# Patient Record
Sex: MALE | Race: WHITE | NOT HISPANIC OR LATINO | Employment: PART TIME | URBAN - METROPOLITAN AREA
[De-identification: names, ages, dates, MRNs, and addresses within clinical notes are randomized per-mention and may not be internally consistent; named-entity substitution may affect disease eponyms.]

---

## 2021-06-28 ENCOUNTER — APPOINTMENT (EMERGENCY)
Dept: CT IMAGING | Facility: HOSPITAL | Age: 31
End: 2021-06-28
Payer: COMMERCIAL

## 2021-06-28 ENCOUNTER — ANESTHESIA EVENT (OUTPATIENT)
Dept: PERIOP | Facility: HOSPITAL | Age: 31
End: 2021-06-28
Payer: COMMERCIAL

## 2021-06-28 ENCOUNTER — ANESTHESIA (OUTPATIENT)
Dept: PERIOP | Facility: HOSPITAL | Age: 31
End: 2021-06-28
Payer: COMMERCIAL

## 2021-06-28 ENCOUNTER — HOSPITAL ENCOUNTER (OUTPATIENT)
Facility: HOSPITAL | Age: 31
Setting detail: OBSERVATION
Discharge: HOME | End: 2021-06-29
Attending: EMERGENCY MEDICINE | Admitting: INTERNAL MEDICINE
Payer: COMMERCIAL

## 2021-06-28 ENCOUNTER — HOSPITAL ENCOUNTER (EMERGENCY)
Facility: HOSPITAL | Age: 31
Discharge: HOME | End: 2021-06-28
Attending: EMERGENCY MEDICINE | Admitting: EMERGENCY MEDICINE
Payer: COMMERCIAL

## 2021-06-28 VITALS
WEIGHT: 232 LBS | DIASTOLIC BLOOD PRESSURE: 65 MMHG | RESPIRATION RATE: 16 BRPM | SYSTOLIC BLOOD PRESSURE: 135 MMHG | TEMPERATURE: 97.8 F | HEART RATE: 73 BPM | OXYGEN SATURATION: 99 %

## 2021-06-28 DIAGNOSIS — N17.9 ACUTE KIDNEY INJURY (HCC): Primary | ICD-10-CM

## 2021-06-28 DIAGNOSIS — K12.2 LUDWIG'S ANGINA: Primary | ICD-10-CM

## 2021-06-28 DIAGNOSIS — K08.89 DENTALGIA: ICD-10-CM

## 2021-06-28 DIAGNOSIS — K04.7 DENTAL INFECTION: ICD-10-CM

## 2021-06-28 DIAGNOSIS — F17.210 TOBACCO DEPENDENCE DUE TO CIGARETTES: ICD-10-CM

## 2021-06-28 DIAGNOSIS — K04.7 DENTAL ABSCESS: ICD-10-CM

## 2021-06-28 PROBLEM — F11.20 OPIOID DEPENDENCE (HCC): Status: ACTIVE | Noted: 2021-06-28

## 2021-06-28 PROBLEM — R79.89 ELEVATED SERUM CREATININE: Status: ACTIVE | Noted: 2021-06-28

## 2021-06-28 LAB
ALBUMIN SERPL BCP-MCNC: 3.6 G/DL (ref 3.5–5)
ALP SERPL-CCNC: 70 U/L (ref 46–116)
ALT SERPL W P-5'-P-CCNC: 19 U/L (ref 12–78)
ANION GAP SERPL CALCULATED.3IONS-SCNC: 7 MMOL/L (ref 4–13)
ANION GAP SERPL CALCULATED.3IONS-SCNC: 8 MMOL/L (ref 4–13)
AST SERPL W P-5'-P-CCNC: 14 U/L (ref 5–45)
BASOPHILS # BLD AUTO: 0.04 THOUSANDS/ΜL (ref 0–0.1)
BASOPHILS # BLD AUTO: 0.1 THOUSANDS/ΜL (ref 0–0.1)
BASOPHILS NFR BLD AUTO: 0 % (ref 0–1)
BASOPHILS NFR BLD AUTO: 1 % (ref 0–2)
BILIRUB SERPL-MCNC: 0.43 MG/DL (ref 0.2–1)
BUN SERPL-MCNC: 22 MG/DL (ref 5–25)
BUN SERPL-MCNC: 27 MG/DL (ref 7–25)
CALCIUM SERPL-MCNC: 8.8 MG/DL (ref 8.3–10.1)
CALCIUM SERPL-MCNC: 9.4 MG/DL (ref 8.6–10.5)
CHLORIDE SERPL-SCNC: 105 MMOL/L (ref 98–107)
CHLORIDE SERPL-SCNC: 110 MMOL/L (ref 100–108)
CO2 SERPL-SCNC: 23 MMOL/L (ref 21–32)
CO2 SERPL-SCNC: 28 MMOL/L (ref 21–31)
CREAT SERPL-MCNC: 1.22 MG/DL (ref 0.6–1.3)
CREAT SERPL-MCNC: 1.59 MG/DL (ref 0.7–1.3)
EOSINOPHIL # BLD AUTO: 0.03 THOUSAND/ΜL (ref 0–0.61)
EOSINOPHIL # BLD AUTO: 0.2 THOUSAND/ΜL (ref 0–0.61)
EOSINOPHIL NFR BLD AUTO: 0 % (ref 0–6)
EOSINOPHIL NFR BLD AUTO: 1 % (ref 0–5)
ERYTHROCYTE [DISTWIDTH] IN BLOOD BY AUTOMATED COUNT: 14.5 % (ref 11.5–14.5)
ERYTHROCYTE [DISTWIDTH] IN BLOOD BY AUTOMATED COUNT: 14.6 % (ref 11.6–15.1)
GFR SERPL CREATININE-BSD FRML MDRD: 57 ML/MIN/1.73SQ M
GFR SERPL CREATININE-BSD FRML MDRD: 78 ML/MIN/1.73SQ M
GLUCOSE SERPL-MCNC: 104 MG/DL (ref 65–99)
GLUCOSE SERPL-MCNC: 99 MG/DL (ref 65–140)
HCT VFR BLD AUTO: 43.1 % (ref 36.5–49.3)
HCT VFR BLD AUTO: 44.3 % (ref 42–47)
HGB BLD-MCNC: 14.2 G/DL (ref 12–17)
HGB BLD-MCNC: 14.7 G/DL (ref 14–18)
IMM GRANULOCYTES # BLD AUTO: 0.07 THOUSAND/UL (ref 0–0.2)
IMM GRANULOCYTES NFR BLD AUTO: 0 % (ref 0–2)
LYMPHOCYTES # BLD AUTO: 0.53 THOUSANDS/ΜL (ref 0.6–4.47)
LYMPHOCYTES # BLD AUTO: 2.1 THOUSANDS/ΜL (ref 0.6–4.47)
LYMPHOCYTES NFR BLD AUTO: 16 % (ref 21–51)
LYMPHOCYTES NFR BLD AUTO: 3 % (ref 14–44)
MAGNESIUM SERPL-MCNC: 2.2 MG/DL (ref 1.9–2.7)
MCH RBC QN AUTO: 29.6 PG (ref 26.8–34.3)
MCH RBC QN AUTO: 29.6 PG (ref 26–34)
MCHC RBC AUTO-ENTMCNC: 32.9 G/DL (ref 31.4–37.4)
MCHC RBC AUTO-ENTMCNC: 33.2 G/DL (ref 31–37)
MCV RBC AUTO: 89 FL (ref 81–99)
MCV RBC AUTO: 90 FL (ref 82–98)
MONOCYTES # BLD AUTO: 0.91 THOUSAND/ΜL (ref 0.17–1.22)
MONOCYTES # BLD AUTO: 1.3 THOUSAND/ΜL (ref 0.17–1.22)
MONOCYTES NFR BLD AUTO: 6 % (ref 4–12)
MONOCYTES NFR BLD AUTO: 9 % (ref 2–12)
NEUTROPHILS # BLD AUTO: 10.1 THOUSANDS/ΜL (ref 1.4–6.5)
NEUTROPHILS # BLD AUTO: 14.1 THOUSANDS/ΜL (ref 1.85–7.62)
NEUTS SEG NFR BLD AUTO: 73 % (ref 42–75)
NEUTS SEG NFR BLD AUTO: 91 % (ref 43–75)
NRBC BLD AUTO-RTO: 0 /100 WBCS
PLATELET # BLD AUTO: 174 THOUSANDS/UL (ref 149–390)
PLATELET # BLD AUTO: 183 THOUSANDS/UL (ref 149–390)
PMV BLD AUTO: 11.3 FL (ref 8.9–12.7)
PMV BLD AUTO: 9.3 FL (ref 8.6–11.7)
POTASSIUM SERPL-SCNC: 3.6 MMOL/L (ref 3.5–5.3)
POTASSIUM SERPL-SCNC: 4.1 MMOL/L (ref 3.5–5.5)
PROT SERPL-MCNC: 7.4 G/DL (ref 6.4–8.2)
RBC # BLD AUTO: 4.8 MILLION/UL (ref 3.88–5.62)
RBC # BLD AUTO: 4.97 MILLION/UL (ref 4.3–5.9)
SODIUM SERPL-SCNC: 140 MMOL/L (ref 136–145)
SODIUM SERPL-SCNC: 141 MMOL/L (ref 134–143)
WBC # BLD AUTO: 13.8 THOUSAND/UL (ref 4.8–10.8)
WBC # BLD AUTO: 15.68 THOUSAND/UL (ref 4.31–10.16)

## 2021-06-28 PROCEDURE — 99284 EMERGENCY DEPT VISIT MOD MDM: CPT

## 2021-06-28 PROCEDURE — 99285 EMERGENCY DEPT VISIT HI MDM: CPT | Performed by: PHYSICIAN ASSISTANT

## 2021-06-28 PROCEDURE — 87147 CULTURE TYPE IMMUNOLOGIC: CPT | Performed by: DENTIST

## 2021-06-28 PROCEDURE — 87116 MYCOBACTERIA CULTURE: CPT | Performed by: DENTIST

## 2021-06-28 PROCEDURE — 87206 SMEAR FLUORESCENT/ACID STAI: CPT | Performed by: DENTIST

## 2021-06-28 PROCEDURE — 96365 THER/PROPH/DIAG IV INF INIT: CPT

## 2021-06-28 PROCEDURE — 87040 BLOOD CULTURE FOR BACTERIA: CPT | Performed by: PHYSICIAN ASSISTANT

## 2021-06-28 PROCEDURE — 70487 CT MAXILLOFACIAL W/DYE: CPT

## 2021-06-28 PROCEDURE — 85025 COMPLETE CBC W/AUTO DIFF WBC: CPT | Performed by: PHYSICIAN ASSISTANT

## 2021-06-28 PROCEDURE — 83735 ASSAY OF MAGNESIUM: CPT | Performed by: EMERGENCY MEDICINE

## 2021-06-28 PROCEDURE — 80053 COMPREHEN METABOLIC PANEL: CPT | Performed by: PHYSICIAN ASSISTANT

## 2021-06-28 PROCEDURE — 99220 PR INITIAL OBSERVATION CARE/DAY 70 MINUTES: CPT | Performed by: INTERNAL MEDICINE

## 2021-06-28 PROCEDURE — G1004 CDSM NDSC: HCPCS

## 2021-06-28 PROCEDURE — 87102 FUNGUS ISOLATION CULTURE: CPT | Performed by: DENTIST

## 2021-06-28 PROCEDURE — 96361 HYDRATE IV INFUSION ADD-ON: CPT

## 2021-06-28 PROCEDURE — 87070 CULTURE OTHR SPECIMN AEROBIC: CPT | Performed by: DENTIST

## 2021-06-28 PROCEDURE — 87075 CULTR BACTERIA EXCEPT BLOOD: CPT | Performed by: DENTIST

## 2021-06-28 PROCEDURE — 85025 COMPLETE CBC W/AUTO DIFF WBC: CPT | Performed by: EMERGENCY MEDICINE

## 2021-06-28 PROCEDURE — 87205 SMEAR GRAM STAIN: CPT | Performed by: DENTIST

## 2021-06-28 PROCEDURE — 96375 TX/PRO/DX INJ NEW DRUG ADDON: CPT

## 2021-06-28 PROCEDURE — 99285 EMERGENCY DEPT VISIT HI MDM: CPT | Performed by: EMERGENCY MEDICINE

## 2021-06-28 PROCEDURE — 80048 BASIC METABOLIC PNL TOTAL CA: CPT | Performed by: EMERGENCY MEDICINE

## 2021-06-28 PROCEDURE — 36415 COLL VENOUS BLD VENIPUNCTURE: CPT | Performed by: EMERGENCY MEDICINE

## 2021-06-28 RX ORDER — HYDROXYZINE HYDROCHLORIDE 25 MG/1
25 TABLET, FILM COATED ORAL EVERY 6 HOURS PRN
Status: DISCONTINUED | OUTPATIENT
Start: 2021-06-28 | End: 2021-06-29 | Stop reason: HOSPADM

## 2021-06-28 RX ORDER — METOCLOPRAMIDE HYDROCHLORIDE 5 MG/ML
10 INJECTION INTRAMUSCULAR; INTRAVENOUS ONCE AS NEEDED
Status: DISCONTINUED | OUTPATIENT
Start: 2021-06-28 | End: 2021-06-28 | Stop reason: HOSPADM

## 2021-06-28 RX ORDER — PROMETHAZINE HYDROCHLORIDE 25 MG/ML
12.5 INJECTION, SOLUTION INTRAMUSCULAR; INTRAVENOUS ONCE AS NEEDED
Status: DISCONTINUED | OUTPATIENT
Start: 2021-06-28 | End: 2021-06-28 | Stop reason: HOSPADM

## 2021-06-28 RX ORDER — NALOXONE HYDROCHLORIDE 0.4 MG/ML
0.1 INJECTION, SOLUTION INTRAMUSCULAR; INTRAVENOUS; SUBCUTANEOUS
Status: DISCONTINUED | OUTPATIENT
Start: 2021-06-28 | End: 2021-06-28

## 2021-06-28 RX ORDER — HYDROMORPHONE HCL 110MG/55ML
PATIENT CONTROLLED ANALGESIA SYRINGE INTRAVENOUS AS NEEDED
Status: DISCONTINUED | OUTPATIENT
Start: 2021-06-28 | End: 2021-06-28

## 2021-06-28 RX ORDER — CLINDAMYCIN PHOSPHATE 600 MG/50ML
600 INJECTION INTRAVENOUS ONCE
Status: DISCONTINUED | OUTPATIENT
Start: 2021-06-28 | End: 2021-06-28

## 2021-06-28 RX ORDER — KETOROLAC TROMETHAMINE 30 MG/ML
15 INJECTION, SOLUTION INTRAMUSCULAR; INTRAVENOUS ONCE
Status: COMPLETED | OUTPATIENT
Start: 2021-06-28 | End: 2021-06-28

## 2021-06-28 RX ORDER — LIDOCAINE HYDROCHLORIDE AND EPINEPHRINE 10; 10 MG/ML; UG/ML
INJECTION, SOLUTION INFILTRATION; PERINEURAL AS NEEDED
Status: DISCONTINUED | OUTPATIENT
Start: 2021-06-28 | End: 2021-06-28 | Stop reason: HOSPADM

## 2021-06-28 RX ORDER — SUCCINYLCHOLINE/SOD CL,ISO/PF 100 MG/5ML
SYRINGE (ML) INTRAVENOUS AS NEEDED
Status: DISCONTINUED | OUTPATIENT
Start: 2021-06-28 | End: 2021-06-28

## 2021-06-28 RX ORDER — HYDROMORPHONE HCL/PF 1 MG/ML
0.5 SYRINGE (ML) INJECTION
Status: DISCONTINUED | OUTPATIENT
Start: 2021-06-28 | End: 2021-06-28

## 2021-06-28 RX ORDER — SODIUM CHLORIDE, SODIUM LACTATE, POTASSIUM CHLORIDE, CALCIUM CHLORIDE 600; 310; 30; 20 MG/100ML; MG/100ML; MG/100ML; MG/100ML
125 INJECTION, SOLUTION INTRAVENOUS CONTINUOUS
Status: DISCONTINUED | OUTPATIENT
Start: 2021-06-28 | End: 2021-06-29

## 2021-06-28 RX ORDER — BUPIVACAINE HYDROCHLORIDE AND EPINEPHRINE 5; 5 MG/ML; UG/ML
INJECTION, SOLUTION EPIDURAL; INTRACAUDAL; PERINEURAL AS NEEDED
Status: DISCONTINUED | OUTPATIENT
Start: 2021-06-28 | End: 2021-06-28 | Stop reason: HOSPADM

## 2021-06-28 RX ORDER — ROCURONIUM BROMIDE 10 MG/ML
INJECTION, SOLUTION INTRAVENOUS AS NEEDED
Status: DISCONTINUED | OUTPATIENT
Start: 2021-06-28 | End: 2021-06-28

## 2021-06-28 RX ORDER — SODIUM CHLORIDE 9 MG/ML
125 INJECTION, SOLUTION INTRAVENOUS CONTINUOUS
Status: DISCONTINUED | OUTPATIENT
Start: 2021-06-28 | End: 2021-06-29

## 2021-06-28 RX ORDER — FENTANYL CITRATE 50 UG/ML
50 INJECTION, SOLUTION INTRAMUSCULAR; INTRAVENOUS ONCE
Status: COMPLETED | OUTPATIENT
Start: 2021-06-28 | End: 2021-06-28

## 2021-06-28 RX ORDER — HYDROMORPHONE HCL IN WATER/PF 6 MG/30 ML
0.2 PATIENT CONTROLLED ANALGESIA SYRINGE INTRAVENOUS
Status: DISCONTINUED | OUTPATIENT
Start: 2021-06-28 | End: 2021-06-28 | Stop reason: HOSPADM

## 2021-06-28 RX ORDER — LABETALOL 20 MG/4 ML (5 MG/ML) INTRAVENOUS SYRINGE
10
Status: DISCONTINUED | OUTPATIENT
Start: 2021-06-28 | End: 2021-06-28 | Stop reason: HOSPADM

## 2021-06-28 RX ORDER — HYDRALAZINE HYDROCHLORIDE 20 MG/ML
5 INJECTION INTRAMUSCULAR; INTRAVENOUS
Status: DISCONTINUED | OUTPATIENT
Start: 2021-06-28 | End: 2021-06-28 | Stop reason: HOSPADM

## 2021-06-28 RX ORDER — ACETAMINOPHEN 325 MG/1
975 TABLET ORAL EVERY 8 HOURS SCHEDULED
Status: DISCONTINUED | OUTPATIENT
Start: 2021-06-28 | End: 2021-06-29 | Stop reason: HOSPADM

## 2021-06-28 RX ORDER — HYDROMORPHONE HYDROCHLORIDE 2 MG/1
2 TABLET ORAL EVERY 6 HOURS PRN
Status: DISCONTINUED | OUTPATIENT
Start: 2021-06-28 | End: 2021-06-28

## 2021-06-28 RX ORDER — OXYCODONE HYDROCHLORIDE 10 MG/1
10 TABLET ORAL EVERY 4 HOURS PRN
Status: DISCONTINUED | OUTPATIENT
Start: 2021-06-28 | End: 2021-06-28

## 2021-06-28 RX ORDER — FENTANYL CITRATE 50 UG/ML
INJECTION, SOLUTION INTRAMUSCULAR; INTRAVENOUS AS NEEDED
Status: DISCONTINUED | OUTPATIENT
Start: 2021-06-28 | End: 2021-06-28

## 2021-06-28 RX ORDER — PROPOFOL 10 MG/ML
INJECTION, EMULSION INTRAVENOUS AS NEEDED
Status: DISCONTINUED | OUTPATIENT
Start: 2021-06-28 | End: 2021-06-28

## 2021-06-28 RX ORDER — AMOXICILLIN 250 MG
1 CAPSULE ORAL DAILY
Status: DISCONTINUED | OUTPATIENT
Start: 2021-06-29 | End: 2021-06-29 | Stop reason: HOSPADM

## 2021-06-28 RX ORDER — OXYCODONE HYDROCHLORIDE 5 MG/1
5 TABLET ORAL EVERY 4 HOURS PRN
Status: DISCONTINUED | OUTPATIENT
Start: 2021-06-28 | End: 2021-06-28

## 2021-06-28 RX ORDER — KETAMINE HCL IN NACL, ISO-OSM 100MG/10ML
SYRINGE (ML) INJECTION AS NEEDED
Status: DISCONTINUED | OUTPATIENT
Start: 2021-06-28 | End: 2021-06-28

## 2021-06-28 RX ORDER — SODIUM CHLORIDE, SODIUM LACTATE, POTASSIUM CHLORIDE, CALCIUM CHLORIDE 600; 310; 30; 20 MG/100ML; MG/100ML; MG/100ML; MG/100ML
INJECTION, SOLUTION INTRAVENOUS CONTINUOUS PRN
Status: DISCONTINUED | OUTPATIENT
Start: 2021-06-28 | End: 2021-06-28

## 2021-06-28 RX ORDER — LIDOCAINE HYDROCHLORIDE 10 MG/ML
INJECTION, SOLUTION EPIDURAL; INFILTRATION; INTRACAUDAL; PERINEURAL AS NEEDED
Status: DISCONTINUED | OUTPATIENT
Start: 2021-06-28 | End: 2021-06-28

## 2021-06-28 RX ORDER — OXYCODONE HYDROCHLORIDE 10 MG/1
10 TABLET ORAL EVERY 4 HOURS PRN
Status: DISCONTINUED | OUTPATIENT
Start: 2021-06-28 | End: 2021-06-29 | Stop reason: HOSPADM

## 2021-06-28 RX ORDER — ONDANSETRON 2 MG/ML
4 INJECTION INTRAMUSCULAR; INTRAVENOUS EVERY 4 HOURS PRN
Status: DISCONTINUED | OUTPATIENT
Start: 2021-06-28 | End: 2021-06-29 | Stop reason: HOSPADM

## 2021-06-28 RX ORDER — ONDANSETRON 2 MG/ML
4 INJECTION INTRAMUSCULAR; INTRAVENOUS ONCE AS NEEDED
Status: COMPLETED | OUTPATIENT
Start: 2021-06-28 | End: 2021-06-28

## 2021-06-28 RX ORDER — OXYCODONE HYDROCHLORIDE 5 MG/1
5 TABLET ORAL EVERY 4 HOURS PRN
Status: DISCONTINUED | OUTPATIENT
Start: 2021-06-28 | End: 2021-06-29 | Stop reason: HOSPADM

## 2021-06-28 RX ORDER — ALBUTEROL SULFATE 2.5 MG/3ML
2.5 SOLUTION RESPIRATORY (INHALATION) ONCE AS NEEDED
Status: DISCONTINUED | OUTPATIENT
Start: 2021-06-28 | End: 2021-06-28 | Stop reason: HOSPADM

## 2021-06-28 RX ORDER — ESMOLOL HYDROCHLORIDE 10 MG/ML
INJECTION INTRAVENOUS AS NEEDED
Status: DISCONTINUED | OUTPATIENT
Start: 2021-06-28 | End: 2021-06-28

## 2021-06-28 RX ORDER — HYDROMORPHONE HYDROCHLORIDE 2 MG/1
4 TABLET ORAL EVERY 4 HOURS PRN
Status: DISCONTINUED | OUTPATIENT
Start: 2021-06-28 | End: 2021-06-28

## 2021-06-28 RX ORDER — ONDANSETRON 2 MG/ML
INJECTION INTRAMUSCULAR; INTRAVENOUS AS NEEDED
Status: DISCONTINUED | OUTPATIENT
Start: 2021-06-28 | End: 2021-06-28

## 2021-06-28 RX ORDER — MIDAZOLAM HYDROCHLORIDE 2 MG/2ML
INJECTION, SOLUTION INTRAMUSCULAR; INTRAVENOUS AS NEEDED
Status: DISCONTINUED | OUTPATIENT
Start: 2021-06-28 | End: 2021-06-28

## 2021-06-28 RX ORDER — ALBUTEROL SULFATE 90 UG/1
AEROSOL, METERED RESPIRATORY (INHALATION) AS NEEDED
Status: DISCONTINUED | OUTPATIENT
Start: 2021-06-28 | End: 2021-06-28

## 2021-06-28 RX ORDER — NICOTINE 21 MG/24HR
1 PATCH, TRANSDERMAL 24 HOURS TRANSDERMAL DAILY
Status: DISCONTINUED | OUTPATIENT
Start: 2021-06-28 | End: 2021-06-29 | Stop reason: HOSPADM

## 2021-06-28 RX ORDER — HYDROMORPHONE HCL/PF 1 MG/ML
1 SYRINGE (ML) INJECTION
Status: DISCONTINUED | OUTPATIENT
Start: 2021-06-28 | End: 2021-06-29 | Stop reason: HOSPADM

## 2021-06-28 RX ORDER — METHADONE HYDROCHLORIDE 10 MG/1
75 TABLET ORAL DAILY
Status: DISCONTINUED | OUTPATIENT
Start: 2021-06-28 | End: 2021-06-29 | Stop reason: HOSPADM

## 2021-06-28 RX ORDER — FENTANYL CITRATE/PF 50 MCG/ML
25 SYRINGE (ML) INJECTION
Status: DISCONTINUED | OUTPATIENT
Start: 2021-06-28 | End: 2021-06-28 | Stop reason: HOSPADM

## 2021-06-28 RX ORDER — CEFAZOLIN SODIUM 2 G/50ML
2000 SOLUTION INTRAVENOUS ONCE
Status: DISCONTINUED | OUTPATIENT
Start: 2021-06-28 | End: 2021-06-28 | Stop reason: HOSPADM

## 2021-06-28 RX ORDER — CLINDAMYCIN HYDROCHLORIDE 300 MG/1
300 CAPSULE ORAL 4 TIMES DAILY
Qty: 28 CAPSULE | Refills: 0 | Status: ON HOLD | OUTPATIENT
Start: 2021-06-28 | End: 2021-06-29

## 2021-06-28 RX ORDER — ONDANSETRON 2 MG/ML
INJECTION INTRAMUSCULAR; INTRAVENOUS
Status: COMPLETED
Start: 2021-06-28 | End: 2021-06-28

## 2021-06-28 RX ORDER — METHADONE HYDROCHLORIDE 10 MG/ML
75 CONCENTRATE ORAL DAILY
COMMUNITY

## 2021-06-28 RX ORDER — LORAZEPAM 2 MG/ML
1 INJECTION INTRAMUSCULAR
Status: DISCONTINUED | OUTPATIENT
Start: 2021-06-28 | End: 2021-06-28 | Stop reason: HOSPADM

## 2021-06-28 RX ORDER — METHADONE HYDROCHLORIDE 10 MG/1
70 TABLET ORAL DAILY
Status: DISCONTINUED | OUTPATIENT
Start: 2021-06-28 | End: 2021-06-28

## 2021-06-28 RX ORDER — HYDROMORPHONE HCL/PF 1 MG/ML
0.5 SYRINGE (ML) INJECTION
Status: DISCONTINUED | OUTPATIENT
Start: 2021-06-28 | End: 2021-06-28 | Stop reason: HOSPADM

## 2021-06-28 RX ADMIN — ONDANSETRON 4 MG: 2 INJECTION INTRAMUSCULAR; INTRAVENOUS at 19:16

## 2021-06-28 RX ADMIN — SODIUM CHLORIDE 1000 ML: 0.9 INJECTION, SOLUTION INTRAVENOUS at 11:14

## 2021-06-28 RX ADMIN — IOHEXOL 85 ML: 350 INJECTION, SOLUTION INTRAVENOUS at 06:19

## 2021-06-28 RX ADMIN — KETOROLAC TROMETHAMINE 15 MG: 30 INJECTION, SOLUTION INTRAMUSCULAR; INTRAVENOUS at 05:43

## 2021-06-28 RX ADMIN — NICOTINE 1 PATCH: 14 PATCH, EXTENDED RELEASE TRANSDERMAL at 12:15

## 2021-06-28 RX ADMIN — ROCURONIUM BROMIDE 30 MG: 50 INJECTION, SOLUTION INTRAVENOUS at 19:00

## 2021-06-28 RX ADMIN — OXYCODONE HYDROCHLORIDE 10 MG: 10 TABLET ORAL at 15:43

## 2021-06-28 RX ADMIN — SODIUM CHLORIDE 1000 ML: 0.9 INJECTION, SOLUTION INTRAVENOUS at 05:43

## 2021-06-28 RX ADMIN — SODIUM CHLORIDE 3 G: 9 INJECTION, SOLUTION INTRAVENOUS at 20:53

## 2021-06-28 RX ADMIN — SODIUM CHLORIDE 125 ML/HR: 0.9 INJECTION, SOLUTION INTRAVENOUS at 12:11

## 2021-06-28 RX ADMIN — ESMOLOL HYDROCHLORIDE 40 MG: 100 INJECTION, SOLUTION INTRAVENOUS at 19:06

## 2021-06-28 RX ADMIN — ONDANSETRON 4 MG: 2 INJECTION INTRAMUSCULAR; INTRAVENOUS at 12:54

## 2021-06-28 RX ADMIN — LIDOCAINE HYDROCHLORIDE 50 MG: 10 INJECTION, SOLUTION EPIDURAL; INFILTRATION; INTRACAUDAL; PERINEURAL at 18:50

## 2021-06-28 RX ADMIN — SODIUM CHLORIDE 3 G: 9 INJECTION, SOLUTION INTRAVENOUS at 12:11

## 2021-06-28 RX ADMIN — PROPOFOL 200 MG: 10 INJECTION, EMULSION INTRAVENOUS at 18:50

## 2021-06-28 RX ADMIN — ALBUTEROL SULFATE 4 PUFF: 90 AEROSOL, METERED RESPIRATORY (INHALATION) at 18:51

## 2021-06-28 RX ADMIN — ONDANSETRON 4 MG: 2 INJECTION INTRAMUSCULAR; INTRAVENOUS at 19:44

## 2021-06-28 RX ADMIN — SODIUM CHLORIDE, SODIUM LACTATE, POTASSIUM CHLORIDE, AND CALCIUM CHLORIDE: .6; .31; .03; .02 INJECTION, SOLUTION INTRAVENOUS at 18:46

## 2021-06-28 RX ADMIN — LORAZEPAM 1 MG: 2 INJECTION INTRAMUSCULAR; INTRAVENOUS at 19:48

## 2021-06-28 RX ADMIN — Medication 100 MG: at 18:50

## 2021-06-28 RX ADMIN — Medication 3 G: at 06:34

## 2021-06-28 RX ADMIN — ONDANSETRON 4 MG: 2 INJECTION INTRAMUSCULAR; INTRAVENOUS at 11:13

## 2021-06-28 RX ADMIN — SODIUM CHLORIDE 125 ML/HR: 0.9 INJECTION, SOLUTION INTRAVENOUS at 19:37

## 2021-06-28 RX ADMIN — HYDROMORPHONE HYDROCHLORIDE 2 MG: 2 TABLET ORAL at 12:54

## 2021-06-28 RX ADMIN — ACETAMINOPHEN 975 MG: 325 TABLET, FILM COATED ORAL at 14:41

## 2021-06-28 RX ADMIN — MIDAZOLAM 2 MG: 1 INJECTION INTRAMUSCULAR; INTRAVENOUS at 18:45

## 2021-06-28 RX ADMIN — Medication 50 MG: at 18:50

## 2021-06-28 RX ADMIN — Medication 25 MCG: at 19:53

## 2021-06-28 RX ADMIN — FENTANYL CITRATE 100 MCG: 50 INJECTION INTRAMUSCULAR; INTRAVENOUS at 18:50

## 2021-06-28 RX ADMIN — FENTANYL CITRATE 50 MCG: 50 INJECTION, SOLUTION INTRAMUSCULAR; INTRAVENOUS at 06:03

## 2021-06-28 RX ADMIN — Medication 25 MCG: at 19:46

## 2021-06-28 RX ADMIN — ACETAMINOPHEN 975 MG: 325 TABLET, FILM COATED ORAL at 21:52

## 2021-06-28 RX ADMIN — HYDROMORPHONE HYDROCHLORIDE 1 MG: 2 INJECTION, SOLUTION INTRAMUSCULAR; INTRAVENOUS; SUBCUTANEOUS at 19:02

## 2021-06-28 RX ADMIN — SUGAMMADEX 200 MG: 100 INJECTION, SOLUTION INTRAVENOUS at 19:09

## 2021-06-28 NOTE — OP NOTE
OPERATIVE REPORT  PATIENT NAME: Sakshi Campbell    :  1990  MRN: [id number]  Pt Location: [facility] OR ROOM 07    SURGERY DATE: 2021    Surgeon(s) and Role: * Marisol Figueroa DMD - Primary    Preop Diagnosis:  Dental abscess [K04 7]    Post-Op Diagnosis Codes:     * Dental abscess [K04 7]    Procedure(s) (LRB):  INCISION AND DRAINAGE (I&D) HEAD/FACE, EXTRACTION TEETH MULTIPLE (N/A)    Specimen(s):  * No specimens in log *    Estimated Blood Loss:   Minimal    Drains:  * No LDAs found *    Anesthesia Type:   General    Operative Indications:  Dental abscess [K04 7]  Left multispace facial infection     Operative Findings:  Left submandibular,  space infection    Complications:   None    Procedure and Technique:  Danniel Opitz is a 78-year-old male who presents with left-sided facial pain and swelling  He was initially seen by his general dentist where he was told he needed a tooth removed  He was supposed to follow up with the scheduled appointment but he presented to the [facility] emergency room with increasing facial pain and swelling  He ultimately signed out AMA  He presented back to [facility] where he was scanned and noted to have a facial abscess  OMFS was consulted  Upon clinical radiographic examination, it was determined that the patient has a left submandibular space infection associated with nonrestorable tooth 19  OR for extraction of tooth 19 and incision and drainage of left facial infection was recommended  All risks, benefits, alternatives and complications to the procedure were discussed with the patient at length including but not limited to bleeding, swelling, pain, infection, nerve damage, damage to adjacent structures, need for any additional procedures including reoperation  Patient verbalized understanding and agree with surgical plan  All consents were obtained questions were answered  The patient was greeted in the preoperative area   All the risks and benefits of the procedure were once again explained and the risks of sinus communication as well as lower chin and lip numbness were explained in detail all questions were answered  Consent had already been signed  Care was then handed back to the anesthesia team     The patient was brought into the operating room by the anesthesia team and the patient was placed in a supine position where the patient remained for the rest of the case  Anesthesia was able to establish an orotracheal intubation without any complications  Care was then handed back to the OMFS team     Patient was draped in sterile manner timeout was performed in which the patient was correctly identified by name medical record number as well as a site of the procedure be performed  Patient had received preoperative IV ancef Antibiotics  Once a timeout was completed oral cavity was thoroughly suctioned with the [employer] suction the moist vaginal packing was used it as a throat pack  Patient was given local anesthesia with 1% lidocaine with 1-100,000 epinephrine as local anesthesia extraorally then intraorally via local blocks and infiltration for OMFS procedures  Attention was drawn extraorally in the left submandibular region where a skin incision was made approximately 2 finger breadths below the inferior border of the mandible in skin and subcutaneous tissue  Curved stats were then introduced for blunt dissection through platysma carying the stats superiorly to the inferior border of the mandible to enter the submandibular space  The stats were withdrawn  Copious purulent drainage was expressed  Cultures were then collected and passed off the field  The stats were then reintroduced staying along the bony mandible to enter the sublingual space while performing bimanual palpation intraorally   The stats were then carried superiorly while staying along the bony mandible to enter the lateral pharyngeal space while performing bimanual palpation intraorally  The stats were withdrawn  Scant drainage noted  The stats were then reintroduced staying along the bony mandible going superiorly and laterally along the ramus to enter the  space  Scant drainage noted  The stats were withdrawn  The incision was then irrigated  The tissues were reapproximated and closed with 5-0 fast absorbing gut suture  Attention was then drawn intraorally where a full thickness mucoperiosteal flap was designed within the gingival sulcus to separate the gingiva from the teeth  The subperiosteal space was entered  Scant Drainage was expressed  Blunt dissection with stats were used to enter through the buccinator muscle to the buccal space  Scant drainage was expressed  The stats were withdrawn  The stats were then reintroduced intraorally under the flap along the lateral ramus of the mandible posteriorly to access the  space intraorally  Scant drainage was expressed  The stats were then withdrawn  The stats were then re-introduced along the lingual aspect of the ramus of the mandible staying along the bony mandible to enter the sublingual and submandibular spaces intraorally  Purulent drainage was expressed  The stats were then withdrawn  Attention was then drawn intraorally where a periosteal elevator was used to separate the gingiva from the teeth  Full thickness mucoperiosteal flaps elevated at all extraction sites  Periosteal elevator to remove minimal crestal bone  Universal forceps were used to extract teeth #19 without any complications  Surgical sites were thoroughly curetted, bone filed, and irrigated with sterile saline  Closure with 3-0 chromic gut sutures  All surgical sites were reevaluated found to be hemostatic  Next the oral cavity was thoroughly irrigated with sterile saline and suctioned with the Yankauer suction  The moist vaginal packing was removed and the oropharynx was suctioned      Care was then handed back to anesthesia team where the patient was extubated in the operating room without any complications and then transferred to the postanesthesia care unit       I was present for the entire procedure    Patient Disposition:  extubated and stable    SIGNATURE: Carolyn Cao DMD  DATE: June 28, 2021  TIME: 6:28 PM

## 2021-06-28 NOTE — ASSESSMENT & PLAN NOTE
States he has been on methadone for [employer]  [facility] [facility]  [facility] was called to verify his current dosage which is 75 mg daily    · Continue current dose of methadone 75 mg daily  · Monitor for over-sedation  · Follow-up as an outpatient

## 2021-06-28 NOTE — CONSULTS
Oral and Maxillofacial Surgery Consult    Pt seen 06/28/21 5:23 PM     HPI: 26 y o  male w history of opioid use on methadone who initially presented to [facility] for facial swelling  Was set for transfer to [facility] but signed out AMA  Now presenting with worsening facial swelling  Had 1 week history of dental pain which resolved  Developed facial swelling 1 day ago  Currently experiencing mild odynophagia, but denies fever, shortness of breath, voice changes, nausea, vomiting  PMH:   History reviewed  No pertinent past medical history       Allergies:   No Known Allergies    Meds:     Current Facility-Administered Medications:     acetaminophen (TYLENOL) tablet 975 mg, 975 mg, Oral, Q8H [address], Bryce G Chirayath, DO, 975 mg at 06/28/21 1441    ampicillin-sulbactam (UNASYN) 3 g in sodium chloride 0 9 % 100 mL IVPB, 3 g, Intravenous, Q6H, Bryce G Chirayath, DO, Last Rate: 200 mL/hr at 06/28/21 1211, 3 g at 06/28/21 1211    HYDROmorphone (DILAUDID) injection 1 mg, 1 mg, Intravenous, Q3H PRN, Bryce G Chirayath, DO    hydrOXYzine HCL (ATARAX) tablet 25 mg, 25 mg, Oral, Q6H PRN, Bryce G Chirayath, DO    methadone (DOLOPHINE) tablet 75 mg, 75 mg, Oral, Daily, Bryce G Chirayath, DO    nicotine (NICODERM CQ) 14 mg/24hr TD 24 hr patch 1 patch, 1 patch, Transdermal, Daily, Bryce G Chirayath, DO, 1 patch at 06/28/21 1215    ondansetron (ZOFRAN) injection 4 mg, 4 mg, Intravenous, Q4H PRN, Bryce G Chirayath, DO, 4 mg at 06/28/21 1254    oxyCODONE (ROXICODONE) immediate release tablet 10 mg, 10 mg, Oral, Q4H PRN, Bryce G Chirayath, DO, 10 mg at 06/28/21 1543    oxyCODONE (ROXICODONE) IR tablet 5 mg, 5 mg, Oral, Q4H PRN, Bryce G Chirayath, DO    [START ON 6/29/2021] senna-docusate sodium (SENOKOT S) 8 6-50 mg per tablet 1 tablet, 1 tablet, Oral, Daily, Bryce G DO Helga    sodium chloride 0 9 % infusion, 125 mL/hr, Intravenous, Continuous, Bryce G DO Helga, Last Rate: 125 mL/hr at 06/28/21 1211, 125 mL/hr at 06/28/21 1211    PSH:   History reviewed  No pertinent surgical history  History reviewed  No pertinent family history  Review of Systems   Constitutional: Positive for fatigue  HENT: Positive for dental problem, facial swelling and trouble swallowing  Respiratory: Negative for shortness of breath  Gastrointestinal: Negative for nausea and vomiting  All other systems reviewed and are negative  Temp:  [97 8 °F (36 6 °C)-99 1 °F (37 3 °C)] 99 1 °F (37 3 °C)  HR:  [65-91] 91  Resp:  [16-18] 18  BP: (115-145)/(58-78) 119/66  SpO2:  [95 %-99 %] 97 %     Intake/Output Summary (Last 24 hours) at 6/28/2021 1723  Last data filed at 6/28/2021 1212  Gross per 24 hour   Intake 1000 ml   Output --   Net 1000 ml      Physical Exam:  Gen: AAOx3  NAD  Resp: Unlabored on RA  Neuro:  Bilateral CN V2-V3 grossly intact  CN VII grossly intact  HEENT:   Extraoral:  Left lower facial swelling extending over inferior border of the mandible to submandibular region  Inferior border of the mandible is palpable  Swelling is soft and fluctuant  Intraoral: FOUZIA ~30mm  Partial edentulism  Tooth #19 with associated vestibular swelling  No purulence on palpation  Tooth and buccal aspect of tooth with moderate tenderness to palpation  FOM elevated on left side and tender  Uvula midline  Imaging: I have personally reviewed pertinent reports  Assessment  26 y o  male  evaluated for left facial swelling  Clinical and radiographich exam reveals bandar-apical pathoology associated with non-restorable tooth #19  Plan:  - Plan for OR today foir extraction of necessary teeth and incision and drainage of fascial spoaces  - Antibiotics (unasyn 3g IV q6h)  - Analgesia as per primary team  - NPO/IVF for OR  - Peridex 15mL swish and spit BID x7d  - Encourage good oral hygiene  - Head of bed elevated    Consults     Counseling / Coordination of Care  Total floor / unit time spent today 30 minutes    Greater than 50% of total time was spent with the patient and / or family counseling and / or coordination of care

## 2021-06-28 NOTE — ASSESSMENT & PLAN NOTE
Patient endorse 1 week-long history of dental pain  Originally presented to [facility]  Did not appear to be septic on presentation  Was found to have elevated creatinine in setting of dehydration  He was started on IV antibiotics and OMFS was contacted their who recommended transfer to [facility] for formal evaluation  Patient signed out against medical advice due to having to take care of his pets  He presents now for admission  CT shows evidence of small submandibular abscess  No prior history of dental issues  No signs of endocarditis on exam   · Follow-up blood cultures  · OMFS consulted, appreciated  · Continue IV Unasyn  · Pain control with Tylenol 975 scheduled Dilaudid 2 mg Q 4 p r n  For moderate, Dilaudid 4 mg Q 4 p r n  For severe, Dilaudid 0 5 mg Q 3 p r n  · Zofran p r n   For nausea  · Continue NPO status until home FS that seemed patient  · Continue to monitor

## 2021-06-28 NOTE — ED PROVIDER NOTES
History  Chief Complaint   Patient presents with    Dental Pain     Patient is a 25-year-old male presenting with left-sided mouth pain  Patient states over last week he had a toothache in that area, now today he has developed swelling underneath the left lateral tongue and worsening pain  Denies any fever, chills, recent dental procedures, respiratory syndromes  Dental Pain  Location:  Lower  Quality:  Dull  Severity:  Severe  Onset quality:  Gradual  Duration:  1 day  Timing:  Constant  Chronicity:  New  Relieved by:  Nothing  Worsened by:  Nothing  Ineffective treatments:  None tried  Associated symptoms: no congestion and no fever        None       History reviewed  No pertinent past medical history  Past Surgical History:   Procedure Laterality Date    INCISION AND DRAINAGE OF WOUND Left 6/28/2021    Procedure: INCISION AND DRAINAGE (I&D) LEFT SUBMANDIBULAR SPACE INFECTION, LEFT  SPACE INFECTION WITH EXTRACTION OF TOOTH #19;  Surgeon: Shashank Wyatt DMD;  Location: [facility] MAIN OR;  Service: Maxillofacial       History reviewed  No pertinent family history  I have reviewed and agree with the history as documented  E-Cigarette/Vaping    E-Cigarette Use Never User      E-Cigarette/Vaping Substances     Social History     Tobacco Use    Smoking status: Current Every Day Smoker     Packs/day: 0 50    Smokeless tobacco: Never Used   Vaping Use    Vaping Use: Never used   Substance Use Topics    Alcohol use: Never    Drug use: Never       Review of Systems   Constitutional: Negative for chills and fever  HENT: Positive for dental problem  Negative for congestion, nosebleeds, rhinorrhea and sore throat  Eyes: Negative for pain and visual disturbance  Respiratory: Negative for cough, wheezing and stridor  Cardiovascular: Negative for chest pain and leg swelling  Gastrointestinal: Negative for abdominal distention, abdominal pain, diarrhea, nausea and vomiting  Genitourinary: Negative for dysuria and frequency  Musculoskeletal: Negative for back pain and joint swelling  Skin: Negative for rash and wound  Neurological: Negative for weakness and numbness  Psychiatric/Behavioral: Negative for decreased concentration and suicidal ideas  Physical Exam  Physical Exam  Constitutional:       Appearance: He is well-developed  Comments: In obvious pain   HENT:      Head: Normocephalic and atraumatic  Mouth/Throat:      Comments: 3cm swelling floor of mouth on left with associated tenderness, submandibular space very tender as well as lateral left mandible  Lower left gums are also tender without obvious abscess  Airway intact  Eyes:      Conjunctiva/sclera: Conjunctivae normal       Pupils: Pupils are equal, round, and reactive to light  Neck:      Trachea: No tracheal deviation  Cardiovascular:      Rate and Rhythm: Normal rate and regular rhythm  Heart sounds: Normal heart sounds  No murmur heard  Pulmonary:      Effort: Pulmonary effort is normal  No respiratory distress  Breath sounds: Normal breath sounds  No wheezing or rales  Abdominal:      General: Bowel sounds are normal  There is no distension  Palpations: Abdomen is soft  Tenderness: There is no abdominal tenderness  Musculoskeletal:         General: No deformity  Cervical back: Normal range of motion and neck supple  Skin:     General: Skin is warm and dry  Capillary Refill: Capillary refill takes less than 2 seconds  Neurological:      Mental Status: He is alert and oriented to person, place, and time  Sensory: No sensory deficit     Psychiatric:         Judgment: Judgment normal          Vital Signs  ED Triage Vitals [06/28/21 0523]   Temperature Pulse Respirations Blood Pressure SpO2   97 8 °F (36 6 °C) 73 16 136/70 99 %      Temp src Heart Rate Source Patient Position - Orthostatic VS BP Location FiO2 (%)   -- -- -- -- --      Pain Score 8           Vitals:    06/28/21 0523 06/28/21 0715 06/28/21 0719   BP: 136/70 134/67 135/65   Pulse: 73 65 73         Visual Acuity      ED Medications  Medications   sodium chloride 0 9 % bolus 1,000 mL (0 mL Intravenous Stopped 6/28/21 0731)   ketorolac (TORADOL) injection 15 mg (15 mg Intravenous Given 6/28/21 0543)   ampicillin-sulbactam (UNASYN) 3 g in sodium chloride 0 9 % 100 mL IVPB (0 g Intravenous Stopped 6/28/21 0732)   fentanyl citrate (PF) 100 MCG/2ML 50 mcg (50 mcg Intravenous Given 6/28/21 0603)   iohexol (OMNIPAQUE) 350 MG/ML injection (SINGLE-DOSE) 85 mL (85 mL Intravenous Given 6/28/21 0619)       Diagnostic Studies  Results Reviewed     Procedure Component Value Units Date/Time    Basic metabolic panel [[id number]]  (Abnormal) Collected: 06/28/21 0540    Lab Status: Final result Specimen: Blood from Arm, Right Updated: 06/28/21 0605     Sodium 141 mmol/L      Potassium 4 1 mmol/L      Chloride 105 mmol/L      CO2 28 mmol/L      ANION GAP 8 mmol/L      BUN 27 mg/dL      Creatinine 1 59 mg/dL      Glucose 104 mg/dL      Calcium 9 4 mg/dL      eGFR 57 ml/min/1 73sq m     Narrative:      [place] guidelines for Chronic Kidney Disease (CKD):     Stage 1 with normal or high GFR (GFR > 90 mL/min/1 73 square meters)    Stage 2 Mild CKD (GFR = 60-89 mL/min/1 73 square meters)    Stage 3A Moderate CKD (GFR = 45-59 mL/min/1 73 square meters)    Stage 3B Moderate CKD (GFR = 30-44 mL/min/1 73 square meters)    Stage 4 Severe CKD (GFR = 15-29 mL/min/1 73 square meters)    Stage 5 End Stage CKD (GFR <15 mL/min/1 73 square meters)  Note: GFR calculation is accurate only with a steady state creatinine    Magnesium [[id number]]  (Normal) Collected: 06/28/21 0540    Lab Status: Final result Specimen: Blood from Arm, Right Updated: 06/28/21 0604     Magnesium 2 2 mg/dL     CBC and differential [[id number]]  (Abnormal) Collected: 06/28/21 0540    Lab Status: Final result Specimen: Blood from Arm, Right Updated: 06/28/21 0553     WBC 13 80 Thousand/uL      RBC 4 97 Million/uL      Hemoglobin 14 7 g/dL      Hematocrit 44 3 %      MCV 89 fL      MCH 29 6 pg      MCHC 33 2 g/dL      RDW 14 5 %      MPV 9 3 fL      Platelets 546 Thousands/uL      Neutrophils Relative 73 %      Lymphocytes Relative 16 %      Monocytes Relative 9 %      Eosinophils Relative 1 %      Basophils Relative 1 %      Neutrophils Absolute 10 10 Thousands/µL      Lymphocytes Absolute 2 10 Thousands/µL      Monocytes Absolute 1 30 Thousand/µL      Eosinophils Absolute 0 20 Thousand/µL      Basophils Absolute 0 10 Thousands/µL                  CT facial bones with contrast   Final Result by Joan Winter MD (06/28 1273)      1  Assessment of dentition is limited due to distorted image quality by streak artifact from prior dental work  2   Small periapical lucency/abscess of left mandibular first molar tooth with suggested thin elongated abscess along the buccal surface measuring 1 7 x 0 2 cm  Soft tissue cellulitis/ phlegmon along the buccal and lingual surface of the left mandible  The study was marked in [facility] for immediate notification  Workstation performed: [id number]                    Procedures  Procedures         ED Course  ED Course as of Jun 30 0145   Mon Jun 28, 2021   8445 Will reach out to OMFS  Mild leukocytosis at 13 8, nonspecific, but will start unasyn  expediting to CT      0556 [place] Kusum Pacheco [place] agrees, not early ludwigs  If not septic can be seen in office  Patient has mild leukocytosis which may be reactive, will ontinue with plan and contact OMFS if any concerning findings  They would be able to see him in the office today if all reassuring      0615 Acute kidney injury, no history of kidney problems, apparently young and health  In setting of elevated WBC and dental infection this raises my concern in this patient  Requires medical admission, but will need OMFS consult if admitted  Discussed with Samuel (OMFS, will admit to [facility] at [facility] and OMFS consult                                SBIRT 20yo+      Most Recent Value   SBIRT (22 yo +)   In order to provide better care to our patients, we are screening all of our patients for alcohol and drug use  Would it be okay to ask you these screening questions? Yes Filed at: 06/28/2021 0599   Initial Alcohol Screen: US AUDIT-C    1  How often do you have a drink containing alcohol?  0 Filed at: 06/28/2021 0525   2  How many drinks containing alcohol do you have on a typical day you are drinking? 0 Filed at: 06/28/2021 0519   3a  Male UNDER 65: How often do you have five or more drinks on one occasion? 0 Filed at: 06/28/2021 0512   3b  FEMALE Any Age, or MALE 65+: How often do you have 4 or more drinks on one occassion? 0 Filed at: 06/28/2021 0547   Audit-C Score  0 Filed at: 06/28/2021 2344   ABIODUN: How many times in the past year have you    Used an illegal drug or used a prescription medication for non-medical reasons? Never Filed at: 06/28/2021 0547                    MDM  Number of Diagnoses or Management Options  Diagnosis management comments: Patient has dental caries and of small amount of swelling under the left tongue  This may just be a sialolith however in the setting of a dental infection, abscess is a concern  Will get a CT  Patient does not have dysphagia, drooling, stridor, trismus, tongue elevation, high fever, hot potato voice    He has mild edema to the lateral side of the floor of the mouth       Amount and/or Complexity of Data Reviewed  Review and summarize past medical records: yes  Independent visualization of images, tracings, or specimens: yes    Risk of Complications, Morbidity, and/or Mortality  Presenting problems: high  Diagnostic procedures: minimal  Management options: high        Disposition  Final diagnoses:   Acute kidney injury ([facility] [address] )   [address] infection   Dental abscess     Time reflects when diagnosis was documented in both MDM as applicable and the Disposition within this note     Time User Action Codes Description Comment    6/28/2021  6:32 AM Zeke Lj Add [N17 9] Acute kidney injury ([facility] [address] )     6/28/2021  7:24 AM Denece Sea Add [K08 89] Dentalgia     6/28/2021  7:24 AM Denece Sea Add [K04 7] Dental infection     6/28/2021  7:30 AM Denece Sea Add [K04 7] Dental abscess       ED Disposition     ED Disposition Condition Date/Time Comment    AMA  Mon Jun 28, 2021  7:25 AM Shaquille Collier signing against medical advice      Follow-up Information     Follow up With Specialties Details Why Contact Info    Beti Palencia DMD Oral Maxillofacial Surgery Schedule an appointment as soon as possible for a visit   [address] [place] [address]  [phone]            Discharge Medication List as of 6/28/2021  7:26 AM      START taking these medications    Details   clindamycin (CLEOCIN) 300 MG capsule Take 1 capsule (300 mg total) by mouth 4 (four) times a day for 7 days, Starting Mon 6/28/2021, Until Mon 7/5/2021, Normal           No discharge procedures on file      PDMP Review     None          ED Provider  Electronically Signed by           Purvi Hernandez DO  06/30/21 2226 [place] Chiquis Velázquez DO  07/04/21 1524

## 2021-06-28 NOTE — ANESTHESIA PREPROCEDURE EVALUATION
Procedure:  INCISION AND DRAINAGE (I&D) HEAD/FACE, EXTRACTION TEETH MULTIPLE (N/A Face)    Relevant Problems   ANESTHESIA (within normal limits)        Physical Exam    Airway    Mallampati score: IV  TM Distance: >3 FB  Neck ROM: full     Dental   Comment: Per OMFS note,     Cardiovascular  Rhythm: regular, Rate: normal, Cardiovascular exam normal    Pulmonary  Pulmonary exam normal Breath sounds clear to auscultation,     Other Findings        Anesthesia Plan  ASA Score- 2 Emergent    Anesthesia Type- general with ASA Monitors  Additional Monitors:   Airway Plan: ETT  Plan Factors-Exercise tolerance (METS): >4 METS  Chart reviewed  Existing labs reviewed  Patient summary reviewed  Patient is a current smoker  Patient smoked on day of surgery  Induction- intravenous  Postoperative Plan- Plan for postoperative opioid use  Planned trial extubation    Informed Consent- Anesthetic plan and risks discussed with patient  I personally reviewed this patient with the CRNA  Discussed and agreed on the Anesthesia Plan with the CRNA  Jessica Young

## 2021-06-28 NOTE — ED PROVIDER NOTES
History  Chief Complaint   Patient presents with    Oral Swelling     Pt reports he was seen at [facility] [facility] for dental pain and was told they wanted to admit him for his WBC count and kidney function as well as IV abx  Pt had to arrange care for animals at home so they told him to come here to be admitted      This is a 68-year-old male patient who was seen up at [facility] approximately 6 hours ago  Was diagnosed with early Chris's angina and acute kidney injury  He send out AMA because he did not have any care for his animals  He was also told to come down to [facility] because he is unable to see an OMFS up the other campus  He presents with pain in the bottom was not under his tongue that started early this morning  States it was worse than when he was seen at the other hospital   He did receive a dose of clindamycin there is no airway compromise at this time  No fever chills  His white count did go from [phone]  The acute kidney injury has resolved  He is not drooling  No fever chills headache blurred vision double vision cough congestion sore throat nausea vomiting diarrhea abdominal pain no chest pain or shortness of breath  Nothing makes better or worse  Patient will be admitted for IV antibiotics for worsening Chris's angina and be seen by OMFS at this facility  Prior to Admission Medications   Prescriptions Last Dose Informant Patient Reported? Taking? clindamycin (CLEOCIN) 300 MG capsule Not Taking at Unknown time  No No   Sig: Take 1 capsule (300 mg total) by mouth 4 (four) times a day for 7 days   Patient not taking: Reported on 6/28/2021   methadone (DOLOPHINE) 10 mg/mL oral concentrated solution 6/27/2021 at Unknown time  Yes Yes   Sig: Take 70 mg by mouth every 6 (six) hours as needed for moderate pain      Facility-Administered Medications: None       History reviewed  No pertinent past medical history  History reviewed   No pertinent surgical history  History reviewed  No pertinent family history  I have reviewed and agree with the history as documented  E-Cigarette/Vaping    E-Cigarette Use Never User      E-Cigarette/Vaping Substances     Social History     Tobacco Use    Smoking status: Current Every Day Smoker     Packs/day: 0 50    Smokeless tobacco: Never Used   Vaping Use    Vaping Use: Never used   Substance Use Topics    Alcohol use: Never    Drug use: Never       Review of Systems   Constitutional: Negative for diaphoresis, fatigue and fever  HENT: Positive for dental problem  Negative for congestion, drooling, ear discharge, ear pain, facial swelling, hearing loss, mouth sores, nosebleeds, postnasal drip, rhinorrhea, sinus pressure, sinus pain, sneezing, sore throat, tinnitus, trouble swallowing and voice change  Eyes: Negative for photophobia, pain, discharge and visual disturbance  Respiratory: Negative for cough, choking, chest tightness, shortness of breath and wheezing  Cardiovascular: Negative for chest pain and palpitations  Gastrointestinal: Negative for abdominal distention, abdominal pain, diarrhea and vomiting  Genitourinary: Negative for dysuria, flank pain and frequency  Musculoskeletal: Negative for back pain, gait problem and joint swelling  Skin: Negative for color change and rash  Neurological: Negative for dizziness, syncope and headaches  Psychiatric/Behavioral: Negative for behavioral problems and confusion  The patient is not nervous/anxious  All other systems reviewed and are negative  Physical Exam  Physical Exam  Vitals and nursing note reviewed  Constitutional:       General: He is not in acute distress  Appearance: He is well-developed  He is not ill-appearing, toxic-appearing or diaphoretic  HENT:      Head: Normocephalic and atraumatic        Right Ear: Tympanic membrane, ear canal and external ear normal       Left Ear: Tympanic membrane, ear canal and external ear normal       Nose: Nose normal       Comments: Patient has fullness and discomfort underneath his tongue consistent with Chris's angina  Patient did have a previous CT scan does not need to repeat     Mouth/Throat:      Mouth: Mucous membranes are moist       Pharynx: No oropharyngeal exudate or posterior oropharyngeal erythema  Eyes:      Conjunctiva/sclera: Conjunctivae normal       Pupils: Pupils are equal, round, and reactive to light  Cardiovascular:      Rate and Rhythm: Normal rate and regular rhythm  Pulmonary:      Effort: Pulmonary effort is normal       Breath sounds: Normal breath sounds  Abdominal:      General: Bowel sounds are normal       Palpations: Abdomen is soft  Tenderness: There is no abdominal tenderness  Musculoskeletal:         General: Normal range of motion  Cervical back: Normal range of motion and neck supple  No rigidity or tenderness  Lymphadenopathy:      Cervical: No cervical adenopathy  Skin:     General: Skin is warm  Capillary Refill: Capillary refill takes less than 2 seconds  Neurological:      General: No focal deficit present  Mental Status: He is alert and oriented to person, place, and time  Mental status is at baseline     Psychiatric:         Behavior: Behavior normal          Vital Signs  ED Triage Vitals [06/28/21 1006]   Temperature Pulse Respirations Blood Pressure SpO2   98 7 °F (37 1 °C) 78 18 145/78 98 %      Temp Source Heart Rate Source Patient Position - Orthostatic VS BP Location FiO2 (%)   Oral Monitor Sitting Left arm --      Pain Score       8           Vitals:    06/28/21 1006 06/28/21 1130   BP: 145/78 130/61   Pulse: 78 72   Patient Position - Orthostatic VS: Sitting          Visual Acuity      ED Medications  Medications   sodium chloride 0 9 % bolus 1,000 mL (1,000 mL Intravenous New Bag 6/28/21 1114)   sodium chloride 0 9 % infusion (has no administration in time range)   nicotine (NICODERM CQ) 14 mg/24hr TD 24 hr patch 1 patch (has no administration in time range)   ampicillin-sulbactam (UNASYN) 3 g in sodium chloride 0 9 % 100 mL IVPB (has no administration in time range)   senna-docusate sodium (SENOKOT S) 8 6-50 mg per tablet 1 tablet (has no administration in time range)   ondansetron (ZOFRAN) injection 4 mg (has no administration in time range)   acetaminophen (TYLENOL) tablet 975 mg (has no administration in time range)   HYDROmorphone (DILAUDID) tablet 2 mg (has no administration in time range)   HYDROmorphone (DILAUDID) tablet 4 mg (has no administration in time range)   HYDROmorphone (DILAUDID) injection 0 5 mg (has no administration in time range)   clindamycin (CLEOCIN) IVPB (premix in dextrose) 600 mg 50 mL (has no administration in time range)   ondansetron (ZOFRAN) 4 mg/2 mL injection **ADS Override Pull** (4 mg  Given 6/28/21 1113)       Diagnostic Studies  Results Reviewed     Procedure Component Value Units Date/Time    Comprehensive metabolic panel [[id number]]  (Abnormal) Collected: 06/28/21 1112    Lab Status: Final result Specimen: Blood from Arm, Right Updated: 06/28/21 1147     Sodium 140 mmol/L      Potassium 3 6 mmol/L      Chloride 110 mmol/L      CO2 23 mmol/L      ANION GAP 7 mmol/L      BUN 22 mg/dL      Creatinine 1 22 mg/dL      Glucose 99 mg/dL      Calcium 8 8 mg/dL      AST 14 U/L      ALT 19 U/L      Alkaline Phosphatase 70 U/L      Total Protein 7 4 g/dL      Albumin 3 6 g/dL      Total Bilirubin 0 43 mg/dL      eGFR 78 ml/min/1 73sq m     Narrative:      [place] guidelines for Chronic Kidney Disease (CKD):     Stage 1 with normal or high GFR (GFR > 90 mL/min/1 73 square meters)    Stage 2 Mild CKD (GFR = 60-89 mL/min/1 73 square meters)    Stage 3A Moderate CKD (GFR = 45-59 mL/min/1 73 square meters)    Stage 3B Moderate CKD (GFR = 30-44 mL/min/1 73 square meters)    Stage 4 Severe CKD (GFR = 15-29 mL/min/1 73 square meters)    Stage 5 End Stage CKD (GFR <15 mL/min/1 73 square meters)  Note: GFR calculation is accurate only with a steady state creatinine    Blood culture #1 [[id number]] Collected: 06/28/21 1128    Lab Status: In process Specimen: Blood from Arm, Left Updated: 06/28/21 1139    CBC and differential [[id number]]  (Abnormal) Collected: 06/28/21 1112    Lab Status: Preliminary result Specimen: Blood from Arm, Right Updated: 06/28/21 1125     WBC 15 68 Thousand/uL      RBC 4 80 Million/uL      Hemoglobin 14 2 g/dL      Hematocrit 43 1 %      MCV 90 fL      MCH 29 6 pg      MCHC 32 9 g/dL      RDW 14 6 %      MPV 11 3 fL      Platelets 909 Thousands/uL     Blood culture #2 [[id number]] Collected: 06/28/21 1112    Lab Status: In process Specimen: Blood from Arm, Right Updated: 06/28/21 1120                 No orders to display              Procedures  Procedures         ED Course                                           MDM    Disposition  Final diagnoses:   Chris's angina     Time reflects when diagnosis was documented in both MDM as applicable and the Disposition within this note     Time User Action Codes Description Comment    6/28/2021 11:14 AM Atilio Escoto Add [K12 2] [place] Royal angina     6/28/2021 11:14 AM Atilio Escoto Add [N17 9] Acute kidney injury ([facility] [address] )     6/28/2021 11:33 AM Bryce Partida Modify [K12 2] Chris's angina     6/28/2021 11:48 AM Atilio Escoto Remove [N17 9] Acute kidney injury [facility])       ED Disposition     ED Disposition Condition Date/Time Comment    Admit Stable Mon Jun 28, 2021 11:14 AM Case was discussed with Resident sod  and the patient's admission status was agreed to be Admission Status: observation status to the service of Dr Kinsey Solis   Follow-up Information    None         Patient's Medications   Discharge Prescriptions    No medications on file     No discharge procedures on file      PDMP Review     None          ED Provider  Electronically Signed by           Imtiaz Simeon HAYDEE  06/28/21 1153

## 2021-06-28 NOTE — ED NOTES
Pt states he has broken tooth for a while , but woke up this am with swelling of lower jaw and lump under tongue     Terrell Portillo RN  47/44/31 7616

## 2021-06-28 NOTE — ED CARE HANDOFF
Emergency Department Sign Out Note        Sign out and transfer of care from [facility] [address]  See Separate Emergency Department note  The patient, Sebas Kate, was evaluated by the previous provider for dental pain  Workup Completed:  Labs CT     ED Course / Workup Pending (followup): Patient was seen examined by bedside  Patient is awake alert oriented x3  Requesting to be discharged home  Patient is any his medical advise  Precautions provided  Recommending to stay for further treatment patient continued to refuse  The patient has decided to leave against medical advice, stating reasoning of not wishing to stay in hospital  I have assessed that the patient has adequate capacity to make this medical decision and patient did not appear intoxicated and denies any recent substance abuse including but not limited to alcohol or drugs  The patient refuses my recommended plan of care  The risks have been explained to the patient, including death, and long-term disability, pain and suffering  The benefits of my plan of care have also been explained, including the availability and proximity of nurses, physicians, monitoring, diagnostic testing, and treatment  The patient was able to understand and state the rationale I gave for my plan of care and the risks of refusing this plan of care  The had the opportunity to ask questions about their medical condition, and was treated to the extent that they would allow and knows that they may return to this or any other emergency department for care at any time if they change their mind, or their clinical condition changes  I have emphasized that if they do not return to an emergency department then they should at least seek care from a medical professional immediately, though they understand this is not a substitute for emergency care and that they may suffer irreversible harm or death if they obtain non-emergency care        PAC called and was informed regarding pt decision  recommended to avoid taking NSAID  Take Tylenol for dental pain  Recommending to return back to the ED for further treatment patient verbalized understand the plan DC home  ED Course as of Jun 28 0726 Mon Jun 28, 2021   0719 Called by pt nurse by bedside  Pt is requesting to be dc home states" there are things that I have to take care at home have to come in because have someone to look after my dog  I will sign myself out and will come back"  Returning Precaution discussed with patient  Recommending to his stay for further treatment however patient continues refused  Awake alert oriented x3 GCS 15  Patient extending himself out against medical advice  Procedures  MDM    Disposition  Final diagnoses:   Acute kidney injury ([facility] [address] )   [address] infection     Time reflects when diagnosis was documented in both MDM as applicable and the Disposition within this note     Time User Action Codes Description Comment    6/28/2021  6:32 AM Serene Idol Add [N17 9] Acute kidney injury ([facility] [address] )     6/28/2021  7:24 AM Enedelia Phyllis Add [K08 89] Dentalgia     6/28/2021  7:24 AM Enedelia Phyllis Add [K04 7] Dental infection       ED Disposition     ED Disposition Condition Date/Time Comment    AMA  Mon Jun 28, 2021  7:25 AM Kierra Mariano signing against medical advice      Follow-up Information     Follow up With Specialties Details Why Contact Info    Ebony Wheeler DMD Oral Maxillofacial Surgery Schedule an appointment as soon as possible for a visit   [address] [place] [address] [address] [facility]  [phone]          Patient's Medications   Discharge Prescriptions    CLINDAMYCIN (CLEOCIN) 300 MG CAPSULE    Take 1 capsule (300 mg total) by mouth 4 (four) times a day for 7 days       Start Date: 6/28/2021 End Date: 7/5/2021       Order Dose: 300 mg       Quantity: 28 capsule    Refills: 0     No discharge procedures on file         ED Provider  Electronically Signed by Fernando Kussmaul, MD  06/28/21 9751

## 2021-06-28 NOTE — QUICK NOTE
Went to evaluate patient due to worsening left jaw pain due to his dental abscess  Upon evaluation patient  Uncomfortable, stated he had a lot of pain stated that oral Dilaudid did not work for him  Also appeared very anxious as he feels that this pain and swelling has gotten worse  He does endorse pain when swallowing, some mild shortness of breath  He has a however completely hemodynamically stable on room air  Examination did not reveal any stridor or or respiratory distress  Stated that he has not had reaction to oxycodone in the past   Will order oral oxycodone 5 mg and 10 mg for moderate to severe respectively  Will increase Dilaudid to 1 mg Q 3 p r n  Home FS was once again contacted stated that he has an add on case and will be taken down later today  Continue NPO sips with meds status  Continue to monitor closely  Low threshold for surgery consult should he develop any signs of respiratory distress or failure

## 2021-06-28 NOTE — DISCHARGE INSTRUCTIONS
POST OPERATIVE INSTRUCTIONS FOLLOWING ORAL SURGERY    Swelling: To reduce swelling, place ice bag on your face up to 12 hours following surgery  This is an important factor in keeping swelling to a minimum  Swelling is common and need not cause alarm  Rinsing: DO NOT RINSE for the first 12 hours after surgery  After 24 hours it is important to rinse, using warm salt water (not over the counter mouthwash) 3 to 4 times a day, particularly after eating  Brush areas of mouth not affected by the surgery starting tomorrow  Spitting: DO NOT SPIT OUT frequent spitting will cause bleeding to continue  Exercise Jaw: In some cases following oral surgery, it becomes difficult to open your mouth  Exercise your jaw frequently by attempting to open your mouth wide  You may experience discomfort at first, however, with continued exercise the discomfort is reduced  Diet: After having oral surgery it is recommended the patient maintain a semi-liquid diet for 24 hours  A regular diet should be resumed as soon as possible, avoiding peanuts, pretzels, and foods with seeds  Vomiting: Occasionally, patients will have nausea after surgery  Tea, ginger ale, and soup broth will help this complication  Pain: A prescription for pain relieving drugs is given when surgery is extensive  For lesser surgical procedures, it is recommended the patient use Motrin or Advil  If you are in pain and the drug you are taking does not help, please contact us and we will try to remedy the situation  Bleeding: Bite on gauze for 30 minutes  If the bleeding continues, bite on new gauze for an additional 30 minutes  If bleeding continues, place a damp tea bag over the socket and continue to bite down for an additional 30 minutes  Frequent gauze changes allow bleeding to continue  Smoking: It is important you DO NOT SMOKE after surgery  Smoking caused dry socket, which is very painful      Concerns: May call [facility] at [phone]  Emergency: Go to the [address] at [facility] and ask for the Oral Surgeon on call  DO NOT WAIT until your post-operative appointment to consult [facility] [phone]

## 2021-06-28 NOTE — ASSESSMENT & PLAN NOTE
Patient presented to [facility] [address] with a elevated creatinine to 1 59, no prior baseline  Likely JOSÉ MIGUEL in the setting of dehydration  · Continue IV fluid hydration with normal saline at 125 cc/hour for 12 hours  · Monitor urine output  · Monitor creatinine

## 2021-06-28 NOTE — H&P
INTERNAL MEDICINE RESIDENCY ADMISSION H&P     Name: Benny Kilgore   Age & Sex: 26 y o  male   MRN: [id number]  Unit/Bed#: ED 09   Encounter: [id number]  Primary Care Provider: No primary care provider on file  Code Status: Level 1 - Full Code  Admission Status: OBSERVATION  Disposition: Patient requires Med/Surg    Admit to team: SOD Team A    ASSESSMENT/PLAN     Principal Problem:    Chris's angina  Active Problems:    Elevated serum creatinine    [facility]      * Chris's angina  Assessment & Plan  Patient endorse 1 week-long history of dental pain  Originally presented to [facility]  Did not appear to be septic on presentation  Was found to have elevated creatinine in setting of dehydration  He was started on IV antibiotics and OMFS was contacted their who recommended transfer to [facility] for formal evaluation  Patient signed out against medical advice due to having to take care of his pets  He presents now for admission  CT shows evidence of small submandibular abscess  No prior history of dental issues  No signs of endocarditis on exam   · Follow-up blood cultures  · OMFS consulted, appreciated  · Continue IV Unasyn  · Pain control with Tylenol 975 scheduled Dilaudid 2 mg Q 4 p r n  For moderate, Dilaudid 4 mg Q 4 p r n  For severe, Dilaudid 0 5 mg Q 3 p r n  · Zofran p r n  For nausea  · Continue NPO status until home FS that seemed patient  · Continue to monitor    [facility] )  Assessment & Plan  States he has been on methadone for years  "[place]  for methadone  Center was called to verify his current dosage which is 75 mg daily    · Continue current dose of methadone 75 mg daily  · Monitor for over-sedation  · Follow-up as an outpatient    Elevated serum creatinine  Assessment & Plan  Patient presented to [facility] with a elevated creatinine to 1 59, no prior baseline  Likely JOSÉ MIGUEL in the setting of dehydration  · Continue IV fluid hydration with normal saline at 125 cc/hour for 12 hours  · Avoid NSAIDs, nephrotoxins, hypotension  · Monitor urine output  · Monitor creatinine        VTE Pharmacologic Prophylaxis: Reason for no pharmacologic prophylaxis Low risk  VTE Mechanical Prophylaxis: sequential compression device    CHIEF COMPLAINT     Chief Complaint   Patient presents with    Oral Swelling     Pt reports he was seen at [facility] today for dental pain and was told they wanted to admit him for his WBC count and kidney function as well as IV abx  Pt had to arrange care for animals at home so they told him to come here to be admitted       HISTORY OF PRESENT ILLNESS     56-year-old male with past medical history of opioid dependence who is presenting to [facility] for dental abscess, Chris's angina  He stated the pain began on left lower teeth 1 week ago  Initially was very painful than improved and now suddenly worsened over the past day  He denies any fever, chills, chest pain, shortness of breath  He has pain with swallowing  He has a history of opioid dependence has been on methadone for years  [facility]  Denies using any other drugs including IV heroin, cocaine, etc   He smokes a half pack per day since being a teenager  Denies any significant alcohol use  On presentation the ED he appeared hemodynamically stable  He had elevated leukocytosis at 13 8, elevated creatinine 1 59  On CT he exhibited small left submandibular abscess  Luz patton was contacted at [facility] who recommended transfer for formal evaluation and patient  Patient initially signed out against medical advice due to being able to take care of his pets and now presents for formal admission for dental abscess  REVIEW OF SYSTEMS     Review of Systems   Constitutional: Negative for chills and fever     HENT: Positive for facial swelling, sore throat and trouble swallowing  Negative for congestion and sinus pressure  Dental pain   Respiratory: Negative for cough and shortness of breath  Cardiovascular: Negative for chest pain, palpitations and leg swelling  Gastrointestinal: Negative for abdominal pain, constipation, diarrhea, nausea and vomiting  Genitourinary: Negative for dysuria and hematuria  Musculoskeletal: Negative for arthralgias and gait problem  Neurological: Negative for dizziness and headaches  Psychiatric/Behavioral: Negative for agitation and confusion  OBJECTIVE     Vitals:    21 1006 21 1130   BP: 145/78 130/61   BP Location: Left arm Left arm   Pulse: 78 72   Resp: 18 18   Temp: 98 7 °F (37 1 °C)    TempSrc: Oral    SpO2: 98% 99%      Temperature:   Temp (24hrs), Av 3 °F (36 8 °C), Min:97 8 °F (36 6 °C), Max:98 7 °F (37 1 °C)    Temperature: 98 7 °F (37 1 °C)  Intake & Output:  I/O        07 -  0700  07 -  0700  07 -  0700    IV Piggyback   1000    Total Intake   1000    Net   +1000               Weights: There is no height or weight on file to calculate BMI  Weight (last 2 days)     None        Physical Exam  Constitutional:       General: He is not in acute distress  Appearance: Normal appearance  He is not ill-appearing  HENT:      Head: Normocephalic and atraumatic  Nose: Nose normal       Mouth/Throat:      Mouth: Mucous membranes are dry  Comments: Erythema swelling around left lower molar  Some edema is present and is noticeable on examination  There is no evidence of airway compromise  Eyes:      Extraocular Movements: Extraocular movements intact  Conjunctiva/sclera: Conjunctivae normal       Pupils: Pupils are equal, round, and reactive to light  Cardiovascular:      Rate and Rhythm: Normal rate and regular rhythm  Pulses: Normal pulses  Heart sounds: Normal heart sounds  No murmur heard  No friction rub  No gallop  Pulmonary:      Effort: Pulmonary effort is normal  No respiratory distress  Breath sounds: Normal breath sounds  No wheezing or rales  Abdominal:      General: Abdomen is flat  Bowel sounds are normal  There is no distension  Palpations: Abdomen is soft  Tenderness: There is no abdominal tenderness  There is no guarding  Musculoskeletal:      Right lower leg: No edema  Left lower leg: No edema  Skin:     General: Skin is warm and dry  Neurological:      General: No focal deficit present  Mental Status: He is alert and oriented to person, place, and time  Psychiatric:         Mood and Affect: Mood normal          Behavior: Behavior normal        PAST MEDICAL HISTORY   History reviewed  No pertinent past medical history  PAST SURGICAL HISTORY   History reviewed  No pertinent surgical history  SOCIAL & FAMILY HISTORY     Social History     Substance and Sexual Activity   Alcohol Use Never     Social History     Substance and Sexual Activity   Drug Use Never     Social History     Tobacco Use   Smoking Status Current Every Day Smoker    Packs/day: 0 50   Smokeless Tobacco Never Used     History reviewed  No pertinent family history  LABORATORY DATA     Labs: I have personally reviewed pertinent reports      Results from last 7 days   Lab Units 06/28/21  1112 06/28/21  0540   WBC Thousand/uL 15 68* 13 80*   HEMOGLOBIN g/dL 14 2 14 7   HEMATOCRIT % 43 1 44 3   PLATELETS Thousands/uL 174 183   NEUTROS PCT %  --  73   MONOS PCT %  --  9      Results from last 7 days   Lab Units 06/28/21  1112 06/28/21  0540   POTASSIUM mmol/L 3 6 4 1   CHLORIDE mmol/L 110* 105   CO2 mmol/L 23 28   BUN mg/dL 22 27*   CREATININE mg/dL 1 22 1 59*   CALCIUM mg/dL 8 8 9 4   ALK PHOS U/L 70  --    ALT U/L 19  --    AST U/L 14  --      Results from last 7 days   Lab Units 06/28/21  0540   MAGNESIUM mg/dL 2 2                      Micro:  No results found for: Michelle Sterling, SPUTUMCULTUR  IMAGING & DIAGNOSTIC TESTS     Imaging: I have personally reviewed pertinent reports  CT facial bones with contrast    Result Date: 6/28/2021  Impression: 1  Assessment of dentition is limited due to distorted image quality by streak artifact from prior dental work  2   Small periapical lucency/abscess of left mandibular first molar tooth with suggested thin elongated abscess along the buccal surface measuring 1 7 x 0 2 cm  Soft tissue cellulitis/ phlegmon along the buccal and lingual surface of the left mandible  The study was marked in [facility] for immediate notification  Workstation performed: [id number]     EKG, Pathology, and Other Studies: I have personally reviewed pertinent reports  ALLERGIES   No Known Allergies  MEDICATIONS PRIOR TO ARRIVAL     Prior to Admission medications    Medication Sig Start Date End Date Taking?  Authorizing Provider   methadone (DOLOPHINE) 10 mg/mL oral concentrated solution Take 70 mg by mouth every 6 (six) hours as needed for moderate pain   Yes Historical Provider, MD   clindamycin (CLEOCIN) 300 MG capsule Take 1 capsule (300 mg total) by mouth 4 (four) times a day for 7 days  Patient not taking: Reported on 6/28/2021 6/28/21 7/5/21  Angelique Smith MD     MEDICATIONS ADMINISTERED IN LAST 24 HOURS     Medication Administration - last 24 hours from 06/27/2021 1240 to 06/28/2021 1240       Date/Time Order Dose Route Action Action by     06/28/2021 1212 sodium chloride 0 9 % bolus 1,000 mL 0 mL Intravenous Stopped Catie Lechuga RN     06/28/2021 1114 sodium chloride 0 9 % bolus 1,000 mL 1,000 mL Intravenous [address] [facility]     06/28/2021 1113 ondansetron (ZOFRAN) 4 mg/2 mL injection **ADS Override Pull** 4 mg  Given Catie Lechuga RN     06/28/2021 1211 sodium chloride 0 9 % infusion 125 mL/hr Intravenous [address] [facility]     06/28/2021 1215 nicotine (NICODERM CQ) 14 mg/24hr TD 24 hr patch 1 patch 1 patch Transdermal Medication Applied Kishore De La Garza RN     06/28/2021 1211 ampicillin-sulbactam (UNASYN) 3 g in sodium chloride 0 9 % 100 mL IVPB 3 g Intravenous [address] Kishore De La Garza [facility]     06/28/2021 1154 methadone (DOLOPHINE) tablet 70 mg   Oral Canceled Entry Kishore De La Garza RN     06/28/2021 1212 clindamycin (CLEOCIN) IVPB (premix in dextrose) 600 mg 50 mL   Intravenous Canceled Entry Kishore De La Garza RN        CURRENT MEDICATIONS     Current Facility-Administered Medications   Medication Dose Route Frequency Provider Last Rate    acetaminophen  975 mg Oral Q8H [address] Bryce G Chirayath, DO      ampicillin-sulbactam  3 g Intravenous Q6H Bryce G Chirayath, DO 3 g (06/28/21 1211)    HYDROmorphone  0 5 mg Intravenous Q3H PRN Bryce G Chirayath, DO      HYDROmorphone  2 mg Oral Q6H PRN Bryce G Chirayath, DO      HYDROmorphone  4 mg Oral Q4H PRN Bryce G Chirayath, DO      methadone  75 mg Oral Daily Bryce G Chirayath, DO      nicotine  1 patch Transdermal Daily Bryce G Chirayath, DO      ondansetron  4 mg Intravenous Q4H PRN Bryce G Chirayath, DO      [START ON 6/29/2021] senna-docusate sodium  1 tablet Oral Daily Bryce G Chirayath, DO      sodium chloride  125 mL/hr Intravenous Continuous Bryce G Chirayath,  mL/hr (06/28/21 1211)     sodium chloride, 125 mL/hr, Last Rate: 125 mL/hr (06/28/21 1211)      HYDROmorphone, 0 5 mg, Q3H PRN  HYDROmorphone, 2 mg, Q6H PRN  HYDROmorphone, 4 mg, Q4H PRN  ondansetron, 4 mg, Q4H PRN        Admission Time  I spent 30 minutes admitting the patient  This involved direct patient contact where I performed a full history and physical, reviewing previous records, and reviewing laboratory and other diagnostic studies  Portions of the record may have been created with voice recognition software  Occasional wrong word or "sound a like" substitutions may have occurred due to the inherent limitations of voice recognition software    Read the chart carefully and recognize, using context, where substitutions have occurred     ==  Chase Dan, [facility]  Internal Medicine Residency PGY-2

## 2021-06-28 NOTE — NURSING NOTE
Pt requesting to see his doctor, says the tylenol does not do anything for his pain and the dilaudid makes him sick  Pt does not want to take methadone at this time b/c he said someone told him not to take it b/c he is on narcotics in the hospital  Pt says he is having trouble breathing and swallowing b/c the pain and swelling is getting worse  Dr Li cuba and is in to see pt

## 2021-06-29 VITALS
RESPIRATION RATE: 18 BRPM | HEART RATE: 75 BPM | DIASTOLIC BLOOD PRESSURE: 67 MMHG | SYSTOLIC BLOOD PRESSURE: 131 MMHG | OXYGEN SATURATION: 95 % | TEMPERATURE: 98.8 F

## 2021-06-29 PROBLEM — R79.89 ELEVATED SERUM CREATININE: Status: RESOLVED | Noted: 2021-06-28 | Resolved: 2021-06-29

## 2021-06-29 PROBLEM — F17.210 TOBACCO DEPENDENCE DUE TO CIGARETTES: Status: ACTIVE | Noted: 2021-06-29

## 2021-06-29 LAB
ANION GAP SERPL CALCULATED.3IONS-SCNC: 6 MMOL/L (ref 4–13)
BASOPHILS # BLD AUTO: 0.04 THOUSANDS/ΜL (ref 0–0.1)
BASOPHILS NFR BLD AUTO: 0 % (ref 0–1)
BUN SERPL-MCNC: 13 MG/DL (ref 5–25)
CALCIUM SERPL-MCNC: 7.9 MG/DL (ref 8.3–10.1)
CHLORIDE SERPL-SCNC: 113 MMOL/L (ref 100–108)
CO2 SERPL-SCNC: 23 MMOL/L (ref 21–32)
CREAT SERPL-MCNC: 0.81 MG/DL (ref 0.6–1.3)
EOSINOPHIL # BLD AUTO: 0.01 THOUSAND/ΜL (ref 0–0.61)
EOSINOPHIL NFR BLD AUTO: 0 % (ref 0–6)
ERYTHROCYTE [DISTWIDTH] IN BLOOD BY AUTOMATED COUNT: 14.7 % (ref 11.6–15.1)
GFR SERPL CREATININE-BSD FRML MDRD: 118 ML/MIN/1.73SQ M
GLUCOSE P FAST SERPL-MCNC: 133 MG/DL (ref 65–99)
GLUCOSE SERPL-MCNC: 133 MG/DL (ref 65–140)
HCT VFR BLD AUTO: 37.2 % (ref 36.5–49.3)
HGB BLD-MCNC: 12.1 G/DL (ref 12–17)
IMM GRANULOCYTES # BLD AUTO: 0.09 THOUSAND/UL (ref 0–0.2)
IMM GRANULOCYTES NFR BLD AUTO: 1 % (ref 0–2)
LYMPHOCYTES # BLD AUTO: 0.87 THOUSANDS/ΜL (ref 0.6–4.47)
LYMPHOCYTES NFR BLD AUTO: 6 % (ref 14–44)
MAGNESIUM SERPL-MCNC: 1.8 MG/DL (ref 1.6–2.6)
MCH RBC QN AUTO: 29.1 PG (ref 26.8–34.3)
MCHC RBC AUTO-ENTMCNC: 32.5 G/DL (ref 31.4–37.4)
MCV RBC AUTO: 89 FL (ref 82–98)
MONOCYTES # BLD AUTO: 0.89 THOUSAND/ΜL (ref 0.17–1.22)
MONOCYTES NFR BLD AUTO: 6 % (ref 4–12)
NEUTROPHILS # BLD AUTO: 13.66 THOUSANDS/ΜL (ref 1.85–7.62)
NEUTS SEG NFR BLD AUTO: 87 % (ref 43–75)
NRBC BLD AUTO-RTO: 0 /100 WBCS
PHOSPHATE SERPL-MCNC: 1.7 MG/DL (ref 2.7–4.5)
PLATELET # BLD AUTO: 134 THOUSANDS/UL (ref 149–390)
PMV BLD AUTO: 11.5 FL (ref 8.9–12.7)
POTASSIUM SERPL-SCNC: 3.7 MMOL/L (ref 3.5–5.3)
RBC # BLD AUTO: 4.16 MILLION/UL (ref 3.88–5.62)
SODIUM SERPL-SCNC: 142 MMOL/L (ref 136–145)
WBC # BLD AUTO: 15.56 THOUSAND/UL (ref 4.31–10.16)

## 2021-06-29 PROCEDURE — 84100 ASSAY OF PHOSPHORUS: CPT | Performed by: DENTIST

## 2021-06-29 PROCEDURE — 99217 PR OBSERVATION CARE DISCHARGE MANAGEMENT: CPT | Performed by: INTERNAL MEDICINE

## 2021-06-29 PROCEDURE — 85025 COMPLETE CBC W/AUTO DIFF WBC: CPT | Performed by: DENTIST

## 2021-06-29 PROCEDURE — 83735 ASSAY OF MAGNESIUM: CPT | Performed by: DENTIST

## 2021-06-29 PROCEDURE — 80048 BASIC METABOLIC PNL TOTAL CA: CPT | Performed by: DENTIST

## 2021-06-29 RX ORDER — NICOTINE 21 MG/24HR
1 PATCH, TRANSDERMAL 24 HOURS TRANSDERMAL DAILY
Qty: 28 PATCH | Refills: 0 | Status: SHIPPED | OUTPATIENT
Start: 2021-06-30

## 2021-06-29 RX ORDER — AMOXICILLIN AND CLAVULANATE POTASSIUM 875; 125 MG/1; MG/1
1 TABLET, FILM COATED ORAL EVERY 12 HOURS SCHEDULED
Status: DISCONTINUED | OUTPATIENT
Start: 2021-06-29 | End: 2021-06-29 | Stop reason: HOSPADM

## 2021-06-29 RX ORDER — AMOXICILLIN AND CLAVULANATE POTASSIUM 875; 125 MG/1; MG/1
1 TABLET, FILM COATED ORAL EVERY 12 HOURS SCHEDULED
Qty: 14 TABLET | Refills: 0 | Status: SHIPPED | OUTPATIENT
Start: 2021-06-29 | End: 2021-07-06

## 2021-06-29 RX ORDER — AMOXICILLIN AND CLAVULANATE POTASSIUM 875; 125 MG/1; MG/1
1 TABLET, FILM COATED ORAL EVERY 12 HOURS SCHEDULED
Status: DISCONTINUED | OUTPATIENT
Start: 2021-06-29 | End: 2021-06-29

## 2021-06-29 RX ADMIN — OXYCODONE HYDROCHLORIDE 10 MG: 10 TABLET ORAL at 00:57

## 2021-06-29 RX ADMIN — SODIUM CHLORIDE 3 G: 9 INJECTION, SOLUTION INTRAVENOUS at 09:23

## 2021-06-29 RX ADMIN — ACETAMINOPHEN 975 MG: 325 TABLET, FILM COATED ORAL at 12:19

## 2021-06-29 RX ADMIN — SODIUM CHLORIDE, SODIUM LACTATE, POTASSIUM CHLORIDE, AND CALCIUM CHLORIDE 125 ML/HR: .6; .31; .03; .02 INJECTION, SOLUTION INTRAVENOUS at 05:36

## 2021-06-29 RX ADMIN — ACETAMINOPHEN 975 MG: 325 TABLET, FILM COATED ORAL at 05:35

## 2021-06-29 RX ADMIN — POTASSIUM PHOSPHATE, MONOBASIC AND POTASSIUM PHOSPHATE, DIBASIC 21 MMOL: 224; 236 INJECTION, SOLUTION, CONCENTRATE INTRAVENOUS at 10:52

## 2021-06-29 RX ADMIN — SODIUM CHLORIDE 3 G: 9 INJECTION, SOLUTION INTRAVENOUS at 04:13

## 2021-06-29 RX ADMIN — NICOTINE 1 PATCH: 14 PATCH, EXTENDED RELEASE TRANSDERMAL at 09:29

## 2021-06-29 RX ADMIN — OXYCODONE HYDROCHLORIDE 10 MG: 10 TABLET ORAL at 05:35

## 2021-06-29 RX ADMIN — OXYCODONE HYDROCHLORIDE 10 MG: 10 TABLET ORAL at 10:55

## 2021-06-29 NOTE — ASSESSMENT & PLAN NOTE
States he has been [facility] [employer]  [facility] [facility]  Center was called to verify his current dosage which is 75 mg daily    · Continue current dose of methadone 75 mg daily

## 2021-06-29 NOTE — ANESTHESIA POSTPROCEDURE EVALUATION
Post-Op Assessment Note    CV Status:  Stable    Pain management: adequate     Mental Status:  Alert and awake   Hydration Status:  Stable   PONV Controlled:  None   Airway Patency:  Patent      Post Op Vitals Reviewed: Yes      Staff: Anesthesiologist         No complications documented    Vitals:    06/28/21 2019   BP: 116/56   Pulse: 88   Resp: 20   Temp:    SpO2: 99%   ;s  BP      Temp      Pulse     Resp      SpO2

## 2021-06-29 NOTE — PROGRESS NOTES
INTERNAL MEDICINE RESIDENCY PROGRESS NOTE     Name: Simon Estrada   Age & Sex: 26 y o  male   MRN: [id number]  Unit/Bed#: [facility] 323-01   Encounter: [id number]  Team: SOD Team A    PATIENT INFORMATION     Name: Simon Estrada   Age & Sex: 26 y o  male   MRN: [id number]  Hospital Stay Days: 0    ASSESSMENT/PLAN     Principal Problem:    Chris's angina  Active Problems:    Elevated serum creatinine    Opioid dependence ([facility] )      * Chris's angina  Assessment & Plan  Patient endorse 1 week-long history of dental pain  [facility]  Did not appear to be septic on presentation  Was found to have elevated creatinine in setting of dehydration  He was started on IV antibiotics and OMFS was contacted their who recommended transfer to [facility] for formal evaluation  Patient signed out against medical advice due to having to take care of his pets  He presents now for admission  CT shows evidence of small submandibular abscess  No prior history of dental issues  No signs of endocarditis on exam     6/28 dental extraction of tooth 19 and abscess drainage performed by OMS  · Follow-up blood cultures  · Continue IV Unasyn  · Pain control with Tylenol 975 scheduled Dilaudid 2 mg Q 4 p r n  For moderate, Dilaudid 4 mg Q 4 p r n  For severe, Dilaudid 0 5 mg Q 3 p r n  · Zofran p r n  For nausea  · Continue NPO status until home FS that seemed patient  · Continue to monitor    Opioid dependence [facility])  Assessment & Plan  States he has been on methadone for [employer] "[address] methadone  Center was called to verify his current dosage which is 75 mg daily    · Continue current dose of methadone 75 mg daily  · Monitor for over-sedation  · Follow-up as an outpatient    Elevated serum creatinine *resolved*  Assessment & Plan  Patient presented to [facility] with a elevated creatinine to 1 59, no prior baseline  Likely JOSÉ MIGUEL in the setting of dehydration  · Continue IV fluid hydration with normal saline at 125 cc/hour for 12 hours  · Monitor urine output     labs showed crt 0 81, BUN 13  Disposition: Patient is POD1 and will be monitored and maintained on IV ampicillin-sulbactam     SUBJECTIVE     Patient seen and examined  He reports that he is feeling better than yesterday  He reports 6/10 pain/discomfort in his jaw and mouth on the left side  Denies SOB, chest pain, headache, dizziness, fever, and chills  No acute events reported overnight  OBJECTIVE     Vitals:    21 2115 21 2215 21 2354 21 0700   BP: 124/60 115/56 113/53 131/67   BP Location: Left arm Left arm Left arm Left arm   Pulse: 79 75 91 75   Resp: 16 15 18 18   Temp:  100 1 °F (37 8 °C) 99 6 °F (37 6 °C) 98 8 °F (37 1 °C)   TempSrc:  Oral Oral Oral   SpO2: 95% 100% 92% 95%      Temperature:   Temp (24hrs), Av 4 °F (37 4 °C), Min:98 7 °F (37 1 °C), Max:100 2 °F (37 9 °C)    Temperature: 98 8 °F (37 1 °C)  Intake & Output:  I/O       701 -  07 -  -  0700    I V   600     IV Piggyback  1000     Total Intake  1600     Net  +1600                Weights: There is no height or weight on file to calculate BMI  Weight (last 2 days)     None        Physical Exam  Vitals reviewed  Constitutional:       Appearance: Normal appearance  HENT:      Head: Normocephalic  Jaw: Swelling present  Cardiovascular:      Rate and Rhythm: Normal rate and regular rhythm  Heart sounds: Normal heart sounds  No murmur heard  Pulmonary:      Effort: Pulmonary effort is normal       Breath sounds: Normal breath sounds  Skin:     General: Skin is warm  Neurological:      Mental Status: He is alert and oriented to person, place, and time     Psychiatric:         Attention and Perception: Attention normal          Mood and Affect: Mood normal          Speech: Speech normal          Behavior: Behavior normal        LABORATORY DATA     Labs: I have personally reviewed pertinent reports  Results from last 7 days   Lab Units 06/29/21  0623 06/28/21  1112 06/28/21  0540   WBC Thousand/uL 15 56* 15 68* 13 80*   HEMOGLOBIN g/dL 12 1 14 2 14 7   HEMATOCRIT % 37 2 43 1 44 3   PLATELETS Thousands/uL 134* 174 183   NEUTROS PCT % 87* 91* 73   MONOS PCT % 6 6 9      Results from last 7 days   Lab Units 06/29/21  0623 06/28/21  1112 06/28/21  0540   POTASSIUM mmol/L 3 7 3 6 4 1   CHLORIDE mmol/L 113* 110* 105   CO2 mmol/L 23 23 28   BUN mg/dL 13 22 27*   CREATININE mg/dL 0 81 1 22 1 59*   CALCIUM mg/dL 7 9* 8 8 9 4   ALK PHOS U/L  --  70  --    ALT U/L  --  19  --    AST U/L  --  14  --      Results from last 7 days   Lab Units 06/29/21  0623 06/28/21  0540   MAGNESIUM mg/dL 1 8 2 2     Results from last 7 days   Lab Units 06/29/21  0623   PHOSPHORUS mg/dL 1 7*                    IMAGING & DIAGNOSTIC TESTING     Radiology Results: I have personally reviewed pertinent reports  CT facial bones with contrast    Result Date: 6/28/2021  Impression: 1  Assessment of dentition is limited due to distorted image quality by streak artifact from prior dental work  2   Small periapical lucency/abscess of left mandibular first molar tooth with suggested thin elongated abscess along the buccal surface measuring 1 7 x 0 2 cm  Soft tissue cellulitis/ phlegmon along the buccal and lingual surface of the left mandible  The study was [facility] in [facility] for immediate notification  Workstation performed: [id number]     Other Diagnostic Testing: I have personally reviewed pertinent reports      ACTIVE MEDICATIONS     Current Facility-Administered Medications   Medication Dose Route Frequency    acetaminophen (TYLENOL) tablet 975 mg  975 mg Oral Q8H [address]    ampicillin-sulbactam (UNASYN) 3 g in sodium chloride 0 9 % 100 mL IVPB  3 g Intravenous Q6H    HYDROmorphone (DILAUDID) injection 1 mg  1 mg Intravenous Q3H PRN    hydrOXYzine HCL (ATARAX) tablet 25 mg  25 mg Oral Q6H PRN    lactated ringers infusion  125 mL/hr Intravenous Continuous    methadone (DOLOPHINE) tablet 75 mg  75 mg Oral Daily    nicotine (NICODERM CQ) 14 mg/24hr TD 24 hr patch 1 patch  1 patch Transdermal Daily    ondansetron (ZOFRAN) injection 4 mg  4 mg Intravenous Q4H PRN    oxyCODONE (ROXICODONE) immediate release tablet 10 mg  10 mg Oral Q4H PRN    oxyCODONE (ROXICODONE) IR tablet 5 mg  5 mg Oral Q4H PRN    senna-docusate sodium (SENOKOT S) 8 6-50 mg per tablet 1 tablet  1 tablet Oral Daily       VTE Pharmacologic Prophylaxis: {Pharmacologic VTE Prophylaxis:327064322}  VTE Mechanical Prophylaxis: {Mechanical VTE Prophylaxis:53017}    Portions of the record may have been created with voice recognition software  Occasional wrong word or "sound a like" substitutions may have occurred due to the inherent limitations of voice recognition software    Read the chart carefully and recognize, using context, where substitutions have occurred   ==  [facility]  Internal Medicine Residency PGY-***

## 2021-06-29 NOTE — UTILIZATION REVIEW
Initial Clinical Review    Admission: Date/Time/Statement:   Admission Orders (From admission, onward)     Ordered        06/28/21 1115  Place in Observation  Once                   Orders Placed This Encounter   Procedures    Place in Observation     Standing Status:   Standing     Number of Occurrences:   1     Order Specific Question:   Level of Care     Answer:   Med Surg [16]     ED Arrival Information     Expected Arrival Acuity    - 6/28/2021 09:11 Urgent         Means of arrival Escorted by Service Admission type    Walk-In Self General Medicine Urgent         Arrival complaint    dental pain; abnormal lab results        Chief Complaint   Patient presents with    Oral Swelling     Pt reports he was seen at [facility] today for dental pain and was told they wanted to admit him for his WBC count and kidney function as well as IV abx  Pt had to arrange care for animals at home so they told him to come here to be admitted        Initial Presentation:  26year old male, presented to the ED @ [facility], from home via walk in  Admitted as Observation due to Chris's Angina  Past medical history of opioid dependence  On presentation the ED @ [address],  he appeared hemodynamically stable  He had elevated leukocytosis at 13 8, elevated creatinine 1 59  On CT he exhibited small left submandibular abscess  Luz pooja was contacted at [facility] who recommended transfer for formal evaluation and patient  Patient signed out against medical advice due to being able to take care of his pets and now presents to [address] [place] [address] for formal admission for dental abscess  Date: 06/28/2021  Reports 1 week duration of dental pain  Elevated Cr in setting of dehydration  Consult OMFS  Continue IV Abx  Analgesia control scheduled and PRN  Zofran PRN for nausea  NPO  Monitor Cr and urine output  Continue methadone  Continue IV flds        06/28/2021  Consult OMFS:  Plan for OR today foir extraction of necessary teeth and incision and drainage of fascial spoaces  Antibiotics (unasyn 3g IV q6h)  Analgesia Scheduled and PRN    NPO/IVF for OR  Peridex 15mL swish and spit BID x7d  Encourage good oral hygiene  Head of bed elevated        SURGERY DATE: 6/28/2021  Procedure(s) (LRB):  INCISION AND DRAINAGE (I&D) HEAD/FACE, EXTRACTION TEETH MULTIPLE   Anesthesia Type:   General  Operative Findings:  Left submandibular,  space infection      VTE Pharmacologic Prophylaxis: Reason for no pharmacologic prophylaxis Low risk  VTE Mechanical Prophylaxis: sequential compression device      ED Triage Vitals [06/28/21 1006]   Temperature Pulse Respirations Blood Pressure SpO2   98 7 °F (37 1 °C) 78 18 145/78 98 %      Temp Source Heart Rate Source Patient Position - Orthostatic VS BP Location FiO2 (%)   Oral Monitor Sitting Left arm --      Pain Score       8          Wt Readings from Last 1 Encounters:   06/28/21 105 kg (232 lb)     Additional Vital Signs:   Date/Time  Temp  Pulse  Resp  BP  MAP (mmHg)  SpO2  O2 Flow Rate (L/min)  O2 Device  Cardiac (WDL)  Patient Position - Orthostatic VS   06/29/21 0700  98 8 °F (37 1 °C)  75  18  131/67  91  95 %  --  None (Room air)  --  Lying   06/28/21 2354  99 6 °F (37 6 °C)  91  18  113/53  74  92 %  --  None (Room air)  --  Lying   06/28/21 2215  100 1 °F (37 8 °C)  75  15  115/56  --  100 %  --  None (Room air)  --  Lying   06/28/21 2115  --  79  16  124/60  --  95 %  --  None (Room air)  --  Lying   06/28/21 2045  --  89  18  118/62  --  93 %  --  None (Room air)  --  Lying   06/28/21 2019  --  88  20  116/56  --  99 %  --  None (Room air)  --  --   06/28/21 2000  --  94  23Abnormal   132/66  --  98 %  --  --  --  --   06/28/21 1956  --  94  19  131/68  --  97 %  --  None (Room air)  --  --   06/28/21 1945  --  100  22  144/72  --  98 %  --  --  --  --   06/28/21 1930  --  116Abnormal   21  124/71  --  100 %  --  --  --  --   06/28/21 1929  100 2 °F (37 9 °C)  118Abnormal   20  124/71  --  100 %  6 L/min  Simple mask  X  --   06/28/21 1620  99 1 °F (37 3 °C)  91  18  119/66  87  97 %  --  None (Room air)  --  Lying   06/28/21 1543  --  --  --  --  --  --  --  None (Room air)  --  --   06/28/21 1300  99 °F (37 2 °C)  83  18  115/58  81  95 %  --  None (Room air)  --  Lying   06/28/21 1130  --  72  18  130/61  --  99 %  --  None (Room air)  --  --     Date and Time Eye Opening Best Verbal Response Best Motor Response Willa Coma Scale Score   06/28/21 1956 4 5 6 15     06/28/2021 @ 0714  CT facial bones:  1   Assessment of dentition is limited due to distorted image quality by streak artifact from prior dental work  2   Small periapical lucency/abscess of left mandibular first molar tooth with suggested thin elongated abscess along the buccal surface measuring 1 7 x 0 2 cm   Soft tissue cellulitis/ phlegmon along the buccal and lingual surface of the left mandible       Pertinent Labs/Diagnostic Test Results:     Results from last 7 days   Lab Units 06/29/21  0623 06/28/21  1112 06/28/21  0540   WBC Thousand/uL 15 56* 15 68* 13 80*   HEMOGLOBIN g/dL 12 1 14 2 14 7   HEMATOCRIT % 37 2 43 1 44 3   PLATELETS Thousands/uL 134* 174 183   NEUTROS ABS Thousands/µL 13 66* 14 10* 10 10*     Results from last 7 days   Lab Units 06/29/21  0623 06/28/21  1112 06/28/21  0540   SODIUM mmol/L 142 140 141   POTASSIUM mmol/L 3 7 3 6 4 1   CHLORIDE mmol/L 113* 110* 105   CO2 mmol/L 23 23 28   ANION GAP mmol/L 6 7 8   BUN mg/dL 13 22 27*   CREATININE mg/dL 0 81 1 22 1 59*   EGFR ml/min/1 73sq m 118 78 57   CALCIUM mg/dL 7 9* 8 8 9 4   MAGNESIUM mg/dL 1 8  --  2 2   PHOSPHORUS mg/dL 1 7*  --   --      Results from last 7 days   Lab Units 06/28/21  1112   AST U/L 14   ALT U/L 19   ALK PHOS U/L 70   TOTAL PROTEIN g/dL 7 4   ALBUMIN g/dL 3 6   TOTAL BILIRUBIN mg/dL 0 43     Results from last 7 days   Lab Units 06/29/21  0623 06/28/21  1112 06/28/21  0540   GLUCOSE RANDOM mg/dL 133 99 104*     Results from last 7 days   Lab Units 06/28/21  1128 06/28/21  1112   BLOOD CULTURE  Received in Microbiology Lab  Culture in Progress  Received in Microbiology Lab  Culture in Progress  ED Treatment:   Medication Administration from 06/28/2021 0911 to 06/28/2021 1315       Date/Time Order Dose Route Action     06/28/2021 1114 sodium chloride 0 9 % bolus 1,000 mL 1,000 mL Intravenous New Bag     06/28/2021 1113 ondansetron (ZOFRAN) 4 mg/2 mL injection **ADS Override Pull** 4 mg  Given     06/28/2021 1211 sodium chloride 0 9 % infusion 125 mL/hr Intravenous New Bag     06/28/2021 1215 nicotine (NICODERM CQ) 14 mg/24hr TD 24 hr patch 1 patch 1 patch Transdermal Medication Applied     06/28/2021 1211 ampicillin-sulbactam (UNASYN) 3 g in sodium chloride 0 9 % 100 mL IVPB 3 g Intravenous New Bag     06/28/2021 1254 ondansetron (ZOFRAN) injection 4 mg 4 mg Intravenous Given     06/28/2021 1254 HYDROmorphone (DILAUDID) tablet 2 mg 2 mg Oral Given        History reviewed  No pertinent past medical history      Admitting Diagnosis: Chris's angina [K12 2]  Pain, dental [K08 89]  Age/Sex: 26 y o  male  Admission Orders:  Scheduled Medications:  acetaminophen, 975 mg, Oral, Q8H [address]  ampicillin-sulbactam, 3 g, Intravenous, Q6H  methadone, 75 mg, Oral, Daily  nicotine, 1 patch, Transdermal, Daily  senna-docusate sodium, 1 tablet, Oral, Daily      Continuous IV Infusions:  lactated ringers, 125 mL/hr, Intravenous, Continuous  sodium chloride 0 9 % infusion   Rate: 125 mL/hr Dose: 125 mL/hr  Freq: Continuous Route: IV  Indications of Use: IV Hydration  Last Dose: Stopped (06/29/21 0353)  Start: 06/28/21 1145 End: 06/29/21 0010    PRN Meds:  HYDROmorphone, 1 mg, Intravenous, Q3H PRN  hydrOXYzine HCL, 25 mg, Oral, Q6H PRN  ondansetron, 4 mg, Intravenous, Q4H PRN  oxyCODONE, 10 mg, Oral, Q4H PRN  oxyCODONE, 5 mg, Oral, Q4H PRN      NPO  Elevate HOB 30 degrees  Ice to face 20 minutes off/on  Monitor Airway  Sinus precautions - no nose blowing, no close mouth sneezing  IP CONSULT TO ORAL AND MAXILLOFACIAL SURGERY    Network Utilization Review Department  ATTENTION: Please call with any questions or concerns to [phone] and carefully listen to the prompts so that you are directed to the right person  All voicemails are confidential   Chadd Senior all requests for admission clinical reviews, approved or denied determinations and any other requests to dedicated fax number below belonging to the campus where the patient is receiving treatment   List of dedicated fax numbers for the Facilities:  [address] DENIALS (Administrative/Medical Necessity) [phone]   [address] (Maternity/NICU/Pediatrics) [phone] [address] Torsten Stephensonel Elana [address] [address] [place] [address] Daniela Blum 14[address] [facility] [phone]

## 2021-06-29 NOTE — PROGRESS NOTES
OMFS    POD #0 s/p I&D of left submandibular space infection with extraction of tooth #19    Recommendations:     -HOB >30  -warm compresses to left face  -jaw exercises reviewed  -continue with unasyn; transition to augmentin  -peridex bid  -f/u within 1 week of d/c   -await cultures  -no further surgical intervention is indicated  -please call with questions     Benedict Dandy, DMD

## 2021-06-29 NOTE — ASSESSMENT & PLAN NOTE
Patient endorse 1 week-long history of dental pain  Originally presented to [facility]  Did not appear to be septic on presentation  Was found to have elevated creatinine in setting of dehydration  He was started on IV antibiotics and OMFS was contacted their who recommended transfer to [facility] for formal evaluation  Patient signed out against medical advice due to having to take care of his pets  He presents now for admission  CT shows evidence of small submandibular abscess  No prior history of dental issues  No signs of endocarditis on exam   · Follow-up blood cultures  · OMFS consulted, appreciated  · Status post tooth 19  extraction and submandibular I&D on 06/28  · F/u in office for post-op eval 1 week  · IV Unasyn transition to p o   Augmentin 875-125 b i d  X7 days  · Soft room temperature foods, no straws, no red food dye or red colored food  · Ice 20 minutes on and off, Tylenol, ibuprofen as conservative pain management considering opioid dependence history  · Patient 8 to follow-up monthly fungal, anaerobic consult Wound and AFB cultures outpatient  · Blood cultures pending

## 2021-06-29 NOTE — DISCHARGE SUMMARY
INTERNAL MEDICINE RESIDENCY DISCHARGE SUMMARY     Ashley Norwood   26 y o  male  MRN: [id number]  Room/Bed: [facility] 323/[facility] 323-01     [address] [place] [address]   Encounter: [id number]    Principal Problem:    Chris's angina  Active Problems:    Opioid dependence ([facility] [address] )    Tobacco dependence due to cigarettes      * Chris's angina  Assessment & Plan  Patient endorse 1 week-long history of dental pain  Originally presented to [facility]  Did not appear to be septic on presentation  Was found to have elevated creatinine in setting of dehydration  He was started on IV antibiotics and OMFS was contacted their who recommended transfer to [facility] for formal evaluation  Patient signed out against medical advice due to having to take care of his pets  He presents now for admission  CT shows evidence of small submandibular abscess  No prior history of dental issues  No signs of endocarditis on exam   · Follow-up blood cultures  · OMFS consulted, appreciated  · Status post tooth 19  extraction and submandibular I&D on 06/28  · F/u in office for post-op eval 1 week  · IV Unasyn transition to p o  Augmentin 875-125 b i d  X7 days  · Soft room temperature foods, no straws, no red food dye or red colored food  · Ice 20 minutes on and off, Tylenol, ibuprofen as conservative pain management considering opioid dependence history  · Patient 8 to follow-up monthly fungal, anaerobic consult Wound and AFB cultures outpatient  · Blood cultures pending    Opioid dependence ([facility] [address] )  Assessment & Plan  States he has been on methadone [facility] [facility]  [facility] [address] [facility]  Center was called to verify his current dosage which is 75 mg daily    · Continue current dose of methadone 75 mg daily      Elevated serum creatinine-resolved as of 6/29/2021  Assessment & Plan  Patient presented to [facility] [address] with a elevated creatinine to 1 59, no prior baseline  Likely JOSÉ MIGUEL in the setting of dehydration  · S/p Continue IV fluid hydration  · Returned to 0 81 upon d/c which is likely around his baseline    Tobacco dependence due to cigarettes  Assessment & Plan  Patient currently smoking 5 cigarettes per day  Smoking approximately 1/2 to 1 pack per day since the age of 11years old    Tobacco cessation education provided upon discharge  Fourteen mg and Nicoderm patch provided  Follow-up outpatient PCP for further management      [address]      Patient is a 18-year-old male past medical history of opioid dependence currently in remission and maintained on methadone presenting to [facility] for dental abscess and wound with angina as a transfer from [facility]  Patient originally began to have left lower mandibular tooth pain approximately 1 week ago, and since it will come up from sleep decided to come in  Patient had mild leukocytosis and transferred to [facility] on 06/28 for on FS evaluation and [facility] imaging  On 06/28, oral maxillofacial team saw patient and extracted tooth 19 and Performed incision and drainage successfully  Patient was on IV Unasyn x1 d however transition to p o  Augmentin upon discharge which he will complete for 7 days  Patient able to tolerate soft foods  Fungal, blood, anaerobic, wound and acid-fast cultures obtained  Patient remained hemodynamically stable and pain well controlled with very few narcotics and ice  Will hold off on narcotics upon discharge considering opioid dependence history  DVT prophylaxis completed with SCD boots and ambulation  Today, patient seen examined at bedside stating that his pain is well controlled and he is anxious to go home  Overall he feels much better    He understands this dietary restrictions, need for completion of antibiotic therapy, and follow-up for postop with OMFS    Physical Exam  Constitutional:       General: He is not in acute distress  Appearance: He is not ill-appearing  HENT:      Mouth/Throat:      Mouth: Mucous membranes are moist       Pharynx: No oropharyngeal exudate or posterior oropharyngeal erythema  Comments: Bilateral mandibular swelling, left greater than right, no erythema tenderness to palpation particularly at 2 in away from angle of mandible on left, able to open mouth approximately 45°, no purulence or serosanguineous drainage noted from extraction site  Neck:      Comments: No clavicular lymphadenopathy noted  Cardiovascular:      Rate and Rhythm: Normal rate and regular rhythm  Heart sounds: No murmur heard  No gallop  Pulmonary:      Effort: Pulmonary effort is normal       Breath sounds: No wheezing or rales  Abdominal:      General: Bowel sounds are normal       Palpations: Abdomen is soft  Tenderness: There is no abdominal tenderness  There is no guarding  Musculoskeletal:      Right lower leg: No edema  Left lower leg: No edema  Lymphadenopathy:      Cervical: Cervical adenopathy (Left greater than right) present  Skin:     General: Skin is warm  Neurological:      Mental Status: He is alert     Psychiatric:         Mood and Affect: Mood normal          Behavior: Behavior normal            DISCHARGE INFORMATION     PCP at Discharge: Kvng Vicente MD    Admitting Provider: Beto Quach MD  Admission Date: 6/28/2021    Discharge Provider: Beto Quach MD  Discharge Date:  06/29/2021    Discharge Disposition: Home/Self Care  Discharge Condition: good  Discharge with Lines: no    Discharge Diet: Surgical soft diet, room temperature, no red colored food/red dye  Activity Restrictions: No rigorous activity such as running, lifting, or swimming  Test Results Pending at Discharge:  Blood cultures, follow cultures, anaerobic cultures, wound cultures, AFB    Discharge Diagnoses:  Principal Problem:    Chris's angina  Active Problems:    Opioid dependence ([facility] [address] )    Tobacco dependence due to cigarettes  Resolved Problems:    Elevated serum creatinine      Consulting Providers:   OMFS    Diagnostic & Therapeutic Procedures Performed:  CT facial bones with contrast    Result Date: 6/28/2021  Impression: 1  Assessment of dentition is limited due to distorted image quality by streak artifact from prior dental work  2   Small periapical lucency/abscess of left mandibular first molar tooth with suggested thin elongated abscess along the buccal surface measuring 1 7 x 0 2 cm  Soft tissue cellulitis/ phlegmon along the buccal and lingual surface of the left mandible  The study was marked in [place] campus for immediate notification  Workstation performed: [id number]       Code Status: Level 1 - Full Code  Advance Directive & Living Will: <no information>  Power of :    POLST:      Medications:  Current Discharge Medication List        Current Discharge Medication List        Current Discharge Medication List      CONTINUE these medications which have NOT CHANGED    Details   methadone (DOLOPHINE) 10 mg/mL oral concentrated solution Take 75 mg by mouth daily              Allergies:  No Known Allergies    FOLLOW-UP     PCP Outpatient Follow-up:  Yes, within 1-2 weeks    Consulting Providers Follow-up:  Yes, OMFS within 1 week     Active Issues Requiring Follow-up:   Yes, tooth extraction 19  And Ludwigs angina    Discharge Statement:   I spent 30 minutes minutes discharging the patient  This time was spent on the day of discharge  I had direct contact with the patient on the day of discharge  Additional documentation is required if more than 30 minutes were spent on discharge  Portions of the record may have been created with voice recognition software  Occasional wrong word or "sound a like" substitutions may have occurred due to the inherent limitations of voice recognition software    Read the chart carefully and recognize, using context, where substitutions have occurred     ==  Juan Leon, DO [place]  [address]  Internal Medicine Resident PGY-1

## 2021-06-29 NOTE — ASSESSMENT & PLAN NOTE
Patient presented to [facility] [address] with a elevated creatinine to 1 59, no prior baseline  Likely JOSÉ MIGUEL in the setting of dehydration  · S/p Continue IV fluid hydration  · Returned to 0 81 upon d/c which is likely around his baseline

## 2021-06-29 NOTE — ASSESSMENT & PLAN NOTE
Patient currently smoking 5 cigarettes per day    Smoking approximately 1/2 to 1 pack per day since the age of 11years old    Tobacco cessation education provided upon discharge  Fourteen mg and Nicoderm patch provided  Follow-up outpatient PCP for further management

## 2021-07-01 LAB
BACTERIA SPEC ANAEROBE CULT: NORMAL
BACTERIA WND AEROBE CULT: ABNORMAL
BACTERIA WND AEROBE CULT: ABNORMAL
GRAM STN SPEC: ABNORMAL
GRAM STN SPEC: ABNORMAL

## 2021-07-03 LAB
BACTERIA BLD CULT: NORMAL
BACTERIA BLD CULT: NORMAL

## 2021-08-02 LAB — FUNGUS SPEC CULT: NORMAL

## 2021-08-17 LAB
MYCOBACTERIUM SPEC CULT: NORMAL
RHODAMINE-AURAMINE STN SPEC: NORMAL

## 2022-08-19 ENCOUNTER — OFFICE VISIT (OUTPATIENT)
Dept: URGENT CARE | Facility: CLINIC | Age: 32
End: 2022-08-19
Payer: COMMERCIAL

## 2022-08-19 VITALS
WEIGHT: 215 LBS | BODY MASS INDEX: 26.73 KG/M2 | OXYGEN SATURATION: 96 % | RESPIRATION RATE: 18 BRPM | HEART RATE: 62 BPM | HEIGHT: 75 IN | TEMPERATURE: 98.6 F

## 2022-08-19 DIAGNOSIS — K04.7 DENTAL INFECTION: Primary | ICD-10-CM

## 2022-08-19 PROCEDURE — G0382 LEV 3 HOSP TYPE B ED VISIT: HCPCS

## 2022-08-19 RX ORDER — AMOXICILLIN 875 MG/1
875 TABLET, COATED ORAL 2 TIMES DAILY
Qty: 14 TABLET | Refills: 0 | Status: CANCELLED | OUTPATIENT
Start: 2022-08-19 | End: 2022-08-26

## 2022-08-19 RX ORDER — CHLORHEXIDINE GLUCONATE 0.12 MG/ML
15 RINSE ORAL 2 TIMES DAILY
Qty: 120 ML | Refills: 0 | Status: SHIPPED | OUTPATIENT
Start: 2022-08-19

## 2022-08-19 RX ORDER — AMOXICILLIN AND CLAVULANATE POTASSIUM 875; 125 MG/1; MG/1
1 TABLET, FILM COATED ORAL EVERY 12 HOURS SCHEDULED
Qty: 14 TABLET | Refills: 0 | Status: SHIPPED | OUTPATIENT
Start: 2022-08-19 | End: 2022-08-26

## 2022-08-19 NOTE — PROGRESS NOTES
[facility]        NAME: Cynthia Grubbs is a 33 y o  male  : 1990    MRN: [id number]  DATE: 2022  TIME: 12:44 PM      Assessment and Plan     Dental infection [K04 7]  1  Dental infection  chlorhexidine (PERIDEX) 0 12 % solution    amoxicillin-clavulanate (AUGMENTIN) 875-125 mg per tablet         Patient Instructions   Take antibiotic as prescribed  Recommend probiotic use while taking antibiotic  Use peridex as prescribed  Acetaminophen or ibuprofen for fever and pain  Follow-up with dentistry  Follow-up with PCP in 3-5 days  Go to ER if symptoms worsen  Chief Complaint     Chief Complaint   Patient presents with    Dental Pain     Top left tooth pain         History of Present Illness     Patient is a 70-year-old male who presents with worsening dental pain over the past week  States he has pain in the left upper back molar  Reports intermittnet facial swelling  Reports chills, no fever  Denies n/v/d  Denies drooling  States he has an apt with the dentist next week  States he has been taking tylenol OTC For pain  Denies recent antibiotic use  Review of Systems     Review of Systems   Constitutional: Positive for chills  Negative for fever  HENT: Positive for dental problem and facial swelling  Gastrointestinal: Negative for diarrhea, nausea and vomiting  All other systems reviewed and are negative          Current Medications       Current Outpatient Medications:     amoxicillin-clavulanate (AUGMENTIN) 875-125 mg per tablet, Take 1 tablet by mouth every 12 (twelve) hours for 7 days, Disp: 14 tablet, Rfl: 0    chlorhexidine (PERIDEX) 0 12 % solution, Apply 15 mL to the mouth or throat 2 (two) times a day, Disp: 120 mL, Rfl: 0    methadone (DOLOPHINE) 10 mg/mL oral concentrated solution, Take 75 mg by mouth daily  (Patient not taking: Reported on 2022), Disp: , Rfl:     nicotine (NICODERM CQ) 14 mg/24hr TD 24 hr patch, Place 1 patch on the skin daily (Patient not taking: Reported on 8/19/2022), Disp: 28 patch, Rfl: 0    Current Allergies     Allergies as of 08/19/2022    (No Known Allergies)              The following portions of the patient's history were reviewed and updated as appropriate: allergies, current medications, past family history, past medical history, past social history, past surgical history and problem list      History reviewed  No pertinent past medical history  Past Surgical History:   Procedure Laterality Date    INCISION AND DRAINAGE OF WOUND Left 06/28/2021    Procedure: INCISION AND DRAINAGE (I&D) LEFT SUBMANDIBULAR SPACE INFECTION, LEFT  SPACE INFECTION WITH EXTRACTION OF TOOTH #19;  Surgeon: Katya Bhakta DMD;  Location: [facility];  Service: Maxillofacial    ORIF TIBIA & FIBULA FRACTURES Right        History reviewed  No pertinent family history  Medications have been verified  Objective     Pulse 62   Temp 98 6 °F (37 °C) (Temporal)   Resp 18   Ht 6' 3" (1 905 m)   Wt 97 5 kg (215 lb)   SpO2 96%   BMI 26 87 kg/m²   No LMP for male patient  Physical Exam     Physical Exam  Vitals and nursing note reviewed  Constitutional:       General: He is not in acute distress  Appearance: Normal appearance  He is not ill-appearing, toxic-appearing or diaphoretic  HENT:      Mouth/Throat:      Lips: Pink  Mouth: Mucous membranes are moist       Dentition: Abnormal dentition  Dental tenderness and dental caries present  No dental abscesses  Pharynx: Oropharynx is clear  Uvula midline  No pharyngeal swelling, oropharyngeal exudate, posterior oropharyngeal erythema or uvula swelling  Cardiovascular:      Rate and Rhythm: Normal rate  Pulses: Normal pulses  Heart sounds: Normal heart sounds, S1 normal and S2 normal    Pulmonary:      Effort: Pulmonary effort is normal       Breath sounds: Normal breath sounds and air entry   No stridor, decreased air movement or transmitted upper airway sounds  No decreased breath sounds, wheezing, rhonchi or rales  Skin:     General: Skin is warm  Capillary Refill: Capillary refill takes less than 2 seconds  Neurological:      General: No focal deficit present  Mental Status: He is alert  Psychiatric:         Mood and Affect: Mood normal          Behavior: Behavior normal          Thought Content:  Thought content normal          Judgment: Judgment normal

## 2022-08-19 NOTE — PATIENT INSTRUCTIONS
Take antibiotic as prescribed  Recommend probiotic use while taking antibiotic  Use peridex as prescribed  Acetaminophen or ibuprofen for fever and pain  Follow-up with dentistry as previously scheduled for next week  Follow-up with PCP in 3-5 days  Go to ER if symptoms worsen

## 2022-10-06 ENCOUNTER — HOSPITAL ENCOUNTER (EMERGENCY)
Facility: HOSPITAL | Age: 32
Discharge: HOME | End: 2022-10-06
Attending: EMERGENCY MEDICINE
Payer: COMMERCIAL

## 2022-10-06 VITALS
BODY MASS INDEX: 26.87 KG/M2 | OXYGEN SATURATION: 100 % | DIASTOLIC BLOOD PRESSURE: 84 MMHG | HEART RATE: 60 BPM | RESPIRATION RATE: 20 BRPM | WEIGHT: 215 LBS | TEMPERATURE: 98.5 F | SYSTOLIC BLOOD PRESSURE: 124 MMHG

## 2022-10-06 DIAGNOSIS — K08.89 DENTALGIA: ICD-10-CM

## 2022-10-06 DIAGNOSIS — K02.9 DENTAL CARIES: Primary | ICD-10-CM

## 2022-10-06 PROCEDURE — 99284 EMERGENCY DEPT VISIT MOD MDM: CPT | Performed by: EMERGENCY MEDICINE

## 2022-10-06 PROCEDURE — 99282 EMERGENCY DEPT VISIT SF MDM: CPT

## 2022-10-06 RX ORDER — KETOROLAC TROMETHAMINE 30 MG/ML
15 INJECTION, SOLUTION INTRAMUSCULAR; INTRAVENOUS ONCE
Status: DISCONTINUED | OUTPATIENT
Start: 2022-10-06 | End: 2022-10-06 | Stop reason: HOSPADM

## 2022-10-06 NOTE — DISCHARGE INSTRUCTIONS
You may continue to take tylenol and ibuprofen as needed for pain  Return to the emergency department for any new or worsening symptoms including swelling in the area of the tooth, fevers, difficulty swallowing or breathing, or other concerning symptoms

## 2022-10-06 NOTE — ED ATTENDING ATTESTATION
Final Diagnosis:  1  Dental caries    2  Henry De MD, saw and evaluated the patient  All available labs and X-rays were ordered by me or the resident and have been reviewed by myself  I discussed the patient with the resident / non-physician and agree with the resident's / non-physician practitioner's findings and plan as documented in the resident's / non-physician practicitioner's note, except where noted  At this point, I agree with the current assessment done in the ED  I was present during key portions of all procedures performed unless otherwise stated  Chief Complaint   Patient presents with    Dental Pain     Pt reports Left sided dental pain for a couple months and states he he was told by his dentist to see an oral surgeon     This is a 33 y o  male presenting for evaluation of dental pain  The patient has been having dental pain (tooth 17) for about 6 weeks  Tooth 16 is also having pain  Saw a dentist who said to see an oral surgeon  The patient has been unable to see oral surgeon so came in  +significant cold sensitivity  PMH:   has no past medical history on file  PSH:   has a past surgical history that includes Incision and drainage of wound (Left, 06/28/2021) and ORIF tibia & fibula fractures (Right)  Social:  Social History     Substance and Sexual Activity   Alcohol Use Never     Social History     Tobacco Use   Smoking Status Current Every Day Smoker    Packs/day: 0 50   Smokeless Tobacco Never Used     Social History     Substance and Sexual Activity   Drug Use Yes    Types: Marijuana     PE:  Vitals:    10/06/22 1545   BP: 124/84   BP Location: Left arm   Pulse: 60   Resp: 20   Temp: 98 5 °F (36 9 °C)   TempSrc: Oral   SpO2: 100%   Weight: 97 5 kg (215 lb)   General: VS reviewed  Appears in NAD  awake, alert  Well-nourished, well-developed  Appears stated age  Speaking normally in full sentences     Head: Normocephalic, atraumatic  Eyes: EOM-I  No diplopia  No hyphema  No subconjunctival hemorrhages  Symmetrical lids  ENT: Atraumatic external nose and ears  MMM  Tooth 17 has a large dental carry  Tooth 16 has an early/small dental carry  +percussive tenderness  No flucutuance  Uvula is miudline  No malocclusion  No stridor  Normal phonation  No drooling  Normal swallowing  Neck: No JVD  CV: No pallor noted  Lungs:   No tachypnea  No respiratory distress  MSK:   FROM spontaneously  Skin: Dry, intact  Neuro: Awake, alert, GCS15, CN II-XII grossly intact  Motor grossly intact  Psychiatric/Behavioral: Appropriate mood and affect   Exam: deferred  A:  - dentalgia  - We will give tylenol/motrin for pain  - We will write for chlorhexidine to sterilize the area and prevent further worsening of infection or repeat infection   - The patient will follow-up with her primary care or dentist for re-evaluation of her symptoms   - The patient has no red flags for Chris's or serious dental infection at this juncture  The patient doesn't appear toxic, nor has rapid progression of symptoms  Patient isn't immunocompromised  Patient has no SOB nor trouble swallowing  Patient doesn't appear dehydrated nor demonstrates trismus on exam    - 13 point ROS was performed and all are normal unless stated in the history above  - Nursing note reviewed  Vitals reviewed  - Orders placed by myself and/or advanced practitioner / resident     - Previous chart was reviewed  - No language barrier    - History obtained from patient  - There are no limitations to the history obtained  - Critical care time: Not applicable for this patient       Code Status: Prior  Advance Directive and Living Will:      Power of :    POLST:      Medications   ketorolac (TORADOL) injection 15 mg (has no administration in time range)     No orders to display     Orders Placed This Encounter   Procedures    Ambulatory Referral to Oral Maxillofacial Surgery     Labs Reviewed - No data to display  Time reflects when diagnosis was documented in both MDM as applicable and the Disposition within this note       Time User Action Codes Description Comment    10/6/2022  4:59 PM Jonathon Galvez Add [K02 9] Dental caries     10/6/2022  4:59 PM Jonathon Galvez Add [K08 [id number]] [address] [address]           ED Disposition       ED Disposition   Left from Room after Provider Exam    Condition   --    Date/Time   Thu Oct 6, 2022  6:15 PM    Comment   --             Follow-up Information       Follow up With Specialties Details Why [facility] Schedule an appointment as soon as possible for a visit in 1 week  [facility] [address]  [address] [place], [place], [address], [address] [facility]  Schedule an appointment as soon as possible for a visit in 1 week  [address] [facility] 16     [address] [facility] Emergency Department Emergency Medicine Go to  If symptoms worsen [address] Emergency [address], [address], [place], [place], [address]          Discharge Medication List as of 10/6/2022  5:01 PM        CONTINUE these medications which have NOT CHANGED    Details   chlorhexidine (PERIDEX) 0 12 % solution Apply 15 mL to the mouth or throat 2 (two) times a day, Starting Fri 8/19/2022, Normal      methadone (DOLOPHINE) 10 mg/mL oral concentrated solution Take 75 mg by mouth daily , Historical Med      nicotine (NICODERM CQ) 14 mg/24hr TD 24 hr patch Place 1 patch on the skin daily, Starting Wed 6/30/2021, Normal             Prior to Admission Medications   Prescriptions Last Dose Informant Patient Reported?  Taking?   chlorhexidine (PERIDEX) 0 12 % solution   No No   Sig: Apply 15 mL to the mouth or throat 2 (two) times a day   methadone (DOLOPHINE) 10 mg/mL oral concentrated solution   Yes No   Sig: Take 75 mg by mouth daily    Patient not taking: Reported on 8/19/2022   nicotine (NICODERM CQ) 14 mg/24hr TD 24 hr patch   No No   Sig: Place 1 patch on the skin daily   Patient not taking: Reported on 8/19/2022      Facility-Administered Medications: None       Portions of the record may have been created with voice recognition software  Occasional wrong word or "sound a like" substitutions may have occurred due to the inherent limitations of voice recognition software  Read the chart carefully and recognize, using context, where substitutions have occurred      Electronically signed by:  Brenda Fallon

## 2022-11-24 ENCOUNTER — HOSPITAL ENCOUNTER (EMERGENCY)
Facility: HOSPITAL | Age: 32
Discharge: HOME | End: 2022-11-24
Attending: EMERGENCY MEDICINE

## 2022-11-24 ENCOUNTER — APPOINTMENT (EMERGENCY)
Dept: RADIOLOGY | Facility: HOSPITAL | Age: 32
End: 2022-11-24

## 2022-11-24 VITALS
HEART RATE: 76 BPM | WEIGHT: 215 LBS | DIASTOLIC BLOOD PRESSURE: 59 MMHG | RESPIRATION RATE: 20 BRPM | SYSTOLIC BLOOD PRESSURE: 121 MMHG | OXYGEN SATURATION: 98 % | TEMPERATURE: 98.8 F | BODY MASS INDEX: 26.73 KG/M2 | HEIGHT: 75 IN

## 2022-11-24 DIAGNOSIS — M25.561 ACUTE PAIN OF RIGHT KNEE: Primary | ICD-10-CM

## 2022-11-24 RX ORDER — OXYCODONE HYDROCHLORIDE 5 MG/1
5 TABLET ORAL EVERY 8 HOURS PRN
Qty: 6 TABLET | Refills: 0 | Status: SHIPPED | OUTPATIENT
Start: 2022-11-24 | End: 2022-11-26

## 2022-11-24 RX ORDER — CYCLOBENZAPRINE HCL 10 MG
10 TABLET ORAL ONCE
Status: COMPLETED | OUTPATIENT
Start: 2022-11-24 | End: 2022-11-24

## 2022-11-24 RX ORDER — OXYCODONE HYDROCHLORIDE 5 MG/1
5 TABLET ORAL ONCE
Status: COMPLETED | OUTPATIENT
Start: 2022-11-24 | End: 2022-11-24

## 2022-11-24 RX ORDER — KETOROLAC TROMETHAMINE 30 MG/ML
15 INJECTION, SOLUTION INTRAMUSCULAR; INTRAVENOUS ONCE
Status: COMPLETED | OUTPATIENT
Start: 2022-11-24 | End: 2022-11-24

## 2022-11-24 RX ADMIN — KETOROLAC TROMETHAMINE 15 MG: 30 INJECTION, SOLUTION INTRAMUSCULAR at 13:19

## 2022-11-24 RX ADMIN — CYCLOBENZAPRINE HYDROCHLORIDE 10 MG: 10 TABLET, FILM COATED ORAL at 13:19

## 2022-11-24 RX ADMIN — OXYCODONE HYDROCHLORIDE 5 MG: 5 TABLET ORAL at 14:04

## 2022-11-24 NOTE — DISCHARGE INSTRUCTIONS
If you develop any new or worsening symptoms please immediately return to your nearest emergency department  Please use crutches when trying to get around as you should be trying not to bear weight on your R knee, however be careful as using crutches puts you at increased risk of falling  Please do not attempt to drive while injured and/or using the knee immobilizer as it can cause an accident  2.15

## 2022-11-24 NOTE — ED PROVIDER NOTES
History  Chief Complaint   Patient presents with   • Knee Injury     Was playing non contact football this morning when he states he developed right knee pain  26 yo male was playing football earlier today when he felt like he "tweaked" his R knee going up for an interception  States that they then took a break for half-time and he was able to play the second half for about 60-90 minutes without issue  States after the game he went to sit down his R knee gave out and hes been unable to bear weight on it since then  Denies any numbness or tingling or any other injuries or headstrike  Denies prior R knee injuries or surgeries  States baring weight makes pain really bad, but that he is able to find positions where the pain is tolerable when laying or sitting  Pain is worse with any movement in the knee  States he feels like pain is worse behind the knee and up into the thigh  Prior to Admission Medications   Prescriptions Last Dose Informant Patient Reported? Taking?   chlorhexidine (PERIDEX) 0 12 % solution Not Taking  No No   Sig: Apply 15 mL to the mouth or throat 2 (two) times a day   Patient not taking: Reported on 11/24/2022   methadone (DOLOPHINE) 10 mg/mL oral concentrated solution   Yes No   Sig: Take 50 mg by mouth daily   nicotine (NICODERM CQ) 14 mg/24hr TD 24 hr patch   No No   Sig: Place 1 patch on the skin daily   Patient not taking: Reported on 8/19/2022      Facility-Administered Medications: None       History reviewed  No pertinent past medical history  Past Surgical History:   Procedure Laterality Date   • INCISION AND DRAINAGE OF WOUND Left 06/28/2021    Procedure: INCISION AND DRAINAGE (I&D) LEFT SUBMANDIBULAR SPACE INFECTION, LEFT  SPACE INFECTION WITH EXTRACTION OF TOOTH #19;  Surgeon: Susy Oswald DMD;  [facility];  Service: Maxillofacial   • ORIF TIBIA & FIBULA FRACTURES Right        History reviewed  No pertinent family history    I have reviewed and agree with the history as documented  E-Cigarette/Vaping   • E-Cigarette Use Never User      E-Cigarette/Vaping Substances     Social History     Tobacco Use   • Smoking status: Every Day     Packs/day: 0 50     Types: Cigarettes   • Smokeless tobacco: Never   Vaping Use   • Vaping Use: Never used   Substance Use Topics   • Alcohol use: Never   • Drug use: Yes     Types: Marijuana       Review of Systems   Musculoskeletal: Positive for arthralgias (R knee pain) and gait problem  All other systems reviewed and are negative  Physical Exam  Physical Exam  Musculoskeletal:      Comments: Pain with Kari test both medially and laterally concerning for possible meniscal injury  There is some tenderness over lateral joint line  No obvious ligamentous laxity with anterior-posterior or valgus or varus strain  No noted posterior swelling, erythema, ecchymosis  There is area of swelling and ecchymosis to the medial knee  Patient able to lift leg with knee in full extension and able to flex knee  DP and PT pulses 2+  No pain or tenderness to palpation of the lower leg below the knee  Full range of motion of the ankle  DP and PT pulses and sensation equal intact in bilateral lower extremities  General: VS reviewed  Appears in NAD  awake, alert  Well-nourished, well-developed  Appears stated age  Speaking normally in full sentences  Head: Normocephalic, atraumatic  Eyes: EOM-I  No diplopia  No hyphema  No subconjunctival hemorrhages  Symmetrical lids  ENT: Atraumatic external nose and ears  MMM  No malocclusion  No stridor  Normal phonation  No drooling  Normal swallowing  Neck: No JVD  CV: No pallor noted  Lungs:   No tachypnea  No respiratory distress  Skin: Dry, intact  Neuro: Awake, alert, GCS15, CN II-XII grossly intact  Motor grossly intact    Psychiatric/Behavioral: Appropriate mood and affect   Exam: deferred      Vital Signs  ED Triage Vitals [11/24/22 1222] Temperature Pulse Respirations Blood Pressure SpO2   98 8 °F (37 1 °C) 76 20 121/59 98 %      Temp Source Heart Rate Source Patient Position - Orthostatic VS BP Location FiO2 (%)   Temporal Monitor Sitting Left arm --      Pain Score       9           Vitals:    11/24/22 1222   BP: 121/59   Pulse: 76   Patient Position - Orthostatic VS: Sitting         Visual Acuity      ED Medications  Medications   cyclobenzaprine (FLEXERIL) tablet 10 mg (10 mg Oral Given 11/24/22 1319)   ketorolac (TORADOL) injection 15 mg (15 mg Intramuscular Given 11/24/22 1319)   oxyCODONE (ROXICODONE) IR tablet 5 mg (5 mg Oral Given 11/24/22 1404)       Diagnostic Studies  Results Reviewed     None                 XR knee 4+ vw right injury   ED Interpretation by Sandra Staton DO (11/24 1335)   No acute fracture or dislocation noted on my interpretation                     Procedures  Procedures         ED Course  ED Course as of 11/24/22 1408   Thu Nov 24, 2022   1251 Discussed pain control and as patient    1354 Extended conversation with patient about pain control  As the muscle relaxer intraoral did not seem to help  Patient states that in the past he has had surgery since being sober and would take pain medication for maximal 2-3 days and has been able to go back to the methadone without issues  Will give patient 2 days of oxycodone along with 1 time oxycodone here  [facility]  Number of Diagnoses or Management Options  Diagnosis management comments: Concern for possible meniscal injury  X-ray, pain control  Patient does have a history of opiate abuse and is on methadone  Discussed him pain control and willing to try anti-inflammatories and muscle relaxers at this time  Will give knee immobilizer, crutches, nonweightbearing and follow-up with orthopedics        Disposition  Final diagnoses:   Acute pain of right knee     Time reflects when diagnosis was documented in both MDM as applicable and the Disposition within this note     Time User Action Codes Description Comment    11/24/2022  1:56 PM Kelly Barba Add [T46 [id number]] Right knee sprain     11/24/2022  1:56 PM Tonya Ashton [S11 [id number]] Right knee sprain     11/24/2022  1:56 PM Kelly Barba Add [[id number] [id number]] Acute pain of right knee       ED Disposition     ED Disposition   Discharge    Condition   Stable    Date/Time   Thu Nov 24, 2022  1:55 PM    Comment   [facility] discharge to home/self care                 Follow-up Information     Follow up With Specialties Details Why Contact Info Additional [address], DO Orthopedic Surgery Schedule an appointment as soon as possible for a visit   Tanya Samuels [phone]  [address] [facility] Emergency Department Emergency Medicine Go to  As needed, If symptoms worsen [address] [id number] [phone] [facility] Emergency Department, [address] [facility], [facility], [address] [facility]          Discharge Medication List as of 11/24/2022  2:00 PM      START taking these medications    Details   oxyCODONE (ROXICODONE) 5 immediate release tablet Take 1 tablet (5 mg total) by mouth every 8 (eight) hours as needed for moderate pain for up to 2 days Max Daily Amount: 15 mg, Starting Thu 11/24/2022, Until Sat 11/26/2022 at 2359, Normal         CONTINUE these medications which have NOT CHANGED    Details   chlorhexidine (PERIDEX) 0 12 % solution Apply 15 mL to the mouth or throat 2 (two) times a day, Starting Fri 8/19/2022, Normal      methadone (DOLOPHINE) 10 mg/mL oral concentrated solution Take 50 mg by mouth daily, Historical Med      nicotine (NICODERM CQ) 14 mg/24hr TD 24 hr patch Place 1 patch on the skin daily, Starting Wed 6/30/2021, Normal                 PDMP Review       Value Time User    PDMP Reviewed  Yes 11/24/2022  1:57 PM Lena Rosales DO ED Provider  Electronically Signed by           Pamela Edwards DO  11/24/22 3690

## 2022-11-29 ENCOUNTER — OFFICE VISIT (OUTPATIENT)
Dept: OBGYN CLINIC | Facility: CLINIC | Age: 32
End: 2022-11-29

## 2022-11-29 VITALS — WEIGHT: 215 LBS | BODY MASS INDEX: 26.73 KG/M2 | HEIGHT: 75 IN

## 2022-11-29 DIAGNOSIS — M23.91 ACUTE INTERNAL DERANGEMENT OF RIGHT KNEE: Primary | ICD-10-CM

## 2022-11-29 DIAGNOSIS — M25.461 EFFUSION OF RIGHT KNEE: ICD-10-CM

## 2022-11-29 DIAGNOSIS — M25.561 ACUTE PAIN OF RIGHT KNEE: ICD-10-CM

## 2022-11-29 NOTE — LETTER
November 29, 2022     Patient: Román Stafford  YOB: 1990  Date of Visit: 11/29/2022      To Whom it May Concern:    Uma Mirza is under my professional care  Damiensunday Norajames was seen in my office on 11/29/2022  Teresa Edwards may return to work 12/1/2022   If you have any questions or concerns, please don't hesitate to call           Sincerely,          Berenice Woo, DO        CC: No Recipients

## 2022-11-29 NOTE — PROGRESS NOTES
Assessment/Plan:   Diagnoses and all orders for this visit:    Acute pain of right knee  -     Ambulatory Referral to Orthopedic Surgery         X-rays of the right knee and physical exam were discussed with patient at length  Was advised that due to obvious locking of his right knee on exam and positive effusion there is concern for possible meniscus tear  MRI of the right knee was ordered for further evaluation  Due to effusion patient was offered aspiration  Patient accepted and aspiration of the right knee was performed under aseptic technique yielding 40 mL of clear fluid  Patient did have increased extension post aspiration  Patient was advised that he can discontinue the crutches at this time and was provided with a note to return to work on Thursday 12/01/2022  Patient will follow up in the office after MRI of the right knee  The patient's right knee is locked  There is lacking 30° of terminal extension  An aspiration was performe in which 40 cc of fluid were retrieved  The lack of terminal extension decreased to approximately 10-15 degrees  Due to this, it is my concern that there is a displaced buckle handle tear of medial meniscus  An MRI is the gold standard to look for this  Return back once this is complete    Subjective:   Patient ID: Miguel Adamemisael  1990     HPI  Patient is a 27 y o  male who presents for evaluation of his right knee  He sustained a fall about 1 year ago while working  Patient states that he had some locking in his right knee after that incident but it significantly improved over time  Patient states he was playing football 11/24/2022 when he “tweaked” his knee and had significant pain after he was finished with a football game which caused him to go to the emergency department where x-rays were obtained showing no fracture    Today patient states he has significant difficulty with extension of his right knee feels it has continued to walk and has continued pain   Patient has been ambulating with use of crutches  Patient states that he has been taking ibuprofen as needed for pain with some relief  The following portions of the patient's history were reviewed and updated as appropriate:  Past medical history, past surgical history, Family history, social history, current medications and allergies    History reviewed  No pertinent past medical history  Past Surgical History:   Procedure Laterality Date   • INCISION AND DRAINAGE OF WOUND Left 06/28/2021    Procedure: INCISION AND DRAINAGE (I&D) LEFT SUBMANDIBULAR SPACE INFECTION, LEFT  SPACE INFECTION WITH EXTRACTION OF TOOTH #19;  Surgeon: Cece Mahoney DMD;  [facility];  Service: Maxillofacial   • ORIF TIBIA & FIBULA FRACTURES Right        History reviewed  No pertinent family history      Social History     Socioeconomic History   • Marital status: Single     Spouse name: None   • Number of children: None   • Years of education: None   • Highest education level: None   Occupational History   • None   Tobacco Use   • Smoking status: Every Day     Packs/day: 0 50     Types: Cigarettes   • Smokeless tobacco: Never   Vaping Use   • Vaping Use: Never used   Substance and Sexual Activity   • Alcohol use: Never   • Drug use: Yes     Types: Marijuana   • Sexual activity: None   Other Topics Concern   • None   Social History Narrative   • None     Social Determinants of Health     Financial Resource Strain: Not on file   Food Insecurity: Not on file   Transportation Needs: Not on file   Physical Activity: Not on file   Stress: Not on file   Social Connections: Not on file   Intimate Partner Violence: Not on file   Housing Stability: Not on file         Current Outpatient Medications:   •  methadone (DOLOPHINE) 10 mg/mL oral concentrated solution, Take 50 mg by mouth daily, Disp: , Rfl:   •  chlorhexidine (PERIDEX) 0 12 % solution, Apply 15 mL to the mouth or throat 2 (two) times a day (Patient not taking: Reported on 11/24/2022), Disp: 120 mL, Rfl: 0  •  nicotine (NICODERM CQ) 14 mg/24hr TD 24 hr patch, Place 1 patch on the skin daily (Patient not taking: Reported on 8/19/2022), Disp: 28 patch, Rfl: 0    No Known Allergies    Review of Systems   Constitutional: Negative for chills, fever and unexpected weight change  HENT: Negative for hearing loss, nosebleeds and sore throat  Eyes: Negative for pain, redness and visual disturbance  Respiratory: Negative for cough, shortness of breath and wheezing  Cardiovascular: Negative for chest pain, palpitations and leg swelling  Gastrointestinal: Negative for abdominal pain, nausea and vomiting  Endocrine: Negative for polydipsia and polyuria  Genitourinary: Negative for dysuria and hematuria  Musculoskeletal: Positive for arthralgias  Skin: Negative for rash and wound  Neurological: Negative for dizziness, light-headedness and headaches  Psychiatric/Behavioral: Negative for decreased concentration, dysphoric mood and suicidal ideas  The patient is not nervous/anxious  Objective:  Ht 6' 3" (1 905 m)   Wt 97 5 kg (215 lb)   BMI 26 87 kg/m²     Ortho Exam  Right knee   Full flexion   20-30 degrees lack of extension   Positive effusion   No erythema warmth or ecchymosis   Equivocal medial Kari's  Obvious locking on exam  Neurovascularly intact distally       Physical Exam  Vitals and nursing note reviewed  Constitutional:       General: He is not in acute distress  Appearance: He is well-developed  HENT:      Head: Normocephalic and atraumatic  Eyes:      Conjunctiva/sclera: Conjunctivae normal    Cardiovascular:      Rate and Rhythm: Normal rate and regular rhythm  Heart sounds: No murmur heard  Pulmonary:      Effort: Pulmonary effort is normal  No respiratory distress  Breath sounds: Normal breath sounds  Abdominal:      Palpations: Abdomen is soft  Tenderness: There is no abdominal tenderness  Musculoskeletal:         General: No swelling  Cervical back: Neck supple  Skin:     General: Skin is warm and dry  Capillary Refill: Capillary refill takes less than 2 seconds  Neurological:      Mental Status: He is alert  Psychiatric:         Mood and Affect: Mood normal           Diagnostic Test Review:  X-rays of the right knee performed 11/24/2022 showed no acute fracture or dislocation    Large joint arthrocentesis: R knee  Universal Protocol:  Consent: Verbal consent obtained    Risks and benefits: risks, benefits and alternatives were discussed  Consent given by: patient  Patient understanding: patient states understanding of the procedure being performed  Patient consent: the patient's understanding of the procedure matches consent given  Site marked: the operative site was marked  Supporting Documentation  Indications: pain   Procedure Details  Location: knee - R knee  Preparation: Patient was prepped and draped in the usual sterile fashion  Ultrasound guidance: no    Aspirate amount: 40 mL  Aspirate: clear    Patient tolerance: patient tolerated the procedure well with no immediate complications  Dressing:  Sterile dressing applied             Scribe Attestation    I,:  Jerad Mei am acting as a scribe while in the presence of the attending physician :       I,:  Jeffrey Lei DO personally performed the services described in this documentation    as scribed in my presence :

## 2022-12-06 ENCOUNTER — HOSPITAL ENCOUNTER (OUTPATIENT)
Dept: MRI IMAGING | Facility: HOSPITAL | Age: 32
Discharge: HOME | End: 2022-12-06
Attending: ORTHOPAEDIC SURGERY

## 2022-12-06 DIAGNOSIS — M25.561 ACUTE PAIN OF RIGHT KNEE: ICD-10-CM

## 2022-12-06 DIAGNOSIS — M23.91 ACUTE INTERNAL DERANGEMENT OF RIGHT KNEE: ICD-10-CM

## 2022-12-12 ENCOUNTER — OFFICE VISIT (OUTPATIENT)
Dept: OBGYN CLINIC | Facility: CLINIC | Age: 32
End: 2022-12-12

## 2022-12-12 VITALS
HEIGHT: 75 IN | BODY MASS INDEX: 26.73 KG/M2 | HEART RATE: 78 BPM | WEIGHT: 215 LBS | SYSTOLIC BLOOD PRESSURE: 129 MMHG | DIASTOLIC BLOOD PRESSURE: 82 MMHG

## 2022-12-12 DIAGNOSIS — S83.271D COMPLEX TEAR OF LATERAL MENISCUS OF RIGHT KNEE, UNSPECIFIED WHETHER OLD OR CURRENT TEAR, SUBSEQUENT ENCOUNTER: Primary | ICD-10-CM

## 2022-12-12 NOTE — PROGRESS NOTES
Assessment/Plan:    No problem-specific Assessment & Plan notes found for this encounter  Diagnoses and all orders for this visit:    Complex tear of lateral meniscus of right knee, unspecified whether old or current tear, subsequent encounter          The patient's MRI results were discussed  He is opted for an elective arthroscopy of the right knee  All risks, complications, benefits were discussed with the patient in great detail including bleeding, infection, blood clots, pain, stiffness, neurovascular damage, fractures, dislocations, the possibility loss of limb from surgery, heart attack, stroke, etc   His surgery scheduled for January 10, 2023    Subjective:      Patient ID: Lynne Cai is a 27 y o  male  HPI    Patient presents for his MRI report of his right knee  It did show a complex tear of the lateral meniscus with a displaced fragment  This does explain his locking symptomatology  He did have an aspiration which helped his symptoms the last visit  He denies any numbness or doing  He denies any fever chills  He states most of the extension has regained    The following portions of the patient's history were reviewed and updated as appropriate: allergies, current medications, past family history, past medical history, past social history, past surgical history and problem list     Review of Systems   Constitutional: Negative for chills, fever and unexpected weight change  HENT: Negative for hearing loss, nosebleeds and sore throat  Eyes: Negative for pain, redness and visual disturbance  Respiratory: Negative for cough, shortness of breath and wheezing  Cardiovascular: Negative for chest pain, palpitations and leg swelling  Gastrointestinal: Negative for abdominal pain, nausea and vomiting  Endocrine: Negative for polydipsia and polyuria  Genitourinary: Negative for dysuria and hematuria  Musculoskeletal: Positive for arthralgias, gait problem and myalgias   Negative for back pain, joint swelling, neck pain and neck stiffness  As noted in HPI   Skin: Negative for rash and wound  Neurological: Negative for dizziness, numbness and headaches  Psychiatric/Behavioral: Negative for decreased concentration and suicidal ideas  The patient is not nervous/anxious  Objective:      /82   Pulse 78   Ht 6' 3" (1 905 m)   Wt 97 5 kg (215 lb)   BMI 26 87 kg/m²          Physical Exam      Lower extremities Norvasc intact  Toes are pink and mobile  Compartments are soft  The effusion is now gone  Range of motion of 5 to 115 degrees  There is lateral joint tenderness with a positive Luis's  There is a painful arc of motion throughout the knee    Arc of motion is dramatically improved however    MRI as above

## 2022-12-12 NOTE — H&P (VIEW-ONLY)
Assessment/Plan:    No problem-specific Assessment & Plan notes found for this encounter  Diagnoses and all orders for this visit:    Complex tear of lateral meniscus of right knee, unspecified whether old or current tear, subsequent encounter          The patient's MRI results were discussed  He is opted for an elective arthroscopy of the right knee  All risks, complications, benefits were discussed with the patient in great detail including bleeding, infection, blood clots, pain, stiffness, neurovascular damage, fractures, dislocations, the possibility loss of limb from surgery, heart attack, stroke, etc   His surgery scheduled for January 10, 2023    Subjective:      Patient ID: Mary Garrett is a 27 y o  male  HPI    Patient presents for his MRI report of his right knee  It did show a complex tear of the lateral meniscus with a displaced fragment  This does explain his locking symptomatology  He did have an aspiration which helped his symptoms the last visit  He denies any numbness or doing  He denies any fever chills  He states most of the extension has regained    The following portions of the patient's history were reviewed and updated as appropriate: allergies, current medications, past family history, past medical history, past social history, past surgical history and problem list     Review of Systems   Constitutional: Negative for chills, fever and unexpected weight change  HENT: Negative for hearing loss, nosebleeds and sore throat  Eyes: Negative for pain, redness and visual disturbance  Respiratory: Negative for cough, shortness of breath and wheezing  Cardiovascular: Negative for chest pain, palpitations and leg swelling  Gastrointestinal: Negative for abdominal pain, nausea and vomiting  Endocrine: Negative for polydipsia and polyuria  Genitourinary: Negative for dysuria and hematuria  Musculoskeletal: Positive for arthralgias, gait problem and myalgias   Negative for back pain, joint swelling, neck pain and neck stiffness  As noted in HPI   Skin: Negative for rash and wound  Neurological: Negative for dizziness, numbness and headaches  Psychiatric/Behavioral: Negative for decreased concentration and suicidal ideas  The patient is not nervous/anxious  Objective:      /82   Pulse 78   Ht 6' 3" (1 905 m)   Wt 97 5 kg (215 lb)   BMI 26 87 kg/m²          Physical Exam      Lower extremities Norvasc intact  Toes are pink and mobile  Compartments are soft  The effusion is now gone  Range of motion of 5 to 115 degrees  There is lateral joint tenderness with a positive Luis's  There is a painful arc of motion throughout the knee    Arc of motion is dramatically improved however    MRI as above

## 2022-12-13 ENCOUNTER — PREP FOR PROCEDURE (OUTPATIENT)
Dept: OBGYN CLINIC | Facility: CLINIC | Age: 32
End: 2022-12-13

## 2022-12-13 RX ORDER — CHLORHEXIDINE GLUCONATE 4 G/100ML
SOLUTION TOPICAL DAILY PRN
OUTPATIENT
Start: 2022-12-13

## 2022-12-13 RX ORDER — CHLORHEXIDINE GLUCONATE 0.12 MG/ML
15 RINSE ORAL ONCE
OUTPATIENT
Start: 2022-12-13 | End: 2022-12-13

## 2023-01-05 NOTE — PRE-PROCEDURE INSTRUCTIONS
Pre-Surgery Instructions:   Medication Instructions   • methadone (DOLOPHINE) 10 mg/mL oral concentrated solution Take day of surgery  You will receive a phone call from hospital for arrival time  Please call surgeons office if any changes in your condition  Wear easy on/off clothing; consider type of surgery;  Valuables, jewelry, piercing's please keep at home  **COVID-19  education/surgical guidelines  Updated covid    Visitation policy  Please: No contact lenses or eye make up, artificial eyelashes    Please bring special ordered sling or braces if needed for  Your particular surgery  Please secure transportation     Follow pre surgery showering or cleaning instructions as  Reviewed by nurse or surgeons office  Questions answered and concerns addressed    Patient had limited time to review medical history   Focused on instructions

## 2023-01-09 ENCOUNTER — ANESTHESIA EVENT (OUTPATIENT)
Dept: PERIOP | Facility: HOSPITAL | Age: 33
End: 2023-01-09

## 2023-01-10 ENCOUNTER — ANESTHESIA (OUTPATIENT)
Dept: PERIOP | Facility: HOSPITAL | Age: 33
End: 2023-01-10

## 2023-01-10 ENCOUNTER — HOSPITAL ENCOUNTER (OUTPATIENT)
Facility: HOSPITAL | Age: 33
Setting detail: OUTPATIENT SURGERY
Discharge: HOME | End: 2023-01-10
Attending: ORTHOPAEDIC SURGERY | Admitting: ORTHOPAEDIC SURGERY

## 2023-01-10 VITALS
BODY MASS INDEX: 26.73 KG/M2 | SYSTOLIC BLOOD PRESSURE: 128 MMHG | HEART RATE: 64 BPM | HEIGHT: 75 IN | DIASTOLIC BLOOD PRESSURE: 57 MMHG | OXYGEN SATURATION: 98 % | WEIGHT: 215 LBS | RESPIRATION RATE: 16 BRPM | TEMPERATURE: 98.5 F

## 2023-01-10 DIAGNOSIS — S83.271D COMPLEX TEAR OF LATERAL MENISCUS OF RIGHT KNEE, UNSPECIFIED WHETHER OLD OR CURRENT TEAR, SUBSEQUENT ENCOUNTER: ICD-10-CM

## 2023-01-10 RX ORDER — ONDANSETRON 2 MG/ML
INJECTION INTRAMUSCULAR; INTRAVENOUS AS NEEDED
Status: DISCONTINUED | OUTPATIENT
Start: 2023-01-10 | End: 2023-01-10

## 2023-01-10 RX ORDER — KETOROLAC TROMETHAMINE 30 MG/ML
INJECTION, SOLUTION INTRAMUSCULAR; INTRAVENOUS AS NEEDED
Status: DISCONTINUED | OUTPATIENT
Start: 2023-01-10 | End: 2023-01-10

## 2023-01-10 RX ORDER — DEXMEDETOMIDINE HYDROCHLORIDE 100 UG/ML
INJECTION, SOLUTION INTRAVENOUS AS NEEDED
Status: DISCONTINUED | OUTPATIENT
Start: 2023-01-10 | End: 2023-01-10

## 2023-01-10 RX ORDER — SODIUM CHLORIDE, SODIUM LACTATE, POTASSIUM CHLORIDE, CALCIUM CHLORIDE 600; 310; 30; 20 MG/100ML; MG/100ML; MG/100ML; MG/100ML
125 INJECTION, SOLUTION INTRAVENOUS CONTINUOUS
Status: DISCONTINUED | OUTPATIENT
Start: 2023-01-10 | End: 2023-01-10 | Stop reason: HOSPADM

## 2023-01-10 RX ORDER — CHLORHEXIDINE GLUCONATE 0.12 MG/ML
15 RINSE ORAL ONCE
Status: COMPLETED | OUTPATIENT
Start: 2023-01-10 | End: 2023-01-10

## 2023-01-10 RX ORDER — KETAMINE HYDROCHLORIDE 50 MG/ML
INJECTION, SOLUTION, CONCENTRATE INTRAMUSCULAR; INTRAVENOUS AS NEEDED
Status: DISCONTINUED | OUTPATIENT
Start: 2023-01-10 | End: 2023-01-10

## 2023-01-10 RX ORDER — MIDAZOLAM HYDROCHLORIDE 2 MG/2ML
INJECTION, SOLUTION INTRAMUSCULAR; INTRAVENOUS AS NEEDED
Status: DISCONTINUED | OUTPATIENT
Start: 2023-01-10 | End: 2023-01-10

## 2023-01-10 RX ORDER — MAGNESIUM HYDROXIDE 1200 MG/15ML
LIQUID ORAL AS NEEDED
Status: DISCONTINUED | OUTPATIENT
Start: 2023-01-10 | End: 2023-01-10 | Stop reason: HOSPADM

## 2023-01-10 RX ORDER — CHLORHEXIDINE GLUCONATE 4 G/100ML
SOLUTION TOPICAL DAILY PRN
Status: DISCONTINUED | OUTPATIENT
Start: 2023-01-10 | End: 2023-01-10 | Stop reason: HOSPADM

## 2023-01-10 RX ORDER — HYDROMORPHONE HCL/PF 1 MG/ML
0.25 SYRINGE (ML) INJECTION
Status: DISCONTINUED | OUTPATIENT
Start: 2023-01-10 | End: 2023-01-10 | Stop reason: HOSPADM

## 2023-01-10 RX ORDER — PROPOFOL 10 MG/ML
INJECTION, EMULSION INTRAVENOUS AS NEEDED
Status: DISCONTINUED | OUTPATIENT
Start: 2023-01-10 | End: 2023-01-10

## 2023-01-10 RX ORDER — POVIDONE-IODINE 10 MG/G
OINTMENT TOPICAL AS NEEDED
Status: DISCONTINUED | OUTPATIENT
Start: 2023-01-10 | End: 2023-01-10 | Stop reason: HOSPADM

## 2023-01-10 RX ORDER — CEPHALEXIN 500 MG/1
500 CAPSULE ORAL EVERY 8 HOURS SCHEDULED
Qty: 9 CAPSULE | Refills: 0 | Status: SHIPPED | OUTPATIENT
Start: 2023-01-10 | End: 2023-01-13

## 2023-01-10 RX ORDER — CEFAZOLIN SODIUM 2 G/50ML
2000 SOLUTION INTRAVENOUS ONCE
Status: COMPLETED | OUTPATIENT
Start: 2023-01-10 | End: 2023-01-10

## 2023-01-10 RX ORDER — ONDANSETRON 2 MG/ML
4 INJECTION INTRAMUSCULAR; INTRAVENOUS EVERY 6 HOURS PRN
Status: CANCELLED | OUTPATIENT
Start: 2023-01-10

## 2023-01-10 RX ORDER — FENTANYL CITRATE/PF 50 MCG/ML
50 SYRINGE (ML) INJECTION
Status: COMPLETED | OUTPATIENT
Start: 2023-01-10 | End: 2023-01-10

## 2023-01-10 RX ORDER — LIDOCAINE HYDROCHLORIDE 20 MG/ML
INJECTION, SOLUTION EPIDURAL; INFILTRATION; INTRACAUDAL; PERINEURAL AS NEEDED
Status: DISCONTINUED | OUTPATIENT
Start: 2023-01-10 | End: 2023-01-10

## 2023-01-10 RX ORDER — DIPHENHYDRAMINE HYDROCHLORIDE 50 MG/ML
12.5 INJECTION INTRAMUSCULAR; INTRAVENOUS ONCE AS NEEDED
Status: DISCONTINUED | OUTPATIENT
Start: 2023-01-10 | End: 2023-01-10 | Stop reason: HOSPADM

## 2023-01-10 RX ORDER — ACETAMINOPHEN 500 MG
500 TABLET ORAL EVERY 6 HOURS PRN
Qty: 60 TABLET | Refills: 0 | Status: SHIPPED | OUTPATIENT
Start: 2023-01-10 | End: 2023-02-09

## 2023-01-10 RX ORDER — DEXAMETHASONE SODIUM PHOSPHATE 10 MG/ML
INJECTION, SOLUTION INTRAMUSCULAR; INTRAVENOUS AS NEEDED
Status: DISCONTINUED | OUTPATIENT
Start: 2023-01-10 | End: 2023-01-10

## 2023-01-10 RX ORDER — METHYLPREDNISOLONE ACETATE 40 MG/ML
INJECTION, SUSPENSION INTRA-ARTICULAR; INTRALESIONAL; INTRAMUSCULAR; SOFT TISSUE AS NEEDED
Status: DISCONTINUED | OUTPATIENT
Start: 2023-01-10 | End: 2023-01-10 | Stop reason: HOSPADM

## 2023-01-10 RX ORDER — MELOXICAM 15 MG/1
15 TABLET ORAL DAILY
Qty: 30 TABLET | Refills: 1 | Status: SHIPPED | OUTPATIENT
Start: 2023-01-10

## 2023-01-10 RX ORDER — BUPIVACAINE HYDROCHLORIDE AND EPINEPHRINE 5; 5 MG/ML; UG/ML
INJECTION, SOLUTION PERINEURAL AS NEEDED
Status: DISCONTINUED | OUTPATIENT
Start: 2023-01-10 | End: 2023-01-10 | Stop reason: HOSPADM

## 2023-01-10 RX ORDER — FENTANYL CITRATE 50 UG/ML
INJECTION, SOLUTION INTRAMUSCULAR; INTRAVENOUS AS NEEDED
Status: DISCONTINUED | OUTPATIENT
Start: 2023-01-10 | End: 2023-01-10

## 2023-01-10 RX ORDER — PROMETHAZINE HYDROCHLORIDE 25 MG/ML
12.5 INJECTION, SOLUTION INTRAMUSCULAR; INTRAVENOUS ONCE AS NEEDED
Status: DISCONTINUED | OUTPATIENT
Start: 2023-01-10 | End: 2023-01-10 | Stop reason: HOSPADM

## 2023-01-10 RX ADMIN — DEXMEDETOMIDINE HCL 8 MCG: 100 INJECTION INTRAVENOUS at 07:50

## 2023-01-10 RX ADMIN — SODIUM CHLORIDE, SODIUM LACTATE, POTASSIUM CHLORIDE, AND CALCIUM CHLORIDE 125 ML/HR: .6; .31; .03; .02 INJECTION, SOLUTION INTRAVENOUS at 07:08

## 2023-01-10 RX ADMIN — CHLORHEXIDINE GLUCONATE 0.12% ORAL RINSE 15 ML: 1.2 LIQUID ORAL at 07:07

## 2023-01-10 RX ADMIN — FENTANYL CITRATE 50 MCG: 50 INJECTION, SOLUTION INTRAMUSCULAR; INTRAVENOUS at 09:07

## 2023-01-10 RX ADMIN — FENTANYL CITRATE 50 MCG: 50 INJECTION, SOLUTION INTRAMUSCULAR; INTRAVENOUS at 09:02

## 2023-01-10 RX ADMIN — LIDOCAINE HYDROCHLORIDE 100 MG: 20 INJECTION, SOLUTION EPIDURAL; INFILTRATION; INTRACAUDAL; PERINEURAL at 07:34

## 2023-01-10 RX ADMIN — FENTANYL CITRATE 100 MCG: 50 INJECTION, SOLUTION INTRAMUSCULAR; INTRAVENOUS at 07:41

## 2023-01-10 RX ADMIN — KETAMINE HYDROCHLORIDE 20 MG: 50 INJECTION, SOLUTION INTRAMUSCULAR; INTRAVENOUS at 08:02

## 2023-01-10 RX ADMIN — ONDANSETRON 4 MG: 2 INJECTION INTRAMUSCULAR; INTRAVENOUS at 08:24

## 2023-01-10 RX ADMIN — MIDAZOLAM 2 MG: 1 INJECTION INTRAMUSCULAR; INTRAVENOUS at 07:32

## 2023-01-10 RX ADMIN — DEXMEDETOMIDINE HCL 8 MCG: 100 INJECTION INTRAVENOUS at 07:43

## 2023-01-10 RX ADMIN — DEXAMETHASONE SODIUM PHOSPHATE 10 MG: 10 INJECTION, SOLUTION INTRAMUSCULAR; INTRAVENOUS at 07:37

## 2023-01-10 RX ADMIN — KETOROLAC TROMETHAMINE 30 MG: 30 INJECTION, SOLUTION INTRAMUSCULAR at 08:24

## 2023-01-10 RX ADMIN — PROPOFOL 200 MG: 10 INJECTION, EMULSION INTRAVENOUS at 07:34

## 2023-01-10 RX ADMIN — SODIUM CHLORIDE, SODIUM LACTATE, POTASSIUM CHLORIDE, AND CALCIUM CHLORIDE: .6; .31; .03; .02 INJECTION, SOLUTION INTRAVENOUS at 08:29

## 2023-01-10 RX ADMIN — KETAMINE HYDROCHLORIDE 30 MG: 50 INJECTION, SOLUTION INTRAMUSCULAR; INTRAVENOUS at 07:41

## 2023-01-10 RX ADMIN — CEFAZOLIN SODIUM 2000 MG: 2 SOLUTION INTRAVENOUS at 07:28

## 2023-01-10 RX ADMIN — DEXMEDETOMIDINE HCL 8 MCG: 100 INJECTION INTRAVENOUS at 08:24

## 2023-01-10 NOTE — ANESTHESIA POSTPROCEDURE EVALUATION
Post-Op Assessment Note    CV Status:  Stable  Pain Score: 0    Pain management: adequate     Mental Status:  Alert and awake   Hydration Status:  Euvolemic   PONV Controlled:  Controlled   Airway Patency:  Patent      Post Op Vitals Reviewed: Yes      Staff: CRNA, Anesthesiologist         No notable events documented      BP   125/88   Temp   98 4   Pulse  78   Resp   16   SpO2   100

## 2023-01-10 NOTE — OP NOTE
OPERATIVE REPORT  PATIENT NAME: Vaibhav Bocanegra    :  1990  MRN: [id number]  Pt Location: [facility] OR ROOM 02    SURGERY DATE: 1/10/2023    Surgeon(s) and Role:     * Addison Hartman DO - Primary     * Spring Ovalle PA-C - Assisting    Preop Diagnosis:  Complex tear of lateral meniscus of right knee, unspecified whether old or current tear, subsequent encounter [L73 063D]    Post-Op Diagnosis Codes:     * Complex tear of lateral meniscus of right knee, unspecified whether old or current tear, subsequent encounter [S83 565D]      displaced buckle handle tear lateral meniscus   degenerative joint disease   chronic ACL tear   synovitis    Procedure(s) (LRB):  ARTHROSCOPY KNEE WITH POSSIBLE MENISCECTOMY (Right)    Right knee arthroscopy   synovectomy  Lateral meniscectomy  Debridement  Chondroplasty  Post arthroscopic injection of intra-articular joint space and peripheral [id number]    Specimen(s):  Shavings    Estimated Blood Loss:   Minimal    Drains:  None    Anesthesia Type:   General with local placed at conclusion of procedure    Operative Indications:  Complex tear of lateral meniscus of right knee, unspecified whether old or current tear, subsequent encounter [I14 336D]      Operative Findings:  TT: 30    Complications:   None    Procedure and Technique:    The patient was properly identified and brought into the office operative suite  After  Successful induction of the general anesthetic, a tourniquet was placed on patient's left proximal thigh  The left lower extremity was then prepped and draped in the usual fashion  It is medically necessary that the physician's assistant be in the room to aid in positioning and placing  the appropriate amount of retraction on  the patient    Antonio Sharpe PA-C-assisting necessary for the procedure for assistance with minimally invasive arthroscopic techniques for medial and lateral meniscectomies as well as assistance with utilization of the camera and shaver and the biter  The patient was also given a dose of intravenous antibiotics      An Esmarch was used to exsanguinate the left lower extremity  The tourniquet was then inflated  Using a  #11  Scalpel blade an anterior lateral portal was made approximately 1 cm above the joint line 1 cm lateral to patellar tendon and an anterior medial portal was made approximately 1 cm above the joint line 1 cm medial to patellar tendon  The arthroscope was 1st introduced into the  into the lateral portal   First the  medial joint space was inspected  There was a tremendous amount of synovial tissue  A synovectomy did occur with the assistance of a shaver  There was  No tearing of the medial meniscus which was confirmed using a probe  There was grade 3 arthritic changes along the medial femoral condyle  It was then smoothed out with shaver  A Chondroplasty did occur where there was rough articular cartilage  It was then confirmed with a probe that there was no further loosening of the meniscus and articular cartilage  The arthroscope was then introduced into the intercondylar notch  The anterior cruciate ligament was not visualized indicative of chronic tearing  There was a displaced buckle handle tear of the lateral meniscus in the notch      The arthroscope was then introduced in the lateral side of is knee  After a synovectomy was performed, there was a  Displaced buckle handle tear tear along the lateral meniscus  Using arthroscopic shaver a lateral meniscectomy did occur  The tear was avascular  in consistency  Unfortunately, a large portion was torn and had to be removed  It was then confirmed with a probe that there is no further loosening of the meniscal tissue  There is minimal arthrosis on the lateral side of his knee where  a small chondroplasty was warranted  The arthroscope was then induced the patellofemoral joint    There was some grade 2 and grade 3 early arthritic changes along the undersurface of the patella and trochlea  Using arthroscopic shaver a chondroplasty to occur smoothing the articular cartilage down to a nice base  The medial and lateral gutters were inspected next and there was no loose bodies  The knee was then copiously irrigated sterile arthroscopic solution  The portals were closed with 3 O nylon  The portals were then injected with 0 5% Marcaine with epinephrine, and the knee was injected with 0 5% Marcaine with epinephrine and Depo-Medrol  The wounds were then dressed with ointment Xeroform 4x4s sterile Webril and Ace bandage  The tourniquet was then deflated  There was no complications during the procedure   He was successfully awoken transferred to his hospital bed and Went to the recovery room in stable condition     I was present for the entire procedure, A qualified resident physician was not available and A physician assistant was required during the procedure for retraction tissue handling,dissection and suturing    Patient Disposition:  PACU         SIGNATURE: Penny Cortes DO  DATE: January 10, 2023  TIME: 7:35 AM

## 2023-01-10 NOTE — ANESTHESIA PREPROCEDURE EVALUATION
Procedure:  ARTHROSCOPY KNEE WITH POSSIBLE MENISCECTOMY (Right: Knee)    Relevant Problems   No relevant active problems        Physical Exam    Airway    Mallampati score: I  TM Distance: >3 FB  Neck ROM: full     Dental   No notable dental hx     Cardiovascular  Cardiovascular exam normal    Pulmonary  Pulmonary exam normal     Other Findings      On methadone 44mg daily  Anesthesia Plan  ASA Score- 2     Anesthesia Type- general with ASA Monitors  Additional Monitors:   Airway Plan: LMA  Plan Factors-Exercise tolerance (METS): >4 METS  Chart reviewed  EKG reviewed  Existing labs reviewed  Patient summary reviewed  Patient is a current smoker  Patient did not smoke on day of surgery  Induction- intravenous  Postoperative Plan- Plan for postoperative opioid use  Informed Consent- Anesthetic plan and risks discussed with patient  I personally reviewed this patient with the CRNA  Discussed and agreed on the Anesthesia Plan with the CRNA  Riccardo Florian

## 2023-01-10 NOTE — INTERVAL H&P NOTE
H&P reviewed  After examining the patient I find no changes in the patients condition since the H&P had been written    Heart: RRR  Lungs: CTA  Abd: Soft, non tender, non distended     Vitals:    01/10/23 0643   BP: 118/57   Pulse: 60   Resp: 16   Temp: 99 1 °F (37 3 °C)   SpO2: 100%

## 2023-01-13 ENCOUNTER — OFFICE VISIT (OUTPATIENT)
Dept: PHYSICAL THERAPY | Age: 33
End: 2023-01-13

## 2023-01-13 DIAGNOSIS — S83.271D COMPLEX TEAR OF LATERAL MENISCUS OF RIGHT KNEE, UNSPECIFIED WHETHER OLD OR CURRENT TEAR, SUBSEQUENT ENCOUNTER: ICD-10-CM

## 2023-01-13 DIAGNOSIS — M25.561 ACUTE PAIN OF RIGHT KNEE: Primary | ICD-10-CM

## 2023-01-13 NOTE — PROGRESS NOTES
PT Evaluation     Today's date: 2023  Patient name: Sarah Pappas  : 1990  MRN: [id number]  Referring provider: Bonnee Mohs, DO  Dx:   Encounter Diagnosis     ICD-10-CM    1  Acute pain of right knee  M25 561       2  Complex tear of lateral meniscus of right knee, unspecified whether old or current tear, subsequent encounter  S83 271D Ambulatory Referral to Physical Therapy          Start Time: 945  Stop Time: 104  Total time in clinic (min): 59 minutes    Assessment  Assessment details: Sarah Pappas is a 27 y o  male who presents with pain, decreased strength, decreased ROM and joint effusion  Due to these impairments, Patient has difficulty performing a/iadls, recreational activities and work-related activities  Patient's clinical presentation is consistent with their referring diagnosis of s/p right arthroscopy, right knee pain  Patient would benefit from skilled physical therapy to address their aforementioned impairments, improve their level of function and to improve their overall quality of life  Impairments: abnormal or restricted ROM, activity intolerance, impaired physical strength, lacks appropriate home exercise program, pain with function, weight-bearing intolerance and poor posture     Symptom irritability: moderateUnderstanding of Dx/Px/POC: excellent  Goals  ST-3 WEEKS  1  Decrease pain right knee < 2/10 on VAS at its worst   2   Increase ROM right knee flexion > 120    3  Increase right knee strength > 4-/5   4  Improve ambulation to least AD with safe technique on level surfaces  5  Able to perform reciprocal stairs  LT-6 WEEKS  1  Patient to be independent with a/iadls  2  Increase functional activities for leisure and home activities to previous LOF  3  Independent with HEP and/or fitness program    4  Able to RTW without restrictions       Plan  Patient would benefit from: skilled physical therapy  Planned modality interventions: cryotherapy, thermotherapy: hydrocollator packs and unattended electrical stimulation  Planned therapy interventions: activity modification, behavior modification, body mechanics training, flexibility, functional ROM exercises, home exercise program, IADL retraining, joint mobilization, manual therapy, neuromuscular re-education, patient education, postural training, strengthening, stretching, therapeutic activities, therapeutic exercise, balance/weight bearing training and gait training  Frequency: 2-3x week  Duration in weeks: 8  Plan of Care beginning date: 2023  Plan of Care expiration date: 3/13/2023  Treatment plan discussed with: patient        Subjective Evaluation    History of Present Illness  Date of onset: 2022  Date of surgery: 1/10/2023  Mechanism of injury: surgery  Mechanism of injury: Patient was playing foot ball on  without any incident but when he went to pull his cleats off he had severe pain in right knee and locking  He saw ortho and had xrays and MRI  positive for lateral meniscal tear  Needed his knee aspirated  He was scheduled for surgery on Tuesday  Patient states he feels better already             Not a recurrent problem   Quality of life: excellent    Pain  Current pain ratin  At worst pain ratin  Location: right knee  Quality: sharp, dull ache and discomfort  Relieving factors: rest and ice  Aggravating factors: walking and stair climbing  Progression: improved    Social Support  Steps to enter house: yes  Stairs in house: no   Lives in: [place]  Lives with: parents    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)    Diagnostic Tests  X-ray: normal  MRI studies: abnormal  Patient Goals  Patient goals for therapy: decreased pain, decreased edema, increased motion, increased strength, return to work and return to [employer] activities  Patient goal: able to play softball        Objective     Observations     Right Knee   Positive for atrophy, edema, effusion and incision  Additional Observation Details  2 inf patella scope sites closed with sutures  No drainage noted  One lateral drain hole also closed  Min to mod ecchymosis medial scope site noted  Neurological Testing     Sensation     Knee   Left Knee   Intact: light touch    Right Knee   Intact: light touch     Additional Neurological Details  No neuro signs noted    Active Range of Motion   Left Knee   Flexion: 138 degrees   Extension: 0 degrees     Right Knee   Flexion: 100 degrees with pain  Extension: -3 degrees     Mobility   Patellar Mobility:     Right Knee   WFL: medial, lateral, superior and inferior    Strength/Myotome Testing     Left Knee   Normal strength    Right Knee   Flexion: 4-  Extension: 3+  Quadriceps contraction: good    Swelling     Left Knee Girth Measurement (cm)   Joint line: 35 5 cm  10 cm above joint line: 40 5 (suprapatella) cm    Right Knee Girth Measurement (cm)   Joint line: 37 cm  10 cm above joint line: 41 5 (suprapatella) cm    Ambulation   Weight-Bearing Status   Weight-Bearing Status (Right): weight-bearing as tolerated    Assistive device used: none    Ambulation: Level Surfaces   Ambulation without assistive device: independent    Ambulation: Stairs   Ascend stairs: independent  Pattern: non-reciprocal  Railings: one rail  Descend stairs: independent  Pattern: non-reciprocal  Railings: one rail    Observational Gait   Decreased walking speed         Flowsheet Rows    Flowsheet Row Most Recent Value   PT/OT G-Codes    Current Score 57   Projected Score 91             Precautions: ORIF right ankle      Manuals 1/13            Right knee 10'                                                   Neuro Re-Ed                                                    Ther Ex             Hip adduction Ball 30x            Hip abduction Red TB 30x            R heel slides 30x            R SLR 3x10            R SAQ 30x            R LAQ 30x                         Bike for ROM and strengthening             nustep for strengthening quads             Slant board                                       Ther Activity             Side stepping             Step ups             Gait Training                                       Modalities             Ice R knee after 10'

## 2023-01-17 ENCOUNTER — OFFICE VISIT (OUTPATIENT)
Dept: PHYSICAL THERAPY | Age: 33
End: 2023-01-17

## 2023-01-17 DIAGNOSIS — S83.271D COMPLEX TEAR OF LATERAL MENISCUS OF RIGHT KNEE, UNSPECIFIED WHETHER OLD OR CURRENT TEAR, SUBSEQUENT ENCOUNTER: Primary | ICD-10-CM

## 2023-01-17 DIAGNOSIS — M25.561 ACUTE PAIN OF RIGHT KNEE: ICD-10-CM

## 2023-01-17 NOTE — PROGRESS NOTES
Daily Note     Today's date: 2023  Patient name: Colette Goldberg  : 1990  MRN: [id number]  Referring provider: Lucy Borrero DO  Dx:   Encounter Diagnosis     ICD-10-CM    1  Complex tear of lateral meniscus of right knee, unspecified whether old or current tear, subsequent encounter  S83 271D       2  Acute pain of right knee  M25 561           Start Time: 0945  Stop Time: 1040  Total time in clinic (min): 55 minutes    Subjective: Patient states his knee got black and blue  Objective: See treatment diary below      Assessment: Tolerated treatment well  Patient would benefit from continued PT  Cues for TKE with ambulation and encourage heel strike  Good ROM  ecchymosis resolving medial right knee and shin  Swelling around medial scope site  Sutures intact no drainage noted  Plan: Continue per plan of care        Precautions: ORIF right ankle      Manuals            Right knee 10' 8'                                                  Neuro Re-Ed                                                    Ther Ex             Hip adduction Ball 30x 50# 30x           Hip abduction Red TB 30x 45# 30x           R heel slides 30x 30x           R SLR 3x10 2# 30x           R SAQ 30x 3# 30x           R LAQ 30x 3# 30x           R TKE farrukh  10# 10x 10"           Bike for ROM and strengthening  5'           nustep for strengthening quads  L5 5'           Slant board  L3 30" 4x                                     Ther Activity             Side stepping  nv           Step ups             Gait Training                                       Modalities             Ice R knee after 10' 10'

## 2023-01-19 ENCOUNTER — OFFICE VISIT (OUTPATIENT)
Dept: PHYSICAL THERAPY | Age: 33
End: 2023-01-19

## 2023-01-19 DIAGNOSIS — M25.561 ACUTE PAIN OF RIGHT KNEE: ICD-10-CM

## 2023-01-19 DIAGNOSIS — S83.271D COMPLEX TEAR OF LATERAL MENISCUS OF RIGHT KNEE, UNSPECIFIED WHETHER OLD OR CURRENT TEAR, SUBSEQUENT ENCOUNTER: Primary | ICD-10-CM

## 2023-01-19 NOTE — PROGRESS NOTES
Daily Note     Today's date: 2023  Patient name: Scott Machado  : 1990  MRN: [id number]  Referring provider: Nilesh Daniels DO  Dx:   Encounter Diagnosis     ICD-10-CM    1  Complex tear of lateral meniscus of right knee, unspecified whether old or current tear, subsequent encounter  S83 271D       2  Acute pain of right knee  M25 561                      Subjective: Patient reports his knee gave out walking yesterday, SPR at start of session 2-3/10 but more discomfort than pain  States he is compliant with HEP  Objective: See treatment diary below      Assessment: Tolerated treatment well  Evaluating PT performed skin check, ecchymosis cont to improve with some swelling around suture sites  ROM WFL, however has empty end feel due to sharp pain  He was able to add wall sits and side steps with good effort and appropriate fatigue following  No pain reported with outlined program  Patient exhibited good technique with therapeutic exercises and would benefit from continued PT      Plan: Continue per plan of care        Precautions: ORIF right ankle      Manuals           Right knee 10' 8' 8'                                                 Neuro Re-Ed                                                    Ther Ex             Hip adduction Ball 30x 50# 30x 55# 30x          Hip abduction Red TB 30x 45# 30x 50#  30x          R heel slides 30x 30x           R SLR 3x10 2# 30x 3# 2x15          SL Hip Abd   0# 3x10          R SAQ 30x 3# 30x 3# 3x10          R LAQ 30x 3# 30x 3# 2x15          R TKE farrukh  10# 10x 10" 12# 10"x10          Bike for ROM and strengthening  5' 5'          nustep for strengthening quads  L5 5' L5 5'           Slant board  L3 30" 4x L3 30"x4          Wall Sit   30"x2                       Ther Activity             Side stepping  nv RTB  2 laps 15 ft          Step ups             Gait Training                                       Modalities             Ice R knee after 10' 10' 10'

## 2023-01-24 ENCOUNTER — OFFICE VISIT (OUTPATIENT)
Dept: PHYSICAL THERAPY | Age: 33
End: 2023-01-24

## 2023-01-24 DIAGNOSIS — S83.271D COMPLEX TEAR OF LATERAL MENISCUS OF RIGHT KNEE, UNSPECIFIED WHETHER OLD OR CURRENT TEAR, SUBSEQUENT ENCOUNTER: Primary | ICD-10-CM

## 2023-01-24 DIAGNOSIS — M25.561 ACUTE PAIN OF RIGHT KNEE: ICD-10-CM

## 2023-01-24 NOTE — PROGRESS NOTES
Daily Note     Today's date: 2023  Patient name: Hector Avendano  : 1990  MRN: [id number]  Referring provider: Carol Rahman DO  Dx:   Encounter Diagnosis     ICD-10-CM    1  Complex tear of lateral meniscus of right knee, unspecified whether old or current tear, subsequent encounter  S83 271D       2  Acute pain of right knee  M25 561           Start Time: 1030  Stop Time: 1130  Total time in clinic (min): 60 minutes    Subjective: Patient reports the past few days was better  He is able to do stairs better  Objective: See treatment diary below      Assessment: Tolerated treatment well  Patient would benefit from continued PT tender around medial scope site  Sutures intact  Seeing MD Friday  Good ROM right knee  Plan: Continue per plan of care        Precautions: ORIF right ankle      Manuals          Right knee 10' 8' 8' 8                                                Neuro Re-Ed                                                    Ther Ex             Hip adduction Ball 30x 50# 30x 55# 30x 55# 30x         Hip abduction Red TB 30x 45# 30x 50#  30x 55# 30x         R heel slides 30x 30x  30x         R SLR 3x10 2# 30x 3# 2x15 3# 30x         SL Hip Abd   0# 3x10 3# 30x         VMO half foam    30x         R SAQ 30x 3# 30x 3# 3x10 4# 30x         R LAQ 30x 3# 30x 3# 2x15 4# 30x         R TKE farrukh  10# 10x 10" 12# 10"x10 30x blkTB         Bike for ROM and strengthening  5' 5' 8' L3         nustep for strengthening quads  L5 5' L5 5'  L5 10'         Slant board  L3 30" 4x L3 30"x4 4x         Wall Sit   30"x2                       Ther Activity             Side stepping  nv RTB  2 laps 15 ft Blue 2x         Lateral step downs    4" 30x         Step ups    6" 30x         Gait Training                                       Modalities             Ice R knee after 10' 10' 10' 10' rolling walker

## 2023-01-26 ENCOUNTER — APPOINTMENT (OUTPATIENT)
Dept: PHYSICAL THERAPY | Age: 33
End: 2023-01-26

## 2023-01-26 DIAGNOSIS — M25.561 ACUTE PAIN OF RIGHT KNEE: ICD-10-CM

## 2023-01-26 DIAGNOSIS — S83.271D COMPLEX TEAR OF LATERAL MENISCUS OF RIGHT KNEE, UNSPECIFIED WHETHER OLD OR CURRENT TEAR, SUBSEQUENT ENCOUNTER: Primary | ICD-10-CM

## 2023-01-26 NOTE — PROGRESS NOTES
Daily Note     Today's date: 2023  Patient name: Arjun Lancaster  : 1990  MRN: [id number]  Referring provider: Lara Schwab DO  Dx:   Encounter Diagnosis     ICD-10-CM    1  Complex tear of lateral meniscus of right knee, unspecified whether old or current tear, subsequent encounter  I16 220D       2  Acute pain of right knee  M25 561                      Subjective: ***      Objective: See treatment diary below      Assessment: Tolerated treatment {Tolerated treatment :[id number]}   Patient {assessment:7755860139}      Plan: {PLAN:7439970805}     Precautions: ORIF right ankle      Manuals          Right knee 10' 8' 8' 8                                                Neuro Re-Ed                                                    Ther Ex             Hip adduction Ball 30x 50# 30x 55# 30x 55# 30x         Hip abduction Red TB 30x 45# 30x 50#  30x 55# 30x         R heel slides 30x 30x  30x         R SLR 3x10 2# 30x 3# 2x15 3# 30x         SL Hip Abd   0# 3x10 3# 30x         VMO half foam    30x         R SAQ 30x 3# 30x 3# 3x10 4# 30x         R LAQ 30x 3# 30x 3# 2x15 4# 30x         R TKE farrukh  10# 10x 10" 12# 10"x10 30x blkTB         Bike for ROM and strengthening  5' 5' 8' L3         nustep for strengthening quads  L5 5' L5 5'  L5 10'         Slant board  L3 30" 4x L3 30"x4 4x         Wall Sit   30"x2                       Ther Activity             Side stepping  nv RTB  2 laps 15 ft Blue 2x         Lateral step downs    4" 30x         Step ups    6" 30x         Gait Training                                       Modalities             Ice R knee after 10' 10' 10' 10'

## 2023-01-27 ENCOUNTER — OFFICE VISIT (OUTPATIENT)
Dept: OBGYN CLINIC | Facility: CLINIC | Age: 33
End: 2023-01-27

## 2023-01-27 VITALS
SYSTOLIC BLOOD PRESSURE: 121 MMHG | BODY MASS INDEX: 26.73 KG/M2 | WEIGHT: 215 LBS | HEIGHT: 75 IN | HEART RATE: 65 BPM | DIASTOLIC BLOOD PRESSURE: 59 MMHG

## 2023-01-27 DIAGNOSIS — M23.51 OLD COMPLETE ACL TEAR, RIGHT: ICD-10-CM

## 2023-01-27 DIAGNOSIS — S83.271D COMPLEX TEAR OF LATERAL MENISCUS OF RIGHT KNEE, UNSPECIFIED WHETHER OLD OR CURRENT TEAR, SUBSEQUENT ENCOUNTER: Primary | ICD-10-CM

## 2023-01-27 NOTE — PROGRESS NOTES
Assessment/Plan:   Diagnoses and all orders for this visit:    Complex tear of lateral meniscus of right knee, unspecified whether old or current tear, subsequent encounter  -     Brace    Old complete ACL tear, right  -     Brace  Reviewed today's physical exam findings and and operative findings with patient at time of visit  Reviewed with patient that while his lateral meniscectomy was successful, the procedure also discovered chronic ACL tear in the right knee which is likely contributing to the instability he feels postoperatively, as his meniscus is no longer present to decrease instability  He is encouraged to continue physical therapy until he has met his therapeutic goals  He was also provided a hinged knee brace for added support with weightbearing activity  He can continue NSAIDs/Tylenol as needed for pain and soreness  He will be seen for follow-up evaluation in 4 weeks  Patient stresses understanding and is in agreement with this treatment plan  the patient is doing better since his right knee arthroscopy  He can fully extend his knee now  The patient understands the implication of his ACL deficient knee as well as the chronic tear of his lateral meniscus  He was placed in a hinged knee brace  Continue therapy  Follow-up 1 month for evaluation  If his condition changes, he will not hesitate to let us know    Subjective:   Patient ID: Graciela Borja  1990     HPI  Patient is a 27 y o  male who presents for 2-week follow-up evaluation s/p right knee arthroscopy with lateral meniscectomy performed 1/10/2023  Patient states that he has decreased pain postoperatively, but has experienced 3 episodes of instability since this procedure was performed  He denies any new onset bruising, swelling, numbness, tingling, or mechanical symptoms  He also denies any recent fever, chills, headache, nausea, dizziness, or malaise      The following portions of the patient's history were reviewed and updated as appropriate:  Past medical history, past surgical history, Family history, social history, current medications and allergies    Past Medical History:   Diagnosis Date   • Asthma     as a child       Past Surgical History:   Procedure Laterality Date   • INCISION AND DRAINAGE OF WOUND Left 06/28/2021    Procedure: INCISION AND DRAINAGE (I&D) LEFT SUBMANDIBULAR SPACE INFECTION, LEFT  SPACE INFECTION WITH EXTRACTION OF TOOTH #19;  Surgeon: Ron Patrick DMD;  Location:  MAIN OR;  Service: Maxillofacial   • ORIF TIBIA & FIBULA FRACTURES Right    • [place] ARTHRS KNE SURG W/MENISCECTOMY MED/LAT W/SHVG Right 1/10/2023    Procedure: ARTHROSCOPY KNEE WITH LATERAL MENISCECTOMY;  Surgeon: Dorys Castaneda DO;  Location: [facility];  Service: Orthopedics       History reviewed  No pertinent family history      Social History     Socioeconomic History   • Marital status: Single     Spouse name: None   • Number of children: None   • Years of education: None   • Highest education level: None   Occupational History   • None   Tobacco Use   • Smoking status: Every Day     Packs/day: 0 50     Types: Cigarettes   • Smokeless tobacco: Never   Vaping Use   • Vaping Use: Never used   Substance and Sexual Activity   • Alcohol use: Never   • Drug use: Yes     Types: Marijuana     Comment: daily   • Sexual activity: None   Other Topics Concern   • None   Social History Narrative   • None     Social Determinants of Health     Financial Resource Strain: Not on file   Food Insecurity: Not on file   Transportation Needs: Not on file   Physical Activity: Not on file   Stress: Not on file   Social Connections: Not on file   Intimate Partner Violence: Not on file   Housing Stability: Not on file         Current Outpatient Medications:   •  acetaminophen (TYLENOL) 500 mg tablet, Take 1 tablet (500 mg total) by mouth every 6 (six) hours as needed for mild pain, Disp: 60 tablet, Rfl: 0  •  meloxicam (MOBIC) 15 mg tablet, Take 1 tablet (15 mg total) by mouth daily, Disp: 30 tablet, Rfl: 1  •  methadone (DOLOPHINE) 10 mg/mL oral concentrated solution, Take 44 mg by mouth daily, Disp: , Rfl:   •  chlorhexidine (PERIDEX) 0 12 % solution, Apply 15 mL to the mouth or throat 2 (two) times a day (Patient not taking: Reported on 11/24/2022), Disp: 120 mL, Rfl: 0  •  nicotine (NICODERM CQ) 14 mg/24hr TD 24 hr patch, Place 1 patch on the skin daily (Patient not taking: Reported on 8/19/2022), Disp: 28 patch, Rfl: 0    No Known Allergies    Review of Systems   Constitutional: Negative for chills, fever and unexpected weight change  HENT: Negative for hearing loss, nosebleeds and sore throat  Eyes: Negative for pain, redness and visual disturbance  Respiratory: Negative for cough, shortness of breath and wheezing  Cardiovascular: Negative for chest pain, palpitations and leg swelling  Gastrointestinal: Negative for abdominal pain, nausea and vomiting  Endocrine: Negative for polydipsia and polyuria  Genitourinary: Negative for dysuria and hematuria  Musculoskeletal:        As noted in HPI   Skin: Negative for rash and wound  Neurological: Negative for dizziness, numbness and headaches  Psychiatric/Behavioral: Negative for decreased concentration and suicidal ideas  The patient is not nervous/anxious           Objective:  /59   Ht 6' 3" (1 905 m)   Wt 97 5 kg (215 lb)   BMI 26 87 kg/m²     Ortho Exam  Right knee -   Weight-bearing status: Full WBAT  Incision(s): Clean, dry, healing -no signs of drainage or dehiscence -sutures removed at time of visit without difficulty  Skin is warm and dry with no signs of erythema, ecchymosis, or infection  Minimal soft tissue swelling no effusion  ROM: 0° - 120°  Strength: 4+/5 seated knee flexion and extension  Knee demonstrates grade 1-2 anterior laxity with Lachman's and anterior drawer  Knee is stable to varus and valgus stress  Calf compartments are soft  - Munir's sign  2+ TP and DP pulses with brisk capillary refill to the toes  Sural, saphenous, tibial, superficial and deep peroneal motor and sensory distributions intact  Sensation light touch intact distally    Physical Exam  Vitals reviewed  Constitutional:       Appearance: He is well-developed  HENT:      Head: Normocephalic and atraumatic  Nose: Nose normal    Eyes:      Conjunctiva/sclera: Conjunctivae normal    Cardiovascular:      Rate and Rhythm: Normal rate  Pulmonary:      Effort: Pulmonary effort is normal    Musculoskeletal:      Cervical back: Neck supple  Skin:     General: Skin is warm and dry  Capillary Refill: Capillary refill takes less than 2 seconds  Neurological:      Mental Status: He is alert and oriented to person, place, and time     Psychiatric:         Mood and Affect: Mood normal          Behavior: Behavior normal         Diagnostic Test Review:  No new imaging reviewed this visit    Procedures   No procedures performed this visit    Scribe Attestation    I,:  Demian Ba am acting as a scribe while in the presence of the attending physician :       I,:  Josselin Tubbs DO personally performed the services described in this documentation    as scribed in my presence :

## 2023-01-30 ENCOUNTER — APPOINTMENT (OUTPATIENT)
Dept: PHYSICAL THERAPY | Age: 33
End: 2023-01-30

## 2023-03-03 ENCOUNTER — OFFICE VISIT (OUTPATIENT)
Dept: OBGYN CLINIC | Facility: CLINIC | Age: 33
End: 2023-03-03

## 2023-03-03 VITALS
DIASTOLIC BLOOD PRESSURE: 66 MMHG | BODY MASS INDEX: 26.87 KG/M2 | HEIGHT: 75 IN | SYSTOLIC BLOOD PRESSURE: 104 MMHG | HEART RATE: 76 BPM

## 2023-03-03 DIAGNOSIS — Z98.890 S/P RIGHT KNEE ARTHROSCOPY: ICD-10-CM

## 2023-03-03 DIAGNOSIS — M23.51 OLD COMPLETE ACL TEAR, RIGHT: Primary | ICD-10-CM

## 2023-03-03 NOTE — PROGRESS NOTES
ASSESSMENT/PLAN:    Diagnoses and all orders for this visit:    Old complete ACL tear, right    S/P right knee arthroscopy        The patient was seen and examined  He is continuing to progress since his surgery  He is interested in scheduling an ACL reconstruction next winter  The patient will follow-up with our office in 4 months for strength and motion check  He should continue home exercise program for strengthening, stretching and range of motion  Return in about 4 months (around 7/3/2023)  The patient is doing quite well from his right knee arthroscopy  There is no full extension  Mild ACL laxity which is chronic in nature  The patient may consider ACL reconstruction next winter  Return back in 4 months reevaluation discussed this in further detail  If his condition changes, he would not hesitate to let us know      _____________________________________________________  CHIEF COMPLAINT:  Chief Complaint   Patient presents with   • Right Knee - Post-op, Follow-up         SUBJECTIVE:  Orly Callejas is a 27 y o  male who presents to our office for a postop visit  The patient is status post right knee arthroscopy with lateral meniscectomy from 1/10/2023  He still complains of some right knee pain, albeit improved since surgery  He does state that his knee will feel unstable which is most likely attributed to his chronic ACL tear  He denies any numbness or tingling  He denies any fever or chills      The following portions of the patient's history were reviewed and updated as appropriate: allergies, current medications, past family history, past medical history, past social history, past surgical history and problem list     PAST MEDICAL HISTORY:  Past Medical History:   Diagnosis Date   • Asthma     as a child       PAST SURGICAL HISTORY:  Past Surgical History:   Procedure Laterality Date   • INCISION AND DRAINAGE OF WOUND Left 06/28/2021    Procedure: INCISION AND DRAINAGE (I&D) LEFT SUBMANDIBULAR SPACE INFECTION, LEFT  SPACE INFECTION WITH EXTRACTION OF TOOTH #19;  Surgeon: Ferdinand Mascorro DMD;  Location:  MAIN OR;  Service: Maxillofacial   • ORIF TIBIA & FIBULA FRACTURES Right    • [place] ARTHRS KNE SURG W/MENISCECTOMY MED/LAT W/SHVG Right 1/10/2023    Procedure: ARTHROSCOPY KNEE WITH LATERAL MENISCECTOMY;  Surgeon: Zoya Quiroga DO;  Location: [facility];  Service: Orthopedics       FAMILY HISTORY:  History reviewed  No pertinent family history  SOCIAL HISTORY:  Social History     Tobacco Use   • Smoking status: Every Day     Packs/day: 0 50     Types: Cigarettes   • Smokeless tobacco: Never   Vaping Use   • Vaping Use: Never used   Substance Use Topics   • Alcohol use: Never   • Drug use: Yes     Types: Marijuana     Comment: daily       MEDICATIONS:    Current Outpatient Medications:   •  methadone (DOLOPHINE) 10 mg/mL oral concentrated solution, Take 44 mg by mouth daily, Disp: , Rfl:   •  chlorhexidine (PERIDEX) 0 12 % solution, Apply 15 mL to the mouth or throat 2 (two) times a day (Patient not taking: Reported on 11/24/2022), Disp: 120 mL, Rfl: 0  •  meloxicam (MOBIC) 15 mg tablet, Take 1 tablet (15 mg total) by mouth daily (Patient not taking: Reported on 3/3/2023), Disp: 30 tablet, Rfl: 1  •  nicotine (NICODERM CQ) 14 mg/24hr TD 24 hr patch, Place 1 patch on the skin daily (Patient not taking: Reported on 8/19/2022), Disp: 28 patch, Rfl: 0    ALLERGIES:  No Known Allergies    ROS:  Review of Systems     Constitutional: Negative for fatigue, fever or loss of appetite  HENT: Negative  Respiratory: Negative for shortness of breath, dyspnea  Cardiovascular: Negative for chest pain/tightness  Gastrointestinal: Negative for abdominal pain, N/V  Endocrine: Negative for cold/heat intolerance, unexplained weight loss/gain  Genitourinary: Negative for flank pain, dysuria, hematuria  Musculoskeletal: Positive for arthralgia   Skin: Negative for rash  Neurological: Negative for numbness or tingling  Psychiatric/Behavioral: Negative for agitation  _____________________________________________________  PHYSICAL EXAMINATION:    Blood pressure 104/66, pulse 76, height 6' 3" (1 905 m)  Constitutional: Oriented to person, place, and time  Appears well-developed and well-nourished  No distress  HENT:   Head: Normocephalic  Eyes: Conjunctivae are normal  Right eye exhibits no discharge  Left eye exhibits no discharge  No scleral icterus  Cardiovascular: Normal rate  Pulmonary/Chest: Effort normal    Neurological: Alert and oriented to person, place, and time  Skin: Skin is warm and dry  No rash noted  Not diaphoretic  No erythema  No pallor  Psychiatric: Normal mood and affect  Behavior is normal  Judgment and thought content normal       MUSCULOSKELETAL EXAMINATION:   Physical Exam  Ortho Exam    Right lower extremity was neurovascularly intact  Toes are pink and mobile  Compartments are soft  No warmth erythema  Range of motion of the knee is from 0 to 120 degrees  Positive Lachman  No effusion  No significant edema  Brisk cap refill  Sensation intact  Objective:  BP Readings from Last 1 Encounters:   03/03/23 104/66      Wt Readings from Last 1 Encounters:   01/27/23 97 5 kg (215 lb)        BMI:   Estimated body mass index is 26 87 kg/m² as calculated from the following:    Height as of this encounter: 6' 3" (1 905 m)  Weight as of 1/27/23: 97 5 kg (215 lb)          Scribe Attestation    I,:  Tierney Conteh PA-C am acting as a scribe while in the presence of the attending physician :       I,:  Rehana Calixto DO personally performed the services described in this documentation    as scribed in my presence :

## 2023-07-18 ENCOUNTER — HOSPITAL ENCOUNTER (EMERGENCY)
Facility: HOSPITAL | Age: 33
Discharge: HOME | End: 2023-07-18
Attending: EMERGENCY MEDICINE
Payer: COMMERCIAL

## 2023-07-18 VITALS
WEIGHT: 215 LBS | HEART RATE: 68 BPM | SYSTOLIC BLOOD PRESSURE: 119 MMHG | OXYGEN SATURATION: 96 % | HEIGHT: 75 IN | BODY MASS INDEX: 26.73 KG/M2 | TEMPERATURE: 98.1 F | DIASTOLIC BLOOD PRESSURE: 64 MMHG | RESPIRATION RATE: 16 BRPM

## 2023-07-18 DIAGNOSIS — K08.89 DENTALGIA: Primary | ICD-10-CM

## 2023-07-18 RX ORDER — AMOXICILLIN AND CLAVULANATE POTASSIUM 875; 125 MG/1; MG/1
1 TABLET, FILM COATED ORAL EVERY 12 HOURS
Qty: 14 TABLET | Refills: 0 | Status: SHIPPED | OUTPATIENT
Start: 2023-07-18 | End: 2023-07-25

## 2023-07-18 RX ORDER — AMOXICILLIN AND CLAVULANATE POTASSIUM 875; 125 MG/1; MG/1
1 TABLET, FILM COATED ORAL ONCE
Status: COMPLETED | OUTPATIENT
Start: 2023-07-18 | End: 2023-07-18

## 2023-07-18 RX ADMIN — AMOXICILLIN AND CLAVULANATE POTASSIUM 1 TABLET: 875; 125 TABLET, FILM COATED ORAL at 07:24

## 2023-07-18 NOTE — ED PROVIDER NOTES
History  Chief Complaint   Patient presents with   • Dental Pain     Woke up yesterday with some dental pain/pressure left upper molar area. Seen here and also treated at the dentist for same a few months ago. Symptoms returned. Patient is a 64-year-old male with history of poor dentition that presents for evaluation of dental pain. Patient says that he has been having some mild dental discomfort left upper molar area. He had issues here couple months ago that required antibiotics and a cavity being filled. He otherwise denies red flags including fevers, p.o. intolerance, trismus, drooling, voice change. No neck pain chest pain dyspnea abdominal pain. He is taking Aleve with significant relief of symptoms. Prior to Admission Medications   Prescriptions Last Dose Informant Patient Reported?  Taking?   chlorhexidine (PERIDEX) 0.12 % solution   No No   Sig: Apply 15 mL to the mouth or throat 2 (two) times a day   Patient not taking: Reported on 11/24/2022   ibuprofen (MOTRIN) 600 mg tablet   No No   Sig: Take 1 tablet (600 mg total) by mouth every 6 (six) hours as needed for mild pain   meloxicam (MOBIC) 15 mg tablet   No No   Sig: Take 1 tablet (15 mg total) by mouth daily   Patient not taking: Reported on 3/3/2023   methadone (DOLOPHINE) 10 mg/mL oral concentrated solution   Yes No   Sig: Take 44 mg by mouth daily   nicotine (NICODERM CQ) 14 mg/24hr TD 24 hr patch   No No   Sig: Place 1 patch on the skin daily   Patient not taking: Reported on 8/19/2022      Facility-Administered Medications: None       Past Medical History:   Diagnosis Date   • Asthma     as a child       Past Surgical History:   Procedure Laterality Date   • INCISION AND DRAINAGE OF WOUND Left 06/28/2021    Procedure: INCISION AND DRAINAGE (I&D) LEFT SUBMANDIBULAR SPACE INFECTION, LEFT  SPACE INFECTION WITH EXTRACTION OF TOOTH #19;  Surgeon: Thor Turcios DMD;  [facility];  Service: Maxillofacial   • ORIF TIBIA & FIBULA FRACTURES Right    • CO ARTHRS KNE SURG W/MENISCECTOMY MED/LAT W/SHVG Right 1/10/2023    Procedure: ARTHROSCOPY KNEE WITH LATERAL MENISCECTOMY;  Surgeon: Ino Blue DO;  Location: [facility];  Service: Orthopedics       No family history on file. I have reviewed and agree with the history as documented. E-Cigarette/Vaping   • E-Cigarette Use Never User      E-Cigarette/Vaping Substances     Social History     Tobacco Use   • Smoking status: Every Day     Packs/day: 0.50     Types: Cigarettes   • Smokeless tobacco: Never   Vaping Use   • Vaping Use: Never used   Substance Use Topics   • Alcohol use: Never   • Drug use: Yes     Types: Marijuana     Comment: daily       Review of Systems   Constitutional: Negative for fever. HENT: Positive for dental problem. Negative for sore throat. Eyes: Negative for photophobia. Respiratory: Negative for shortness of breath. Cardiovascular: Negative for chest pain. Gastrointestinal: Negative for abdominal pain. Genitourinary: Negative for dysuria. Musculoskeletal: Negative for back pain. Skin: Negative for rash. Neurological: Negative for light-headedness. Hematological: Negative for adenopathy. Psychiatric/Behavioral: Negative for agitation. All other systems reviewed and are negative. Physical Exam  Physical Exam  Vitals reviewed. Constitutional:       General: He is not in acute distress. Appearance: He is well-developed. HENT:      Head: Normocephalic. Mouth/Throat:      Comments: Mild inflammation of the gingiva around tooth 15. No drainable abscess. Otherwise oropharynx clear  Eyes:      Pupils: Pupils are equal, round, and reactive to light. Cardiovascular:      Rate and Rhythm: Normal rate and regular rhythm. Heart sounds: Normal heart sounds. No murmur heard. No friction rub. No gallop. Pulmonary:      Effort: Pulmonary effort is normal.      Breath sounds: Normal breath sounds. Abdominal:      General: Bowel sounds are normal. There is no distension. Palpations: Abdomen is soft. Tenderness: There is no abdominal tenderness. There is no guarding. Musculoskeletal:         General: Normal range of motion. Cervical back: Normal range of motion and neck supple. Skin:     Capillary Refill: Capillary refill takes less than 2 seconds. Neurological:      Mental Status: He is alert and oriented to person, place, and time. Cranial Nerves: No cranial nerve deficit. Sensory: No sensory deficit. Motor: No abnormal muscle tone. Psychiatric:         Behavior: Behavior normal.         Thought Content: Thought content normal.         Judgment: Judgment normal.         Vital Signs  ED Triage Vitals   Temperature Pulse Respirations Blood Pressure SpO2   07/18/23 0702 07/18/23 0700 07/18/23 0700 07/18/23 0700 07/18/23 0700   98.1 °F (36.7 °C) 68 16 119/64 96 %      Temp Source Heart Rate Source Patient Position - Orthostatic VS BP Location FiO2 (%)   07/18/23 0702 07/18/23 0700 07/18/23 0700 07/18/23 0700 --   Tympanic Monitor Sitting Right arm       Pain Score       07/18/23 0700       3           Vitals:    07/18/23 0700   BP: 119/64   Pulse: 68   Patient Position - Orthostatic VS: Sitting         Visual Acuity      ED Medications  Medications   amoxicillin-clavulanate (AUGMENTIN) 875-125 mg per tablet 1 tablet (1 tablet Oral Given 7/18/23 0724)       Diagnostic Studies  Results Reviewed     None                 No orders to display              Procedures  Procedures         ED Course                               SBIRT 22yo+    Flowsheet Row Most Recent Value   Initial Alcohol Screen: US AUDIT-C     1. How often do you have a drink containing alcohol? 0 Filed at: 07/18/2023 0701   2. How many drinks containing alcohol do you have on a typical day you are drinking? 2 Filed at: 07/18/2023 0701   3a. Male UNDER 65:  How often do you have five or more drinks on one occasion? 0 Filed at: 07/18/2023 0701   Audit-C Score 2 Filed at: 07/18/2023 9134   ABIODUN: How many times in the past year have you. .. Used an illegal drug or used a prescription medication for non-medical reasons? Never Filed at: 07/18/2023 0701                    Medical Decision Making  Patient is a 68-year-old male who presents for evaluation of dental pain. Appears that there may be a mild infection without evidence of drainable abscess. Otherwise no red flags on history or physical exam.  Plan to start on antibiotics with dental follow-up and strict return precautions. Risk  Prescription drug management. Disposition  Final diagnoses:   Hunter Gunn     Time reflects when diagnosis was documented in both MDM as applicable and the Disposition within this note     Time User Action Codes Description Comment    7/18/2023  7:18 AM Shaina Ramos Add [K08.89] Hunter Gunn       ED Disposition     ED Disposition   Discharge    Condition   Stable    Date/Time   Tue Jul 18, 2023  7:18 AM    Comment   [facility] discharge to home/self care.                Follow-up Information     Follow up With Specialties Details Why [facility] Emergency Department Emergency Medicine  If symptoms worsen [address] [place] [address]  [phone] [address] [facility] Emergency Department, [address] [facility], [address] [facility] [address], [address] [facility]          Discharge Medication List as of 7/18/2023  7:19 AM      START taking these medications    Details   amoxicillin-clavulanate (AUGMENTIN) 875-125 mg per tablet Take 1 tablet by mouth every 12 (twelve) hours for 7 days, Starting Tue 7/18/2023, Until Tue 7/25/2023, Normal         CONTINUE these medications which have NOT CHANGED    Details   chlorhexidine (PERIDEX) 0.12 % solution Apply 15 mL to the mouth or throat 2 (two) times a day, Starting Fri 8/19/2022, Normal      ibuprofen (MOTRIN) 600 mg tablet Take 1 tablet (600 mg total) by mouth every 6 (six) hours as needed for mild pain, Starting Sun 4/9/2023, Normal      meloxicam (MOBIC) 15 mg tablet Take 1 tablet (15 mg total) by mouth daily, Starting Tue 1/10/2023, Normal      methadone (DOLOPHINE) 10 mg/mL oral concentrated solution Take 44 mg by mouth daily, Historical Med      nicotine (NICODERM CQ) 14 mg/24hr TD 24 hr patch Place 1 patch on the skin daily, Starting Wed 6/30/2021, Normal             No discharge procedures on file.     PDMP Review       Value Time User    PDMP Reviewed  Yes 1/10/2023  7:14 AM Moe Cobb PA-C          ED Provider  Electronically Signed by           Gina Humphreys MD  07/18/23 7824

## 2023-07-27 ENCOUNTER — HOSPITAL ENCOUNTER (EMERGENCY)
Facility: HOSPITAL | Age: 33
Discharge: HOME | End: 2023-07-27
Attending: EMERGENCY MEDICINE
Payer: COMMERCIAL

## 2023-07-27 VITALS
TEMPERATURE: 98.7 F | HEART RATE: 84 BPM | DIASTOLIC BLOOD PRESSURE: 61 MMHG | RESPIRATION RATE: 18 BRPM | SYSTOLIC BLOOD PRESSURE: 119 MMHG | WEIGHT: 200 LBS | OXYGEN SATURATION: 94 % | HEIGHT: 75 IN | BODY MASS INDEX: 24.87 KG/M2

## 2023-07-27 DIAGNOSIS — M26.629 TMJ SYNDROME: Primary | ICD-10-CM

## 2023-07-27 PROCEDURE — 99283 EMERGENCY DEPT VISIT LOW MDM: CPT

## 2023-07-27 PROCEDURE — 96372 THER/PROPH/DIAG INJ SC/IM: CPT

## 2023-07-27 PROCEDURE — 99284 EMERGENCY DEPT VISIT MOD MDM: CPT | Performed by: EMERGENCY MEDICINE

## 2023-07-27 RX ORDER — KETOROLAC TROMETHAMINE 30 MG/ML
15 INJECTION, SOLUTION INTRAMUSCULAR; INTRAVENOUS ONCE
Status: COMPLETED | OUTPATIENT
Start: 2023-07-27 | End: 2023-07-27

## 2023-07-27 RX ORDER — METHOCARBAMOL 500 MG/1
500 TABLET, FILM COATED ORAL 3 TIMES DAILY PRN
Qty: 20 TABLET | Refills: 0 | Status: SHIPPED | OUTPATIENT
Start: 2023-07-27

## 2023-07-27 RX ADMIN — KETOROLAC TROMETHAMINE 15 MG: 30 INJECTION, SOLUTION INTRAMUSCULAR; INTRAVENOUS at 08:11

## 2023-07-27 NOTE — DISCHARGE INSTRUCTIONS
Return to the ED if you are unable to open or close your mouth, you develop significant swelling of the area, or any other concerning symptoms

## 2023-07-27 NOTE — ED PROVIDER NOTES
History  Chief Complaint   Patient presents with   • Jaw Pain     Patient is a 68-year-old male who presents for evaluation of right jaw pain. Patient says he has been getting the symptoms intermittently over the last 2 to 3 weeks. He says that the pain is worse when he is chewing or try to eat. He says that the pain is located below his right ear in the TMJ area. It does not radiate across his face. He describes it as a aching pain. Patient says the pain was bad last night and seemed worse this morning. He says that it is since "loosened up."  He is able to open his mouth though it causes him discomfort. He feels that he is able to completely close his mouth. Denies any dental pain, facial swelling. He denies any trauma to the area          Prior to Admission Medications   Prescriptions Last Dose Informant Patient Reported?  Taking?   chlorhexidine (PERIDEX) 0.12 % solution   No No   Sig: Apply 15 mL to the mouth or throat 2 (two) times a day   Patient not taking: Reported on 11/24/2022   ibuprofen (MOTRIN) 600 mg tablet   No No   Sig: Take 1 tablet (600 mg total) by mouth every 6 (six) hours as needed for mild pain   meloxicam (MOBIC) 15 mg tablet   No No   Sig: Take 1 tablet (15 mg total) by mouth daily   Patient not taking: Reported on 3/3/2023   methadone (DOLOPHINE) 10 mg/mL oral concentrated solution   Yes No   Sig: Take 44 mg by mouth daily   nicotine (NICODERM CQ) 14 mg/24hr TD 24 hr patch   No No   Sig: Place 1 patch on the skin daily   Patient not taking: Reported on 8/19/2022      Facility-Administered Medications: None       Past Medical History:   Diagnosis Date   • Asthma     as a child       Past Surgical History:   Procedure Laterality Date   • INCISION AND DRAINAGE OF WOUND Left 06/28/2021    Procedure: INCISION AND DRAINAGE (I&D) LEFT SUBMANDIBULAR SPACE INFECTION, LEFT  SPACE INFECTION WITH EXTRACTION OF TOOTH #19;  Surgeon: Alexei Tyson DMD;  [facility]; Service: Maxillofacial   • ORIF TIBIA & FIBULA FRACTURES Right    • NE ARTHRS KNE SURG W/MENISCECTOMY MED/LAT W/SHVG Right 1/10/2023    Procedure: ARTHROSCOPY KNEE WITH LATERAL MENISCECTOMY;  Surgeon: Mohamud Tay DO;  Location[facility];  Service: Orthopedics       History reviewed. No pertinent family history. I have reviewed and agree with the history as documented. E-Cigarette/Vaping   • E-Cigarette Use Current Every Day User    • Start Date 1/1/23    • Cartridges/Day 0.5      E-Cigarette/Vaping Substances     Social History     Tobacco Use   • Smoking status: Every Day     Packs/day: 0.50     Types: Cigarettes   • Smokeless tobacco: Never   Vaping Use   • Vaping Use: Every day   • Start date: 1/1/2023   Substance Use Topics   • Alcohol use: Never   • Drug use: Yes     Types: Marijuana     Comment: daily       Review of Systems   Constitutional: Negative for chills, fever and unexpected weight change. HENT: Negative for congestion, sore throat and trouble swallowing. R jaw pain     Eyes: Negative for pain, discharge and itching. Respiratory: Negative for cough, chest tightness, shortness of breath and wheezing. Cardiovascular: Negative for chest pain, palpitations and leg swelling. Gastrointestinal: Negative for abdominal pain, blood in stool, diarrhea, nausea and vomiting. Endocrine: Negative for polyuria. Genitourinary: Negative for difficulty urinating, dysuria, frequency and hematuria. Musculoskeletal: Negative for arthralgias and back pain. Neurological: Negative for dizziness, syncope, weakness, light-headedness and headaches. Physical Exam  Physical Exam  Vitals and nursing note reviewed. Constitutional:       General: He is not in acute distress. Appearance: He is well-developed. HENT:      Head: Normocephalic and atraumatic.         Comments: No jaw swelling  No redness, no crepitus  No intraoral swelling or tenderness  Patient able to completely close his mouth  Negative tongue depressor test     Right Ear: External ear normal.      Left Ear: External ear normal.   Eyes:      Conjunctiva/sclera: Conjunctivae normal.      Pupils: Pupils are equal, round, and reactive to light. Cardiovascular:      Rate and Rhythm: Normal rate and regular rhythm. Heart sounds: Normal heart sounds. No murmur heard. No friction rub. No gallop. Pulmonary:      Effort: Pulmonary effort is normal. No respiratory distress. Breath sounds: Normal breath sounds. No wheezing or rales. Abdominal:      General: Bowel sounds are normal. There is no distension. Palpations: Abdomen is soft. Tenderness: There is no abdominal tenderness. There is no guarding. Musculoskeletal:         General: No tenderness or deformity. Normal range of motion. Cervical back: Normal range of motion. Lymphadenopathy:      Cervical: No cervical adenopathy. Skin:     General: Skin is warm and dry. Neurological:      General: No focal deficit present. Mental Status: He is alert and oriented to person, place, and time. Mental status is at baseline. Cranial Nerves: No cranial nerve deficit. Sensory: No sensory deficit. Motor: No weakness or abnormal muscle tone.    Psychiatric:         Behavior: Behavior normal.         Vital Signs  ED Triage Vitals [07/27/23 0745]   Temperature Pulse Respirations Blood Pressure SpO2   98.7 °F (37.1 °C) 84 18 119/61 94 %      Temp Source Heart Rate Source Patient Position - Orthostatic VS BP Location FiO2 (%)   Tympanic Monitor Sitting Right arm --      Pain Score       6           Vitals:    07/27/23 0745   BP: 119/61   Pulse: 84   Patient Position - Orthostatic VS: Sitting         Visual Acuity      ED Medications  Medications   ketorolac (TORADOL) injection 15 mg (15 mg Intramuscular Given 7/27/23 2221)       Diagnostic Studies  Results Reviewed     None                 No orders to display Procedures  Procedures         ED Course                               SBIRT 20yo+    Flowsheet Row Most Recent Value   Initial Alcohol Screen: US AUDIT-C     1. How often do you have a drink containing alcohol? 0 Filed at: 07/27/2023 0749   2. How many drinks containing alcohol do you have on a typical day you are drinking? 0 Filed at: 07/27/2023 0749   3a. Male UNDER 65: How often do you have five or more drinks on one occasion? 0 Filed at: 07/27/2023 0749   3b. FEMALE Any Age, or MALE 65+: How often do you have 4 or more drinks on one occassion? 0 Filed at: 07/27/2023 0749   Audit-C Score 0 Filed at: 07/27/2023 7574   ABIODUN: How many times in the past year have you. .. Used an illegal drug or used a prescription medication for non-medical reasons? Never Filed at: 07/27/2023 [address]                    Medical Decision Making  68-year-old male presenting for right jaw pain. Pain located in the TMJ area. Has tenderness to palpation. Able to open and close mouth. Her oral swelling, redness, tenderness. Pain worse with chewing worse in the morning  Believe symptoms are secondary to TMJ syndrome. Given no swelling, redness, fevers have low suspicion for infection. Given ability to open and close mouth completely, do not believe TMJ is dislocated  Will give Toradol. Will give prescription for muscle relaxers. Will give referral to OMFS. Risk  Prescription drug management. Disposition  Final diagnoses:   TMJ syndrome     Time reflects when diagnosis was documented in both MDM as applicable and the Disposition within this note     Time User Action Codes Description Comment    7/27/2023  8:16 AM Monique Fatima Add [Q56.396] TMJ syndrome       ED Disposition     ED Disposition   Discharge    Condition   Stable    Date/Time   u Jul 27, 2023  8:16 AM    Comment   [facility] discharge to home/self care.                Follow-up Information     Follow up With Specialties Details Why Contact Info Additional Information    Grace Witt, DMD Oral Maxillofacial Surgery Schedule an appointment as soon as possible for a visit  For follow up of R jaw pain [address] [facility]       [address] [facility] Emergency Department Emergency Medicine Go to  If symptoms worsen [address] [place] [address]  [address] [place] [address] Emergency Department, [address] [facility] [address] [facility]          Discharge Medication List as of 7/27/2023  8:18 AM      START taking these medications    Details   methocarbamol (ROBAXIN) 500 mg tablet Take 1 tablet (500 mg total) by mouth 3 (three) times a day as needed for muscle spasms, Starting Thu 7/27/2023, Normal         CONTINUE these medications which have NOT CHANGED    Details   chlorhexidine (PERIDEX) 0.12 % solution Apply 15 mL to the mouth or throat 2 (two) times a day, Starting Fri 8/19/2022, Normal      ibuprofen (MOTRIN) 600 mg tablet Take 1 tablet (600 mg total) by mouth every 6 (six) hours as needed for mild pain, Starting Sun 4/9/2023, Normal      meloxicam (MOBIC) 15 mg tablet Take 1 tablet (15 mg total) by mouth daily, Starting Tue 1/10/2023, Normal      methadone (DOLOPHINE) 10 mg/mL oral concentrated solution Take 44 mg by mouth daily, Historical Med      nicotine (NICODERM CQ) 14 mg/24hr TD 24 hr patch Place 1 patch on the skin daily, Starting Wed 6/30/2021, Normal                 PDMP Review       Value Time User    PDMP Reviewed  Yes 1/10/2023  7:14 AM Taylor Dalal PA-C          ED Provider  Electronically Signed by           Deysi Ferguson DO  07/27/23 5014

## 2023-08-28 ENCOUNTER — PREP FOR PROCEDURE (OUTPATIENT)
Dept: OBGYN CLINIC | Facility: CLINIC | Age: 33
End: 2023-08-28

## 2023-08-28 ENCOUNTER — PREP FOR PROCEDURE (OUTPATIENT)
Dept: PAIN MEDICINE | Facility: CLINIC | Age: 33
End: 2023-08-28

## 2023-08-28 ENCOUNTER — OFFICE VISIT (OUTPATIENT)
Dept: OBGYN CLINIC | Facility: CLINIC | Age: 33
End: 2023-08-28
Payer: COMMERCIAL

## 2023-08-28 VITALS
HEIGHT: 75 IN | SYSTOLIC BLOOD PRESSURE: 108 MMHG | HEART RATE: 57 BPM | BODY MASS INDEX: 24.37 KG/M2 | WEIGHT: 196 LBS | DIASTOLIC BLOOD PRESSURE: 57 MMHG

## 2023-08-28 DIAGNOSIS — Z01.812 PRE-OPERATIVE LABORATORY EXAMINATION: ICD-10-CM

## 2023-08-28 DIAGNOSIS — S83.271D COMPLEX TEAR OF LATERAL MENISCUS OF RIGHT KNEE, UNSPECIFIED WHETHER OLD OR CURRENT TEAR, SUBSEQUENT ENCOUNTER: ICD-10-CM

## 2023-08-28 DIAGNOSIS — M23.51 OLD COMPLETE ACL TEAR, RIGHT: Primary | ICD-10-CM

## 2023-08-28 PROCEDURE — 99213 OFFICE O/P EST LOW 20 MIN: CPT | Performed by: ORTHOPAEDIC SURGERY

## 2023-08-28 PROCEDURE — 20610 DRAIN/INJ JOINT/BURSA W/O US: CPT | Performed by: ORTHOPAEDIC SURGERY

## 2023-08-28 RX ORDER — MELOXICAM 15 MG/1
15 TABLET ORAL DAILY
Qty: 90 TABLET | Refills: 1 | Status: SHIPPED | OUTPATIENT
Start: 2023-08-28

## 2023-08-28 RX ORDER — BUPIVACAINE HYDROCHLORIDE 2.5 MG/ML
4 INJECTION, SOLUTION INFILTRATION; PERINEURAL
Status: COMPLETED | OUTPATIENT
Start: 2023-08-28 | End: 2023-08-28

## 2023-08-28 RX ORDER — CHLORHEXIDINE GLUCONATE 4 G/100ML
SOLUTION TOPICAL DAILY PRN
OUTPATIENT
Start: 2023-08-28

## 2023-08-28 RX ORDER — TRIAMCINOLONE ACETONIDE 40 MG/ML
80 INJECTION, SUSPENSION INTRA-ARTICULAR; INTRAMUSCULAR
Status: COMPLETED | OUTPATIENT
Start: 2023-08-28 | End: 2023-08-28

## 2023-08-28 RX ORDER — CHLORHEXIDINE GLUCONATE 0.12 MG/ML
15 RINSE ORAL ONCE
OUTPATIENT
Start: 2023-08-28 | End: 2023-08-28

## 2023-08-28 RX ADMIN — BUPIVACAINE HYDROCHLORIDE 4 ML: 2.5 INJECTION, SOLUTION INFILTRATION; PERINEURAL at 08:45

## 2023-08-28 RX ADMIN — TRIAMCINOLONE ACETONIDE 80 MG: 40 INJECTION, SUSPENSION INTRA-ARTICULAR; INTRAMUSCULAR at 08:45

## 2023-08-28 NOTE — PROGRESS NOTES
Assessment/Plan:   Diagnoses and all orders for this visit:    Old complete ACL tear, right    Other orders  -     Large joint arthrocentesis         Reviewed physical exam and imaging with patient at time of visit. The patient continues to experience symptoms of instability, consistent with an ACL tear of his right knee. He was offered and accepted an aspiration and injection(s) of Kenalog and Marcaine to his Rightknee for symptomatic relief of pain and inflammation. 30cc of clear fluid was aspirated. Patient tolerated the treatment(s) well. Ice and post injection protocol advised. Weightbearing activities as tolerated. Discussed the patient's diagnosis and associated treatment options including conservative and surgical treatment. Discussed risks, potential complications, and benefits of surgical procedure in great detail. The patient understands the risks and benefits of all mention treatment options and has no further questions. The patient has elected to proceed forward with arthroscopy with ACL reconstruction of his right knee. Surgery is tentatively scheduled for December 13, 2023. He will be seen for follow-up 10 to 14 days post-op for reevaluation. A surgical consent was obtained at today's visit. The patient expressed understanding and had the opportunity to ask questions. The patient has a chronic tear of his anterior crucial ligament of his right knee. Physical examination shows a positive Lachman and drawer test.  There is laxity and a large effusion present. Treatment options were discussed. He now wants proceed with elective arthroscopy of the right knee with an ACL reconstruction using allograft.   All risks, complications, benefits were discussed with the patient in great detail including bleeding, infection, blood clots, pain, stiffness, neurovascular damage, fractures, dislocations, the possibility loss of life from surgery, heart attack, stroke, rerupture of tendon, graft/host disease transmission including but not limited to HIV and hepatitis. His surgery scheduled for December 13, 2023. In the meantime, 30 cc of fluid were aspirated from his right knee. Subjective:   Patient ID: Dorothy Altman  1990     HPI  Patient is a 27 y.o. male who presents for follow-up evaluation of his right knee. The patient is approximately 7.5 months s/p lateral meniscectomy of his right knee, completed on 1/10/23. On today's presentation, he reports a new onset of recurring instability. He reports an achy pain that is worse at night. The patient works a physically demanding job and reports pain after a long day of work. He reports cracking and feelings of instability. His pain is located deep in his knee. He reports that his knee feels "stuck" with deep flexion. He denies numbness or tingling. The following portions of the patient's history were reviewed and updated as appropriate:  Past medical history, past surgical history, Family history, social history, current medications and allergies    Past Medical History:   Diagnosis Date   • Asthma     as a child       Past Surgical History:   Procedure Laterality Date   • INCISION AND DRAINAGE OF WOUND Left 06/28/2021    Procedure: INCISION AND DRAINAGE (I&D) LEFT SUBMANDIBULAR SPACE INFECTION, LEFT  SPACE INFECTION WITH EXTRACTION OF TOOTH #19;  Surgeon: Marilyn jN DMD;  Location:  MAIN OR;  Service: Maxillofacial   • ORIF TIBIA & FIBULA FRACTURES Right    • [place] ARTHRS KNE SURG W/MENISCECTOMY MED/LAT W/SHVG Right 1/10/2023    Procedure: ARTHROSCOPY KNEE WITH LATERAL MENISCECTOMY;  Surgeon: Ana Main DO;  Location: [facility];  Service: Orthopedics       History reviewed. No pertinent family history.     Social History     Socioeconomic History   • Marital status: Single     Spouse name: None   • Number of children: None   • Years of education: None   • Highest education level: None   Occupational History   • None   Tobacco Use   • Smoking status: Every Day     Packs/day: 0.50     Types: Cigarettes   • Smokeless tobacco: Never   Vaping Use   • Vaping Use: Every day   • Start date: 1/1/2023   Substance and Sexual Activity   • Alcohol use: Never   • Drug use: Yes     Types: Marijuana     Comment: daily   • Sexual activity: None   Other Topics Concern   • None   Social History Narrative   • None     Social Determinants of Health     Financial Resource Strain: Not on file   Food Insecurity: Not on file   Transportation Needs: Not on file   Physical Activity: Not on file   Stress: Not on file   Social Connections: Not on file   Intimate Partner Violence: Not on file   Housing Stability: Not on file         Current Outpatient Medications:   •  ibuprofen (MOTRIN) 600 mg tablet, Take 1 tablet (600 mg total) by mouth every 6 (six) hours as needed for mild pain, Disp: 20 tablet, Rfl: 0  •  methadone (DOLOPHINE) 10 mg/mL oral concentrated solution, Take 44 mg by mouth daily, Disp: , Rfl:   •  chlorhexidine (PERIDEX) 0.12 % solution, Apply 15 mL to the mouth or throat 2 (two) times a day (Patient not taking: Reported on 11/24/2022), Disp: 120 mL, Rfl: 0  •  meloxicam (MOBIC) 15 mg tablet, Take 1 tablet (15 mg total) by mouth daily (Patient not taking: Reported on 3/3/2023), Disp: 30 tablet, Rfl: 1  •  methocarbamol (ROBAXIN) 500 mg tablet, Take 1 tablet (500 mg total) by mouth 3 (three) times a day as needed for muscle spasms (Patient not taking: Reported on 8/28/2023), Disp: 20 tablet, Rfl: 0  •  nicotine (NICODERM CQ) 14 mg/24hr TD 24 hr patch, Place 1 patch on the skin daily (Patient not taking: Reported on 8/19/2022), Disp: 28 patch, Rfl: 0    No Known Allergies    Review of Systems   Constitutional: Negative for chills and fever. HENT: Negative for ear pain and sore throat. Eyes: Negative for pain and visual disturbance. Respiratory: Negative for cough and shortness of breath.     Cardiovascular: Negative for chest pain and palpitations. Gastrointestinal: Negative for abdominal pain and vomiting. Genitourinary: Negative for dysuria and hematuria. Musculoskeletal: Negative for arthralgias and back pain. Skin: Negative for color change and rash. Neurological: Negative for seizures and syncope. All other systems reviewed and are negative. Objective:  /57 (BP Location: Left arm, Patient Position: Sitting, Cuff Size: Large)   Pulse 57   Ht 6' 3" (1.905 m)   Wt 88.9 kg (196 lb)   BMI 24.50 kg/m²     Ortho Exam  right knee(s) -   Patient ambulates with steady gait pattern  Uses No assistive device  No anatomical deformity  Skin is warm and dry to touch with no signs of erythema, ecchymosis, or infection   Mild generalized soft tissue swelling or effusion noted  ROM (2° - 115°)   MMT: 4/5 throughout  TTP over medial joint line, TTP over lateral joint line  Flexor and extensor mechanisms are intact   Knee is stable to varus and valgus stress  + Lachman's  + Anterior Drawer, - Posterior Drawer  - Kari's  Patella tracks centrally without palpable crepitus  Calf compartments are soft and supple  - Munir's sign  2+ DP and PT pulses with brisk capillary refill to the toes  Sural, saphenous, tibial, superficial, and deep peroneal motor and sensory distributions intact  Sensation light touch intact distally      Physical Exam  HENT:      Head: Normocephalic and atraumatic. Nose: Nose normal.   Eyes:      Conjunctiva/sclera: Conjunctivae normal.   Cardiovascular:      Rate and Rhythm: Normal rate. Pulmonary:      Effort: Pulmonary effort is normal.   Musculoskeletal:      Cervical back: Neck supple. Skin:     General: Skin is warm and dry. Capillary Refill: Capillary refill takes less than 2 seconds. Neurological:      Mental Status: He is alert and oriented to person, place, and time.    Psychiatric:         Mood and Affect: Mood normal.         Behavior: Behavior normal.          Diagnostic Test Review:  No new imaging at time of visit. Large joint arthrocentesis: R knee  Universal Protocol:  Consent: Verbal consent obtained. Risks and benefits: risks, benefits and alternatives were discussed  Consent given by: patient  Timeout called at: 8/28/2023 8:55 AM.  Patient understanding: patient states understanding of the procedure being performed  Site marked: the operative site was marked  Patient identity confirmed: verbally with patient    Supporting Documentation  Indications: pain and joint swelling   Procedure Details  Location: knee - R knee  Needle gauge: 21 G.   Ultrasound guidance: no  Approach: anterolateral  Medications administered: 4 mL bupivacaine 0.25 %; 80 mg triamcinolone acetonide 40 mg/mL    Aspirate amount: 30 mL  Aspirate: clear    Patient tolerance: patient tolerated the procedure well with no immediate complications  Dressing:  Sterile dressing applied             Scribe Attestation    I,:  Julia Steel am acting as a scribe while in the presence of the attending physician.:       I,:  Gauri Kebede, DO personally performed the services described in this documentation    as scribed in my presence.:

## 2023-10-12 ENCOUNTER — TELEPHONE (OUTPATIENT)
Age: 33
End: 2023-10-12

## 2023-10-12 ENCOUNTER — TELEPHONE (OUTPATIENT)
Dept: OBGYN CLINIC | Facility: CLINIC | Age: 33
End: 2023-10-12

## 2023-10-12 NOTE — TELEPHONE ENCOUNTER
Caller  Patient     Doctor: Muriel Hodges     Reason for call: needs to reschedule sx,  birth of baby same timeframe    Call back#: [phone]

## 2023-10-23 ENCOUNTER — OFFICE VISIT (OUTPATIENT)
Dept: OBGYN CLINIC | Facility: CLINIC | Age: 33
End: 2023-10-23
Payer: COMMERCIAL

## 2023-10-23 VITALS
BODY MASS INDEX: 24.37 KG/M2 | DIASTOLIC BLOOD PRESSURE: 55 MMHG | WEIGHT: 196 LBS | HEART RATE: 76 BPM | HEIGHT: 75 IN | SYSTOLIC BLOOD PRESSURE: 102 MMHG

## 2023-10-23 DIAGNOSIS — M23.51 OLD COMPLETE ACL TEAR, RIGHT: Primary | ICD-10-CM

## 2023-10-23 PROCEDURE — 20610 DRAIN/INJ JOINT/BURSA W/O US: CPT | Performed by: ORTHOPAEDIC SURGERY

## 2023-10-23 PROCEDURE — 99213 OFFICE O/P EST LOW 20 MIN: CPT | Performed by: ORTHOPAEDIC SURGERY

## 2023-10-23 NOTE — PROGRESS NOTES
ASSESSMENT/PLAN:    Diagnoses and all orders for this visit:    Old complete ACL tear, right    Other orders  -     Large joint arthrocentesis        The patient's right knee was aspirated for 45 cc of clear fluid, the patient tolerated the procedure quite well. He is scheduled for a right knee arthroscopy with ACL reconstruction. The patient surgery attending scheduled for January 17, 2024. Return for surgery. The patient has recurrent synovitis along his right knee. The knee was aspirated of 45 cc of clear synovial fluid. He is scheduled for ACL surgery in January. Return back for surgical invention. If his condition changes, he would not hesitate to let us know  _____________________________________________________  CHIEF COMPLAINT:  Chief Complaint   Patient presents with    Right Knee - Follow-up         SUBJECTIVE:  Gerson Sears is a 26 y.o. male who presents to our office for a follow-up visit. The patient has a known history of a chronic right ACL tear. The patient today is complaining of right knee pain with swelling. He is already scheduled for a right knee scope with ACL reconstruction on 1/17/2024. He denies any numbness or tingling. He denies any fever or chills.     The following portions of the patient's history were reviewed and updated as appropriate: allergies, current medications, past family history, past medical history, past social history, past surgical history and problem list.    PAST MEDICAL HISTORY:  Past Medical History:   Diagnosis Date    Asthma     as a child       PAST SURGICAL HISTORY:  Past Surgical History:   Procedure Laterality Date    INCISION AND DRAINAGE OF WOUND Left 06/28/2021    Procedure: INCISION AND DRAINAGE (I&D) LEFT SUBMANDIBULAR SPACE INFECTION, LEFT  SPACE INFECTION WITH EXTRACTION OF TOOTH #19;  Surgeon: Cheryl Shelby DMD;  Location: [facility];  Service: Maxillofacial    ORIF TIBIA & FIBULA FRACTURES Right     [place] ARTHRS KNE SURG W/MENISCECTOMY MED/LAT W/SHVG Right 1/10/2023    Procedure: ARTHROSCOPY KNEE WITH LATERAL MENISCECTOMY;  Surgeon: Missie Seip, DO;  Location: [facility];  Service: Orthopedics       FAMILY HISTORY:  History reviewed. No pertinent family history. SOCIAL HISTORY:  Social History     Tobacco Use    Smoking status: Every Day     Packs/day: 0.50     Types: Cigarettes    Smokeless tobacco: Never   Vaping Use    Vaping Use: Every day    Start date: 1/1/2023   Substance Use Topics    Alcohol use: Never    Drug use: Yes     Types: Marijuana     Comment: daily       MEDICATIONS:    Current Outpatient Medications:     ibuprofen (MOTRIN) 600 mg tablet, Take 1 tablet (600 mg total) by mouth every 6 (six) hours as needed for mild pain, Disp: 20 tablet, Rfl: 0    meloxicam (MOBIC) 15 mg tablet, Take 1 tablet (15 mg total) by mouth daily, Disp: 90 tablet, Rfl: 1    methadone (DOLOPHINE) 10 mg/mL oral concentrated solution, Take 44 mg by mouth daily, Disp: , Rfl:     chlorhexidine (PERIDEX) 0.12 % solution, Apply 15 mL to the mouth or throat 2 (two) times a day (Patient not taking: Reported on 11/24/2022), Disp: 120 mL, Rfl: 0    methocarbamol (ROBAXIN) 500 mg tablet, Take 1 tablet (500 mg total) by mouth 3 (three) times a day as needed for muscle spasms (Patient not taking: Reported on 8/28/2023), Disp: 20 tablet, Rfl: 0    nicotine (NICODERM CQ) 14 mg/24hr TD 24 hr patch, Place 1 patch on the skin daily (Patient not taking: Reported on 8/19/2022), Disp: 28 patch, Rfl: 0    ALLERGIES:  No Known Allergies    ROS:  Review of Systems     Constitutional: Negative for fatigue, fever or loss of appetite. HENT: Negative. Respiratory: Negative for shortness of breath, dyspnea. Cardiovascular: Negative for chest pain/tightness. Gastrointestinal: Negative for abdominal pain, N/V. Endocrine: Negative for cold/heat intolerance, unexplained weight loss/gain. Genitourinary: Negative for flank pain, dysuria, hematuria. Musculoskeletal: Positive for arthralgia   Skin: Negative for rash. Neurological: Negative for numbness or tingling  Psychiatric/Behavioral: Negative for agitation. _____________________________________________________  PHYSICAL EXAMINATION:    Blood pressure 102/55, pulse 76, height 6' 3" (1.905 m), weight 88.9 kg (196 lb). Constitutional: Oriented to person, place, and time. Appears well-developed and well-nourished. No distress. HENT:   Head: Normocephalic. Eyes: Conjunctivae are normal. Right eye exhibits no discharge. Left eye exhibits no discharge. No scleral icterus. Cardiovascular: Normal rate. Pulmonary/Chest: Effort normal.   Neurological: Alert and oriented to person, place, and time. Skin: Skin is warm and dry. No rash noted. Not diaphoretic. No erythema. No pallor. Psychiatric: Normal mood and affect. Behavior is normal. Judgment and thought content normal.      MUSCULOSKELETAL EXAMINATION:   Physical Exam  Ortho Exam    Right lower extremity is neurovascular intact  Toes are pink and mobile  Compartments are soft  There is a moderate-sized effusion present  Positive Lachman and drawer  Brisk cap refill  Sensation intact  Range of motion of the knee is limited to pain  Objective:  BP Readings from Last 1 Encounters:   10/23/23 102/55      Wt Readings from Last 1 Encounters:   10/23/23 88.9 kg (196 lb)        BMI:   Estimated body mass index is 24.5 kg/m² as calculated from the following:    Height as of this encounter: 6' 3" (1.905 m). Weight as of this encounter: 88.9 kg (196 lb). PROCEDURES PERFORMED:  Large joint arthrocentesis: R knee  Universal Protocol:  Risks and benefits: risks, benefits and alternatives were discussed  Consent given by: patient  Site marked: the operative site was marked  Radiology Images displayed and confirmed.  If images not available, report reviewed: imaging studies available  Supporting Documentation  Indications: pain and joint swelling Procedure Details  Location: knee - R knee  Preparation: Patient was prepped and draped in the usual sterile fashion  Needle size: 18 G  Ultrasound guidance: no  Approach: anterolateral    Aspirate amount: 45 mL  Aspirate: clear  Patient tolerance: patient tolerated the procedure well with no immediate complications  Dressing:  Sterile dressing applied            Scribe Attestation      I,:  Yamilka Mistry PA-C am acting as a scribe while in the presence of the attending physician.:       I,:  Gauri Kebede DO personally performed the services described in this documentation    as scribed in my presence.:

## 2023-12-12 ENCOUNTER — HOSPITAL ENCOUNTER (EMERGENCY)
Facility: HOSPITAL | Age: 33
Discharge: HOME | End: 2023-12-12
Attending: EMERGENCY MEDICINE
Payer: COMMERCIAL

## 2023-12-12 VITALS
SYSTOLIC BLOOD PRESSURE: 134 MMHG | OXYGEN SATURATION: 97 % | HEIGHT: 75 IN | WEIGHT: 196 LBS | TEMPERATURE: 97.8 F | HEART RATE: 75 BPM | BODY MASS INDEX: 24.37 KG/M2 | RESPIRATION RATE: 18 BRPM | DIASTOLIC BLOOD PRESSURE: 63 MMHG

## 2023-12-12 DIAGNOSIS — K08.89 PAIN, DENTAL: Primary | ICD-10-CM

## 2023-12-12 PROCEDURE — 99283 EMERGENCY DEPT VISIT LOW MDM: CPT

## 2023-12-12 PROCEDURE — 99284 EMERGENCY DEPT VISIT MOD MDM: CPT | Performed by: EMERGENCY MEDICINE

## 2023-12-12 RX ORDER — AMOXICILLIN AND CLAVULANATE POTASSIUM 875; 125 MG/1; MG/1
1 TABLET, FILM COATED ORAL EVERY 12 HOURS
Qty: 14 TABLET | Refills: 0 | Status: SHIPPED | OUTPATIENT
Start: 2023-12-12 | End: 2023-12-19

## 2023-12-12 RX ORDER — AMOXICILLIN AND CLAVULANATE POTASSIUM 875; 125 MG/1; MG/1
1 TABLET, FILM COATED ORAL ONCE
Status: COMPLETED | OUTPATIENT
Start: 2023-12-12 | End: 2023-12-12

## 2023-12-12 RX ADMIN — AMOXICILLIN AND CLAVULANATE POTASSIUM 1 TABLET: 875; 125 TABLET, FILM COATED ORAL at 08:34

## 2023-12-12 NOTE — ED PROVIDER NOTES
History  Chief Complaint   Patient presents with    Dental Pain     Patient is a 33-year-old male that has recurrent dental pain that presents for evaluation of dental pain.  Patient says that he has pain behind his left back molar, tooth 15.  Patient says that he gets this pain about once every 6 months and antibiotics clears it up.  He denies red flags including trismus drooling voice change.  No fever chills rigors.  No vomiting.  He has seen a dentist and has been told there is nothing to be done about it.        Prior to Admission Medications   Prescriptions Last Dose Informant Patient Reported? Taking?   chlorhexidine (PERIDEX) 0.12 % solution   No No   Sig: Apply 15 mL to the mouth or throat 2 (two) times a day   Patient not taking: Reported on 11/24/2022   ibuprofen (MOTRIN) 600 mg tablet   No No   Sig: Take 1 tablet (600 mg total) by mouth every 6 (six) hours as needed for mild pain   meloxicam (MOBIC) 15 mg tablet   No No   Sig: Take 1 tablet (15 mg total) by mouth daily   methadone (DOLOPHINE) 10 mg/mL oral concentrated solution   Yes No   Sig: Take 44 mg by mouth daily   methocarbamol (ROBAXIN) 500 mg tablet   No No   Sig: Take 1 tablet (500 mg total) by mouth 3 (three) times a day as needed for muscle spasms   Patient not taking: Reported on 8/28/2023   nicotine (NICODERM CQ) 14 mg/24hr TD 24 hr patch   No No   Sig: Place 1 patch on the skin daily   Patient not taking: Reported on 8/19/2022      Facility-Administered Medications: None       Past Medical History:   Diagnosis Date    Asthma     as a child       Past Surgical History:   Procedure Laterality Date    INCISION AND DRAINAGE OF WOUND Left 06/28/2021    Procedure: INCISION AND DRAINAGE (I&D) LEFT SUBMANDIBULAR SPACE INFECTION, LEFT  SPACE INFECTION WITH EXTRACTION OF TOOTH #19;  Surgeon: Fabi Hood DMD;  [facility];  Service: Maxillofacial    ORIF TIBIA & FIBULA FRACTURES Right     [place] ARTHRS KNE SURG W/MENISCECTOMY  MED/LAT W/SHVG Right 1/10/2023    Procedure: ARTHROSCOPY KNEE WITH LATERAL MENISCECTOMY;  Surgeon: Harvinder Ga DO;  Location: [facility];  Service: Orthopedics       History reviewed. No pertinent family history.  I have reviewed and agree with the history as documented.    E-Cigarette/Vaping    E-Cigarette Use Current Every Day User     Start Date 1/1/23     Cartridges/Day 0.5      E-Cigarette/Vaping Substances     Social History     Tobacco Use    Smoking status: Every Day     Packs/day: 0.50     Types: Cigarettes    Smokeless tobacco: Never   Vaping Use    Vaping Use: Every day    Start date: 1/1/2023   Substance Use Topics    Alcohol use: Never    Drug use: Yes     Types: Marijuana     Comment: daily       Review of Systems   Constitutional:  Negative for fever.   HENT:  Positive for dental problem. Negative for sore throat.    Eyes:  Negative for photophobia.   Respiratory:  Negative for shortness of breath.    Cardiovascular:  Negative for chest pain.   Gastrointestinal:  Negative for abdominal pain.   Genitourinary:  Negative for dysuria.   Musculoskeletal:  Negative for back pain.   Skin:  Negative for rash.   Neurological:  Negative for light-headedness.   Hematological:  Negative for adenopathy.   Psychiatric/Behavioral:  Negative for agitation.    All other systems reviewed and are negative.      Physical Exam  Physical Exam  Vitals reviewed.   Constitutional:       General: He is not in acute distress.     Appearance: He is well-developed.   HENT:      Head: Normocephalic.      Mouth/Throat:      Comments: Mild inflammation of the gingiva around tooth 15.  No drainable abscess.  Eyes:      Pupils: Pupils are equal, round, and reactive to light.   Cardiovascular:      Rate and Rhythm: Normal rate and regular rhythm.      Heart sounds: Normal heart sounds. No murmur heard.     No friction rub. No gallop.   Pulmonary:      Effort: Pulmonary effort is normal.      Breath sounds: Normal breath sounds.    Abdominal:      General: Bowel sounds are normal. There is no distension.      Palpations: Abdomen is soft.      Tenderness: There is no abdominal tenderness. There is no guarding.   Musculoskeletal:         General: Normal range of motion.      Cervical back: Normal range of motion and neck supple.   Skin:     Capillary Refill: Capillary refill takes less than 2 seconds.   Neurological:      Mental Status: He is alert and oriented to person, place, and time.      Cranial Nerves: No cranial nerve deficit.      Sensory: No sensory deficit.      Motor: No abnormal muscle tone.   Psychiatric:         Behavior: Behavior normal.         Thought Content: Thought content normal.         Judgment: Judgment normal.         Vital Signs  ED Triage Vitals [12/12/23 0753]   Temperature Pulse Respirations Blood Pressure SpO2   97.8 °F (36.6 °C) 75 18 134/63 97 %      Temp Source Heart Rate Source Patient Position - Orthostatic VS BP Location FiO2 (%)   Temporal Monitor Sitting Left arm --      Pain Score       3           Vitals:    12/12/23 0753   BP: 134/63   Pulse: 75   Patient Position - Orthostatic VS: Sitting         Visual Acuity      ED Medications  Medications   amoxicillin-clavulanate (AUGMENTIN) 875-125 mg per tablet 1 tablet (1 tablet Oral Given 12/12/23 0834)       Diagnostic Studies  Results Reviewed       None                   No orders to display              Procedures  Procedures         ED Course                                             Medical Decision Making  Patient is a 33-year-old male who presents for evaluation of dental pain.  Appears to be a mild dental infection.  No other flags on history or physical exam.  Plan started on antibiotics with dental follow-up and strict return precautions.    Risk  Prescription drug management.             Disposition  Final diagnoses:   Pain, dental     Time reflects when diagnosis was documented in both MDM as applicable and the Disposition within this note        Time User Action Codes Description Comment    12/12/2023  8:14 AM Lester Loza Add [K08.89] Pain, dental           ED Disposition       ED Disposition   Discharge    Condition   Stable    Date/Time   Tue Dec 12, 2023  8:14 AM    Comment   Khalif Hood discharge to home/self care.                   Follow-up Information       Follow up With Specialties Details Why Contact Info Additional Information    [facility] Emergency [facility]  If symptoms worsen [address] [place] [id number] [phone] [facility], [address], [place], [place] [address]            Discharge Medication List as of 12/12/2023  8:14 AM        CONTINUE these medications which have NOT CHANGED    Details   chlorhexidine (PERIDEX) 0.12 % solution Apply 15 mL to the mouth or throat 2 (two) times a day, Starting Fri 8/19/2022, Normal      ibuprofen (MOTRIN) 600 mg tablet Take 1 tablet (600 mg total) by mouth every 6 (six) hours as needed for mild pain, Starting Sun 4/9/2023, Normal      meloxicam (MOBIC) 15 mg tablet Take 1 tablet (15 mg total) by mouth daily, Starting Mon 8/28/2023, Normal      methadone (DOLOPHINE) 10 mg/mL oral concentrated solution Take 44 mg by mouth daily, Historical Med      methocarbamol (ROBAXIN) 500 mg tablet Take 1 tablet (500 mg total) by mouth 3 (three) times a day as needed for muscle spasms, Starting Thu 7/27/2023, Normal      nicotine (NICODERM CQ) 14 mg/24hr TD 24 hr patch Place 1 patch on the skin daily, Starting Wed 6/30/2021, Normal             No discharge procedures on file.    PDMP Review         Value Time User    PDMP Reviewed  Yes 1/10/2023  7:14 AM Sanjay Cheek PA-C            ED Provider  Electronically Signed by             Lester Loza MD  12/12/23 6344

## 2023-12-15 ENCOUNTER — PREP FOR PROCEDURE (OUTPATIENT)
Dept: OBGYN CLINIC | Facility: CLINIC | Age: 33
End: 2023-12-15

## 2023-12-19 ENCOUNTER — HOSPITAL ENCOUNTER (EMERGENCY)
Facility: HOSPITAL | Age: 33
Discharge: HOME | End: 2023-12-19
Attending: FAMILY MEDICINE
Payer: COMMERCIAL

## 2023-12-19 ENCOUNTER — APPOINTMENT (EMERGENCY)
Dept: RADIOLOGY | Facility: HOSPITAL | Age: 33
End: 2023-12-19
Payer: COMMERCIAL

## 2023-12-19 VITALS
DIASTOLIC BLOOD PRESSURE: 55 MMHG | TEMPERATURE: 98.5 F | OXYGEN SATURATION: 94 % | SYSTOLIC BLOOD PRESSURE: 111 MMHG | RESPIRATION RATE: 15 BRPM | HEART RATE: 61 BPM

## 2023-12-19 DIAGNOSIS — M94.0 COSTOCHONDRITIS: Primary | ICD-10-CM

## 2023-12-19 LAB
ANION GAP SERPL CALCULATED.3IONS-SCNC: 9 MMOL/L
ATRIAL RATE: 64 BPM
ATRIAL RATE: 65 BPM
BASOPHILS # BLD AUTO: 0.05 THOUSANDS/ÂΜL (ref 0–0.1)
BASOPHILS NFR BLD AUTO: 1 % (ref 0–1)
BUN SERPL-MCNC: 18 MG/DL (ref 5–25)
CALCIUM SERPL-MCNC: 9.6 MG/DL (ref 8.4–10.2)
CARDIAC TROPONIN I PNL SERPL HS: <2 NG/L
CHLORIDE SERPL-SCNC: 103 MMOL/L (ref 96–108)
CO2 SERPL-SCNC: 26 MMOL/L (ref 21–32)
CREAT SERPL-MCNC: 0.87 MG/DL (ref 0.6–1.3)
D DIMER PPP FEU-MCNC: <0.27 UG/ML FEU
EOSINOPHIL # BLD AUTO: 0.11 THOUSAND/ÂΜL (ref 0–0.61)
EOSINOPHIL NFR BLD AUTO: 2 % (ref 0–6)
ERYTHROCYTE [DISTWIDTH] IN BLOOD BY AUTOMATED COUNT: 13.5 % (ref 11.6–15.1)
FLUAV RNA RESP QL NAA+PROBE: NEGATIVE
FLUBV RNA RESP QL NAA+PROBE: NEGATIVE
GFR SERPL CREATININE-BSD FRML MDRD: 111 ML/MIN/1.73SQ M
GLUCOSE SERPL-MCNC: 102 MG/DL (ref 65–140)
HCT VFR BLD AUTO: 47.3 % (ref 36.5–49.3)
HGB BLD-MCNC: 15.5 G/DL (ref 12–17)
IMM GRANULOCYTES # BLD AUTO: 0.02 THOUSAND/UL (ref 0–0.2)
IMM GRANULOCYTES NFR BLD AUTO: 0 % (ref 0–2)
LYMPHOCYTES # BLD AUTO: 2 THOUSANDS/ÂΜL (ref 0.6–4.47)
LYMPHOCYTES NFR BLD AUTO: 27 % (ref 14–44)
MCH RBC QN AUTO: 30.1 PG (ref 26.8–34.3)
MCHC RBC AUTO-ENTMCNC: 32.8 G/DL (ref 31.4–37.4)
MCV RBC AUTO: 92 FL (ref 82–98)
MONOCYTES # BLD AUTO: 0.66 THOUSAND/ÂΜL (ref 0.17–1.22)
MONOCYTES NFR BLD AUTO: 9 % (ref 4–12)
NEUTROPHILS # BLD AUTO: 4.51 THOUSANDS/ÂΜL (ref 1.85–7.62)
NEUTS SEG NFR BLD AUTO: 61 % (ref 43–75)
NRBC BLD AUTO-RTO: 0 /100 WBCS
P AXIS: 70 DEGREES
P AXIS: 71 DEGREES
PLATELET # BLD AUTO: 171 THOUSANDS/UL (ref 149–390)
PMV BLD AUTO: 10.8 FL (ref 8.9–12.7)
POTASSIUM SERPL-SCNC: 4.1 MMOL/L (ref 3.5–5.3)
PR INTERVAL: 152 MS
PR INTERVAL: 154 MS
QRS AXIS: 70 DEGREES
QRS AXIS: 71 DEGREES
QRSD INTERVAL: 84 MS
QRSD INTERVAL: 88 MS
QT INTERVAL: 382 MS
QT INTERVAL: 398 MS
QTC INTERVAL: 397 MS
QTC INTERVAL: 410 MS
RBC # BLD AUTO: 5.15 MILLION/UL (ref 3.88–5.62)
RSV RNA RESP QL NAA+PROBE: NEGATIVE
SARS-COV-2 RNA RESP QL NAA+PROBE: NEGATIVE
SODIUM SERPL-SCNC: 138 MMOL/L (ref 135–147)
T WAVE AXIS: 61 DEGREES
T WAVE AXIS: 64 DEGREES
VENTRICULAR RATE: 64 BPM
VENTRICULAR RATE: 65 BPM
WBC # BLD AUTO: 7.35 THOUSAND/UL (ref 4.31–10.16)

## 2023-12-19 PROCEDURE — 85025 COMPLETE CBC W/AUTO DIFF WBC: CPT | Performed by: FAMILY MEDICINE

## 2023-12-19 PROCEDURE — 80048 BASIC METABOLIC PNL TOTAL CA: CPT | Performed by: FAMILY MEDICINE

## 2023-12-19 PROCEDURE — 84484 ASSAY OF TROPONIN QUANT: CPT | Performed by: FAMILY MEDICINE

## 2023-12-19 PROCEDURE — 0241U HB NFCT DS VIR RESP RNA 4 TRGT: CPT | Performed by: FAMILY MEDICINE

## 2023-12-19 PROCEDURE — 99285 EMERGENCY DEPT VISIT HI MDM: CPT | Performed by: FAMILY MEDICINE

## 2023-12-19 PROCEDURE — 85379 FIBRIN DEGRADATION QUANT: CPT | Performed by: FAMILY MEDICINE

## 2023-12-19 PROCEDURE — 71045 X-RAY EXAM CHEST 1 VIEW: CPT

## 2023-12-19 PROCEDURE — 99285 EMERGENCY DEPT VISIT HI MDM: CPT

## 2023-12-19 PROCEDURE — 93005 ELECTROCARDIOGRAM TRACING: CPT

## 2023-12-19 PROCEDURE — 96375 TX/PRO/DX INJ NEW DRUG ADDON: CPT

## 2023-12-19 PROCEDURE — 96374 THER/PROPH/DIAG INJ IV PUSH: CPT

## 2023-12-19 PROCEDURE — 36415 COLL VENOUS BLD VENIPUNCTURE: CPT | Performed by: FAMILY MEDICINE

## 2023-12-19 RX ORDER — METHYLPREDNISOLONE 4 MG/1
TABLET ORAL
Qty: 21 TABLET | Refills: 0 | Status: SHIPPED | OUTPATIENT
Start: 2023-12-19

## 2023-12-19 RX ORDER — SODIUM CHLORIDE 9 MG/ML
3 INJECTION INTRAVENOUS
Status: DISCONTINUED | OUTPATIENT
Start: 2023-12-19 | End: 2023-12-19 | Stop reason: HOSPADM

## 2023-12-19 RX ORDER — KETOROLAC TROMETHAMINE 30 MG/ML
30 INJECTION, SOLUTION INTRAMUSCULAR; INTRAVENOUS ONCE
Status: COMPLETED | OUTPATIENT
Start: 2023-12-19 | End: 2023-12-19

## 2023-12-19 RX ORDER — DEXAMETHASONE SODIUM PHOSPHATE 10 MG/ML
10 INJECTION, SOLUTION INTRAMUSCULAR; INTRAVENOUS ONCE
Status: COMPLETED | OUTPATIENT
Start: 2023-12-19 | End: 2023-12-19

## 2023-12-19 RX ORDER — MELOXICAM 7.5 MG/1
7.5 TABLET ORAL DAILY
Qty: 10 TABLET | Refills: 0 | Status: SHIPPED | OUTPATIENT
Start: 2023-12-19 | End: 2023-12-29

## 2023-12-19 RX ADMIN — DEXAMETHASONE SODIUM PHOSPHATE 10 MG: 10 INJECTION, SOLUTION INTRAMUSCULAR; INTRAVENOUS at 07:45

## 2023-12-19 RX ADMIN — KETOROLAC TROMETHAMINE 30 MG: 30 INJECTION, SOLUTION INTRAMUSCULAR at 07:48

## 2023-12-19 NOTE — ED PROVIDER NOTES
History  Chief Complaint   Patient presents with    Chest Pain     Patient reports midsternal chest pain that started on Friday. Patient reports shortness of breath with this as well. Denies any nausea or vomiting.        Chest Pain  Associated symptoms: no abdominal pain, no back pain, no cough, no dizziness, no fever, no headache, no nausea, no shortness of breath and not vomiting      33-year-old male with a history of smoking presented to ED with the complaint of chest pain that started on Friday.  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  He states that today he was taking his break step outside to smoke a cigarette when he started with chest pain.  He states that he since then has been having pain and feels that it is getting worse.  Patient states he did feel better on Saturday but then the pain continued to get worse.  Denies any shortness of breath.  Does have nasal congestion.  Rating his pain a 4 out of 10 at this time.  States that the makes the pain better or worse and he did not take anything for pain.  He states he had a similar pain a few months ago but then resolved.  Otherwise awake alert and times GCS 15.  Prior to Admission Medications   Prescriptions Last Dose Informant Patient Reported? Taking?   amoxicillin-clavulanate (AUGMENTIN) 875-125 mg per tablet   No No   Sig: Take 1 tablet by mouth every 12 (twelve) hours for 7 days   chlorhexidine (PERIDEX) 0.12 % solution   No No   Sig: Apply 15 mL to the mouth or throat 2 (two) times a day   Patient not taking: Reported on 11/24/2022   ibuprofen (MOTRIN) 600 mg tablet   No No   Sig: Take 1 tablet (600 mg total) by mouth every 6 (six) hours as needed for mild pain   meloxicam (MOBIC) 15 mg tablet   No No   Sig: Take 1 tablet (15 mg total) by mouth daily   methadone (DOLOPHINE) 10 mg/mL oral concentrated solution   Yes No   Sig: Take 44 mg by mouth daily   methocarbamol (ROBAXIN) 500 mg tablet   No No    Sig: Take 1 tablet (500 mg total) by mouth 3 (three) times a day as needed for muscle spasms   Patient not taking: Reported on 8/28/2023   nicotine (NICODERM CQ) 14 mg/24hr TD 24 hr patch   No No   Sig: Place 1 patch on the skin daily   Patient not taking: Reported on 8/19/2022      Facility-Administered Medications: None       Past Medical History:   Diagnosis Date    Asthma     as a child       Past Surgical History:   Procedure Laterality Date    INCISION AND DRAINAGE OF WOUND Left 06/28/2021    Procedure: INCISION AND DRAINAGE (I&D) LEFT SUBMANDIBULAR SPACE INFECTION, LEFT  SPACE INFECTION WITH EXTRACTION OF TOOTH #19;  Surgeon: Fabi Hood DMD;  Location:  MAIN OR;  Service: Maxillofacial    ORIF TIBIA & FIBULA FRACTURES Right     [place] ARTHRS KNE SURG W/MENISCECTOMY MED/LAT W/SHVG Right 1/10/2023    Procedure: ARTHROSCOPY KNEE WITH LATERAL MENISCECTOMY;  Surgeon: Harvinder Ga DO;  Location: [facility];  Service: Orthopedics       History reviewed. No pertinent family history.  I have reviewed and agree with the history as documented.    E-Cigarette/Vaping    E-Cigarette Use Current Every Day User     Start Date 1/1/23     Cartridges/Day 0.5      E-Cigarette/Vaping Substances     Social History     Tobacco Use    Smoking status: Every Day     Current packs/day: 0.50     Types: Cigarettes    Smokeless tobacco: Never   Vaping Use    Vaping status: Every Day    Start date: 1/1/2023   Substance Use Topics    Alcohol use: Never    Drug use: Yes     Types: Marijuana     Comment: daily       Review of Systems   Constitutional:  Negative for chills and fever.   HENT:  Negative for rhinorrhea and sore throat.    Eyes:  Negative for visual disturbance.   Respiratory:  Negative for cough and shortness of breath.    Cardiovascular:  Positive for chest pain. Negative for leg swelling.   Gastrointestinal:  Negative for abdominal pain, diarrhea, nausea and vomiting.   Genitourinary:  Negative for  dysuria.   Musculoskeletal:  Negative for back pain and myalgias.   Skin:  Negative for rash.   Neurological:  Negative for dizziness and headaches.   Psychiatric/Behavioral:  Negative for confusion.    All other systems reviewed and are negative.      Physical Exam  Physical Exam  Vitals and nursing note reviewed.   Constitutional:       General: He is not in acute distress.     Appearance: He is well-developed. He is not diaphoretic.   HENT:      Head: Normocephalic and atraumatic.      Right Ear: External ear normal.      Left Ear: External ear normal.      Nose: Nose normal.   Eyes:      Conjunctiva/sclera: Conjunctivae normal.      Pupils: Pupils are equal, round, and reactive to light.   Cardiovascular:      Rate and Rhythm: Normal rate and regular rhythm.   Pulmonary:      Effort: Pulmonary effort is normal. No respiratory distress.      Breath sounds: Normal breath sounds. No wheezing.   Abdominal:      General: Bowel sounds are normal. There is no distension.      Palpations: Abdomen is soft.      Tenderness: There is no abdominal tenderness.   Musculoskeletal:      Cervical back: Normal range of motion and neck supple.   Lymphadenopathy:      Cervical: No cervical adenopathy.   Skin:     General: Skin is warm and dry.      Capillary Refill: Capillary refill takes less than 2 seconds.   Neurological:      Mental Status: He is alert and oriented to person, place, and time.   Psychiatric:         Behavior: Behavior normal.         Vital Signs  ED Triage Vitals   Temperature Pulse Respirations Blood Pressure SpO2   12/19/23 0736 12/19/23 0736 12/19/23 0736 12/19/23 0736 12/19/23 0736   98.5 °F (36.9 °C) 71 18 129/55 97 %      Temp Source Heart Rate Source Patient Position - Orthostatic VS BP Location FiO2 (%)   12/19/23 0736 12/19/23 0736 12/19/23 0959 12/19/23 0959 --   Temporal Monitor Sitting Left arm       Pain Score       12/19/23 0736       5           Vitals:    12/19/23 0736 12/19/23 0959   BP: 129/55  114/56   Pulse: 71 57   Patient Position - Orthostatic VS:  Sitting         Visual Acuity      ED Medications  Medications   sodium chloride (PF) 0.9 % injection 3 mL (has no administration in time range)   ketorolac (TORADOL) injection 30 mg (30 mg Intravenous Given 12/19/23 0748)   dexamethasone (PF) (DECADRON) injection 10 mg (10 mg Intravenous Given 12/19/23 0745)       Diagnostic Studies  Results Reviewed       Procedure Component Value Units Date/Time    D-Dimer [[id number]]  (Normal) Collected: 12/19/23 1007    Lab Status: Final result Specimen: Blood from Arm, Right Updated: 12/19/23 1041     D-Dimer, Quant <0.27 ug/ml FEU     FLU/RSV/COVID - if FLU/RSV clinically relevant [[id number]]  (Normal) Collected: 12/19/23 0742    Lab Status: Final result Specimen: Nares from Nose Updated: 12/19/23 0825     SARS-CoV-2 Negative     INFLUENZA A PCR Negative     INFLUENZA B PCR Negative     RSV PCR Negative    Narrative:      FOR PEDIATRIC PATIENTS - copy/paste COVID Guidelines URL to browser: [email or web address]    SARS-CoV-2 assay is a Nucleic Acid Amplification assay intended for the  qualitative detection of nucleic acid from SARS-CoV-2 in nasopharyngeal  swabs. Results are for the presumptive identification of SARS-CoV-2 RNA.    Positive results are indicative of infection with SARS-CoV-2, the virus  causing COVID-19, but do not rule out bacterial infection or co-infection  with other viruses. Laboratories within the [place] and its  territories are required to report all positive results to the appropriate  public health authorities. Negative results do not preclude SARS-CoV-2  infection and should not be used as the sole basis for treatment or other  patient management decisions. Negative results must be combined with  clinical observations, patient history, and epidemiological information.  This test has not been FDA cleared or approved.    This test  has been authorized by FDA under an Emergency Use Authorization  (EUA). This test is only authorized for the duration of time the  declaration that circumstances exist justifying the authorization of the  emergency use of an in vitro diagnostic tests for detection of SARS-CoV-2  virus and/or diagnosis of COVID-19 infection under section 564(b)(1) of  the Act, 21 U.S.C. 360bbb-3(b)(1), unless the authorization is terminated  or revoked sooner. The test has been validated but independent review by FDA  and CLIA is pending.    Test performed using [employer]: This RT-PCR assay targets N2,  a region unique to SARS-CoV-2. A conserved region in the E-gene was chosen  for pan-Sarbecovirus detection which includes SARS-CoV-2.    According to CMS-2020-01-R, this platform meets the definition of high-throughput technology.    HS Troponin 0hr (reflex protocol) [[id number]]  (Normal) Collected: 12/19/23 0742    Lab Status: Final result Specimen: Blood from Arm, Right Updated: 12/19/23 0824     hs TnI 0hr <2 ng/L     HS Troponin I 4hr [[id number]]     Lab Status: No result Specimen: Blood     Basic metabolic panel [[id number]] Collected: 12/19/23 0742    Lab Status: Final result Specimen: Blood from Arm, Right Updated: 12/19/23 0816     Sodium 138 mmol/L      Potassium 4.1 mmol/L      Chloride 103 mmol/L      CO2 26 mmol/L      ANION GAP 9 mmol/L      BUN 18 mg/dL      Creatinine 0.87 mg/dL      Glucose 102 mg/dL      Calcium 9.6 mg/dL      eGFR 111 ml/min/1.73sq m     Narrative:      National Kidney Disease Foundation guidelines for Chronic Kidney Disease (CKD):     Stage 1 with normal or high GFR (GFR > 90 mL/min/1.73 square meters)    Stage 2 Mild CKD (GFR = 60-89 mL/min/1.73 square meters)    Stage 3A Moderate CKD (GFR = 45-59 mL/min/1.73 square meters)    Stage 3B Moderate CKD (GFR = 30-44 mL/min/1.73 square meters)    Stage 4 Severe CKD (GFR = 15-29 mL/min/1.73 square meters)    Stage 5 End Stage CKD (GFR <15  mL/min/1.73 square meters)  Note: GFR calculation is accurate only with a steady state creatinine    CBC and differential [[id number]] Collected: 12/19/23 0742    Lab Status: Final result Specimen: Blood from Arm, Right Updated: 12/19/23 0747     WBC 7.35 Thousand/uL      RBC 5.15 Million/uL      Hemoglobin 15.5 g/dL      Hematocrit 47.3 %      MCV 92 fL      MCH 30.1 pg      MCHC 32.8 g/dL      RDW 13.5 %      MPV 10.8 fL      Platelets 171 Thousands/uL      nRBC 0 /100 WBCs      Neutrophils Relative 61 %      Immat GRANS % 0 %      Lymphocytes Relative 27 %      Monocytes Relative 9 %      Eosinophils Relative 2 %      Basophils Relative 1 %      Neutrophils Absolute 4.51 Thousands/µL      Immature Grans Absolute 0.02 Thousand/uL      Lymphocytes Absolute 2.00 Thousands/µL      Monocytes Absolute 0.66 Thousand/µL      Eosinophils Absolute 0.11 Thousand/µL      Basophils Absolute 0.05 Thousands/µL                    X-ray chest 1 view portable   ED Interpretation by Reynaldo Quinn MD (12/19 0819)   NAD      Final Result by Naima Mcclure MD (12/19 1052)      No acute cardiopulmonary disease.                  Workstation performed: [id number]                    Procedures  Procedures         ED Course             HEART Risk Score      Flowsheet Row Most Recent Value   Heart Score Risk Calculator    History 0 Filed at: 12/19/2023 0956   ECG 0 Filed at: 12/19/2023 0956   Age 0 Filed at: 12/19/2023 0956   Risk Factors 1 Filed at: 12/19/2023 0956   Troponin 0 Filed at: 12/19/2023 0956   HEART Score 1 Filed at: 12/19/2023 0956                          SBIRT 20yo+      Flowsheet Row Most Recent Value   Initial Alcohol Screen: US AUDIT-C     1. How often do you have a drink containing alcohol? 0 Filed at: 12/19/2023 0737   2. How many drinks containing alcohol do you have on a typical day you are drinking?  0 Filed at: 12/19/2023 0737   3a. Male UNDER 65: How often do you have five or more drinks on one occasion? 0 Filed at:  12/19/2023 0737   3b. FEMALE Any Age, or MALE 65+: How often do you have 4 or more drinks on one occassion? 0 Filed at: 12/19/2023 0737   Audit-C Score 0 Filed at: 12/19/2023 0737   ABIODUN: How many times in the past year have you...    Used an illegal drug or used a prescription medication for non-medical reasons? Never Filed at: 12/19/2023 0737                      Medical Decision Making  33-year-old male with a history of smoking presented to ED with the complaint of chest pain that started on Friday.    History obtained from patient    Patient was nontoxic, stable. Ambulatory. Exam as above.     EKG reviewed.     Labs reviewed.     Independently reviewed imaging.     Reviewed external records.     Differential diagnosis considered. Overall presentation is consistent with low risk chest pain. Low suspicion for ACS. Low risk by EDACS. Low suspicion for PE, low risk by clinical criteria. Low suspicion for pneumothorax, pneumonia, pericarditis, dissection, or other serious cause of chest pain.     Patient was treated with pain medications with improvement in symptoms.     Consideration was given for admission, but the patient was stable for outpatient management.     Disposition: Discussed need to follow up diagnostics, including incidental findings. Discharged with instructions to obtain outpatient follow up of patient's symptoms and findings, with strict return precautions if patient develops new or worsening symptoms.          Amount and/or Complexity of Data Reviewed  Labs: ordered.  Radiology: ordered and independent interpretation performed.    Risk  Prescription drug management.             Disposition  Final diagnoses:   Costochondritis     Time reflects when diagnosis was documented in both MDM as applicable and the Disposition within this note       Time User Action Codes Description Comment    12/19/2023 10:52 AM Reynaldo Quinn Add [M94.0] Costochondritis           ED Disposition       ED Disposition    Discharge    Condition   Stable    Date/Time   Tue Dec 19, 2023 0956    Comment   Khalif THOMSON renetta discharge to home/self care.                   Follow-up Information       Follow up With Specialties Details Why Contact Info    pcp  Schedule an appointment as soon as possible for a visit in 2 days If symptoms worsen             Patient's Medications   Discharge Prescriptions    MELOXICAM (MOBIC) 7.5 MG TABLET    Take 1 tablet (7.5 mg total) by mouth daily for 10 days       Start Date: 12/19/2023End Date: 12/29/2023       Order Dose: 7.5 mg       Quantity: 10 tablet    Refills: 0    METHYLPREDNISOLONE 4 MG TABLET THERAPY PACK    Use as directed on package       Start Date: 12/19/2023End Date: --       Order Dose: --       Quantity: 21 tablet    Refills: 0           PDMP Review         Value Time User    PDMP Reviewed  Yes 1/10/2023  7:14 AM Sanjay Cheek PA-C            ED Provider  Electronically Signed by             Reynaldo Quinn MD  12/19/23 9540

## 2023-12-19 NOTE — Clinical Note
Khalif Hood was seen and treated in our emergency department on 12/19/2023.                Diagnosis:     Khalif  may return to work on return date.    He may return on this date: 12/20/2023         If you have any questions or concerns, please don't hesitate to call.      Reynaldo Quinn MD    ______________________________           _______________          _______________  Hospital Representative                              Date                                Time

## 2023-12-22 ENCOUNTER — OFFICE VISIT (OUTPATIENT)
Dept: URGENT CARE | Facility: CLINIC | Age: 33
End: 2023-12-22
Payer: COMMERCIAL

## 2023-12-22 VITALS
DIASTOLIC BLOOD PRESSURE: 79 MMHG | TEMPERATURE: 98.7 F | RESPIRATION RATE: 18 BRPM | SYSTOLIC BLOOD PRESSURE: 126 MMHG | HEART RATE: 79 BPM | OXYGEN SATURATION: 97 %

## 2023-12-22 DIAGNOSIS — K13.70 MOUTH LESION: Primary | ICD-10-CM

## 2023-12-22 PROCEDURE — S9088 SERVICES PROVIDED IN URGENT: HCPCS | Performed by: PHYSICIAN ASSISTANT

## 2023-12-22 PROCEDURE — 99213 OFFICE O/P EST LOW 20 MIN: CPT | Performed by: PHYSICIAN ASSISTANT

## 2023-12-22 RX ORDER — CLINDAMYCIN HYDROCHLORIDE 150 MG/1
150 CAPSULE ORAL EVERY 6 HOURS SCHEDULED
Qty: 20 CAPSULE | Refills: 0 | Status: SHIPPED | OUTPATIENT
Start: 2023-12-22 | End: 2023-12-27

## 2023-12-22 NOTE — PROGRESS NOTES
[facility]        NAME: Khalif Hood is a 33 y.o. male  : 1990    MRN: [id number]  DATE: 2023  TIME: 7:02 PM    Assessment and Plan   Mouth lesion [K13.70]  1. Mouth lesion  clindamycin (CLEOCIN) 150 mg capsule            Patient Instructions   clindamycin given to cover until patient is able to see dentist next Tuesday.  Should symptoms worsen he should report to the ER    Follow up with PCP in 3-5 days.  Proceed to  ER if symptoms worsen.    Chief Complaint     Chief Complaint   Patient presents with    Oral Pain     States not tooth pain, Verbalizes pallet pain radiating to back three teeth.  States reoccurring issue, states last dose of abx, did not work. Requesting PCN till dental appt. On the           History of Present Illness       HPI  This is a 83-year-old male here complaining of pain on the roof of his mouth.  Patient was seen in the ER on  and given Augmentin for this issue.  He notes that a bubble formed on the roof of his mouth approximately every 6 months and is usually cured by taking a penicillin.  Patient notes he took all the Augmentin prescribed him to finish on Tuesday.  He notes in the last 2 days he feels that the area is worsening again.  He notes today he is back 3 teeth are now throbbing.  He denies fever, chills, shortness of breath or chest pain.  Patient is getting a new dentist and seeing them on the .  They told him to come here to get another antibiotic to cover him until he is seen by them.  Review of Systems   Review of Systems   Constitutional:  Negative for chills and fever.   HENT:  Positive for dental problem.    Respiratory:  Negative for shortness of breath.    Cardiovascular:  Negative for chest pain.         Current Medications       Current Outpatient Medications:     clindamycin (CLEOCIN) 150 mg capsule, Take 1 capsule (150 mg total) by mouth every 6 (six) hours for 5 days, Disp: 20 capsule, Rfl: 0    meloxicam  (Mobic) 7.5 mg tablet, Take 1 tablet (7.5 mg total) by mouth daily for 10 days, Disp: 10 tablet, Rfl: 0    methadone (DOLOPHINE) 10 mg/mL oral concentrated solution, Take 44 mg by mouth daily, Disp: , Rfl:     methylPREDNISolone 4 MG tablet therapy pack, Use as directed on package, Disp: 21 tablet, Rfl: 0    chlorhexidine (PERIDEX) 0.12 % solution, Apply 15 mL to the mouth or throat 2 (two) times a day (Patient not taking: Reported on 11/24/2022), Disp: 120 mL, Rfl: 0    ibuprofen (MOTRIN) 600 mg tablet, Take 1 tablet (600 mg total) by mouth every 6 (six) hours as needed for mild pain (Patient not taking: Reported on 12/22/2023), Disp: 20 tablet, Rfl: 0    meloxicam (MOBIC) 15 mg tablet, Take 1 tablet (15 mg total) by mouth daily (Patient not taking: Reported on 12/22/2023), Disp: 90 tablet, Rfl: 1    Current Allergies     Allergies as of 12/22/2023    (No Known Allergies)            The following portions of the patient's history were reviewed and updated as appropriate: allergies, current medications, past family history, past medical history, past social history, past surgical history and problem list.     Past Medical History:   Diagnosis Date    Asthma     as a child       Past Surgical History:   Procedure Laterality Date    INCISION AND DRAINAGE OF WOUND Left 06/28/2021    Procedure: INCISION AND DRAINAGE (I&D) LEFT SUBMANDIBULAR SPACE INFECTION, LEFT  SPACE INFECTION WITH EXTRACTION OF TOOTH #19;  Surgeon: Fabi Hood DMD;  Location:  MAIN OR;  Service: Maxillofacial    ORIF TIBIA & FIBULA FRACTURES Right     [place] ARTHRS KNE SURG W/MENISCECTOMY MED/LAT W/SHVG Right 1/10/2023    Procedure: ARTHROSCOPY KNEE WITH LATERAL MENISCECTOMY;  Surgeon: Harvinder Ga DO;  Location: [facility];  Service: Orthopedics       History reviewed. No pertinent family history.      Medications have been verified.        Objective   /79   Pulse 79   Temp 98.7 °F (37.1 °C)   Resp 18   SpO2 97%         Physical Exam     Physical Exam  Vitals and nursing note reviewed.   Constitutional:       Appearance: Normal appearance.   HENT:      Head:      Comments: There is an what appears to be ulcerated area on the left upper palate.  Mild swelling.  See photo.  Cardiovascular:      Rate and Rhythm: Normal rate and regular rhythm.   Pulmonary:      Effort: Pulmonary effort is normal.      Breath sounds: Normal breath sounds.   Neurological:      Mental Status: He is alert.

## 2024-01-10 ENCOUNTER — OFFICE VISIT (OUTPATIENT)
Dept: FAMILY MEDICINE CLINIC | Facility: CLINIC | Age: 34
End: 2024-01-10
Payer: COMMERCIAL

## 2024-01-10 VITALS
HEART RATE: 80 BPM | DIASTOLIC BLOOD PRESSURE: 78 MMHG | SYSTOLIC BLOOD PRESSURE: 120 MMHG | WEIGHT: 204.2 LBS | OXYGEN SATURATION: 98 % | HEIGHT: 75 IN | BODY MASS INDEX: 25.39 KG/M2

## 2024-01-10 DIAGNOSIS — M85.88 MASS OF HARD PALATE: Primary | ICD-10-CM

## 2024-01-10 PROCEDURE — 99203 OFFICE O/P NEW LOW 30 MIN: CPT | Performed by: FAMILY MEDICINE

## 2024-01-10 NOTE — PROGRESS NOTES
Assessment/Plan:     Chronic Problems:  No problem-specific Assessment & Plan notes found for this encounter.      Visit Diagnosis:  Diagnoses and all orders for this visit:    Mass of hard palate  Comments:  referral to ent fo further eval  Orders:  -     Ambulatory Referral to Otolaryngology; Future          Subjective:    Patient ID: Khalif Hood is a 33 y.o. male.    C/o   noted a cyst in the back of the mouth 1 yr ago   Seen by dentist , fillings completed , abx given   Returned 6 months ago along the same area , seen in the er , provider attempted to   Where upon went to another dentist with no answer to the question ,   Once again 3 wks seen in urgent care with again given abx   Has since started gargling which appears to be effective   When visible upper left aspect not involving the gum line , firm , non draining     Past med hx   Drug abuse , present with methadone clinic     Past shx  Right tib / fib fx    Soc hx   Single   [employer] grad   Works septic , gym   Smoke 1/2 ppd 15 yr   Etoh - rare   Drug abstinence although admits to week  Previous perc, heroin last use 2019           The following portions of the patient's history were reviewed and updated as appropriate: allergies, current medications, past family history, past medical history, past social history, past surgical history and problem list.    Review of Systems   Constitutional:  Negative for chills and fever.   HENT:  Positive for mouth sores. Negative for ear pain and sore throat.    Eyes:  Negative for pain and visual disturbance.   Respiratory:  Negative for cough and shortness of breath.    Cardiovascular:  Negative for chest pain and palpitations.   Gastrointestinal:  Negative for abdominal pain and vomiting.   Genitourinary:  Negative for dysuria and hematuria.   Musculoskeletal:  Negative for arthralgias and back pain.   Skin:  Negative for color change and rash.   Neurological:  Negative for seizures and syncope.   All other systems  "reviewed and are negative.        /78 (BP Location: Left arm, Patient Position: Sitting, Cuff Size: Standard)   Pulse 80   Ht 6' 3\" (1.905 m)   Wt 92.6 kg (204 lb 3.2 oz)   SpO2 98%   BMI 25.52 kg/m²   Social History     Socioeconomic History    Marital status: Single     Spouse name: Not on file    Number of children: Not on file    Years of education: Not on file    Highest education level: Not on file   Occupational History    Not on file   Tobacco Use    Smoking status: Every Day     Current packs/day: 0.50     Types: Cigarettes    Smokeless tobacco: Never   Vaping Use    Vaping status: Former    Start date: 1/1/2023   Substance and Sexual Activity    Alcohol use: Never    Drug use: Yes     Frequency: 7.0 times per week     Types: Marijuana     Comment: daily    Sexual activity: Not on file   Other Topics Concern    Not on file   Social History Narrative    Not on file     Social Determinants of Health     Financial Resource Strain: Not on file   Food Insecurity: Not on file   Transportation Needs: Not on file   Physical Activity: Not on file   Stress: Not on file   Social Connections: Not on file   Intimate Partner Violence: Not on file   Housing Stability: Not on file     Past Medical History:   Diagnosis Date    Asthma     as a child     History reviewed. No pertinent family history.  Past Surgical History:   Procedure Laterality Date    INCISION AND DRAINAGE OF WOUND Left 06/28/2021    Procedure: INCISION AND DRAINAGE (I&D) LEFT SUBMANDIBULAR SPACE INFECTION, LEFT  SPACE INFECTION WITH EXTRACTION OF TOOTH #19;  Surgeon: Fabi Hood DMD;  Location:  MAIN OR;  Service: Maxillofacial    ORIF TIBIA & FIBULA FRACTURES Right     [facility]TH[facility] KNE SURG W/MENISCECTOMY MED/LAT W/SHVG Right 1/10/2023    Procedure: ARTHROSCOPY KNEE WITH LATERAL MENISCECTOMY;  Surgeon: Harvinder Ga DO;  Location: [facility];  Service: Orthopedics       Current Outpatient Medications:     methadone " (DOLOPHINE) 10 mg/mL oral concentrated solution, Take 25 mg by mouth every morning, Disp: , Rfl:     meloxicam (MOBIC) 15 mg tablet, Take 1 tablet (15 mg total) by mouth daily (Patient taking differently: Take 15 mg by mouth daily as needed for moderate pain), Disp: 90 tablet, Rfl: 1    No Known Allergies       Lab Review   not applicable     Imaging: X-ray chest 1 view portable    Result Date: 12/19/2023  Narrative: CHEST INDICATION:   chest pain. COMPARISON:  None EXAM PERFORMED/VIEWS:  XR CHEST PORTABLE FINDINGS: Cardiomediastinal silhouette appears unremarkable. The lungs are clear.  No pneumothorax or pleural effusion. Osseous structures appear within normal limits for patient age.     Impression: No acute cardiopulmonary disease. Workstation performed: [id number]       Objective:     Physical Exam  Constitutional:       General: He is not in acute distress.     Appearance: He is well-developed. He is not ill-appearing or toxic-appearing.   HENT:      Head: Normocephalic and atraumatic.      Right Ear: Tympanic membrane normal.      Left Ear: Tympanic membrane normal.      Mouth/Throat:      Comments: Normal in  appearance and palpation   Eyes:      Pupils: Pupils are equal, round, and reactive to light.   Cardiovascular:      Rate and Rhythm: Normal rate and regular rhythm.      Heart sounds: Normal heart sounds.   Pulmonary:      Effort: Pulmonary effort is normal.      Breath sounds: Normal breath sounds.   Musculoskeletal:         General: Normal range of motion.      Cervical back: Normal range of motion and neck supple.   Skin:     General: Skin is warm and dry.   Neurological:      Mental Status: He is alert and oriented to person, place, and time.      Deep Tendon Reflexes: Reflexes are normal and symmetric.   Psychiatric:         Behavior: Behavior normal.         Thought Content: Thought content normal.         Judgment: Judgment normal.           There are no Patient Instructions on file for this  "visit.    SHELBY Royal    Portions of the record may have been created with voice recognition software.  Occasional wrong word or \"sound a like\" substitutions may have occurred due to the inherent limitations of voice recognition software.  Read the chart carefully and recognize, using context, where substitutions have occurred.  "

## 2024-01-10 NOTE — PRE-PROCEDURE INSTRUCTIONS
Pre-Surgery Instructions:   Medication Instructions    meloxicam (MOBIC) 15 mg tablet Stop taking 3 days prior to surgery.    methadone (DOLOPHINE) 10 mg/mL oral concentrated solution Take day of surgery.    Medication instructions for day surgery reviewed. Please use only a sip of water to take your instructed medications. Avoid all over the counter vitamins, supplements and NSAIDS for one week prior to surgery per anesthesia guidelines. Tylenol is ok to take as needed.     You will receive a call one business day prior to surgery with an arrival time and hospital directions. If your surgery is scheduled on a Monday, the hospital will be calling you on the Friday prior to your surgery. If you have not heard from anyone by 8pm, please call the hospital supervisor through the hospital  at [phone]. ([facility] [phone]).    Do not eat or drink anything after midnight the night before your surgery, including candy, mints, lifesavers, or chewing gum. Do not drink alcohol 24hrs before your surgery. Try not to smoke at least 24hrs before your surgery.       Follow the pre surgery showering instructions as listed in the “My Surgical Experience Booklet” or otherwise provided by your surgeon's office. Do not use a blade to shave the surgical area 1 week before surgery. It is okay to use a clean electric clippers up to 24 hours before surgery. Do not apply any lotions, creams, including makeup, cologne, deodorant, or perfumes after showering on the day of your surgery. Do not use dry shampoo, hair spray, hair gel, or any type of hair products.     No contact lenses, eye make-up, or artificial eyelashes. Remove nail polish, including gel polish, and any artificial, gel, or acrylic nails if possible. Remove all jewelry including rings and body piercing jewelry.     Wear causal clothing that is easy to take on and off. Consider your type of surgery.    Keep any valuables, jewelry, piercings at home. Please bring  any specially ordered equipment (sling, braces) if indicated.    Arrange for a responsible person to drive you to and from the hospital on the day of your surgery. Visitor Guidelines discussed.     Call the surgeon's office with any new illnesses, exposures, or additional questions prior to surgery.    Please reference your “My Surgical Experience Booklet” for additional information to prepare for your upcoming surgery.     Instructed to bring Knee Brace DOS(does not have crutches)

## 2024-01-17 ENCOUNTER — ANESTHESIA EVENT (OUTPATIENT)
Dept: PERIOP | Facility: HOSPITAL | Age: 34
End: 2024-01-17
Payer: COMMERCIAL

## 2024-01-17 ENCOUNTER — ANESTHESIA (OUTPATIENT)
Dept: PERIOP | Facility: HOSPITAL | Age: 34
End: 2024-01-17
Payer: COMMERCIAL

## 2024-01-17 ENCOUNTER — HOSPITAL ENCOUNTER (OUTPATIENT)
Facility: HOSPITAL | Age: 34
Setting detail: OUTPATIENT SURGERY
Discharge: HOME | End: 2024-01-17
Attending: ORTHOPAEDIC SURGERY | Admitting: ORTHOPAEDIC SURGERY
Payer: COMMERCIAL

## 2024-01-17 VITALS
OXYGEN SATURATION: 99 % | DIASTOLIC BLOOD PRESSURE: 59 MMHG | TEMPERATURE: 97.4 F | HEART RATE: 62 BPM | HEIGHT: 75 IN | WEIGHT: 203.93 LBS | BODY MASS INDEX: 25.36 KG/M2 | RESPIRATION RATE: 13 BRPM | SYSTOLIC BLOOD PRESSURE: 126 MMHG

## 2024-01-17 DIAGNOSIS — S83.271D COMPLEX TEAR OF LATERAL MENISCUS OF RIGHT KNEE, UNSPECIFIED WHETHER OLD OR CURRENT TEAR, SUBSEQUENT ENCOUNTER: ICD-10-CM

## 2024-01-17 DIAGNOSIS — M23.51 OLD COMPLETE ACL TEAR, RIGHT: Primary | ICD-10-CM

## 2024-01-17 PROCEDURE — C1713 ANCHOR/SCREW BN/BN,TIS/BN: HCPCS | Performed by: ORTHOPAEDIC SURGERY

## 2024-01-17 PROCEDURE — NC001 PR NO CHARGE: Performed by: ORTHOPAEDIC SURGERY

## 2024-01-17 PROCEDURE — 29881 ARTHRS KNE SRG MNISECTMY M/L: CPT | Performed by: ORTHOPAEDIC SURGERY

## 2024-01-17 PROCEDURE — 29888 ARTHRS AID ACL RPR/AGMNTJ: CPT | Performed by: ORTHOPAEDIC SURGERY

## 2024-01-17 PROCEDURE — 29881 ARTHRS KNE SRG MNISECTMY M/L: CPT | Performed by: PHYSICIAN ASSISTANT

## 2024-01-17 PROCEDURE — 29888 ARTHRS AID ACL RPR/AGMNTJ: CPT | Performed by: PHYSICIAN ASSISTANT

## 2024-01-17 DEVICE — TIGHTROPE® II ABS, IMPLANT OPEN
Type: IMPLANTABLE DEVICE | Site: KNEE | Status: FUNCTIONAL
Brand: ARTHREX®

## 2024-01-17 DEVICE — TIGHTROPE ABS BUTTON ROUND 14MM CONCAVE
Type: IMPLANTABLE DEVICE | Site: KNEE | Status: FUNCTIONAL
Brand: ARTHREX®

## 2024-01-17 DEVICE — TIGHTROPE ® II BTB WITH DEPLOYING SUTURE
Type: IMPLANTABLE DEVICE | Site: KNEE | Status: FUNCTIONAL
Brand: ARTHREX®

## 2024-01-17 DEVICE — GRAFTLINK (9.5X73 MM)
Type: IMPLANTABLE DEVICE | Site: KNEE | Status: FUNCTIONAL
Brand: FLEXIGRAFT®

## 2024-01-17 RX ORDER — KETAMINE HCL IN NACL, ISO-OSM 100MG/10ML
SYRINGE (ML) INJECTION AS NEEDED
Status: DISCONTINUED | OUTPATIENT
Start: 2024-01-17 | End: 2024-01-17

## 2024-01-17 RX ORDER — DEXAMETHASONE SODIUM PHOSPHATE 10 MG/ML
INJECTION, SOLUTION INTRAMUSCULAR; INTRAVENOUS AS NEEDED
Status: DISCONTINUED | OUTPATIENT
Start: 2024-01-17 | End: 2024-01-17

## 2024-01-17 RX ORDER — CHLORHEXIDINE GLUCONATE ORAL RINSE 1.2 MG/ML
15 SOLUTION DENTAL ONCE
Status: COMPLETED | OUTPATIENT
Start: 2024-01-17 | End: 2024-01-17

## 2024-01-17 RX ORDER — OXYCODONE HYDROCHLORIDE AND ACETAMINOPHEN 5; 325 MG/1; MG/1
1 TABLET ORAL EVERY 4 HOURS PRN
Status: DISCONTINUED | OUTPATIENT
Start: 2024-01-17 | End: 2024-01-17

## 2024-01-17 RX ORDER — ASPIRIN 325 MG
325 TABLET ORAL DAILY
Qty: 30 TABLET | Refills: 0 | Status: SHIPPED | OUTPATIENT
Start: 2024-01-17 | End: 2024-02-16

## 2024-01-17 RX ORDER — PROPOFOL 10 MG/ML
INJECTION, EMULSION INTRAVENOUS AS NEEDED
Status: DISCONTINUED | OUTPATIENT
Start: 2024-01-17 | End: 2024-01-17

## 2024-01-17 RX ORDER — ONDANSETRON 2 MG/ML
4 INJECTION INTRAMUSCULAR; INTRAVENOUS ONCE AS NEEDED
Status: DISCONTINUED | OUTPATIENT
Start: 2024-01-17 | End: 2024-01-17 | Stop reason: HOSPADM

## 2024-01-17 RX ORDER — LIDOCAINE HYDROCHLORIDE 10 MG/ML
INJECTION, SOLUTION EPIDURAL; INFILTRATION; INTRACAUDAL; PERINEURAL AS NEEDED
Status: DISCONTINUED | OUTPATIENT
Start: 2024-01-17 | End: 2024-01-17

## 2024-01-17 RX ORDER — MULTIVIT WITH MINERALS/LUTEIN
250 TABLET ORAL DAILY
COMMUNITY

## 2024-01-17 RX ORDER — ONDANSETRON 2 MG/ML
4 INJECTION INTRAMUSCULAR; INTRAVENOUS EVERY 6 HOURS PRN
Status: DISCONTINUED | OUTPATIENT
Start: 2024-01-17 | End: 2024-01-17 | Stop reason: HOSPADM

## 2024-01-17 RX ORDER — OXYCODONE HYDROCHLORIDE AND ACETAMINOPHEN 5; 325 MG/1; MG/1
1 TABLET ORAL EVERY 4 HOURS PRN
Qty: 28 TABLET | Refills: 0 | Status: SHIPPED | OUTPATIENT
Start: 2024-01-17 | End: 2024-01-27

## 2024-01-17 RX ORDER — MIDAZOLAM HYDROCHLORIDE 2 MG/2ML
INJECTION, SOLUTION INTRAMUSCULAR; INTRAVENOUS AS NEEDED
Status: DISCONTINUED | OUTPATIENT
Start: 2024-01-17 | End: 2024-01-17

## 2024-01-17 RX ORDER — CHLORHEXIDINE GLUCONATE 4 G/100ML
SOLUTION TOPICAL DAILY PRN
Status: DISCONTINUED | OUTPATIENT
Start: 2024-01-17 | End: 2024-01-17 | Stop reason: HOSPADM

## 2024-01-17 RX ORDER — SODIUM CHLORIDE, SODIUM LACTATE, POTASSIUM CHLORIDE, CALCIUM CHLORIDE 600; 310; 30; 20 MG/100ML; MG/100ML; MG/100ML; MG/100ML
INJECTION, SOLUTION INTRAVENOUS CONTINUOUS PRN
Status: DISCONTINUED | OUTPATIENT
Start: 2024-01-17 | End: 2024-01-17

## 2024-01-17 RX ORDER — BUPIVACAINE HYDROCHLORIDE 5 MG/ML
INJECTION, SOLUTION EPIDURAL; INTRACAUDAL
Status: COMPLETED | OUTPATIENT
Start: 2024-01-17 | End: 2024-01-17

## 2024-01-17 RX ORDER — BUPIVACAINE HYDROCHLORIDE 2.5 MG/ML
INJECTION, SOLUTION EPIDURAL; INFILTRATION; INTRACAUDAL
Status: COMPLETED | OUTPATIENT
Start: 2024-01-17 | End: 2024-01-17

## 2024-01-17 RX ORDER — CEFAZOLIN SODIUM 2 G/50ML
2000 SOLUTION INTRAVENOUS ONCE
Status: COMPLETED | OUTPATIENT
Start: 2024-01-17 | End: 2024-01-17

## 2024-01-17 RX ORDER — ONDANSETRON 2 MG/ML
INJECTION INTRAMUSCULAR; INTRAVENOUS AS NEEDED
Status: DISCONTINUED | OUTPATIENT
Start: 2024-01-17 | End: 2024-01-17

## 2024-01-17 RX ORDER — OMEGA-3S/DHA/EPA/FISH OIL/D3 300MG-1000
400 CAPSULE ORAL DAILY
COMMUNITY

## 2024-01-17 RX ORDER — POVIDONE-IODINE 10 MG/G
OINTMENT TOPICAL AS NEEDED
Status: DISCONTINUED | OUTPATIENT
Start: 2024-01-17 | End: 2024-01-17 | Stop reason: HOSPADM

## 2024-01-17 RX ORDER — HYDROMORPHONE HCL/PF 1 MG/ML
2 SYRINGE (ML) INJECTION ONCE
Status: DISCONTINUED | OUTPATIENT
Start: 2024-01-17 | End: 2024-01-17 | Stop reason: HOSPADM

## 2024-01-17 RX ORDER — CHLORAL HYDRATE 500 MG
1000 CAPSULE ORAL DAILY
COMMUNITY

## 2024-01-17 RX ORDER — MELOXICAM 15 MG/1
15 TABLET ORAL DAILY
Qty: 30 TABLET | Refills: 0 | Status: SHIPPED | OUTPATIENT
Start: 2024-01-17 | End: 2024-02-16

## 2024-01-17 RX ORDER — MAGNESIUM HYDROXIDE 1200 MG/15ML
LIQUID ORAL AS NEEDED
Status: DISCONTINUED | OUTPATIENT
Start: 2024-01-17 | End: 2024-01-17 | Stop reason: HOSPADM

## 2024-01-17 RX ORDER — ACETAMINOPHEN 325 MG/1
975 TABLET ORAL ONCE
Status: COMPLETED | OUTPATIENT
Start: 2024-01-17 | End: 2024-01-17

## 2024-01-17 RX ORDER — BUPIVACAINE HYDROCHLORIDE AND EPINEPHRINE 5; 5 MG/ML; UG/ML
INJECTION, SOLUTION PERINEURAL AS NEEDED
Status: DISCONTINUED | OUTPATIENT
Start: 2024-01-17 | End: 2024-01-17 | Stop reason: HOSPADM

## 2024-01-17 RX ORDER — OXYCODONE HYDROCHLORIDE 10 MG/1
10 TABLET ORAL ONCE
Status: COMPLETED | OUTPATIENT
Start: 2024-01-17 | End: 2024-01-17

## 2024-01-17 RX ORDER — FENTANYL CITRATE 50 UG/ML
INJECTION, SOLUTION INTRAMUSCULAR; INTRAVENOUS AS NEEDED
Status: DISCONTINUED | OUTPATIENT
Start: 2024-01-17 | End: 2024-01-17

## 2024-01-17 RX ORDER — SODIUM CHLORIDE, SODIUM LACTATE, POTASSIUM CHLORIDE, CALCIUM CHLORIDE 600; 310; 30; 20 MG/100ML; MG/100ML; MG/100ML; MG/100ML
125 INJECTION, SOLUTION INTRAVENOUS CONTINUOUS
Status: DISCONTINUED | OUTPATIENT
Start: 2024-01-17 | End: 2024-01-17 | Stop reason: HOSPADM

## 2024-01-17 RX ORDER — KETOROLAC TROMETHAMINE 30 MG/ML
INJECTION, SOLUTION INTRAMUSCULAR; INTRAVENOUS AS NEEDED
Status: DISCONTINUED | OUTPATIENT
Start: 2024-01-17 | End: 2024-01-17

## 2024-01-17 RX ORDER — HYDROMORPHONE HCL IN WATER/PF 6 MG/30 ML
0.2 PATIENT CONTROLLED ANALGESIA SYRINGE INTRAVENOUS
Status: DISCONTINUED | OUTPATIENT
Start: 2024-01-17 | End: 2024-01-17 | Stop reason: HOSPADM

## 2024-01-17 RX ORDER — OXYCODONE HYDROCHLORIDE 5 MG/1
5 TABLET ORAL EVERY 4 HOURS PRN
Qty: 28 TABLET | Refills: 0 | Status: CANCELLED | OUTPATIENT
Start: 2024-01-17 | End: 2024-02-14

## 2024-01-17 RX ADMIN — BUPIVACAINE HYDROCHLORIDE 20 ML: 2.5 INJECTION, SOLUTION EPIDURAL; INFILTRATION; INTRACAUDAL at 11:01

## 2024-01-17 RX ADMIN — ONDANSETRON 4 MG: 2 INJECTION INTRAMUSCULAR; INTRAVENOUS at 12:20

## 2024-01-17 RX ADMIN — FENTANYL CITRATE 25 MCG: 50 INJECTION INTRAMUSCULAR; INTRAVENOUS at 12:35

## 2024-01-17 RX ADMIN — Medication 50 MG: at 12:03

## 2024-01-17 RX ADMIN — OXYCODONE HYDROCHLORIDE 10 MG: 10 TABLET ORAL at 14:41

## 2024-01-17 RX ADMIN — SODIUM CHLORIDE, SODIUM LACTATE, POTASSIUM CHLORIDE, AND CALCIUM CHLORIDE: .6; .31; .03; .02 INJECTION, SOLUTION INTRAVENOUS at 11:20

## 2024-01-17 RX ADMIN — FENTANYL CITRATE 25 MCG: 50 INJECTION INTRAMUSCULAR; INTRAVENOUS at 13:03

## 2024-01-17 RX ADMIN — FENTANYL CITRATE 25 MCG: 50 INJECTION INTRAMUSCULAR; INTRAVENOUS at 12:55

## 2024-01-17 RX ADMIN — LIDOCAINE HYDROCHLORIDE 50 MG: 10 INJECTION, SOLUTION EPIDURAL; INFILTRATION; INTRACAUDAL; PERINEURAL at 12:03

## 2024-01-17 RX ADMIN — PROPOFOL 300 MG: 10 INJECTION, EMULSION INTRAVENOUS at 12:03

## 2024-01-17 RX ADMIN — CHLORHEXIDINE GLUCONATE 15 ML: 1.2 RINSE ORAL at 10:46

## 2024-01-17 RX ADMIN — MIDAZOLAM 4 MG: 1 INJECTION INTRAMUSCULAR; INTRAVENOUS at 11:00

## 2024-01-17 RX ADMIN — FENTANYL CITRATE 25 MCG: 50 INJECTION INTRAMUSCULAR; INTRAVENOUS at 12:26

## 2024-01-17 RX ADMIN — SODIUM CHLORIDE, SODIUM LACTATE, POTASSIUM CHLORIDE, AND CALCIUM CHLORIDE 125 ML/HR: .6; .31; .03; .02 INJECTION, SOLUTION INTRAVENOUS at 10:48

## 2024-01-17 RX ADMIN — DEXAMETHASONE SODIUM PHOSPHATE 10 MG: 10 INJECTION, SOLUTION INTRAMUSCULAR; INTRAVENOUS at 12:03

## 2024-01-17 RX ADMIN — ACETAMINOPHEN 975 MG: 325 TABLET ORAL at 10:47

## 2024-01-17 RX ADMIN — BUPIVACAINE HYDROCHLORIDE 20 ML: 5 INJECTION, SOLUTION EPIDURAL; INTRACAUDAL; PERINEURAL at 11:01

## 2024-01-17 RX ADMIN — CEFAZOLIN SODIUM 2000 MG: 2 SOLUTION INTRAVENOUS at 12:10

## 2024-01-17 RX ADMIN — KETOROLAC TROMETHAMINE 30 MG: 30 INJECTION, SOLUTION INTRAMUSCULAR at 13:33

## 2024-01-17 NOTE — ANESTHESIA PROCEDURE NOTES
Peripheral Block    Patient location during procedure: pre-op  Start time: 1/17/2024 11:01 AM  Reason for block: at surgeon's request and post-op pain management  Staffing  Performed by: Eder Evangelista MD  Authorized by: Eder Evangelista MD    Preanesthetic Checklist  Completed: patient identified, IV checked, site marked, risks and benefits discussed, surgical consent, monitors and equipment checked, pre-op evaluation and timeout performed  Peripheral Block  Patient position: supine  Prep: ChloraPrep  Patient monitoring: heart rate, continuous pulse ox and frequent blood pressure checks  Block type: ipack block  Laterality: right  Injection technique: single-shot  Procedures: ultrasound guided, Ultrasound guidance required for the procedure to increase accuracy and safety of medication placement and decrease risk of complications.  Ultrasound permanent image savedbupivacaine (PF) (MARCAINE) 0.25 % injection 20 mL - Perineural   20 mL - 1/17/2024 11:01:00 AM  Needle  Needle type: Stimuplex   Needle gauge: 20 G  Needle length: 4 in  Needle localization: ultrasound guidance  Assessment  Injection assessment: incremental injection, local visualized surrounding nerve on ultrasound, negative aspiration for heme and no paresthesia on injection  patient tolerated the procedure well with no immediate complications  Additional Notes  Uncomplicated iPACK block  LA deposited above shaft of femur just proximal to femoral condyles  Dose: 20ml 0.25% bupivacaine

## 2024-01-17 NOTE — DISCHARGE INSTR - AVS FIRST PAGE
KNEE SURGERY  POSTOPERATIVE INFORMATION    1. Keep your leg elevated as much as possible during the firsts 48 hours after surgery to minimize swelling.  You may apply an ice pack as needed to control swelling and reduce pain.  To elevate your leg, place pillows or a rolled blanket under your ankle, not behind your knee.  2. You should use crutches or walker and remain weight-bearing as tolerated with the brace for at least the first 3 days.  3. If you feel comfortable without the crutches or walker after 3 days, you may stop using them, but you need to continue to use the brace  4. If the bandage begins to feel too tight, you may unwrap it and loosen it.  You should do this if you notice significant swelling in the leg.  5. You may change the dressing after 2 days or if it gets wet or dirty.  You may remove the bandage and cover the incisions with Band-Aids after 3 days.    6. If a postop follow-up appointment is not arranged before you leave the hospital, call our office the day following surgery to schedule the appointment.  7. You should start therapy in about 2 days after surgery, or as previously scheduled.

## 2024-01-17 NOTE — ANESTHESIA POSTPROCEDURE EVALUATION
Post-Op Assessment Note    CV Status:  Stable  Pain Score: 0    Pain management: adequate       Mental Status:  Sleepy   Hydration Status:  Euvolemic   PONV Controlled:  Controlled   Airway Patency:  Patent     Post Op Vitals Reviewed: Yes      Staff: CRNA               BP   132/60   Temp   97.4   Pulse  81   Resp   12   SpO2   100

## 2024-01-17 NOTE — H&P
H&P Exam - Orthopedics   Khalif Hood 33 y.o. male MRN: [id number]  Unit/Bed#:  Encounter: [id number]    Assessment/Plan     Assessment:  ACL tear of right knee  Plan:  Elective right knee arthroscopy with ACL reconstruction    History of Present Illness   HPI:  Khalif Hood is a 33 y.o. male who presented office complaining of right knee pain with swelling and mechanical instability.  The patient had a history of a chronic ACL tear.  He stated that his symptoms were continuing to worsen and starting to affect his quality of life.  After exhausting conservative treatment, the risk and benefits of surgery were discussed with the patient.  He decided on an elective right knee arthroscopy with ACL reconstruction.    Review of Systems   Constitutional:  Negative for chills, fever and unexpected weight change.   HENT:  Negative for nosebleeds and sore throat.    Eyes:  Negative for pain, redness and visual disturbance.   Respiratory:  Negative for cough, shortness of breath and wheezing.    Cardiovascular:  Negative for chest pain, palpitations and leg swelling.   Gastrointestinal:  Negative for abdominal pain, nausea and vomiting.   Endocrine: Negative for polydipsia and polyuria.   Genitourinary:  Negative for dysuria and hematuria.   Musculoskeletal:  Positive for arthralgias, gait problem and myalgias.        As noted in HPI   Skin:  Negative for rash and wound.   Neurological:  Negative for dizziness, numbness and headaches.   Psychiatric/Behavioral:  Negative for decreased concentration and suicidal ideas. The patient is not nervous/anxious.        Historical Information   Past Medical History:   Diagnosis Date    Asthma     as a child     Past Surgical History:   Procedure Laterality Date    INCISION AND DRAINAGE OF WOUND Left 06/28/2021    Procedure: INCISION AND DRAINAGE (I&D) LEFT SUBMANDIBULAR SPACE INFECTION, LEFT  SPACE INFECTION WITH EXTRACTION OF TOOTH #19;  Surgeon: Fabi Hood,  "DMD;  Location: [facility];  Service: Maxillofacial    ORIF TIBIA & FIBULA FRACTURES Right     [facility]TH[facility] KNE SURG W/MENISCECTOMY MED/LAT W/SHVG Right 1/10/2023    Procedure: ARTHROSCOPY KNEE WITH LATERAL MENISCECTOMY;  Surgeon: Harvinder Ga DO;  Location: [facility];  Service: Orthopedics     Social History   Social History     Substance and Sexual Activity   Alcohol Use Never     Social History     Substance and Sexual Activity   Drug Use Yes    Frequency: 7.0 times per week    Types: Marijuana    Comment: daily     Social History     Tobacco Use   Smoking Status Every Day    Current packs/day: 0.50    Types: Cigarettes   Smokeless Tobacco Never     Family History: non-contributory    Meds/Allergies   all medications and allergies reviewed  No Known Allergies    Objective   Vitals: Height 6' 3\" (1.905 m), weight 92.5 kg (204 lb).,Body mass index is 25.5 kg/m².    No intake or output data in the 24 hours ending 01/17/24 0727    No intake/output data recorded.    Invasive Devices       Peripheral Intravenous Line  Duration             Peripheral IV 12/19/23 Right Antecubital 28 days                    Physical Exam  Ortho Exam  Right lower extremity is neurovascularly intact  Toes are pink and warm  Compartments are soft  Positive Lachman and drawer  Brisk cap refill  Sensation intact  Range of motion limited to pain  Cardiovascular: Normal rate    Pulmonary: Effort normal  Abdomen: Soft, nontender, nondistended   Lab Results: I have personally reviewed pertinent lab results.  Imaging: I have personally reviewed pertinent films in PACS  EKG, Pathology, and Other Studies: I have personally reviewed pertinent films in PACS    Code Status: Prior  Advance Directive and Living Will:      Power of :    POLST:      Counseling / Coordination of Care  Total floor / unit time spent today 30 minutes.  Greater than 50% of total time was spent with the patient and / or family counseling and / or coordination of " care.

## 2024-01-17 NOTE — ANESTHESIA PREPROCEDURE EVALUATION
Procedure:  ARTHROSCOPIC RECONSTRUCTION ANTERIOR CRUCIATE LIGAMENT (ACL) WITH ALLOGRAFT (Right: Knee)    Relevant Problems   No relevant active problems      LMA Placement Date: 01/10/23; Placement Time: 0736; Mask Ventilation: Mask ventilation not attempted (0); Brand: LMA; Size: 4; Insertion Attempts: 1; Placement Verify: Auscultation, End tidal CO2; Removal Date: 01/10/23; Removal Time: 0837     ETT Placement Date: 06/28/21; Placement Time: 1851; Mask Ventilation: Ventilated by mask with oral airway (2); Technique: Video laryngoscopy; Type: Cuffed, Oral, TAMIR; Tube Size: 8 mm; Laryngoscope: Alvarez; Blade Size: 3; Location: Oral; Grade View: 1; Insertion Attempts: 1; Placement Verification: Auscultation, End tidal CO2, Fiberoptic visualization, Symmetrical chest wall movement; Removal Date: 06/28/21; Removal Time: 1925     Lab Results   Component Value Date    WBC 7.35 12/19/2023    HGB 15.5 12/19/2023    HCT 47.3 12/19/2023    MCV 92 12/19/2023     12/19/2023         Physical Exam    Airway    Mallampati score: II  TM Distance: >3 FB  Neck ROM: full     Dental   No notable dental hx     Cardiovascular  Rhythm: regular, Rate: normal    Pulmonary   Breath sounds clear to auscultation    Other Findings  Intercisor Distance > 3cm          Anesthesia Plan  ASA Score- 2     Anesthesia Type- general with ASA Monitors.         Additional Monitors:     Airway Plan: LMA.    Comment: Discussed benefits/risks of general anesthesia including possibility of mouth/throat pain, injury to lips/teeth, nausea/vomiting, and surgical pain along with more rare complications such as stroke, MI, pneumonia, aspiration, and injury to blood vessels. All questions answered.    Discussed nerve block to assist with post-operative analgesia. Discussed possibility of uncommon complications including permanent nerve injury, damage to blood vessels, infection local anesthetic toxicity, and nerve block failure. Patient understands and  wishes to proceed.         .       Plan Factors-Exercise tolerance (METS): >4 METS.    Chart reviewed. EKG reviewed.  Existing labs reviewed.                   Induction- intravenous.    Postoperative Plan- Plan for postoperative opioid use. Planned trial extubation    Informed Consent- Anesthetic plan and risks discussed with patient.  I personally reviewed this patient with the CRNA. Discussed and agreed on the Anesthesia Plan with the CRNA..

## 2024-01-17 NOTE — OP NOTE
OPERATIVE REPORT  PATIENT NAME: Khalif Hood    :  1990  MRN: [id number]  Pt Location: [facility] ROOM 02    SURGERY DATE: 2024    Surgeons and Role:     * Harvinder Ga, DO - Primary     * Sanjay Cheek PA-C - Assisting    Preop Diagnosis:  Old complete ACL tear, right [M23.51]    Post-Op Diagnosis Codes:     Anterior crucial ligament tear right knee  Tear of lateral meniscus  Degenerative joint disease  Synovitis    Procedure(s):  Right - knee arthroscopy  Lateral meniscectomy  Synovectomy  ACL reconstruction with allograft using the Arthrex graft link system  Post arthroscopic injection of intra-articular joint space and peripheral portals    Specimen(s):  Shavings    Estimated Blood Loss:   50 cc    Drains:  none    Anesthesia Type:   General with femoral nerve block    Operative Indications:  Anterior crucial ligament tear right knee      Operative Findings:  TT: 68    Complications:   None    Procedure and Technique:    The patient was properly identified and brought into the operating suite.  After successful induction of the general anesthetic, a tourniquet was placed on the patient's obese right proximal thigh.  The right lower extremity was then prepped and draped in usual fashion.  Prior to placing the tourniquet, an evaluation under anesthesia did occur with a positive Lachman's, drawer, and pivot shift tests     It was medically necessary that the physician assistant be in the room to aid in positioning and applying the appropriate amount of retraction on the patient.  A qualified resident was not available     The surgical landmarks relevant a skin marker.  An Esmarch was used to examine rate the right lower extremity.  The tourniquet was then inflated.  Using a 1#1 Scalpel blade an anterior lateral arthroscopic portal was made approximately 1 cm joint line 1 cm lateral to patella tendon and anteromedial portal was made approximately 1 cm of joint line and 1 cm medial patella  tendon.  The arthroscope was 1st introduced into the lateral portal.  First the medial joint space was inspected.  The medial meniscus was probed both on its superior and inferior surfaces and there was no tearing present.  There was minimal arthrosis along the medial side of her knee were no chondroplasty was warranted.     The arthroscope was then introduced into the intercondylar notch.  The ACL was not visible indicative of a tear and will be further addressed.     The arthroscope was then induced the lateral side of his knee.  There was a  tear alonging the lateral meniscus.  There was also grade 3 and grade 4 arthritic changes along the lateral tibial plateau and lateral femoral condyle.  Using arthroscopic biter, a lateral meniscectomy did occur and then was smoothed out with a shaver.  A chondroplasty did occur with the assistance of an arthroscopic shaver as well.  It was then confirmed with a probe that there is no further loosening of the meniscus and articular cartilage     The arthroscope was then introduced the patellofemoral joint.  There was minimal arthrosis along the patellofemoral joint were no chondroplasty was warranted        Attention was then paid to the ACL reconstruction.  The patient's previous ACL stump was removed.  There was some stenosis along the notch.  A notchplasty occurred with the assistance of an arthroscopic bur.  Once this was confirmed to be adequately decompressed, the transtibial guide was placed through the medial portal staying approximately 7 mm anterior to posterior cruciate ligament and midline in the notch.  Once this was confirmed to be in anatomic position, an accessory medial incision was made approximately 11/2 cm medial to the tibial tubercle just above the level of the pes anserine tendons.  The guide was secured.  A guide pin was placed in a retrograde fashion entering the knee joint with an ideal isometric point.  Once this was confirmed, an 10 mm Reamer was  used to open up the tibial tunnel.  The soft tissue along the tibial tunnel was then removed to prevent a “cyclops” lesion.  The tunnel was then smoothed out with a Reamer.  The femoral guide was placed through the tibial tunnel at approximately the 11 o'clock position.  A guide pin was placed.  Then using a 10 mm Reamer, the femoral tunnel was created.  It was confirmed that there is no blowing out of the back wall.    The eye pin was then placed exiting the anterolateral thigh.  In the meantime, the graftlink was assembled using a combination of fiber stitches.   The graft was then placed through the eye pin and advanced in a retrograde fashion anatomically reducing the graft.  The Endobutton was secured (15 mm) It was confirmed that it was in the ideal position.   The graft was then pretensioned.  The distal fixation occurred with an Endo button as well (14 MM).  At this point, the arthroscope was then introduced into the knee which showed anatomic placement of the graft with good tension.  The wound was then irrigated.  The tibial tunnel was closed with 0 Vicryl, 2-0 Vicryl and remaining skin was closed with 3-0 nylon.  All the incisional sites were infiltrated with 0.5% Marcaine with epinephrine as well as the intra-articular portion.  The wounds were then dressed with ointment, Xeroform, 4x4s, sterile, Ace bandage and a hinged knee brace.  There was no complications during the  procedure.  He was successfully woken, transferred to his hospital bed, and went to recovery room in stable condition        I was present for the entire procedure., A qualified resident physician was not available., and A physician assistant was required during the procedure for retraction, tissue handling, dissection and suturing.    Patient Disposition:  PACU         SIGNATURE: Harvinder Ga,   DATE: January 17, 2024  TIME: 12:02 PM

## 2024-01-17 NOTE — ANESTHESIA PROCEDURE NOTES
Peripheral Block    Patient location during procedure: pre-op  Start time: 1/17/2024 11:01 AM  Reason for block: at surgeon's request and post-op pain management  Staffing  Performed by: Eder Evangelista MD  Authorized by: Eder Evangelista MD    Preanesthetic Checklist  Completed: patient identified, IV checked, site marked, risks and benefits discussed, surgical consent, monitors and equipment checked, pre-op evaluation and timeout performed  Peripheral Block  Patient position: supine  Prep: ChloraPrep  Patient monitoring: continuous pulse ox, frequent blood pressure checks and heart rate  Block type: femoral  Laterality: right  Injection technique: single-shot  Procedures: ultrasound guided, Ultrasound guidance required for the procedure to increase accuracy and safety of medication placement and decrease risk of complications.  Ultrasound permanent image savedbupivacaine (PF) (MARCAINE) 0.5 % injection 20 mL - Perineural   20 mL - 1/17/2024 11:01:00 AM  Needle  Needle type: Stimuplex   Needle gauge: 20 G  Needle length: 4 in  Needle localization: ultrasound guidance  Assessment  Injection assessment: incremental injection, local visualized surrounding nerve on ultrasound, negative aspiration for heme and no paresthesia on injection  Paresthesia pain: none  patient tolerated the procedure well with no immediate complications  Additional Notes  Uncomplicated femoral nerve block  Iliopsoas muscle and nerve identified below fascia iliaca lateral to femoral artery

## 2024-01-19 ENCOUNTER — EVALUATION (OUTPATIENT)
Dept: PHYSICAL THERAPY | Age: 34
End: 2024-01-19
Payer: COMMERCIAL

## 2024-01-19 DIAGNOSIS — M23.51 OLD COMPLETE ACL TEAR, RIGHT: ICD-10-CM

## 2024-01-19 PROCEDURE — 97110 THERAPEUTIC EXERCISES: CPT | Performed by: PHYSICAL THERAPIST

## 2024-01-19 PROCEDURE — 97161 PT EVAL LOW COMPLEX 20 MIN: CPT | Performed by: PHYSICAL THERAPIST

## 2024-01-19 NOTE — PROGRESS NOTES
PT Evaluation     Today's date: 2024  Patient name: Khlaif Hood  : 1990  MRN: [id number]  Referring provider: Sanjay Cheek,*  Dx:   Encounter Diagnosis     ICD-10-CM    1. Old complete ACL tear, right  M23.51 Ambulatory Referral to Physical Therapy          Start Time: 0900  Stop Time: 1000  Total time in clinic (min): 60 minutes    Assessment  Assessment details: Khalif Hood is a 33 y.o. male who presents with right knee pain, decreased strength with poor quad activation and NM control, decreased A/PROM lacking both flexion and TKE, and ambulatory dysfunction. Due to these impairments, Patient has difficulty performing a/iadls, recreational activities, work-related activities, and engaging in social activities. Patient's clinical presentation is consistent with the referring diagnosis of s/p right knee ACL reconstruction with allograft and lateral menisectomy. Patient would benefit from skilled physical therapy to address the aforementioned impairments, improve his level of function and to improve his overall quality of life.  Impairments: abnormal gait, abnormal or restricted ROM, activity intolerance, impaired physical strength, lacks appropriate home exercise program, pain with function and weight-bearing intolerance  Functional limitations: walking, standing, working, running, playing recreational softabll, exercising at gym, adls/iadlsUnderstanding of Dx/Px/POC: good   Prognosis: good    Goals  ST-3 WEEKS  1.  Decrease pain by 2 points on VAS at its worst.  2.  Increase ROM by > 5 deg in all deficients planes.  3.  Increase UE by 1/2 MMT grade in all deficient planes.    LT-6 WEEKS  1. Patient to be independent with a/iadls.  2. Increase functional activities for leisure and home activities to previous LOF.  3. Independent with HEP and/or fitness program.    Plan  Patient would benefit from: skilled physical therapy  Planned modality interventions: cryotherapy,  electrical stimulation/Russian stimulation, thermotherapy: hydrocollator packs and unattended electrical stimulation  Planned therapy interventions: activity modification, behavior modification, body mechanics training, aquatic therapy, flexibility, functional ROM exercises, home exercise program, IADL retraining, joint mobilization, manual therapy, neuromuscular re-education, patient education, postural training, strengthening, stretching, therapeutic activities and therapeutic exercise  Frequency: 2-3x week.  Duration in weeks: 12  Treatment plan discussed with: patient      Subjective Evaluation    History of Present Illness  Date of surgery: 2024  Mechanism of injury: surgery  Mechanism of injury: Pt sustained a partial ACL injury in the past and it progressively worsened with knee pain an then he felt a pop while running playing softball. He began to have more pain and at times the knee would give out.   He underwent ACL reconstruction with allograft and lateral menisectomy. He is immobilized in a long leg brace, walking with crutches and referred for PT.  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   Patient Goals  Patient goals for therapy: return to sport/leisure activities, return to work, decreased edema, decreased pain, increased strength, increased motion and independence with ADLs/IADLs  Patient goal: be able to play softball  Pain  Current pain rating: 3  At worst pain ratin  Location: right knee          Objective     Active Range of Motion     Right Knee   Flexion: 55 degrees with pain  Extension: -10 degrees     Passive Range of Motion     Right Knee   Flexion: 72 degrees with pain  Extension: -5 degrees with pain    Mobility   Patellar Mobility:     Right Knee   WFL: medial, lateral, superior and inferior    Strength/Myotome Testing     Left Hip   Normal muscle strength    Right Hip   Planes of Motion   Abduction:  4-  Adduction: 4    Left Knee   Normal strength    Right Knee   Flexion: 2  Extension: 2  Quadriceps contraction: fair    Left Ankle/Foot   Normal strength    Right Ankle/Foot   Normal strength    Swelling     Left Knee Girth Measurement (cm)   Joint line: 38.5 cm    Right Knee Girth Measurement (cm)   Joint line: 40.5 cm    Ambulation   Weight-Bearing Status   Weight-Bearing Status (Right): weight-bearing as tolerated    Assistive device used: crutches    Ambulation: Level Surfaces   Ambulation with assistive device: independent  Ambulation without assistive device: unable             POC expires Unit limit Auth Expiration date PT/OT + Visit Limit?   4/18/24 BON 4/12/24 20                           Visit/Unit Tracking  AUTH Status:  Date 1/19              Auth needed Used 1               Remaining  19               FOTO 22 (56)                    Precautions: PROTOCOL    Access Code: [email or web address]  URL: [email or web address]  Date: 01/19/2024  Prepared by: Annemarie Soria    Exercises  - Supine Quad Set  - 1 x daily - 7 x weekly - 3 sets - 10 reps  - Active Straight Leg Raise with Quad Set  - 1 x daily - 7 x weekly - 3 sets - 10 reps  - Seated Heel Slide  - 1 x daily - 7 x weekly - 3 sets - 10 reps  - Supine Ankle Pumps  - 1 x daily - 7 x weekly - 3 sets - 10 reps  - Heel Raises with Counter Support  - 1 x daily - 7 x weekly - 3 sets - 10 reps  - Standing Hip Abduction with Counter Support  - 1 x daily - 7 x weekly - 3 sets - 10 reps  - Standing Hip Extension with Counter Support  - 1 x daily - 7 x weekly - 3 sets - 10 reps  - Mini Squat with Counter Support  - 1 x daily - 7 x weekly - 3 sets - 10 reps  Manuals 1/19            PROM                          TherEx             HEP/Pt ed/dressing change See above            Quad sets 30x            Heel slides 20x            SLR  In brace  2x10            SL ABD in brace NV            Prone hip ext NV                         Ball ADD 30x                          Standing ham curls              Standing ABD  In brace 20x            Heel raise             Nustep NV            TKE ball wall             Prone extension hang NV            Seated heel slide NV                         NM Re-ed             NMES/quad set NV                                                                                          Ther Activity                                       Gait Training                                       Modalities

## 2024-01-22 ENCOUNTER — OFFICE VISIT (OUTPATIENT)
Dept: PHYSICAL THERAPY | Age: 34
End: 2024-01-22
Payer: COMMERCIAL

## 2024-01-22 DIAGNOSIS — M23.51 OLD COMPLETE ACL TEAR, RIGHT: Primary | ICD-10-CM

## 2024-01-22 PROCEDURE — 97112 NEUROMUSCULAR REEDUCATION: CPT

## 2024-01-22 PROCEDURE — 97110 THERAPEUTIC EXERCISES: CPT

## 2024-01-22 PROCEDURE — 97140 MANUAL THERAPY 1/> REGIONS: CPT

## 2024-01-22 NOTE — PROGRESS NOTES
Daily Note     Today's date: 2024  Patient name: Khalif Hood  : 1990  MRN: [id number]  Referring provider: Sanjay Cheek,*  Dx:   Encounter Diagnosis     ICD-10-CM    1. Old complete ACL tear, right  M23.51           Start Time: 0915  Stop Time: 1015  Total time in clinic (min): 60 minutes    Subjective: Presents to PT with L axillary crutch. Since initial evaluation was able to shower and has discontinued pain medication, using tylenol when needed. Pain in morning decreases throughout the day, descriptors include stiffness and soreness. Pain prior and after therapy a 2 out of 10.     Objective: See treatment diary below for objective changes. Introduced NEMS for quad strengthening and ice modality for pain modulation. Before modalities sensation and skin checked and intact. After modalities skin intact.    ROM  - AAROM w/ heel strap 80 degrees  - AAROM w/ therapist for flexion and extension    NEMS:  2 channels, 4 electrodes   Location: R quads  Duration: 10 minutes   Jordanian: 10:30   Intensity: visible mms contraction to pt tolerance     Ice:  Over R knee with towel for 10 minutes      Assessment: Tolerated treatment well. Patient exhibited good technique with therapeutic exercises and would benefit from continued PT      Plan: Continue per plan of care.      POC expires Unit limit Auth Expiration date PT/OT + Visit Limit?   24 BON 24 20                           Visit/Unit Tracking  AUTH Status:  Date              Auth needed Used 1 2              Remaining                FOTO 22 (56)                    Precautions: PROTOCOL    Access Code: [email or web address]  URL: [email or web address]  Date: 2024  Prepared by: Annemarie Soria    Exercises  - Supine Quad Set  - 1 x daily - 7 x weekly - 3 sets - 10 reps  - Active Straight Leg Raise with Quad Set  - 1 x daily - 7 x weekly - 3 sets - 10 reps  - Seated Heel Slide  - 1 x daily - 7 x weekly - 3 sets - 10  "reps  - Supine Ankle Pumps  - 1 x daily - 7 x weekly - 3 sets - 10 reps  - Heel Raises with Counter Support  - 1 x daily - 7 x weekly - 3 sets - 10 reps  - Standing Hip Abduction with Counter Support  - 1 x daily - 7 x weekly - 3 sets - 10 reps  - Standing Hip Extension with Counter Support  - 1 x daily - 7 x weekly - 3 sets - 10 reps  - Mini Squat with Counter Support  - 1 x daily - 7 x weekly - 3 sets - 10 reps  Manuals 1/19 1/22           PROM                          TherEx             HEP/Pt ed/dressing change See above            Quad sets 30x 30x w/ 5\" hold           Heel slides 20x 30x            SLR  In brace  2x10 In brace 3x10           SL ABD in brace NV            Prone hip ext NV                         Ball ADD 30x 30x                        Standing ham curls              Standing ABD  In brace 20x In brace 3x10           Heel raise             Nustep NV            TKE ball wall             Prone extension hang NV            Seated heel slide NV                         NM Re-ed             NMES/quad set NV 10'   10:30                                                                                         Ther Activity                                       Gait Training                                       Modalities             Ice   10'                             "

## 2024-01-26 ENCOUNTER — OFFICE VISIT (OUTPATIENT)
Dept: PHYSICAL THERAPY | Age: 34
End: 2024-01-26
Payer: COMMERCIAL

## 2024-01-26 DIAGNOSIS — M23.51 OLD COMPLETE ACL TEAR, RIGHT: Primary | ICD-10-CM

## 2024-01-26 PROCEDURE — 97110 THERAPEUTIC EXERCISES: CPT

## 2024-01-26 PROCEDURE — 97112 NEUROMUSCULAR REEDUCATION: CPT

## 2024-01-26 PROCEDURE — 97140 MANUAL THERAPY 1/> REGIONS: CPT

## 2024-01-26 NOTE — PROGRESS NOTES
Daily Note     Today's date: 2024  Patient name: Khalif Hood  : 1990  MRN: [id number]  Referring provider: Sanjay Cheek,*  Dx:   Encounter Diagnosis     ICD-10-CM    1. Old complete ACL tear, right  M23.51           Start Time: 0835  Stop Time: 0930  Total time in clinic (min): 55 minutes    Subjective: Pt reports to PT without crutches. After PT on Monday describes feeling quad soreness following NMES.       Objective: See treatment diary below. Notable changes include completing SLR and standing abduction without brace. Added toe taps open and closed to work on balance, weight acceptance, and proprioception.       Assessment: Tolerated treatment well. Patient demonstrated fatigue post treatment      Plan: Continue per plan of care.      POC expires Unit limit Auth Expiration date PT/OT + Visit Limit?   24 BON 24 20                           Visit/Unit Tracking  AUTH Status:  Date             Auth needed Used 1 2 3             Remaining   18 17             FOTO 22 (56)                    Precautions: PROTOCOL    Access Code: [email or web address]  URL: [email or web address]  Date: 2024  Prepared by: Annemarie Soria    Exercises  - Supine Quad Set  - 1 x daily - 7 x weekly - 3 sets - 10 reps  - Active Straight Leg Raise with Quad Set  - 1 x daily - 7 x weekly - 3 sets - 10 reps  - Seated Heel Slide  - 1 x daily - 7 x weekly - 3 sets - 10 reps  - Supine Ankle Pumps  - 1 x daily - 7 x weekly - 3 sets - 10 reps  - Heel Raises with Counter Support  - 1 x daily - 7 x weekly - 3 sets - 10 reps  - Standing Hip Abduction with Counter Support  - 1 x daily - 7 x weekly - 3 sets - 10 reps  - Standing Hip Extension with Counter Support  - 1 x daily - 7 x weekly - 3 sets - 10 reps  - Mini Squat with Counter Support  - 1 x daily - 7 x weekly - 3 sets - 10 reps  Manuals           PROM                          TherEx             HEP/Pt ed/dressing change See  "above            Quad sets 30x 30x w/ 5\" hold HS sets followed by quad sets 30x w/5' hold           Heel slides 20x 30x  20 x           SLR  In brace  2x10 In brace 3x10 W/o 3x15           SL ABD in brace NV            Prone hip ext NV                         Ball ADD 30x 30x                        Standing ham curls              Standing ABD  In brace 20x In brace 3x10 3x15          Heel raise             Nustep/bike  NV   X         TKE ball wall             Prone extension hang NV            Seated heel slide NV             Mini squats    X         NM Re-ed             NMES/quad set NV 10'   10:30 10' 10:30           Toe taps    EO/EC forward, lateral  X                                                                          Ther Activity                                       Gait Training                                       Modalities             Ice   10' 10'                              "

## 2024-01-29 ENCOUNTER — OFFICE VISIT (OUTPATIENT)
Dept: PHYSICAL THERAPY | Age: 34
End: 2024-01-29
Payer: COMMERCIAL

## 2024-01-29 DIAGNOSIS — M23.51 OLD COMPLETE ACL TEAR, RIGHT: Primary | ICD-10-CM

## 2024-01-29 PROCEDURE — 97140 MANUAL THERAPY 1/> REGIONS: CPT | Performed by: PHYSICAL THERAPIST

## 2024-01-29 PROCEDURE — 97110 THERAPEUTIC EXERCISES: CPT | Performed by: PHYSICAL THERAPIST

## 2024-01-29 NOTE — PROGRESS NOTES
"Daily Note     Today's date: 2024  Patient name: Khalif Hood  : 1990  MRN: [id number]  Referring provider: Sanjay Cheek,*  Dx:   Encounter Diagnosis     ICD-10-CM    1. Old complete ACL tear, right  M23.51           Start Time: 1045  Stop Time: 1140  Total time in clinic (min): 55 minutes    Subjective: Pt reports pain is less each day. Today reports as 2/10.       Objective: See treatment diary below  Flexion AROM 100, PROM 105    Assessment: Pt has good quad control with no evidence of lag. Opened brace to 90 following protocol guidelines. Added bike for partial range.       Plan: Continue per plan of care.      POC expires Unit limit Auth Expiration date PT/OT + Visit Limit?   24 BOMN 24 20                           Visit/Unit Tracking  AUTH Status:  Date            Auth needed Used 1 2 3 4            Remaining  19 18 17 16            FOTO 22 (56)                    Precautions: PROTOCOL    Access Code: [email or web address]  URL: [email or web address]  Date: 2024  Prepared by: Annemarie Soria    Exercises  - Supine Quad Set  - 1 x daily - 7 x weekly - 3 sets - 10 reps  - Active Straight Leg Raise with Quad Set  - 1 x daily - 7 x weekly - 3 sets - 10 reps  - Seated Heel Slide  - 1 x daily - 7 x weekly - 3 sets - 10 reps  - Supine Ankle Pumps  - 1 x daily - 7 x weekly - 3 sets - 10 reps  - Heel Raises with Counter Support  - 1 x daily - 7 x weekly - 3 sets - 10 reps  - Standing Hip Abduction with Counter Support  - 1 x daily - 7 x weekly - 3 sets - 10 reps  - Standing Hip Extension with Counter Support  - 1 x daily - 7 x weekly - 3 sets - 10 reps  - Mini Squat with Counter Support  - 1 x daily - 7 x weekly - 3 sets - 10 reps  Manuals          PROM                          TherEx             HEP/Pt ed/dressing change See above            Quad sets 30x 30x w/ 5\" hold HS sets followed by quad sets 30x w/5' hold  30x         Heel slides " "20x 30x  20 x  30x         SLR  In brace  2x10 In brace 3x10 W/o 3x15  2#/ 30         SL ABD in brace NV   No brace 30x         Prone hip ext NV   30x                      Ball ADD 30x 30x  30x                      Prone ham curls     30x         Standing ABD  In brace 20x In brace 3x10 3x15          Heel raise    30x         Nustep/bike  NV   5' L4         TKE ball wall    30x 2\"         Prone extension hang NV            Seated heel slide NV             Mini squats    30x         Lateral step ups    NV                      Bike ROM    5'                      NM Re-ed             NMES/quad set NV 10'   10:30 10' 10:30           Toe taps    EO/EC forward, lateral  10x 3 cones                                                                          Ther Activity                                       Gait Training                                       Modalities             Ice   10' 10'                                "

## 2024-01-31 ENCOUNTER — APPOINTMENT (OUTPATIENT)
Dept: PHYSICAL THERAPY | Age: 34
End: 2024-01-31
Payer: COMMERCIAL

## 2024-02-01 ENCOUNTER — OFFICE VISIT (OUTPATIENT)
Dept: PHYSICAL THERAPY | Age: 34
End: 2024-02-01
Payer: COMMERCIAL

## 2024-02-01 DIAGNOSIS — M23.51 OLD COMPLETE ACL TEAR, RIGHT: Primary | ICD-10-CM

## 2024-02-01 PROCEDURE — 97110 THERAPEUTIC EXERCISES: CPT

## 2024-02-01 PROCEDURE — 97112 NEUROMUSCULAR REEDUCATION: CPT

## 2024-02-01 NOTE — PROGRESS NOTES
Daily Note     Today's date: 2024  Patient name: Khalif Hood  : 1990  MRN: [id number]  Referring provider: aSnjay Cheek,*  Dx:   Encounter Diagnosis     ICD-10-CM    1. Old complete ACL tear, right  M23.51           Start Time: 1345  Stop Time: 1445  Total time in clinic (min): 60 minutes    Subjective: Reports feeling better each day. Less pain overall.       Objective: See treatment diary below  Flexion AROM: 98 deg    Flexion PROM: 102 deg     Assessment:  Added lateral step downs today with cues for UE support, proper weight shifting to avoid ant translation of tibia, and to avoid IR of R hip. No c/o pain with this activity. Cues for proper weight shifting during mini squats as well as demonstrations to achieve proper form. Added a strap for OP during heelslides at end range to encourage greater knee flexion. Patient was able to make full revolutions on bike today without c/o pain. Patient would benefit from continued PT.      Plan: Continue per plan of care.      POC expires Unit limit Auth Expiration date PT/OT + Visit Limit?   24 BOMN 24 20                           Visit/Unit Tracking  AUTH Status:  Date           Auth needed Used 1 2 3 4 5           Remaining  19 18 17 16 15           FOTO 22 (56)                    Precautions: PROTOCOL    Access Code: [email or web address]  URL: [email or web address]  Date: 2024  Prepared by: Annemarie Soria    Exercises  - Supine Quad Set  - 1 x daily - 7 x weekly - 3 sets - 10 reps  - Active Straight Leg Raise with Quad Set  - 1 x daily - 7 x weekly - 3 sets - 10 reps  - Seated Heel Slide  - 1 x daily - 7 x weekly - 3 sets - 10 reps  - Supine Ankle Pumps  - 1 x daily - 7 x weekly - 3 sets - 10 reps  - Heel Raises with Counter Support  - 1 x daily - 7 x weekly - 3 sets - 10 reps  - Standing Hip Abduction with Counter Support  - 1 x daily - 7 x weekly - 3 sets - 10 reps  - Standing Hip Extension with Counter  "Support  - 1 x daily - 7 x weekly - 3 sets - 10 reps  - Mini Squat with Counter Support  - 1 x daily - 7 x weekly - 3 sets - 10 reps  Manuals 1/19 1/22 1/26 1/29 2/1        PROM                          TherEx             HEP/Pt ed/dressing change See above            Quad sets 30x 30x w/ 5\" hold HS sets followed by quad sets 30x w/5' hold  30x 30x         Heel slides 20x 30x  20 x  30x 30x strap OP        SLR  In brace  2x10 In brace 3x10 W/o 3x15  2#/ 30 No brace 2# 30x         SL ABD in brace NV   No brace 30x No brace 30x         Prone hip ext NV   30x No brace 30x                      Ball ADD 30x 30x  30x 30x                      Prone ham curls     30x 30x         Standing ABD  In brace 20x In brace 3x10 3x15  No brace 3x15         Heel raise    30x 30x         Nustep/bike  NV   5' L4 L5 5'         TKE ball wall    30x 2\" 2\"x30        Prone extension hang NV            Seated heel slide NV             Mini squats    30x 30x         Lateral step ups    NV 4\" 20x         Wall 1/4 sit      NV         Bike ROM    5' 5' full rev                     NM Re-ed             NMES/quad set NV 10'   10:30 10' 10:30           Toe taps    EO/EC forward, lateral  10x 3 cones No brace 10x 3 cones                                                                          Ther Activity                                       Gait Training                                       Modalities             Ice   10' 10'                                  "

## 2024-02-02 ENCOUNTER — OFFICE VISIT (OUTPATIENT)
Dept: OBGYN CLINIC | Facility: CLINIC | Age: 34
End: 2024-02-02

## 2024-02-02 VITALS
DIASTOLIC BLOOD PRESSURE: 73 MMHG | HEIGHT: 75 IN | HEART RATE: 65 BPM | SYSTOLIC BLOOD PRESSURE: 115 MMHG | BODY MASS INDEX: 25.24 KG/M2 | WEIGHT: 203 LBS

## 2024-02-02 DIAGNOSIS — Z98.890 S/P RIGHT KNEE ARTHROSCOPY: Primary | ICD-10-CM

## 2024-02-02 PROCEDURE — 99024 POSTOP FOLLOW-UP VISIT: CPT | Performed by: ORTHOPAEDIC SURGERY

## 2024-02-02 NOTE — PROGRESS NOTES
Assessment:   Diagnosis ICD-10-CM Associated Orders   1. S/P right knee arthroscopy  Z98.890 XR knee 1 or 2 vw right          Plan:  2 weeks s/p right knee arthroscopy Lateral meniscectomy Synovectomy ACL reconstruction with allograft performed on 1/17/24, doing well  Sutures were removed today in the office  Continue with PT/HEP as per post operative protocol.     To do next visit:  Return in about 1 month (around 3/2/2024) for Recheck right knee.    The above stated was discussed in layman's terms and the patient expressed understanding.  All questions were answered to the patient's satisfaction.     The patient is doing quite well from his ACL reconstruction of his right knee.  Continue brace.  Continue ACL protocol.  Follow-up in a month    Scribe Attestation      I,:  Khalif Shirayari am acting as a scribe while in the presence of the attending physician.:       I,:  Harvinder Ga, DO personally performed the services described in this documentation    as scribed in my presence.:               Subjective:   Khalif Hood is a 33 y.o. male who presents today 2 weeks s/p right knee arthroscopy Lateral meniscectomy Synovectomy ACL reconstruction with allograft performed on 1/17/24. Patient reports that he is doing well post operatively. His pain is well controlled. He has been performing formal PT/HEP as per post operative protocol with benefit. No numbness or tingling. No fevers or chills.       Review of systems negative unless otherwise specified in HPI  Review of Systems   Constitutional:  Negative for chills, fatigue, fever and unexpected weight change.   HENT:  Negative for hearing loss, nosebleeds and sore throat.    Eyes:  Negative for pain, redness and visual disturbance.   Respiratory:  Negative for cough, shortness of breath and wheezing.    Cardiovascular:  Negative for chest pain, palpitations and leg swelling.   Gastrointestinal:  Negative for abdominal pain, nausea and vomiting.   Endocrine:  Negative for polydipsia and polyuria.   Genitourinary:  Negative for frequency and urgency.   Skin:  Negative for color change, rash and wound.   Neurological:  Negative for dizziness, weakness, numbness and headaches.   Psychiatric/Behavioral:  Negative for behavioral problems, self-injury and suicidal ideas.        Past Medical History:   Diagnosis Date    Asthma     as a child       Past Surgical History:   Procedure Laterality Date    INCISION AND DRAINAGE OF WOUND Left 06/28/2021    Procedure: INCISION AND DRAINAGE (I&D) LEFT SUBMANDIBULAR SPACE INFECTION, LEFT  SPACE INFECTION WITH EXTRACTION OF TOOTH #19;  Surgeon: Fabi Hood DMD;  Location:  MAIN OR;  Service: Maxillofacial    ORIF TIBIA & FIBULA FRACTURES Right     SC ARTHRS AIDED ANT CRUCIATE LIGM RPR/AGMNTJ/RCNSTJ Right 1/17/2024    Procedure: Right - knee arthroscopy Lateral meniscectomy Synovectomy ACL reconstruction with allograft using the Arthrex graft link system Post arthroscopic injection of intra-articular joint space and peripheral portals;  Surgeon: Harvinder Ga DO;  Location: [facility];  Service: Orthopedics    SC ARTHRS KNE SURG W/MENISCECTOMY MED/LAT W/SHVG Right 1/10/2023    Procedure: ARTHROSCOPY KNEE WITH LATERAL MENISCECTOMY;  Surgeon: Harvinder Ga DO;  Location: [facility];  Service: Orthopedics       History reviewed. No pertinent family history.    Social History     Occupational History    Not on file   Tobacco Use    Smoking status: Every Day     Current packs/day: 0.50     Types: Cigarettes    Smokeless tobacco: Never   Vaping Use    Vaping status: Former    Start date: 1/1/2023   Substance and Sexual Activity    Alcohol use: Never    Drug use: Yes     Frequency: 7.0 times per week     Types: Marijuana     Comment: daily    Sexual activity: Not on file         Current Outpatient Medications:     ascorbic acid (VITAMIN C) 250 mg tablet, Take 250 mg by mouth daily, Disp: , Rfl:     aspirin 325 mg  "tablet, Take 1 tablet (325 mg total) by mouth daily, Disp: 30 tablet, Rfl: 0    cholecalciferol (VITAMIN D3) 400 units tablet, Take 400 Units by mouth daily, Disp: , Rfl:     meloxicam (MOBIC) 15 mg tablet, Take 1 tablet (15 mg total) by mouth daily, Disp: 30 tablet, Rfl: 0    methadone (DOLOPHINE) 10 mg/mL oral concentrated solution, Take 25 mg by mouth every morning, Disp: , Rfl:     Omega-3 Fatty Acids (fish oil) 1,000 mg, Take 1,000 mg by mouth daily, Disp: , Rfl:     vitamin E 100 UNIT capsule, Take 100 Units by mouth daily, Disp: , Rfl:     No Known Allergies         Vitals:    02/02/24 0938   BP: 115/73   Pulse: 65       Body mass index is 25.37 kg/m².  Wt Readings from Last 3 Encounters:   02/02/24 92.1 kg (203 lb)   01/17/24 92.5 kg (203 lb 14.8 oz)   01/10/24 92.6 kg (204 lb 3.2 oz)       Objective:                    Left Knee Exam     Tests   Varus: negative Valgus: negative    Other   Erythema: absent  Scars: present (Incisions are C/D/I without signs of infection)  Sensation: normal  Pulse: present    Comments:  ROM and strength testing deferred today due to post operative period            Diagnostics, reviewed and taken today if performed as documented:    The attending physician has personally reviewed the pertinent films in PACS and interpretation is as follows:    X Ray Right Knee 2/2/24: No acute osseous abnormalities or degenerative changes.       Procedures, if performed today:    Procedures    None performed      Portions of the record may have been created with voice recognition software.  Occasional wrong word or \"sound a like\" substitutions may have occurred due to the inherent limitations of voice recognition software.  Read the chart carefully and recognize, using context, where substitutions have occurred.    "

## 2024-02-05 ENCOUNTER — OFFICE VISIT (OUTPATIENT)
Dept: PHYSICAL THERAPY | Age: 34
End: 2024-02-05
Payer: COMMERCIAL

## 2024-02-05 DIAGNOSIS — M23.51 OLD COMPLETE ACL TEAR, RIGHT: Primary | ICD-10-CM

## 2024-02-05 PROCEDURE — 97110 THERAPEUTIC EXERCISES: CPT | Performed by: PHYSICAL THERAPIST

## 2024-02-05 NOTE — PROGRESS NOTES
"Daily Note     Today's date: 2024  Patient name: Khalif Hood  : 1990  MRN: [id number]  Referring provider: Sanjay Cheek,*  Dx:   Encounter Diagnosis     ICD-10-CM    1. 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       Objective: See treatment diary below  AROM 110 ;PROM 115    Assessment: Pt demonstrates good quality gait with brace open. No evidence of antalgia. Good quad control. Flexion ROM continues to improve. Added calf press.       Plan: Continue per plan of care.      POC expires Unit limit Auth Expiration date PT/OT + Visit Limit?   24 BOMN 24 20                           Visit/Unit Tracking  AUTH Status:  Date          Auth needed Used 1 2 3 4 5 6          Remaining  19 18 17 16 15 14          FOTO 22 (56)                    Precautions: PROTOCOL    Access Code: [email or web address]  URL: [email or web address]  Date: 2024  Prepared by: Annemarie Soria    Exercises  - Supine Quad Set  - 1 x daily - 7 x weekly - 3 sets - 10 reps  - Active Straight Leg Raise with Quad Set  - 1 x daily - 7 x weekly - 3 sets - 10 reps  - Seated Heel Slide  - 1 x daily - 7 x weekly - 3 sets - 10 reps  - Supine Ankle Pumps  - 1 x daily - 7 x weekly - 3 sets - 10 reps  - Heel Raises with Counter Support  - 1 x daily - 7 x weekly - 3 sets - 10 reps  - Standing Hip Abduction with Counter Support  - 1 x daily - 7 x weekly - 3 sets - 10 reps  - Standing Hip Extension with Counter Support  - 1 x daily - 7 x weekly - 3 sets - 10 reps  - Mini Squat with Counter Support  - 1 x daily - 7 x weekly - 3 sets - 10 reps  Manuals  2/       PROM                          TherEx             HEP/Pt ed/dressing change See above            Quad sets 30x 30x w/ 5\" hold HS sets followed by quad " "sets 30x w/5' hold  30x 30x  30x       Heel slides 20x 30x  20 x  30x 30x strap OP 30x       SLR  In brace  2x10 In brace 3x10 W/o 3x15  2#/ 30 No brace 2# 30x  4#/ 30       SL ABD in brace NV   No brace 30x No brace 30x  4#/ 30       Prone hip ext NV   30x No brace 30x  4#/ 30       Prone knee flexion      4#/ 30       Ball ADD 30x 30x  30x 30x  With bridge 30x                    Prone ham curls     30x 30x  4#/ 30x       Standing ABD  In brace 20x In brace 3x10 3x15  No brace 3x15  4# ea 30x       Heel raise    30x 30x         Nustep/bike  NV   5' L4 L5 5'  5'        TKE ball wall    30x 2\" 2\"x30 30x       Prone extension hang NV            Seated heel slide NV             Mini squats    30x 30x  30x       Lateral step ups    NV 4\" 20x  30x       Wall 1/4 sit      NV         Bike ROM    5' 5' full rev 5'                    Calf press      50#/ 30       NM Re-ed             NMES/quad set NV 10'   10:30 10' 10:30           Toe taps    EO/EC forward, lateral  10x 3 cones No brace 10x 3 cones                                                                          Ther Activity                                       Gait Training                                       Modalities             Ice   10' 10'                                    "

## 2024-02-07 ENCOUNTER — APPOINTMENT (OUTPATIENT)
Dept: PHYSICAL THERAPY | Age: 34
End: 2024-02-07
Payer: COMMERCIAL

## 2024-02-09 ENCOUNTER — OFFICE VISIT (OUTPATIENT)
Dept: PHYSICAL THERAPY | Age: 34
End: 2024-02-09
Payer: COMMERCIAL

## 2024-02-09 DIAGNOSIS — M23.51 OLD COMPLETE ACL TEAR, RIGHT: Primary | ICD-10-CM

## 2024-02-09 PROCEDURE — 97110 THERAPEUTIC EXERCISES: CPT

## 2024-02-09 PROCEDURE — 97112 NEUROMUSCULAR REEDUCATION: CPT

## 2024-02-09 NOTE — PROGRESS NOTES
Daily Note     Today's date: 2024  Patient name: Khalif Hood  : 1990  MRN: [id number]  Referring provider: Sanjay Cheek,*  Dx:   Encounter Diagnosis     ICD-10-CM    1. Old complete ACL tear, right  M23.51                      Subjective: Notes he get some vibration when moving his knee. Notes he is a little more sore than usual today, walked a little more the past 2 days.       Objective: See treatment diary below      Assessment: Patella femoral crepitus present, improved following MT. VC to activate posterior chain when performing lateral step ups secondary to minimal hip hinging- visual 3pt cane demonstration to improve carryover. Emphasis on heel drive when stepping up to further activate posterior chain. Patient demonstrating mild tightness in knee flexion, able to achieve 112*. Presents with good quad control and fluid gait with brace ambulation. No sx irritability post. Patient would benefit from continued PT.       Plan: Continue per plan of care.      POC expires Unit limit Auth Expiration date PT/OT + Visit Limit?   24 BOMN 24 20                           Visit/Unit Tracking  AUTH Status:  Date         Auth needed Used 1 2 3 4 5 6 7         Remaining  19 18 17 16 15 14 13         FOTO 22 (56)                    Precautions: PROTOCOL    Access Code: [email or web address]  URL: [email or web address]  Date: 2024  Prepared by: Annemarie Soria    Exercises  - Supine Quad Set  - 1 x daily - 7 x weekly - 3 sets - 10 reps  - Active Straight Leg Raise with Quad Set  - 1 x daily - 7 x weekly - 3 sets - 10 reps  - Seated Heel Slide  - 1 x daily - 7 x weekly - 3 sets - 10 reps  - Supine Ankle Pumps  - 1 x daily - 7 x weekly - 3 sets - 10 reps  - Heel Raises with Counter Support  - 1 x daily - 7 x weekly - 3 sets - 10 reps  - Standing Hip Abduction with Counter Support  - 1 x daily - 7 x weekly - 3 sets - 10 reps  - Standing Hip Extension with  "Counter Support  - 1 x daily - 7 x weekly - 3 sets - 10 reps  - Mini Squat with Counter Support  - 1 x daily - 7 x weekly - 3 sets - 10 reps  Manuals 1/19 1/22 1/26 1/29 2/1 2/5 2/9      PROM       Hamstring stretch 4x 30\"      Patellar mobs       ROM       TherEx             HEP/Pt ed/dressing change See above            Quad sets 30x 30x w/ 5\" hold HS sets followed by quad sets 30x w/5' hold  30x 30x  30x 30x      Heel slides 20x 30x  20 x  30x 30x strap OP 30x 30x       SLR  In brace  2x10 In brace 3x10 W/o 3x15  2#/ 30 No brace 2# 30x  4#/ 30 4#/30      SL ABD in brace NV   No brace 30x No brace 30x  4#/ 30 4#/30x      Prone hip ext NV   30x No brace 30x  4#/ 30 4# 30x      Prone knee flexion      4#/ 30 4# 30x      Ball ADD 30x 30x  30x 30x  With bridge 30x With bridge 30x                    Prone ham curls     30x 30x  4#/ 30x 4# 30x      Standing ABD  In brace 20x In brace 3x10 3x15  No brace 3x15  4# ea 30x 4# ea 30x      Heel raise    30x 30x         Nustep/bike  NV   5' L4 L5 5'  5'  5'      TKE ball wall    30x 2\" 2\"x30 30x 30x      Prone extension hang NV            Seated heel slide NV             Mini squats    30x 30x  30x 30x      Lateral step ups    NV 4\" 20x  30x 30x      Wall 1/4 sit      NV         Bike ROM    5' 5' full rev 5' 5' full                   Calf press      50#/ 30 50# 30x      NM Re-ed             NMES/quad set NV 10'   10:30 10' 10:30           Toe taps    EO/EC forward, lateral  10x 3 cones No brace 10x 3 cones                                                                          Ther Activity                                       Gait Training                                       Modalities             Ice   10' 10'                                      "

## 2024-02-12 ENCOUNTER — OFFICE VISIT (OUTPATIENT)
Dept: PHYSICAL THERAPY | Age: 34
End: 2024-02-12
Payer: COMMERCIAL

## 2024-02-12 DIAGNOSIS — M23.51 OLD COMPLETE ACL TEAR, RIGHT: Primary | ICD-10-CM

## 2024-02-12 PROCEDURE — 97110 THERAPEUTIC EXERCISES: CPT

## 2024-02-12 NOTE — PROGRESS NOTES
Daily Note     Today's date: 2024  Patient name: Khalif Hood  : 1990  MRN: [id number]  Referring provider: Sanjay Cheek,*  Dx:   Encounter Diagnosis     ICD-10-CM    1. Old complete ACL tear, right  M23.51           Start Time: 0915  Stop Time: 1000  Total time in clinic (min): 45 minutes    Subjective: Reports some increased pain at the proximal calf and distal/lateral hamstring. States he is unsure if it is the brace or not. Occasional grinding reported when he bends his knee.       Objective: See treatment diary below      Assessment: Performed roller to R HS prior to manual stretching. Some relief felt post. Palpable crepitus at patellar region with lateral step ups when ascending. Performed standing TE in brace this session due to reported increase in pain the last few days. Will assess response next session and perform out of the brace if pain subsides and knee is stable with good quad control.  Patient would benefit from continued PT.      Plan: Continue per plan of care.      POC expires Unit limit Auth Expiration date PT/OT + Visit Limit?   24 BOMN 24 20                           Visit/Unit Tracking  AUTH Status:  Date  2       Auth needed Used 1 2 3 4 5 6 7 8        Remaining  19 18 17 16 15 14 13 12        FOTO 22 (56)                    Precautions: PROTOCOL    Access Code: [email or web address]  URL: [email or web address]  Date: 2024  Prepared by: Annemarie Soria    Exercises  - Supine Quad Set  - 1 x daily - 7 x weekly - 3 sets - 10 reps  - Active Straight Leg Raise with Quad Set  - 1 x daily - 7 x weekly - 3 sets - 10 reps  - Seated Heel Slide  - 1 x daily - 7 x weekly - 3 sets - 10 reps  - Supine Ankle Pumps  - 1 x daily - 7 x weekly - 3 sets - 10 reps  - Heel Raises with Counter Support  - 1 x daily - 7 x weekly - 3 sets - 10 reps  - Standing Hip Abduction with Counter Support  - 1 x daily - 7 x weekly - 3 sets - 10 reps  -  "Standing Hip Extension with Counter Support  - 1 x daily - 7 x weekly - 3 sets - 10 reps  - Mini Squat with Counter Support  - 1 x daily - 7 x weekly - 3 sets - 10 reps  Manuals 1/19 1/22 1/26 1/29 2/1 2/5 2/9 2/12     PROM       Hamstring stretch 4x 30\" Hamstring stretch 4x 30\"     Patellar mobs       ROM  ROM JR      Roller R HS        JR      TherEx             HEP/Pt ed/dressing change See above            Quad sets 30x 30x w/ 5\" hold HS sets followed by quad sets 30x w/5' hold  30x 30x  30x 30x 30x      Heel slides 20x 30x  20 x  30x 30x strap OP 30x 30x  30x      SLR  In brace  2x10 In brace 3x10 W/o 3x15  2#/ 30 No brace 2# 30x  4#/ 30 4#/30 4# 30x     SL ABD in brace NV   No brace 30x No brace 30x  4#/ 30 4#/30x 4# 30x     Prone hip ext NV   30x No brace 30x  4#/ 30 4# 30x 4# 30x     Prone knee flexion      4#/ 30 4# 30x 4# 30x     Ball ADD 30x 30x  30x 30x  With bridge 30x With bridge 30x  With bridge 30x      Standing ABD  In brace 20x In brace 3x10 3x15  No brace 3x15  4# ea 30x 4# ea 30x NV      Heel raise    30x 30x         Nustep: strength ROM NV   5' L4 L5 5'  5'  5' NP     TKE ball wall    30x 2\" 2\"x30 30x 30x 30x      Prone extension hang NV            Seated heel slide NV             Mini squats    30x 30x  30x 30x 30x      Lateral step ups    NV 4\" 20x  30x 30x 6\" 20x      Wall 1/4 sit      NV         Bike ROM    5' 5' full rev 5' 5' full 5' full                   Calf press      50#/ 30 50# 30x 50# 30x     NM Re-ed             NMES/quad set NV 10'   10:30 10' 10:30           Toe taps    EO/EC forward, lateral  10x 3 cones No brace 10x 3 cones                                                                          Ther Activity                                       Gait Training                                       Modalities             Ice   10' 10'                                        "

## 2024-02-16 ENCOUNTER — OFFICE VISIT (OUTPATIENT)
Dept: PHYSICAL THERAPY | Age: 34
End: 2024-02-16
Payer: COMMERCIAL

## 2024-02-16 DIAGNOSIS — M23.51 OLD COMPLETE ACL TEAR, RIGHT: Primary | ICD-10-CM

## 2024-02-16 PROCEDURE — 97110 THERAPEUTIC EXERCISES: CPT

## 2024-02-16 NOTE — PROGRESS NOTES
Daily Note     Today's date: 2024  Patient name: Khalif Hood  : 1990  MRN: [id number]  Referring provider: Sanjay Cheek,*  Dx:   Encounter Diagnosis     ICD-10-CM    1. Old complete ACL tear, right  M23.51           Start Time: 0845  Stop Time: 0940  Total time in clinic (min): 55 minutes    Subjective: Reports feeling more stability and less pain since last session. States the pain he feels at his distal hamstring and proximal gastroc is right where the two middle straps hit his leg, thinks it is more from that, but did notice improvement post roller treatment to R hamstring. Continues to feel a slight grinding/vibration with step ups at his knee.       Objective: See treatment diary below      Assessment: Palpable crepitus at R patella region with step ups, minimal pain noted. Cues to avoid hip IR and anterior translation of tibia. Increased supination at B ankles during gait. R knee presents slightly more valgus in comparison to L. Cues for heelstrike during gait and to avoid toe walking. Achieved 125 degrees of passive R knee flexion today limited by pain at end range. Performed all exercises out of brace today with no signs of instability. Will continue to progress as able.  Patient would benefit from continued PT      Plan: Continue per plan of care.      POC expires Unit limit Auth Expiration date PT/OT + Visit Limit?   24 BOMN 24 20                           Visit/Unit Tracking  AUTH Status:  Date  2       Auth needed Used 1 2 3 4 5 6 7 8 9       Remaining  19 18 17 16 15 14 13 12 11       FOTO 22 (56)                    Precautions: PROTOCOL    Access Code: [email or web address]  URL: [email or web address]  Date: 2024  Prepared by: Annemarie Soria    Exercises  - Supine Quad Set  - 1 x daily - 7 x weekly - 3 sets - 10 reps  - Active Straight Leg Raise with Quad Set  - 1 x daily - 7 x weekly - 3 sets - 10 reps  - Seated Heel  "Slide  - 1 x daily - 7 x weekly - 3 sets - 10 reps  - Supine Ankle Pumps  - 1 x daily - 7 x weekly - 3 sets - 10 reps  - Heel Raises with Counter Support  - 1 x daily - 7 x weekly - 3 sets - 10 reps  - Standing Hip Abduction with Counter Support  - 1 x daily - 7 x weekly - 3 sets - 10 reps  - Standing Hip Extension with Counter Support  - 1 x daily - 7 x weekly - 3 sets - 10 reps  - Mini Squat with Counter Support  - 1 x daily - 7 x weekly - 3 sets - 10 reps  Manuals 1/19 1/22 1/26 1/29 2/1 2/5 2/9 2/12 2/16    PROM       Hamstring stretch 4x 30\" Hamstring stretch 4x 30\" Hamstring stretch 4x 30\"    Patellar mobs       ROM  ROM JR  ROM JR     Roller R HS        JR  JR     TherEx             HEP/Pt ed/dressing change See above            Quad sets 30x 30x w/ 5\" hold HS sets followed by quad sets 30x w/5' hold  30x 30x  30x 30x 30x  30x     Heel slides 20x 30x  20 x  30x 30x strap OP 30x 30x  30x  30x     SLR  In brace  2x10 In brace 3x10 W/o 3x15  2#/ 30 No brace 2# 30x  4#/ 30 4#/30 4# 30x 4# 30x    SL ABD in brace NV   No brace 30x No brace 30x  4#/ 30 4#/30x 4# 30x 4# 30x    Prone hip ext NV   30x No brace 30x  4#/ 30 4# 30x 4# 30x 4# 30x    Prone knee flexion      4#/ 30 4# 30x 4# 30x 4# 30x    Ball ADD 30x 30x  30x 30x  With bridge 30x With bridge 30x  With bridge 30x  With bridge 30x     Standing ABD  In brace 20x In brace 3x10 3x15  No brace 3x15  4# ea 30x 4# ea 30x NV      Heel raise    30x 30x         Nustep: strength ROM NV   5' L4 L5 5'  5'  5' NP L6 5'     TKE ball wall    30x 2\" 2\"x30 30x 30x 30x  30x    Prone extension hang NV            Seated heel slide NV             Mini squats    30x 30x  30x 30x 30x  30x     Lateral step ups    NV 4\" 20x  30x 30x 6\" 20x  6\" 30x     Wall 1/4 sit      NV         Bike ROM    5' 5' full rev 5' 5' full 5' full  5' full                  Calf press      50#/ 30 50# 30x 50# 30x 70# 30x    NM Re-ed             NMES/quad set NV 10'   10:30 10' 10:30           Toe taps    " EO/EC forward, lateral  10x 3 cones No brace 10x 3 cones                                                                          Ther Activity                                       Gait Training                                       Modalities             Ice   10' 10'

## 2024-02-19 ENCOUNTER — OFFICE VISIT (OUTPATIENT)
Dept: PHYSICAL THERAPY | Age: 34
End: 2024-02-19
Payer: COMMERCIAL

## 2024-02-19 DIAGNOSIS — M23.51 OLD COMPLETE ACL TEAR, RIGHT: Primary | ICD-10-CM

## 2024-02-19 PROCEDURE — 97110 THERAPEUTIC EXERCISES: CPT

## 2024-02-19 NOTE — PROGRESS NOTES
Daily Note     Today's date: 2024  Patient name: Khalif Hood  : 1990  MRN: [id number]  Referring provider: Sanjay Cheek,*  Dx:   Encounter Diagnosis     ICD-10-CM    1. 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 States he thinks the roller is helping.       Objective: See treatment diary below      Assessment: Achieved 128 degrees of R knee PROM flexion, limited by pain. Progressed program with addition of closed chain activities including leg press, single limb leg press, and single limb 3-way taps with LLE. Extra cues and demonstrations for single limb 3-way taps to ensure proper weight shifting to avoid hip IR/adduction and anterior translation of tibia. Also added 1/4 wall sits with good tolerance, cues for proper foot placement. Patient notes decreased crepitus during step ups. Patient would benefit from continued PT.      Plan: Continue per plan of care.      POC expires Unit limit Auth Expiration date PT/OT + Visit Limit?   24 BOMN 24 20                           Visit/Unit Tracking  AUTH Status:  Date      Auth needed Used 1 2 3 4 5 6 7 8 9 10      Remaining  19 18 17 16 15 14 13 12 11 10      FOTO 22 (56)         RE NV            Precautions: PROTOCOL DOS     Access Code: [email or web address]  URL: [email or web address]  Date: 2024  Prepared by: Annemarie Soria    Exercises  - Supine Quad Set  - 1 x daily - 7 x weekly - 3 sets - 10 reps  - Active Straight Leg Raise with Quad Set  - 1 x daily - 7 x weekly - 3 sets - 10 reps  - Seated Heel Slide  - 1 x daily - 7 x weekly - 3 sets - 10 reps  - Supine Ankle Pumps  - 1 x daily - 7 x weekly - 3 sets - 10 reps  - Heel Raises with Counter Support  - 1 x  "daily - 7 x weekly - 3 sets - 10 reps  - Standing Hip Abduction with Counter Support  - 1 x daily - 7 x weekly - 3 sets - 10 reps  - Standing Hip Extension with Counter Support  - 1 x daily - 7 x weekly - 3 sets - 10 reps  - Mini Squat with Counter Support  - 1 x daily - 7 x weekly - 3 sets - 10 reps  Manuals 1/19 1/22 1/26 1/29 2/1 2/5 2/9 2/12 2/16 2/19   PROM       Hamstring stretch 4x 30\" Hamstring stretch 4x 30\" Hamstring stretch 4x 30\" Hamstring stretch 4x 30\"   Patellar mobs       ROM  ROM JR  ROM JR  PROM JR    Roller R HS        JR  JR  JR _ proximal gastroc   Brace open           0-120 deg   TherEx             HEP/Pt ed/dressing change See above            Quad sets 30x 30x w/ 5\" hold HS sets followed by quad sets 30x w/5' hold  30x 30x  30x 30x 30x  30x  30x    Heel slides 20x 30x  20 x  30x 30x strap OP 30x 30x  30x  30x  30x    SLR  In brace  2x10 In brace 3x10 W/o 3x15  2#/ 30 No brace 2# 30x  4#/ 30 4#/30 4# 30x 4# 30x 5# 30x   SL ABD in brace NV   No brace 30x No brace 30x  4#/ 30 4#/30x 4# 30x 4# 30x 5# 30x   Prone hip ext NV   30x No brace 30x  4#/ 30 4# 30x 4# 30x 4# 30x 5# 30x   Prone knee flexion      4#/ 30 4# 30x 4# 30x 4# 30x 5# 30x   Ball ADD 30x 30x  30x 30x  With bridge 30x With bridge 30x  With bridge 30x  With bridge 30x  With bridge 30x    Standing ABD  In brace 20x In brace 3x10 3x15  No brace 3x15  4# ea 30x 4# ea 30x NV      Heel raise    30x 30x         Nustep: strength ROM NV   5' L4 L5 5'  5'  5' NP L6 5'  L6 5'    TKE ball wall    30x 2\" 2\"x30 30x 30x 30x  30x 30x    Prone extension hang NV            Seated heel slide NV             Mini squats    30x 30x  30x 30x 30x  30x  30x    Lateral step ups    NV 4\" 20x  30x 30x 6\" 20x  6\" 30x  6\" 30x    Wall 1/4 sit      NV      5\"x10    Bike ROM    5' 5' full rev 5' 5' full 5' full  5' full  5' full L3    Single limb 3-way heel tap           10x ea    Calf press      50#/ 30 50# 30x 50# 30x 70# 30x 80# 30x                 Leg press      "      80# 30x    Single limb leg press           30# 30x                 NM Re-ed             NMES/quad set NV 10'   10:30 10' 10:30           Toe taps    EO/EC forward, lateral  10x 3 cones No brace 10x 3 cones                                                                          Ther Activity                                       Gait Training                                       Modalities             Ice   10' 10'

## 2024-02-23 ENCOUNTER — EVALUATION (OUTPATIENT)
Dept: PHYSICAL THERAPY | Age: 34
End: 2024-02-23
Payer: COMMERCIAL

## 2024-02-23 DIAGNOSIS — M23.51 OLD COMPLETE ACL TEAR, RIGHT: Primary | ICD-10-CM

## 2024-02-23 PROCEDURE — 97110 THERAPEUTIC EXERCISES: CPT | Performed by: PHYSICAL THERAPIST

## 2024-02-23 NOTE — PROGRESS NOTES
PT Re-Evaluation     Today's date: 2024  Patient name: Khalif Hood  : 1990  MRN: [id number]  Referring provider: Sanjay Cheek,*  Dx:   Encounter Diagnosis     ICD-10-CM    1. Old complete ACL tear, right  M23.51 PT plan of care cert/re-cert          Start Time: 1105  Stop Time: 1200  Total time in clinic (min): 55 minutes    Assessment  Assessment details: Khalif Hood continues to receive phsycial therapy s/p ACL reconstruction with allograft 2x per week. His ROM is overall WFL but he does lack terminal flexion. He has good quad control without lag and is able to walk without brace on level surfaces without evidence of antalgia. He continues to work on strength and proprioception including balance and would benefit form continued skilled services to achieve overall maximum strength and functional return.   Impairments: abnormal gait, abnormal or restricted ROM, activity intolerance, impaired physical strength, lacks appropriate home exercise program, pain with function and weight-bearing intolerance  Functional limitations: walking, standing, working, running, playing recreational softabll, exercising at gym, adls/iadlsUnderstanding of Dx/Px/POC: good   Prognosis: good    Goals  ST-3 WEEKS  1.  Decrease pain by 2 points on VAS at its worst. MET except end range flexion  2.  Increase ROM by > 5 deg in all deficients planes. All WFL  3.  Increase LE strength by 1 MMT grade. MET    LT-12 WEEKS  1. Patient to be independent with a/iadls. MET adls, light iadls  2. Increase functional activities for leisure and home activities to previous LOF. By d/c  3. Independent with HEP and/or fitness program.  4. Normalize gait.   LTG > 12 weeks.   5. RTW  6. Return recreational sports and activities.     Plan  Patient would benefit from: skilled physical therapy  Planned modality interventions: cryotherapy  Planned therapy interventions: functional ROM exercises, home exercise program,  manual therapy, neuromuscular re-education, patient education, strengthening, stretching, therapeutic activities and therapeutic exercise  Frequency: 2-3x week.  Duration in weeks: 12  Plan of Care beginning date: 2024  Plan of Care expiration date: 2024  Treatment plan discussed with: patient        Subjective Evaluation    History of Present Illness  Date of surgery: 2024  Mechanism of injury: surgery  Mechanism of injury: Pt sustained a partial ACL injury in the past and it progressively worsened with knee pain an then he felt a pop while running playing softball. He began to have more pain and at times the knee would give out.   He underwent ACL reconstruction with allograft and lateral menisectomy. He is immobilized in a long leg brace, walking with crutches and referred for PT.  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     24  Pt reports he has little to no pain except produced at end range flexion. He feels stable and stronger.   Patient Goals  Patient goals for therapy: return to sport/leisure activities, return to work, decreased edema, decreased pain, increased strength, increased motion and independence with ADLs/IADLs  Patient goal: be able to play softball  Pain  Current pain ratin  At worst pain ratin (at end range flexion)  Location: right knee  Progression: improved        Objective     Active Range of Motion     Right Knee   Flexion: 118 degrees with pain  Extension: -2 degrees     Passive Range of Motion     Right Knee   Flexion: 128 degrees with pain  Extension: 0 degrees     Mobility   Patellar Mobility:     Right Knee   WFL: medial, lateral, superior and inferior    Strength/Myotome Testing     Left Hip   Normal muscle strength    Right Hip   Planes of Motion   Abduction: 4  Adduction: 4+    Left Knee   Normal strength    Right Knee   Flexion: 4-  Extension: 4  Quadriceps contraction:  "good    Left Ankle/Foot   Normal strength    Right Ankle/Foot   Normal strength    Swelling     Left Knee Girth Measurement (cm)   Joint line: 38.5 cm    Right Knee Girth Measurement (cm)   Joint line: 39 cm    Ambulation   Weight-Bearing Status   Weight-Bearing Status (Right): full weight-bearing      Ambulation: Level Surfaces   Ambulation without assistive device: independent               POC expires Unit limit Auth Expiration date PT/OT + Visit Limit?   4/18/24 BOMN 4/12/24 20                           Visit/Unit Tracking  AUTH Status:  Date 1/19 1/22 1/26 1/29 2/1 2/5 2/9 2/12 2/16 2/19 2/23    Auth needed Used 1 2 3 4 5 6 7 8 9 10 11     Remaining  19 18 17 16 15 14 13 12 11 10 9     FOTO 22 (56)         RE NV            Precautions: PROTOCOL DOS 1/17    Access Code: [email or web address]  URL: [email or web address]  Date: 01/19/2024  Prepared by: Annemarie Soria    Exercises  - Supine Quad Set  - 1 x daily - 7 x weekly - 3 sets - 10 reps  - Active Straight Leg Raise with Quad Set  - 1 x daily - 7 x weekly - 3 sets - 10 reps  - Seated Heel Slide  - 1 x daily - 7 x weekly - 3 sets - 10 reps  - Supine Ankle Pumps  - 1 x daily - 7 x weekly - 3 sets - 10 reps  - Heel Raises with Counter Support  - 1 x daily - 7 x weekly - 3 sets - 10 reps  - Standing Hip Abduction with Counter Support  - 1 x daily - 7 x weekly - 3 sets - 10 reps  - Standing Hip Extension with Counter Support  - 1 x daily - 7 x weekly - 3 sets - 10 reps  - Mini Squat with Counter Support  - 1 x daily - 7 x weekly - 3 sets - 10 reps  Manuals 2/23 1/22 1/26 1/29 2/1 2/5 2/9 2/12 2/16 2/19   PROM       Hamstring stretch 4x 30\" Hamstring stretch 4x 30\" Hamstring stretch 4x 30\" Hamstring stretch 4x 30\"   Patellar mobs       ROM  ROM JR  ROM JR  PROM JR    Roller R HS        JR  JR  JR _ proximal gastroc   Brace open           0-120 deg   TherEx             HEP/Pt ed/dressing change See above            Quad sets  30x w/ 5\" hold HS sets followed by quad " "sets 30x w/5' hold  30x 30x  30x 30x 30x  30x  30x    Heel slides 30x 30x  20 x  30x 30x strap OP 30x 30x  30x  30x  30x    SLR  5#/ 30 In brace 3x10 W/o 3x15  2#/ 30 No brace 2# 30x  4#/ 30 4#/30 4# 30x 4# 30x 5# 30x   SL ABD in brace 5#/ 30   No brace 30x No brace 30x  4#/ 30 4#/30x 4# 30x 4# 30x 5# 30x   Prone hip ext 5#/ 30   30x No brace 30x  4#/ 30 4# 30x 4# 30x 4# 30x 5# 30x   Prone knee flexion 3#/30     4#/ 30 4# 30x 4# 30x 4# 30x 5# 30x   Ball ADD 30x 30x  30x 30x  With bridge 30x With bridge 30x  With bridge 30x  With bridge 30x  With bridge 30x    Standing ABD   In brace 3x10 3x15  No brace 3x15  4# ea 30x 4# ea 30x NV      Heel raise    30x 30x         Nustep: strength ROM 5'   5' L4 L5 5'  5'  5' NP L6 5'  L6 5'    TKE ball wall    30x 2\" 2\"x30 30x 30x 30x  30x 30x    Prone extension hang             Seated heel slide              Mini squats 30x BTB   30x 30x  30x 30x 30x  30x  30x    Lateral step ups 30x   NV 4\" 20x  30x 30x 6\" 20x  6\" 30x  6\" 30x    Fwd step up 30x                         Wall 1/4 sit  10\"x    NV      5\"x10    Bike ROM    5' 5' full rev 5' 5' full 5' full  5' full  5' full L3    Single limb 3-way heel tap  10x ea         10x ea    Calf press 80#/ 30     50#/ 30 50# 30x 50# 30x 70# 30x 80# 30x    Prone ham cybex NV 10#           Leg press  100# DL         80# 30x    Single limb leg press  50# SL         30# 30x                 NM Re-ed             NMES/quad set NV 10'   10:30 10' 10:30           Toe taps    EO/EC forward, lateral  10x 3 cones No brace 10x 3 cones                                                                          Ther Activity                                       Gait Training                                       Modalities             Ice   10' 10'                                   "

## 2024-02-26 ENCOUNTER — OFFICE VISIT (OUTPATIENT)
Dept: PHYSICAL THERAPY | Age: 34
End: 2024-02-26
Payer: COMMERCIAL

## 2024-02-26 DIAGNOSIS — M23.51 OLD COMPLETE ACL TEAR, RIGHT: Primary | ICD-10-CM

## 2024-02-26 PROCEDURE — 97110 THERAPEUTIC EXERCISES: CPT

## 2024-02-26 NOTE — PROGRESS NOTES
Daily Note     Today's date: 2024  Patient name: Khalif Hood  : 1990  MRN: [id number]  Referring provider: Sanjay Cheek,*  Dx:   Encounter Diagnosis     ICD-10-CM    1. Old complete ACL tear, right  M23.51           Start Time: 09  Stop Time: 1030  Total time in clinic (min): 60 minutes    Subjective: Reports feeling really good overall but continues with distal HS and proximal calf pain that limits him, especially when he bends his knee.       Objective: See treatment diary below  Patient was seen 1:1 for 45 min.     Assessment: Attempted cybex hamstring curls with 10-15# but was unable to complete due to pain, performed in standing with 6# and better tolerance but continues with slight discomfort. Relief post manual roller over HS and proximal gastroc. Provided ed on how to perform various hamstring stretches at home as calf stretching off a step. Cues during SL WB activities to avoid hip IR and adduction. Emphasis on lateral hip strength. Cues to avoid ambulating on toes. Patient would benefit from continued PT.      Plan: Continue per plan of care.      POC expires Unit limit Auth Expiration date PT/OT + Visit Limit?   24 BOMN 24 20                           Visit/Unit Tracking  AUTH Status:  Date    Auth needed Used 1 2 3 4 5 6 7 8 9 10 11 12    Remaining  19 18 17 16 15 14 13 12 11 10 9 8    FOTO 22 (56)         RE NV            Precautions: PROTOCOL DOS     Access Code: [email or web address]  URL: [email or web address]  Date: 2024  Prepared by: Annemarie Soria    Exercises  - Supine Quad Set  - 1 x daily - 7 x weekly - 3 sets - 10 reps  - Active Straight Leg Raise with Quad Set  - 1 x daily - 7 x weekly - 3 sets - 10 reps  - Seated Heel Slide  - 1 x daily - 7 x weekly - 3 sets - 10 reps  - Supine Ankle Pumps  - 1 x daily - 7 x weekly - 3 sets - 10 reps  - Heel Raises with Counter Support  - 1 x daily - 7  "x weekly - 3 sets - 10 reps  - Standing Hip Abduction with Counter Support  - 1 x daily - 7 x weekly - 3 sets - 10 reps  - Standing Hip Extension with Counter Support  - 1 x daily - 7 x weekly - 3 sets - 10 reps  - Mini Squat with Counter Support  - 1 x daily - 7 x weekly - 3 sets - 10 reps  Manuals 2/23 2/26 1/29 2/1 2/5 2/9 2/12 2/16 2/19   PROM       Hamstring stretch 4x 30\" Hamstring stretch 4x 30\" Hamstring stretch 4x 30\" Hamstring stretch 4x 30\"   Patellar mobs       ROM  ROM JR  ROM JR  PROM JR    Roller R HS  JR       JR  JR  JR _ proximal gastroc   Brace open   D/C         0-120 deg   TherEx             HEP/Pt ed/dressing change See above HS stretching           Quad sets    30x 30x  30x 30x 30x  30x  30x    Heel slides 30x 30x   30x 30x strap OP 30x 30x  30x  30x  30x    SLR  5#/ 30 5# 30x  2#/ 30 No brace 2# 30x  4#/ 30 4#/30 4# 30x 4# 30x 5# 30x   SL ABD 5#/ 30 5# 30x  No brace 30x No brace 30x  4#/ 30 4#/30x 4# 30x 4# 30x 5# 30x   Prone hip ext 5#/ 30 5# 30x   30x No brace 30x  4#/ 30 4# 30x 4# 30x 4# 30x 5# 30x   Prone knee flexion 3#/30 Standing 6# 30x     4#/ 30 4# 30x 4# 30x 4# 30x 5# 30x   Ball ADD 30x BTB bridge 30x   30x 30x  With bridge 30x With bridge 30x  With bridge 30x  With bridge 30x  With bridge 30x    Standing ABD   D/C    No brace 3x15  4# ea 30x 4# ea 30x NV      Heel raise  D/C   30x 30x         Nustep: strength ROM 5' 5'   5' L4 L5 5'  5'  5' NP L6 5'  L6 5'    TKE ball wall  30x   30x 2\" 2\"x30 30x 30x 30x  30x 30x    Hamstring stretch with strap  20\"x4            Seated heel slide  D/C             Mini squats 30x BTB BTB 30x   30x 30x  30x 30x 30x  30x  30x    Lateral step ups 30x 6\" 30x  NV 4\" 20x  30x 30x 6\" 20x  6\" 30x  6\" 30x    Fwd step up 30x 8\" 30x                         Wall 1/4 sit  10\"x BTB 5\"x10    NV      5\"x10    Bike ROM  5' L3   5' 5' full rev 5' 5' full 5' full  5' full  5' full L3    Single limb 3-way heel tap  10x ea 10x ea         10x ea    Calf press 80#/ 30 " 80# 30x     50#/ 30 50# 30x 50# 30x 70# 30x 80# 30x    Prone ham cybex NV attempted *pain           Leg press  100# # 30x         80# 30x    Single limb leg press  50# SL 50# 30x         30# 30x    Cybex hip abd  50# 30x           Cybex hip add  50# 30x                                      NM Re-ed             Toe taps     10x 3 cones No brace 10x 3 cones                                                                          Ther Activity                                       Gait Training                                       Modalities             Ice

## 2024-02-29 ENCOUNTER — OFFICE VISIT (OUTPATIENT)
Dept: PHYSICAL THERAPY | Age: 34
End: 2024-02-29
Payer: COMMERCIAL

## 2024-02-29 DIAGNOSIS — M23.51 OLD COMPLETE ACL TEAR, RIGHT: Primary | ICD-10-CM

## 2024-02-29 PROCEDURE — 97140 MANUAL THERAPY 1/> REGIONS: CPT

## 2024-02-29 PROCEDURE — 97110 THERAPEUTIC EXERCISES: CPT

## 2024-02-29 NOTE — PROGRESS NOTES
Daily Note     Today's date: 2024  Patient name: Khalif Hood  : 1990  MRN: [id number]  Referring provider: Sanjay Cheek,*  Dx:   Encounter Diagnosis     ICD-10-CM    1. Old complete ACL tear, right  M23.51           Start Time: 930  Stop Time: 1028  Total time in clinic (min): 58 minutes    Subjective: Patient states overall his knee is improving day by day. Notes less hamstring/gastroc tightness since utilizing roller.       Objective: See treatment diary below      Assessment: Patient tolerated treatment session well. Treatment session consisted of progressing knee strength and mobility to promote more ease with functional tasks. Patient self progressed to increased wt on all machines without difficulty or reproduction of pain. Patient challenged by lateral hip motions as hip abductors remain weak. Patient exhibited improved hamstring and gastroc ROM/flexibility following manual work. Patient appropriately fatigued by conclusion of visit and would benefit from continued stretching/strengthening to return to PLOF. Patient billed for 1:1 time with PTA from [phone].       Plan: Continue per POC. Increase reps/resistance as tolerated.      POC expires Unit limit Auth Expiration date PT/OT + Visit Limit?   24 BOMN 24 20                           Visit/Unit Tracking  AUTH Status:  Date  2   Auth needed Used 1 2 3 4 5 6 7 8 9 10 11 12 13    Remaining  19 18 17 16 15 14 13 12 11 10 9 8 7    FOTO 22 (56)         RE NV             Precautions: PROTOCOL DOS     Access Code: [email or web address]  URL: [email or web address]  Date: 2024  Prepared by: Annemarie Soria    Exercises  - Supine Quad Set  - 1 x daily - 7 x weekly - 3 sets - 10 reps  - Active Straight Leg Raise with Quad Set  - 1 x daily - 7 x weekly - 3 sets - 10 reps  - Seated Heel Slide  - 1 x daily - 7 x weekly - 3 sets - 10 reps  - Supine Ankle Pumps  - 1  "x daily - 7 x weekly - 3 sets - 10 reps  - Heel Raises with Counter Support  - 1 x daily - 7 x weekly - 3 sets - 10 reps  - Standing Hip Abduction with Counter Support  - 1 x daily - 7 x weekly - 3 sets - 10 reps  - Standing Hip Extension with Counter Support  - 1 x daily - 7 x weekly - 3 sets - 10 reps  - Mini Squat with Counter Support  - 1 x daily - 7 x weekly - 3 sets - 10 reps  Manuals 2/23 2/26 2/29 1/29 2/1 2/5 2/9 2/12 2/16 2/19   PROM       Hamstring stretch 4x 30\" Hamstring stretch 4x 30\" Hamstring stretch 4x 30\" Hamstring stretch 4x 30\"   Patellar mobs       ROM  ROM JR  ROM JR  PROM JR    Roller R HS  JR  MS     JR  JR  JR _ proximal gastroc   Brace open   D/C         0-120 deg   TherEx             HEP/Pt ed/dressing change See above HS stretching HS stretching          Quad sets    30x 30x  30x 30x 30x  30x  30x    Heel slides 30x 30x  30x 30x 30x strap OP 30x 30x  30x  30x  30x    SLR  5#/ 30 5# 30x 6# 20x 2#/ 30 No brace 2# 30x  4#/ 30 4#/30 4# 30x 4# 30x 5# 30x   SL ABD 5#/ 30 5# 30x 6# 20x No brace 30x No brace 30x  4#/ 30 4#/30x 4# 30x 4# 30x 5# 30x   Prone hip ext 5#/ 30 5# 30x  6# 20x 30x No brace 30x  4#/ 30 4# 30x 4# 30x 4# 30x 5# 30x   Prone knee flexion 3#/30 Standing 6# 30x  Standing 6# 30x    4#/ 30 4# 30x 4# 30x 4# 30x 5# 30x   Ball ADD 30x BTB bridge 30x  BTB bridge 30x  30x 30x  With bridge 30x With bridge 30x  With bridge 30x  With bridge 30x  With bridge 30x    Standing ABD   D/C    No brace 3x15  4# ea 30x 4# ea 30x NV      Heel raise  D/C   30x 30x         Nustep: strength ROM 5' 5'  5'  5' L4 L5 5'  5'  5' NP L6 5'  L6 5'    TKE ball wall  30x  30x 30x 2\" 2\"x30 30x 30x 30x  30x 30x    Hamstring stretch with strap  20\"x4  20\"x4          Seated heel slide  D/C             Mini squats 30x BTB BTB 30x  BTB 30x  30x 30x  30x 30x 30x  30x  30x    Lateral step ups 30x 6\" 30x 8\" 20x NV 4\" 20x  30x 30x 6\" 20x  6\" 30x  6\" 30x    Fwd step up 30x 8\" 30x  8\" 30x                        Wall 1/4 " "sit  10\"x BTB 5\"x10  BTB 5\"x10   NV      5\"x10    Bike ROM  5' L3   5' 5' full rev 5' 5' full 5' full  5' full  5' full L3    Single limb 3-way heel tap  10x ea 10x ea  10x ea       10x ea    Calf press 80#/ 30 80# 30x  120# 20x   50#/ 30 50# 30x 50# 30x 70# 30x 80# 30x    Prone ham cybex NV attempted *pain           Leg press  100# # 30x  130# 20x 135# 10x       80# 30x    Single limb leg press  50# SL 50# 30x  60# 30x       30# 30x    Cybex hip abd  50# 30x 75# 20x          Cybex hip add  50# 30x  60# 20x                                    NM Re-ed             Toe taps     10x 3 cones No brace 10x 3 cones                                                                          Ther Activity                                       Gait Training                                       Modalities             Ice                                              "

## 2024-03-05 ENCOUNTER — OFFICE VISIT (OUTPATIENT)
Dept: PHYSICAL THERAPY | Age: 34
End: 2024-03-05
Payer: COMMERCIAL

## 2024-03-05 DIAGNOSIS — M23.51 OLD COMPLETE ACL TEAR, RIGHT: Primary | ICD-10-CM

## 2024-03-05 PROCEDURE — 97110 THERAPEUTIC EXERCISES: CPT

## 2024-03-05 NOTE — PROGRESS NOTES
Daily Note     Today's date: 3/5/2024  Patient name: Khalif Hood  : 1990  MRN: [id number]  Referring provider: Sanjay Cheek,*  Dx:   Encounter Diagnosis     ICD-10-CM    1. Old complete ACL tear, right  M23.51           Start Time: 1130  Stop Time: 1234  Total time in clinic (min): 64 minutes    Subjective: Reports feeling better overall with much more feeling of control of his knee. States he still gets some vibration sensations when stepping up on stairs but not as bad as in the past. Notes his HS is much better and he has minimal to no discomfort at his proximal calf.       Objective: See treatment diary below      Assessment: Increased weights for cybex machines without issues. Good understanding of his program with few cues needed for carryover. Some cues and demonstrations for proper weight shifting for alternating SLR 3-way in SLS. Overall strength and ROM improving. Patient would benefit from continued PT      Plan: Continue per plan of care.      POC expires Unit limit Auth Expiration date PT/OT + Visit Limit?   24 BOMN 24 20                           Visit/Unit Tracking  AUTH Status:  Date  2/ 2/ 2   Auth needed Used 1 2 3 4 5 6 7 8 9 10 11 12 13    Remaining  19 18 17 16 15 14 13 12 11 10 9 8 7    FOTO 22 (56)         RE NV             Precautions: PROTOCOL DOS     Access Code: [email or web address]  URL: [email or web address]  Date: 2024  Prepared by: Annemarie Soria    Exercises  - Supine Quad Set  - 1 x daily - 7 x weekly - 3 sets - 10 reps  - Active Straight Leg Raise with Quad Set  - 1 x daily - 7 x weekly - 3 sets - 10 reps  - Seated Heel Slide  - 1 x daily - 7 x weekly - 3 sets - 10 reps  - Supine Ankle Pumps  - 1 x daily - 7 x weekly - 3 sets - 10 reps  - Heel Raises with Counter Support  - 1 x daily - 7 x weekly - 3 sets - 10 reps  - Standing Hip Abduction with Counter Support  - 1 x daily - 7 x weekly  "- 3 sets - 10 reps  - Standing Hip Extension with Counter Support  - 1 x daily - 7 x weekly - 3 sets - 10 reps  - Mini Squat with Counter Support  - 1 x daily - 7 x weekly - 3 sets - 10 reps  Manuals 2/23 2/26  2/29 3/5   2/5 2/9 2/12 2/16 2/19   PROM       Hamstring stretch 4x 30\" Hamstring stretch 4x 30\" Hamstring stretch 4x 30\" Hamstring stretch 4x 30\"   Patellar mobs       ROM  ROM JR  ROM JR  PROM JR    Roller R HS  JR  MS JR    JR  JR  JR _ proximal gastroc   Brace open   D/C         0-120 deg   TherEx             HEP/Pt ed/dressing change See above HS stretching HS stretching          Quad sets      30x 30x 30x  30x  30x    Heel slides 30x 30x  30x 30x   30x 30x  30x  30x  30x    SLR  5#/ 30 5# 30x 6# 20x 6# 30x   4#/ 30 4#/30 4# 30x 4# 30x 5# 30x   SL ABD 5#/ 30 5# 30x 6# 20x 6# 30x   4#/ 30 4#/30x 4# 30x 4# 30x 5# 30x   Prone hip ext 5#/ 30 5# 30x  6# 20x 6# 30x   4#/ 30 4# 30x 4# 30x 4# 30x 5# 30x   Prone knee flexion 3#/30 Standing 6# 30x  Standing 6# 30x  Standing 6# 30x   4#/ 30 4# 30x 4# 30x 4# 30x 5# 30x   Ball ADD 30x BTB bridge 30x  BTB bridge 30x  BTB bridge 30x   With bridge 30x With bridge 30x  With bridge 30x  With bridge 30x  With bridge 30x    Standing ABD   D/C     4# ea 30x 4# ea 30x NV      Heel raise  D/C            Nustep: strength ROM 5' 5'  5'  L6 5'   5'  5' NP L6 5'  L6 5'    TKE ball wall  30x  30x 30x   30x 30x 30x  30x 30x    Hamstring stretch with strap  20\"x4  20\"x4 HEP          Seated heel slide  D/C             Mini squats 30x BTB BTB 30x  BTB 30x  BTB 30x   30x 30x 30x  30x  30x    Lateral step ups 30x 6\" 30x 8\" 20x 8\" 20x   30x 30x 6\" 20x  6\" 30x  6\" 30x    Fwd step up 30x 8\" 30x  8\" 30x  8\" 30x                       Wall 1/4 sit  10\"x BTB 5\"x10  BTB 5\"x10  BTB 5\"x10       5\"x10    Bike ROM  5' L3     5' 5' full 5' full  5' full  5' full L3    Single limb 3-way heel tap  10x ea 10x ea  10x ea       10x ea    Calf press 80#/ 30 80# 30x  120# 20x 160# 30x  50#/ 30 50# 30x 50# " 30x 70# 30x 80# 30x    Prone ham cybex NV attempted *pain           Leg press  100# # 30x  130# 20x 135# 10x 150# 30x      80# 30x    Single limb leg press  50# SL 50# 30x  60# 30x 60# 30x       30# 30x    Cybex hip abd  50# 30x 75# 20x 75# 30x         Cybex hip add  50# 30x  60# 20x 65# 30x                                    NM Re-ed             Toe taps                                                                               Ther Activity                                       Gait Training                                       Modalities             Ice

## 2024-03-07 ENCOUNTER — OFFICE VISIT (OUTPATIENT)
Dept: PHYSICAL THERAPY | Age: 34
End: 2024-03-07
Payer: COMMERCIAL

## 2024-03-07 DIAGNOSIS — M23.51 OLD COMPLETE ACL TEAR, RIGHT: Primary | ICD-10-CM

## 2024-03-07 PROCEDURE — 97110 THERAPEUTIC EXERCISES: CPT

## 2024-03-07 NOTE — PROGRESS NOTES
Daily Note     Today's date: 3/7/2024  Patient name: Khalif Hood  : 1990  MRN: [id number]  Referring provider: Sanjay Cheek,*  Dx:   Encounter Diagnosis     ICD-10-CM    1. Old complete ACL tear, right  M23.51           Start Time: 0745  Stop Time: 0845  Total time in clinic (min): 60 minutes    Subjective: Pt feels like the soreness in his calf and HS are disappearing. Pt sees his doctor tomorrow and thinks he will get fit for an athletic brace.       Objective: See treatment diary below      Assessment: Started with TM walking forward and backward. Continued current POC. Added prone HS kicks, pt tolerated well. Tolerated treatment well. Patient demonstrated fatigue post treatment      Plan: Continue per plan of care.      POC expires Unit limit Auth Expiration date PT/OT + Visit Limit?   24 BOMN 24 20                           Visit/Unit Tracking  AUTH Status:  Date    Auth needed Used 1 2 3 4 5 6 7 8 9 10 11 12 13    Remaining  19 18 17 16 15 14 13 12 11 10 9 8 7    FOTO 22 (56)         RE NV             Precautions: PROTOCOL DOS     Access Code: [email or web address]  URL: [email or web address]  Date: 2024  Prepared by: Annemarie Soria    Exercises  - Supine Quad Set  - 1 x daily - 7 x weekly - 3 sets - 10 reps  - Active Straight Leg Raise with Quad Set  - 1 x daily - 7 x weekly - 3 sets - 10 reps  - Seated Heel Slide  - 1 x daily - 7 x weekly - 3 sets - 10 reps  - Supine Ankle Pumps  - 1 x daily - 7 x weekly - 3 sets - 10 reps  - Heel Raises with Counter Support  - 1 x daily - 7 x weekly - 3 sets - 10 reps  - Standing Hip Abduction with Counter Support  - 1 x daily - 7 x weekly - 3 sets - 10 reps  - Standing Hip Extension with Counter Support  - 1 x daily - 7 x weekly - 3 sets - 10 reps  - Mini Squat with Counter Support  - 1 x daily - 7 x weekly - 3 sets - 10 reps  Manuals 2/23 2/26  2/29 3/5  3/7  2/9 2/12  "2/16 2/19   PROM       Hamstring stretch 4x 30\" Hamstring stretch 4x 30\" Hamstring stretch 4x 30\" Hamstring stretch 4x 30\"   Patellar mobs       ROM  ROM JR  ROM JR  PROM JR    Roller R HS  JR  MS JR    JR  JR  JR _ proximal gastroc   Brace open   D/C         0-120 deg   TherEx             HEP/Pt ed/dressing change See above HS stretching HS stretching          Quad sets       30x 30x  30x  30x    Heel slides 30x 30x  30x 30x  D/c  30x  30x  30x  30x    SLR  5#/ 30 5# 30x 6# 20x 6# 30x  6#   30x  4#/30 4# 30x 4# 30x 5# 30x   SL ABD 5#/ 30 5# 30x 6# 20x 6# 30x  6#  30x  4#/30x 4# 30x 4# 30x 5# 30x   Prone hip ext 5#/ 30 5# 30x  6# 20x 6# 30x  6#  30x  4# 30x 4# 30x 4# 30x 5# 30x   Prone knee flexion 3#/30 Standing 6# 30x  Standing 6# 30x  Standing 6# 30x  Standing 6# 30x  Increase weight ^^^ 4# 30x 4# 30x 4# 30x 5# 30x   Ball ADD 30x BTB bridge 30x  BTB bridge 30x  BTB bridge 30x  BTB bridge 30x   With bridge 30x  With bridge 30x  With bridge 30x  With bridge 30x    Standing ABD   D/C      4# ea 30x NV      Heel raise  D/C            Nustep: strength ROM 5' 5'  5'  L6 5'  TM 10'  5' NP L6 5'  L6 5'    TKE ball wall  30x  30x 30x    30x 30x  30x 30x    Hamstring stretch with strap  20\"x4  20\"x4 HEP          Seated heel slide  D/C             Mini squats 30x BTB BTB 30x  BTB 30x  BTB 30x  BTB 30x   30x 30x  30x  30x    Lateral step ups 30x 6\" 30x 8\" 20x 8\" 20x  8\" 20x  30x 6\" 20x  6\" 30x  6\" 30x    Fwd step up 30x 8\" 30x  8\" 30x  8\" 30x  8\" 20x                      Wall 1/4 sit  10\"x BTB 5\"x10  BTB 5\"x10  BTB 5\"x10       5\"x10    Bike ROM  5' L3      5' full 5' full  5' full  5' full L3    Single limb 3-way heel tap  10x ea 10x ea  10x ea       10x ea    Calf press 80#/ 30 80# 30x  120# 20x 160# 30x 160# 30x  50# 30x 50# 30x 70# 30x 80# 30x    Prone ham cybex NV attempted *pain           Leg press  100# # 30x  130# 20x 135# 10x 150# 30x 150# 15x  140# x15     80# 30x    Single limb leg press  50# SL 50# 30x  60# " 30x 60# 30x  60# 15x   65#   15x      30# 30x    Cybex hip abd  50# 30x 75# 20x 75# 30x 80#   15x        Cybex hip add  50# 30x  60# 20x 65# 30x  75# 15x  80#   15x                                  NM Re-ed             Toe taps                                                                               Ther Activity                                       Gait Training                                       Modalities             Ice

## 2024-03-08 ENCOUNTER — OFFICE VISIT (OUTPATIENT)
Dept: OBGYN CLINIC | Facility: CLINIC | Age: 34
End: 2024-03-08

## 2024-03-08 VITALS
HEIGHT: 75 IN | BODY MASS INDEX: 25.24 KG/M2 | HEART RATE: 58 BPM | WEIGHT: 203 LBS | SYSTOLIC BLOOD PRESSURE: 112 MMHG | DIASTOLIC BLOOD PRESSURE: 60 MMHG

## 2024-03-08 DIAGNOSIS — M23.51 OLD COMPLETE ACL TEAR, RIGHT: Primary | ICD-10-CM

## 2024-03-08 DIAGNOSIS — Z98.890 S/P ACL RECONSTRUCTION: ICD-10-CM

## 2024-03-08 PROCEDURE — 99024 POSTOP FOLLOW-UP VISIT: CPT | Performed by: ORTHOPAEDIC SURGERY

## 2024-03-08 NOTE — PROGRESS NOTES
ASSESSMENT/PLAN:    Diagnoses and all orders for this visit:    Old complete ACL tear, right  -     Brace    S/P ACL reconstruction        The patient was seen and examined.  He is doing very well since surgery.  He may continue physical therapy and Occupational Therapy.  He was prescribed a functional knee brace.  He will follow-up with our office in 3 months for strength and motion check.  He is acceptable to this plan.    Return in about 3 months (around 6/8/2024).    The patient is doing quite well in regards to his right knee ACL reconstruction.  Stability is present.  No effusion.  Joint line tenderness is gone.  Continue home exercise program.  He was prescribed a functional knee brace.  He may participate in sports after April 17 and when the brace is available.  Return back in 3 months for final strength and motion check  _____________________________________________________  CHIEF COMPLAINT:  Chief Complaint   Patient presents with    Right Knee - Post-op, Follow-up         SUBJECTIVE:  Khalif Hood is a 33 y.o. male who presents to our office for a follow-up visit.  The patient is status post right knee arthroscopy with ACL reconstruction from 1/17/2024.  He is extremely pleased with the results of surgery.  He is ambulating without antalgic gait or an assist device.  He denies any numbness or tingling.  He denies any fever or chills.    The following portions of the patient's history were reviewed and updated as appropriate: allergies, current medications, past family history, past medical history, past social history, past surgical history and problem list.    PAST MEDICAL HISTORY:  Past Medical History:   Diagnosis Date    Asthma     as a child       PAST SURGICAL HISTORY:  Past Surgical History:   Procedure Laterality Date    INCISION AND DRAINAGE OF WOUND Left 06/28/2021    Procedure: INCISION AND DRAINAGE (I&D) LEFT SUBMANDIBULAR SPACE INFECTION, LEFT  SPACE INFECTION WITH EXTRACTION OF  TOOTH #19;  Surgeon: Fabi Hood DMD;  Location:  MAIN OR;  Service: Maxillofacial    ORIF TIBIA & FIBULA FRACTURES Right     SC ARTHRS AIDED ANT CRUCIATE LIGM RPR/AGMNTJ/RCNSTJ Right 1/17/2024    Procedure: Right - knee arthroscopy Lateral meniscectomy Synovectomy ACL reconstruction with allograft using the Arthrex graft link system Post arthroscopic injection of intra-articular joint space and peripheral portals;  Surgeon: Harvinder Ga DO;  Location: [facility];  Service: Orthopedics    SC ARTHRS KNE SURG W/MENISCECTOMY MED/LAT W/SHVG Right 1/10/2023    Procedure: ARTHROSCOPY KNEE WITH LATERAL MENISCECTOMY;  Surgeon: Harvinder Ga DO;  Location: [facility];  Service: Orthopedics       FAMILY HISTORY:  History reviewed. No pertinent family history.    SOCIAL HISTORY:  Social History     Tobacco Use    Smoking status: Every Day     Current packs/day: 0.50     Types: Cigarettes    Smokeless tobacco: Never   Vaping Use    Vaping status: Former    Start date: 1/1/2023   Substance Use Topics    Alcohol use: Never    Drug use: Yes     Frequency: 7.0 times per week     Types: Marijuana     Comment: daily       MEDICATIONS:    Current Outpatient Medications:     ascorbic acid (VITAMIN C) 250 mg tablet, Take 250 mg by mouth daily, Disp: , Rfl:     cholecalciferol (VITAMIN D3) 400 units tablet, Take 400 Units by mouth daily, Disp: , Rfl:     meloxicam (MOBIC) 15 mg tablet, Take 1 tablet (15 mg total) by mouth daily, Disp: 30 tablet, Rfl: 0    methadone (DOLOPHINE) 10 mg/mL oral concentrated solution, Take 25 mg by mouth every morning, Disp: , Rfl:     Omega-3 Fatty Acids (fish oil) 1,000 mg, Take 1,000 mg by mouth daily, Disp: , Rfl:     vitamin E 100 UNIT capsule, Take 100 Units by mouth daily, Disp: , Rfl:     aspirin 325 mg tablet, Take 1 tablet (325 mg total) by mouth daily, Disp: 30 tablet, Rfl: 0    ALLERGIES:  No Known Allergies    ROS:  Review of Systems     Constitutional: Negative for fatigue,  "fever or loss of appetite.   HENT: Negative.    Respiratory: Negative for shortness of breath, dyspnea.    Cardiovascular: Negative for chest pain/tightness.   Gastrointestinal: Negative for abdominal pain, N/V.   Endocrine: Negative for cold/heat intolerance, unexplained weight loss/gain.   Genitourinary: Negative for flank pain, dysuria, hematuria.   Musculoskeletal: Negative for arthralgia   Skin: Negative for rash.    Neurological: Negative for numbness or tingling  Psychiatric/Behavioral: Negative for agitation.  _____________________________________________________  PHYSICAL EXAMINATION:    Blood pressure 112/60, pulse 58, height 6' 3\" (1.905 m), weight 92.1 kg (203 lb).    Constitutional: Oriented to person, place, and time. Appears well-developed and well-nourished. No distress.   HENT:   Head: Normocephalic.   Eyes: Conjunctivae are normal. Right eye exhibits no discharge. Left eye exhibits no discharge. No scleral icterus.   Cardiovascular: Normal rate.    Pulmonary/Chest: Effort normal.   Neurological: Alert and oriented to person, place, and time.   Skin: Skin is warm and dry. No rash noted. Not diaphoretic. No erythema. No pallor.   Psychiatric: Normal mood and affect. Behavior is normal. Judgment and thought content normal.      MUSCULOSKELETAL EXAMINATION:   Physical Exam  Ortho Exam    Right lower extremity is neurovascularly intact  Toes are pink and mobile  Compartments are soft  Incisions are well-healed  Range of motion of the knee is from 0 to 120 degrees  No ligament laxity  Brisk cap refill and sensation intact  Objective:  BP Readings from Last 1 Encounters:   03/08/24 112/60      Wt Readings from Last 1 Encounters:   03/08/24 92.1 kg (203 lb)        BMI:   Estimated body mass index is 25.37 kg/m² as calculated from the following:    Height as of this encounter: 6' 3\" (1.905 m).    Weight as of this encounter: 92.1 kg (203 lb).          Scribe Attestation      I,:  Sanjay Cheek PA-C " am acting as a scribe while in the presence of the attending physician.:       I,:  Harvinder Ga, DO personally performed the services described in this documentation    as scribed in my presence.:

## 2024-03-14 ENCOUNTER — OFFICE VISIT (OUTPATIENT)
Dept: PHYSICAL THERAPY | Age: 34
End: 2024-03-14
Payer: COMMERCIAL

## 2024-03-14 DIAGNOSIS — M23.51 OLD COMPLETE ACL TEAR, RIGHT: Primary | ICD-10-CM

## 2024-03-14 PROCEDURE — 97112 NEUROMUSCULAR REEDUCATION: CPT

## 2024-03-14 PROCEDURE — 97110 THERAPEUTIC EXERCISES: CPT

## 2024-03-14 NOTE — PROGRESS NOTES
Daily Note     Today's date: 3/14/2024  Patient name: Khalif Hood  : 1990  MRN: [id number]  Referring provider: Sanjay Cheek,*  Dx:   Encounter Diagnosis     ICD-10-CM    1. Old complete ACL tear, right  M23.51           Start Time: 0830  Stop Time: 09  Total time in clinic (min): 55 minutes    Subjective: Feeling good, no new concerns. Feels his knee is more stable.       Objective: See treatment diary below      Assessment: Progressed program today with addition of tband resisted monster walks and side stepping for lateral glute strength. Cues for proper weight shifting to avoid ant translation of tibia over toes and to maintain outward pressure against band with knees. Also progressed bridges with addition of BOSU for increased challenge. Patient was able to perform prone ham curls on cybex today without pain. Patient would benefit from continued PT      Plan: Continue per plan of care.      POC expires Unit limit Auth Expiration date PT/OT + Visit Limit?   24 BOMN 24 20                           Visit/Unit Tracking  AUTH Status:  Date    Auth needed Used 1 2 3 4 5 6 7 8 9 10 11 12 13    Remaining  19 18 17 16 15 14 13 12 11 10 9 8 7    FOTO 22 (56)         RE NV           AUTH Status:  Date 3/5 3/7 3/14       Auth needed Used 14 15 16        Remaining  6 5 4 3 2 1 0    FOTO                  Precautions: PROTOCOL DOS     Access Code: [email or web address]  URL: [email or web address]  Date: 2024  Prepared by: Annemarie Soria    Exercises  - Supine Quad Set  - 1 x daily - 7 x weekly - 3 sets - 10 reps  - Active Straight Leg Raise with Quad Set  - 1 x daily - 7 x weekly - 3 sets - 10 reps  - Seated Heel Slide  - 1 x daily - 7 x weekly - 3 sets - 10 reps  - Supine Ankle Pumps  - 1 x daily - 7 x weekly - 3 sets - 10 reps  - Heel Raises with Counter Support  - 1 x daily - 7 x weekly - 3 sets - 10 reps  - Standing  "Hip Abduction with Counter Support  - 1 x daily - 7 x weekly - 3 sets - 10 reps  - Standing Hip Extension with Counter Support  - 1 x daily - 7 x weekly - 3 sets - 10 reps  - Mini Squat with Counter Support  - 1 x daily - 7 x weekly - 3 sets - 10 reps  Manuals 2/23 2/26  2/29 3/5  3/7 3/14  2/12 2/16 2/19   PROM        Hamstring stretch 4x 30\" Hamstring stretch 4x 30\" Hamstring stretch 4x 30\"   Patellar mobs        ROM JR  ROM JR  PROM JR    Roller R HS  JR  MS JR    JR  JR  JR _ proximal gastroc   Brace open   D/C         0-120 deg   TherEx             HEP/Pt ed/dressing change See above HS stretching HS stretching          Quad sets        30x  30x  30x    Heel slides 30x 30x  30x 30x  D/c   30x  30x  30x    SLR  5#/ 30 5# 30x 6# 20x 6# 30x  6#   30x 7.5# 30x  4# 30x 4# 30x 5# 30x   SL ABD 5#/ 30 5# 30x 6# 20x 6# 30x  6#  30x 7.5# 30x  4# 30x 4# 30x 5# 30x   Prone hip ext 5#/ 30 5# 30x  6# 20x 6# 30x  6#  30x 7.5# 30x  4# 30x 4# 30x 5# 30x   Prone knee flexion 3#/30 Standing 6# 30x  Standing 6# 30x  Standing 6# 30x  Standing 6# 30x  D/C   4# 30x 4# 30x 5# 30x   Ball ADD 30x BTB bridge 30x  BTB bridge 30x  BTB bridge 30x  BTB bridge 30x  On bosu, BTB bridge 30x   With bridge 30x  With bridge 30x  With bridge 30x    Standing ABD   D/C       NV      Heel raise  D/C            Nustep: strength/ROM 5' 5'  5'  L6 5'   L6 5'   NP L6 5'  L6 5'    TM      5' FW  5'BW 5' FW  5'BW       TKE ball wall  30x  30x 30x   D/C   30x  30x 30x    Hamstring stretch with strap  20\"x4  20\"x4 HEP           Mini squats 30x BTB BTB 30x  BTB 30x  BTB 30x  BTB 30x  BTB 30x   30x  30x  30x    Lateral step ups 30x 6\" 30x 8\" 20x 8\" 20x  8\" 20x 8\" 20x  6\" 20x  6\" 30x  6\" 30x    Fwd step up 30x 8\" 30x  8\" 30x  8\" 30x  8\" 20x  8\" 20x                     Wall 1/4 sit  10\"x BTB 5\"x10  BTB 5\"x10  BTB 5\"x10   BTB 5\"x10     5\"x10    Bike ROM  5' L3       5' full  5' full  5' full L3    Single limb 3-way heel tap  10x ea 10x ea  10x ea       10x ea  "   Calf press 80#/ 30 80# 30x  120# 20x 160# 30x 160# 30x 160# 30x   50# 30x 70# 30x 80# 30x    Prone ham cybex NV attempted *pain    Cybex 20# x10, 25# 2x10       Leg press  100# # 30x  130# 20x 135# 10x 150# 30x 150# 15x  140# x15 160# 3x10     80# 30x    Single limb leg press  50# SL 50# 30x  60# 30x 60# 30x  60# 15x   65#   15x  65# 3x10     30# 30x    Cybex hip abd  50# 30x 75# 20x 75# 30x 80#   15x 80# 30x        Cybex hip add  50# 30x  60# 20x 65# 30x  75# 15x  80#   15x 90# 3x10                                 NM Re-ed             Toe taps              Monster walk FW/BW       BTB 3 laps        Side stepping       BTB 3 laps                                               Ther Activity                                       Gait Training                                       Modalities             Ice

## 2024-03-18 ENCOUNTER — DOCUMENTATION (OUTPATIENT)
Dept: OBGYN CLINIC | Facility: CLINIC | Age: 34
End: 2024-03-18

## 2024-03-19 ENCOUNTER — OFFICE VISIT (OUTPATIENT)
Dept: PHYSICAL THERAPY | Age: 34
End: 2024-03-19
Payer: COMMERCIAL

## 2024-03-19 DIAGNOSIS — M23.51 OLD COMPLETE ACL TEAR, RIGHT: Primary | ICD-10-CM

## 2024-03-19 PROCEDURE — 97112 NEUROMUSCULAR REEDUCATION: CPT | Performed by: PHYSICAL THERAPIST

## 2024-03-19 PROCEDURE — 97110 THERAPEUTIC EXERCISES: CPT | Performed by: PHYSICAL THERAPIST

## 2024-03-19 NOTE — PROGRESS NOTES
Daily Note     Today's date: 3/19/2024  Patient name: Khalif Hood  : 1990  MRN: [id number]  Referring provider: Sanjay Cheek,*  Dx:   Encounter Diagnosis     ICD-10-CM    1. Old complete ACL tear, right  M23.51           Start Time: 09  Stop Time: 1015  Total time in clinic (min): 55 minutes    Subjective: Pt reports he is doing well. He has RTW and wearing brace. Awaiting approval for functional brace.       Objective: See treatment diary below      Assessment: Added SL proprioceptive activity. Pt did experience fatigue post-session in quad and overall felt challenged today. Pt is making good overall progress.       Plan: Continue per plan of care.      POC expires Unit limit Auth Expiration date PT/OT + Visit Limit?   24 BOMN 24 20                           Visit/Unit Tracking  AUTH Status:  Date    Auth needed Used 1 2 3 4 5 6 7 8 9 10 11 12 13    Remaining  19 18 17 16 15 14 13 12 11 10 9 8 7    FOTO 22 (56)         RE NV           AUTH Status:  Date 3/5 3/7 3/14 3/19      Auth needed Used 14 15 16        Remaining  6 5 4 3 2 1 0    FOTO                  Precautions: PROTOCOL DOS     Access Code: [email or web address]  URL: [email or web address]  Date: 2024  Prepared by: Annemarie Soria    Exercises  - Supine Quad Set  - 1 x daily - 7 x weekly - 3 sets - 10 reps  - Active Straight Leg Raise with Quad Set  - 1 x daily - 7 x weekly - 3 sets - 10 reps  - Seated Heel Slide  - 1 x daily - 7 x weekly - 3 sets - 10 reps  - Supine Ankle Pumps  - 1 x daily - 7 x weekly - 3 sets - 10 reps  - Heel Raises with Counter Support  - 1 x daily - 7 x weekly - 3 sets - 10 reps  - Standing Hip Abduction with Counter Support  - 1 x daily - 7 x weekly - 3 sets - 10 reps  - Standing Hip Extension with Counter Support  - 1 x daily - 7 x weekly - 3 sets - 10 reps  - Mini Squat with Counter Support  - 1 x daily - 7 x weekly - 3 sets  "- 10 reps  Manuals 2/23 2/26 2/29 3/5  3/7 3/14 3/19 2/12 2/16 2/19   PROM        Hamstring stretch 4x 30\" Hamstring stretch 4x 30\" Hamstring stretch 4x 30\"   Patellar mobs        ROM JR  ROM JR  PROM JR    Roller R HS  JR  MS JR    JR  JR  JR _ proximal gastroc   Brace open   D/C         0-120 deg   TherEx             HEP/Pt ed/dressing change See above HS stretching HS stretching          Quad sets        30x  30x  30x    Heel slides 30x 30x  30x 30x  D/c   30x  30x  30x    SLR  5#/ 30 5# 30x 6# 20x 6# 30x  6#   30x 7.5# 30x 7.5# 4# 30x 4# 30x 5# 30x   SL ABD 5#/ 30 5# 30x 6# 20x 6# 30x  6#  30x 7.5# 30x 7.5# 4# 30x 4# 30x 5# 30x   Prone hip ext 5#/ 30 5# 30x  6# 20x 6# 30x  6#  30x 7.5# 30x 7.5# 4# 30x 4# 30x 5# 30x   Prone knee flexion 3#/30 Standing 6# 30x  Standing 6# 30x  Standing 6# 30x  Standing 6# 30x  D/C   4# 30x 4# 30x 5# 30x   Ball ADD 30x BTB bridge 30x  BTB bridge 30x  BTB bridge 30x  BTB bridge 30x  On bosu, BTB bridge 30x  BOSU bridge with blk TB With bridge 30x  With bridge 30x  With bridge 30x    Standing ABD   D/C       NV      Heel raise  D/C            Nustep: strength/ROM 5' 5'  5'  L6 5'   L6 5'  5' L6 NP L6 5'  L6 5'    TM      5' FW  5'BW 5' FW  5'BW       TKE ball wall  30x  30x 30x   D/C   30x  30x 30x    Hamstring stretch with strap  20\"x4  20\"x4 HEP           Mini squats 30x BTB BTB 30x  BTB 30x  BTB 30x  BTB 30x  BTB 30x  30x 30x  30x  30x    Lateral step ups 30x 6\" 30x 8\" 20x 8\" 20x  8\" 20x 8\" 20x 20x 6\" 20x  6\" 30x  6\" 30x    Fwd step up 30x 8\" 30x  8\" 30x  8\" 30x  8\" 20x  8\" 20x  20x                   Wall 1/4 sit  10\"x BTB 5\"x10  BTB 5\"x10  BTB 5\"x10   BTB 5\"x10  Blk 10\"x10   5\"x10    Bike ROM  5' L3       5' full  5' full  5' full L3    Single limb 3-way heel tap  10x ea 10x ea  10x ea       10x ea    Calf press 80#/ 30 80# 30x  120# 20x 160# 30x 160# 30x 160# 30x  160# 50# 30x 70# 30x 80# 30x    Prone ham cybex NV attempted *pain    Cybex 20# x10, 25# 2x10 40#      Leg press  " 100# # 30x  130# 20x 135# 10x 150# 30x 150# 15x  140# x15 160# 3x10  170#/ 30x   80# 30x    Single limb leg press  50# SL 50# 30x  60# 30x 60# 30x  60# 15x   65#   15x  65# 3x10  70# 30x     30# 30x    Cybex hip abd  50# 30x 75# 20x 75# 30x 80#   15x 80# 30x  80#      Cybex hip add  50# 30x  60# 20x 65# 30x  75# 15x  80#   15x 90# 3x10 90#                                NM Re-ed             Toe taps              Monster walk FW/BW       BTB 3 laps  3x blk      Side stepping       BTB 3 laps  3x blk      SL dead lift cone tap        10x (3 cones)      SL fwd/side/back lunge       5x                   Ther Activity                                       Gait Training                                       Modalities             Ice

## 2024-03-21 ENCOUNTER — APPOINTMENT (OUTPATIENT)
Dept: PHYSICAL THERAPY | Age: 34
End: 2024-03-21
Payer: COMMERCIAL

## 2024-03-22 ENCOUNTER — OFFICE VISIT (OUTPATIENT)
Dept: PHYSICAL THERAPY | Age: 34
End: 2024-03-22
Payer: COMMERCIAL

## 2024-03-22 DIAGNOSIS — M23.51 OLD COMPLETE ACL TEAR, RIGHT: Primary | ICD-10-CM

## 2024-03-22 PROCEDURE — 97112 NEUROMUSCULAR REEDUCATION: CPT

## 2024-03-22 PROCEDURE — 97110 THERAPEUTIC EXERCISES: CPT

## 2024-03-22 NOTE — PROGRESS NOTES
Daily Note     Today's date: 3/22/2024  Patient name: Khalif Hood  : 1990  MRN: [id number]  Referring provider: Sanjay Cheek,*  Dx:   Encounter Diagnosis     ICD-10-CM    1. 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 Does note fatigue after weighted machines.       Objective: See treatment diary below  Patient was seen 1:1 for 45 min.     Assessment: Cues to avoid anterior translation of tibia over toes and adduction during single limb lunges with slider. Challenged by lateral movements, may need to modify next session by reducing ROM in order to maintain control. Patient c/o some lateral knee pain post this activity. Patient requests to perform balance and stability exercises prior to weights next session as he feels his knee fatigues and he has more difficulty completing the activities with good form. Progressed squats to squats on reverse BOSU with occasional UE support for balance and to maintain stability. Also added SLS with ball toss on pod, attempted foam with little challenge and BOSU with inability to maintain balance. Patient would benefit from continued PT      Plan: Continue per plan of care.      POC expires Unit limit Auth Expiration date PT/OT + Visit Limit?   24 BOMN 24 20                           Visit/Unit Tracking  AUTH Status:  Date  2/ 2   Auth needed Used 1 2 3 4 5 6 7 8 9 10 11 12 13    Remaining  19 18 17 16 15 14 13 12 11 10 9 8 7    FOTO 22 (56)         RE NV           AUTH Status:  Date 3/5 3/7 3/14 3/19 3/22      Auth needed Used 14 15 16        Remaining  6 5 4 3 2 1 0    FOTO                  Precautions: PROTOCOL DOS     Access Code: [email or web address]  URL: [email or web address]  Date: 2024  Prepared by: Annemarie Soria    Exercises  - Supine Quad  "Set  - 1 x daily - 7 x weekly - 3 sets - 10 reps  - Active Straight Leg Raise with Quad Set  - 1 x daily - 7 x weekly - 3 sets - 10 reps  - Seated Heel Slide  - 1 x daily - 7 x weekly - 3 sets - 10 reps  - Supine Ankle Pumps  - 1 x daily - 7 x weekly - 3 sets - 10 reps  - Heel Raises with Counter Support  - 1 x daily - 7 x weekly - 3 sets - 10 reps  - Standing Hip Abduction with Counter Support  - 1 x daily - 7 x weekly - 3 sets - 10 reps  - Standing Hip Extension with Counter Support  - 1 x daily - 7 x weekly - 3 sets - 10 reps  - Mini Squat with Counter Support  - 1 x daily - 7 x weekly - 3 sets - 10 reps  Manuals 2/23 2/26  2/29 3/5  3/7 3/14 3/19 3/22   2/19   PROM          Hamstring stretch 4x 30\"   Patellar mobs          PROM JR    Roller R HS  JR  MS JR      JR _ proximal gastroc   Brace open   D/C         0-120 deg   TherEx             HEP/Pt ed/dressing change See above HS stretching HS stretching          Quad sets          30x    Heel slides 30x 30x  30x 30x  D/c     30x    SLR  5#/ 30 5# 30x 6# 20x 6# 30x  6#   30x 7.5# 30x 7.5# home  5# 30x   SL ABD 5#/ 30 5# 30x 6# 20x 6# 30x  6#  30x 7.5# 30x 7.5# home  5# 30x   Prone hip ext 5#/ 30 5# 30x  6# 20x 6# 30x  6#  30x 7.5# 30x 7.5# Home   5# 30x   Ball ADD 30x BTB bridge 30x  BTB bridge 30x  BTB bridge 30x  BTB bridge 30x  On bosu, BTB bridge 30x  BOSU bridge with blk TB BOSU bridge with blk TB 30x  With bridge 30x    Standing ABD   D/C            Heel raise  D/C            Nustep: strength/ROM 5' 5'  5'  L6 5'   L6 5'  5' L6 L6 5'   L6 5'    TM      5' FW  5'BW 5' FW  5'BW  NT      Mini squats 30x BTB BTB 30x  BTB 30x  BTB 30x  BTB 30x  BTB 30x  30x D/C -see below  30x    Lateral step ups 30x 6\" 30x 8\" 20x 8\" 20x  8\" 20x 8\" 20x 20x 20x  6\" 30x    Fwd step up 30x 8\" 30x  8\" 30x  8\" 30x  8\" 20x  8\" 20x  20x 20x                   Wall 1/4 sit  10\"x BTB 5\"x10  BTB 5\"x10  BTB 5\"x10   BTB 5\"x10  Blk 10\"x10 Blk 10\"x10  5\"x10    Bike ROM  5' L3         5' full L3 "    Single limb 3-way heel tap  10x ea 10x ea  10x ea       10x ea    Calf press 80#/ 30 80# 30x  120# 20x 160# 30x 160# 30x 160# 30x  160# 160# 30x   80# 30x    Prone ham cybex NV attempted *pain    Cybex 20# x10, 25# 2x10 40# 40# 30x      Leg press  100# # 30x  130# 20x 135# 10x 150# 30x 150# 15x  140# x15 160# 3x10  170#/ 30x 170# 30x   80# 30x    Single limb leg press  50# SL 50# 30x  60# 30x 60# 30x  60# 15x   65#   15x  65# 3x10  70# 30x   75#  30x   30# 30x    Cybex hip abd  50# 30x 75# 20x 75# 30x 80#   15x 80# 30x  80# 70#  85# 2x10 ea      Cybex hip add  50# 30x  60# 20x 65# 30x  75# 15x  80#   15x 90# 3x10 90# 100# 3x10                                NM Re-ed             Toe taps              Monster walk FW/BW       BTB 3 laps  3x blk 3 laps blk      Side stepping       BTB 3 laps  3x blk 3 laps blk      SL dead lift cone tap        10x (3 cones) 10x (3 cones)     SL fwd/side/back lunge       5x Slider 10x ea     SLS on pod with MB toss         30x RMB      Squats on reverse BOSU        20x  +BTB                  Ther Activity                                       Gait Training                                       Modalities             Ice

## 2024-03-26 ENCOUNTER — APPOINTMENT (OUTPATIENT)
Dept: PHYSICAL THERAPY | Age: 34
End: 2024-03-26
Payer: COMMERCIAL

## 2024-03-28 ENCOUNTER — OFFICE VISIT (OUTPATIENT)
Dept: PHYSICAL THERAPY | Age: 34
End: 2024-03-28
Payer: COMMERCIAL

## 2024-03-28 DIAGNOSIS — M23.51 OLD COMPLETE ACL TEAR, RIGHT: Primary | ICD-10-CM

## 2024-03-28 PROCEDURE — 97110 THERAPEUTIC EXERCISES: CPT

## 2024-03-28 PROCEDURE — 97112 NEUROMUSCULAR REEDUCATION: CPT

## 2024-03-28 NOTE — PROGRESS NOTES
Daily Note     Today's date: 3/28/2024  Patient name: Khalif Hood  : 1990  MRN: [id number]  Referring provider: Sanjay Cheek,*  Dx:   Encounter Diagnosis     ICD-10-CM    1. Old complete ACL tear, right  M23.51           Start Time: 818  Stop Time: 920  Total time in clinic (min): 62 minutes    Subjective: Reports a slight increase in pain at his R lateral knee  since performing alt lunges with slider. Also notes some discomfort when reaching end range R knee flexion.       Objective: See treatment diary below  Patient was seen 1:1 for 45 min.     Assessment: Modified alt lunges with slider to R SLS with LLE taps 3-way and mirror for visual feedback to ensure he maintains proper alignment. Patient tends to cross midline with knee when performing SLS activities on RLE. Added tband to reverse bosu squats with improved knee and hip alignment. Patient would benefit from continued PT      Plan: Continue per plan of care.      POC expires Unit limit Auth Expiration date PT/OT + Visit Limit?   24 BOMN 24 20                           Visit/Unit Tracking  AUTH Status:  Date    Auth needed Used 1 2 3 4 5 6 7 8 9 10 11 12 13    Remaining  19 18 17 16 15 14 13 12 11 10 9 8 7    FOTO 22 (56)         RE NV           AUTH Status:  Date 3/5 3/7 3/14 3/19 3/22  3/28    Auth needed Used 14 15 16        Remaining  6 5 4 3 2 1 0    FOTO                  Precautions: PROTOCOL DOS     Access Code: [email or web address]  URL: [email or web address]  Date: 2024  Prepared by: Annemarie Soria    Exercises  - Supine Quad Set  - 1 x daily - 7 x weekly - 3 sets - 10 reps  - Active Straight Leg Raise with Quad Set  - 1 x daily - 7 x weekly - 3 sets - 10 reps  - Seated Heel Slide  - 1 x daily - 7 x weekly - 3 sets - 10 reps  - Supine Ankle Pumps  - 1 x daily - 7 x weekly - 3 sets - 10 reps  - Heel Raises with Counter Support  - 1 x daily - 7 x  "weekly - 3 sets - 10 reps  - Standing Hip Abduction with Counter Support  - 1 x daily - 7 x weekly - 3 sets - 10 reps  - Standing Hip Extension with Counter Support  - 1 x daily - 7 x weekly - 3 sets - 10 reps  - Mini Squat with Counter Support  - 1 x daily - 7 x weekly - 3 sets - 10 reps  Manuals 2/23 2/26  2/29 3/5  3/7 3/14 3/19 3/22  3/28    PROM             Patellar mobs             Roller R HS  JR  MS JR         Brace open   D/C            TherEx             HEP/Pt ed/dressing change See above HS stretching HS stretching          Quad sets             Heel slides 30x 30x  30x 30x  D/c        SLR  5#/ 30 5# 30x 6# 20x 6# 30x  6#   30x 7.5# 30x 7.5# home     SL ABD 5#/ 30 5# 30x 6# 20x 6# 30x  6#  30x 7.5# 30x 7.5# home     Prone hip ext 5#/ 30 5# 30x  6# 20x 6# 30x  6#  30x 7.5# 30x 7.5# Home      Ball ADD 30x BTB bridge 30x  BTB bridge 30x  BTB bridge 30x  BTB bridge 30x  On bosu, BTB bridge 30x  BOSU bridge with blk TB BOSU bridge with blk TB 30x HEP    Standing ABD   D/C            Heel raise  D/C            Nustep: strength/ROM 5' 5'  5'  L6 5'   L6 5'  5' L6 L6 5'  L6 5'    TM      5' FW  5'BW 5' FW  5'BW  NT      Mini squats 30x BTB BTB 30x  BTB 30x  BTB 30x  BTB 30x  BTB 30x  30x D/C -see below     Lateral step ups 30x 6\" 30x 8\" 20x 8\" 20x  8\" 20x 8\" 20x 20x 20x 20x    Fwd step up 30x 8\" 30x  8\" 30x  8\" 30x  8\" 20x  8\" 20x  20x 20x  20x *W/ L hip flex                Wall 1/4 sit  10\"x BTB 5\"x10  BTB 5\"x10  BTB 5\"x10   BTB 5\"x10  Blk 10\"x10 Blk 10\"x10 Blk 10\"x10    Bike ROM  5' L3            Single limb 3-way heel tap  10x ea 10x ea  10x ea          Calf press 80#/ 30 80# 30x  120# 20x 160# 30x 160# 30x 160# 30x  160# 160# 30x  150# 30x     Prone ham cybex NV attempted *pain    Cybex 20# x10, 25# 2x10 40# 40# 30x  45# 30x     Leg press  100# # 30x  130# 20x 135# 10x 150# 30x 150# 15x  140# x15 160# 3x10  170#/ 30x 170# 30x  180# 30x    Single limb leg press  50# SL 50# 30x  60# 30x 60# 30x  60# 15x "   65#   15x  65# 3x10  70# 30x   75#  30x  80# 30x     Cybex hip abd  50# 30x 75# 20x 75# 30x 80#   15x 80# 30x  80# 70#  85# 2x10 ea  80# 30x     Cybex hip add  50# 30x  60# 20x 65# 30x  75# 15x  80#   15x 90# 3x10 90# 100# 3x10  100# 30x                               NM Re-ed             Toe taps              Monster walk FW/BW       BTB 3 laps  3x blk 3 laps blk  3 laps blk     Side stepping       BTB 3 laps  3x blk 3 laps blk  3 laps blk     SL dead lift cone tap        10x (3 cones) 10x (3 cones) 10x (3 cones)    SL fwd/side/back lunge       5x Slider 10x ea L heel taps with mirror 10x ea    SLS on pod with MB toss         30x RMB  RMB 30x dynodisc     Squats on reverse BOSU        20x  Blk tband 30x                 Ther Activity                                       Gait Training                                       Modalities             Ice

## 2024-04-02 ENCOUNTER — OFFICE VISIT (OUTPATIENT)
Dept: PHYSICAL THERAPY | Age: 34
End: 2024-04-02
Payer: COMMERCIAL

## 2024-04-02 DIAGNOSIS — M23.51 OLD COMPLETE ACL TEAR, RIGHT: Primary | ICD-10-CM

## 2024-04-02 PROCEDURE — 97110 THERAPEUTIC EXERCISES: CPT

## 2024-04-02 PROCEDURE — 97112 NEUROMUSCULAR REEDUCATION: CPT

## 2024-04-02 NOTE — PROGRESS NOTES
"Daily Note     Today's date: 2024  Patient name: Khalif Hood  : 1990  MRN: [id number]  Referring provider: Sanjay Cheek,*  Dx:   Encounter Diagnosis     ICD-10-CM    1. Old complete ACL tear, right  M23.51           Start Time: 0815  Stop Time: 0915  Total time in clinic (min): 60 minutes    Subjective: Reports significant improvement each week. Decreased pain along R lateral knee, increases as fatigue sets in. Improved function with fatigue setting in with increased activity. Limited tolerance to walking long distances due to fatigue and unable to run at this time. States he has been jogging a little bit, does fatigue but no c/o his knee buckling. Some discomfort noted at end range flexion at lateral/posterior knee.       Objective: See treatment diary below  Patient was seen 1:1 for 30 min.     Active Range of Motion     Right Knee   Flexion: 130 degrees with pain  Extension: 0 degrees     Passive Range of Motion     Right Knee   Flexion: 132  degrees with pain    Strength/Myotome Testing     Right Hip   Planes of Motion   Abduction: 4+  Adduction: 4+    Right Knee   Flexion: 4+  Extension: 4+    Swelling     Left Knee Girth Measurement (cm)   Joint line: 38.5 cm    Right Knee Girth Measurement (cm)   Joint line: 38.6 cm    Assessment: Patient is in 10-16 week phase of protocol. Strength and ROM have improved, see measurements above. Overall function has improved, with some limitations due to protocol and fatigue that the patient finds limiting. Progressed program with addition of quick stepping, toe taps to 4\" step with some difficulty with coordination, and double limb hopping. Cues to land softly on toes. No issues with 3-way lunges today, improved knee and hip positioning with fewer cues. Added L hip flexion with RLE step ups with good tolerance. Will continue to progress within the expectations of the protocol and within the patient's tolerance. Patient would benefit from continued " "PT.      Plan: Continue per plan of care.      POC expires Unit limit Auth Expiration date PT/OT + Visit Limit?   4/18/24 BOMN 4/12/24 20                           Visit/Unit Tracking  AUTH Status:  Date 1/19 1/22 1/26 1/29 2/1 2/5 2/9 2/12 2/16 2/19 2/23 2/26 2/29   Auth needed Used 1 2 3 4 5 6 7 8 9 10 11 12 13    Remaining  19 18 17 16 15 14 13 12 11 10 9 8 7    FOTO 22 (56)         RE NV           AUTH Status:  Date 3/5 3/7 3/14 3/19 3/22  3/28 4/2   Auth needed Used 14 15 16 17 18 19 20    Remaining  6 5 4 3 2 1 0    FOTO                  Precautions: PROTOCOL DOS 1/17    Access Code: [email or web address]  URL: [email or web address]  Date: 01/19/2024  Prepared by: Annemarie Soria    Exercises  - Supine Quad Set  - 1 x daily - 7 x weekly - 3 sets - 10 reps  - Active Straight Leg Raise with Quad Set  - 1 x daily - 7 x weekly - 3 sets - 10 reps  - Seated Heel Slide  - 1 x daily - 7 x weekly - 3 sets - 10 reps  - Supine Ankle Pumps  - 1 x daily - 7 x weekly - 3 sets - 10 reps  - Heel Raises with Counter Support  - 1 x daily - 7 x weekly - 3 sets - 10 reps  - Standing Hip Abduction with Counter Support  - 1 x daily - 7 x weekly - 3 sets - 10 reps  - Standing Hip Extension with Counter Support  - 1 x daily - 7 x weekly - 3 sets - 10 reps  - Mini Squat with Counter Support  - 1 x daily - 7 x weekly - 3 sets - 10 reps  Manuals 2/23 2/26  2/29 3/5  3/7 3/14 3/19 3/22  3/28 4/2    PROM             Patellar mobs             Roller R HS  JR  MS JR         TherEx             Nustep: strength/ROM 5' 5'  5'  L6 5'   L6 5'  5' L6 L6 5'  L6 5' L6 5'    TM      5' FW  5'BW 5' FW  5'BW  NT     Lateral step ups 30x 6\" 30x 8\" 20x 8\" 20x  8\" 20x 8\" 20x 20x 20x 20x 20x    Fwd step up 30x 8\" 30x  8\" 30x  8\" 30x  8\" 20x  8\" 20x  20x 20x  20x 20x W/ L hip flex                Wall 1/4 sit  10\"x BTB 5\"x10  BTB 5\"x10  BTB 5\"x10   BTB 5\"x10  Blk 10\"x10 Blk 10\"x10 Blk 10\"x10 Blk 10\"x10    Bike ROM  5' L3            Single limb 3-way heel tap " " 10x ea 10x ea  10x ea          Calf press 80#/ 30 80# 30x  120# 20x 160# 30x 160# 30x 160# 30x  160# 160# 30x  150# 30x  150# 30x    Prone ham cybex NV attempted *pain    Cybex 20# x10, 25# 2x10 40# 40# 30x  45# 30x  45# 30x    Leg press  100# # 30x  130# 20x 135# 10x 150# 30x 150# 15x  140# x15 160# 3x10  170#/ 30x 170# 30x  180# 30x 190# 30x   Single limb leg press  50# SL 50# 30x  60# 30x 60# 30x  60# 15x   65#   15x  65# 3x10  70# 30x   75#  30x  80# 30x  90# 30x    Cybex hip abd  50# 30x 75# 20x 75# 30x 80#   15x 80# 30x  80# 70#  85# 2x10 ea  80# 30x  90# 30x    Cybex hip add  50# 30x  60# 20x 65# 30x  75# 15x  80#   15x 90# 3x10 90# 100# 3x10  100# 30x  100# 30x                              NM Re-ed             Toe taps              Monster walk FW/BW       BTB 3 laps  3x blk 3 laps blk  3 laps blk  3 laps   Side stepping       BTB 3 laps  3x blk 3 laps blk  3 laps blk  3 laps    SL dead lift cone tap        10x (3 cones) 10x (3 cones) 10x (3 cones) 10x (3 cones)   SL fwd/side/back lunge       5x Slider 10x ea L heel taps with mirror 10x ea 10x ea    SLS on pod with MB toss         30x RMB  RMB 30x dynodisc  RMB 30x dynodisc    Squats on reverse BOSU        20x  Blk tband 30x Blk tband 30x                 Ther Activity             Quick stepping FW/BW, lat          1' ea    Toe taps to step           4\" 30x    Jogging              FW/BW double limb hop           10x                 Gait Training                                       Modalities             Ice                                                            "

## 2024-04-05 ENCOUNTER — OFFICE VISIT (OUTPATIENT)
Dept: PHYSICAL THERAPY | Age: 34
End: 2024-04-05
Payer: COMMERCIAL

## 2024-04-05 DIAGNOSIS — M23.51 OLD COMPLETE ACL TEAR, RIGHT: Primary | ICD-10-CM

## 2024-04-05 PROCEDURE — 97110 THERAPEUTIC EXERCISES: CPT

## 2024-04-05 PROCEDURE — 97530 THERAPEUTIC ACTIVITIES: CPT

## 2024-04-05 PROCEDURE — 97112 NEUROMUSCULAR REEDUCATION: CPT

## 2024-04-05 NOTE — PROGRESS NOTES
Daily Note     Today's date: 2024  Patient name: Khalif Hood  : 1990  MRN: [id number]  Referring provider: Sanjay Cheek,*  Dx:   Encounter Diagnosis     ICD-10-CM    1. Old complete ACL tear, right  M23.51           Start Time: 0900  Stop Time: 1000  Total time in clinic (min): 60 minutes    Subjective: Reports feeling significantly better with minimal pain.       Objective: See treatment diary below  Patient was seen 1:1 for 45 min.     Assessment: No issues with 3 way slider today, improved hip and knee positioning. Added light jogging with good tolerance and no pain, IR at B hips during swing phase and absent heel strike. Hesitant with double limb hopping, performed in agility ladder this session. Cues for lighter landing, keeping weight posteriorly to avoid ant translation of tibia, and shorter distances/height to his comfort. Some lingering soreness post, reduced weights on cybex machines today. Patient would benefit from continued PT      Plan: Continue per plan of care.      POC expires Unit limit Auth Expiration date PT/OT + Visit Limit?   24 BOMN 24 20                           Visit/Unit Tracking  AUTH Status:  Date    Auth needed Used 1 2 3 4 5 6 7 8 9 10 11 12 13    Remaining  19 18 17 16 15 14 13 12 11 10 9 8 7    FOTO 22 (56)         RE NV           AUTH Status:  Date 3/5 3/7 3/14 3/19 3/22  3/28 4   Auth needed Used 14 15 16 17 18 19 20    Remaining  6 5 4 3 2 1 0    FOTO              AUTH Status:  Date           Auth after 8 Used 1           Remaining  7 6 5 4 3 2 1 0    FOTO                       Precautions: PROTOCOL DOS     Access Code: [email or web address]  URL: [email or web address]  Date: 2024  Prepared by: Annemarie Soria    Exercises  - Supine Quad Set  - 1 x daily - 7 x weekly - 3 sets - 10 reps  - Active Straight Leg Raise with Quad Set  - 1 x daily - 7 x weekly - 3 sets - 10  "reps  - Seated Heel Slide  - 1 x daily - 7 x weekly - 3 sets - 10 reps  - Supine Ankle Pumps  - 1 x daily - 7 x weekly - 3 sets - 10 reps  - Heel Raises with Counter Support  - 1 x daily - 7 x weekly - 3 sets - 10 reps  - Standing Hip Abduction with Counter Support  - 1 x daily - 7 x weekly - 3 sets - 10 reps  - Standing Hip Extension with Counter Support  - 1 x daily - 7 x weekly - 3 sets - 10 reps  - Mini Squat with Counter Support  - 1 x daily - 7 x weekly - 3 sets - 10 reps  Manuals 4/5   2/29 3/5  3/7 3/14 3/19 3/22  3/28 4/2    PROM             Patellar mobs             Roller R HS   MS JR         TherEx             Nustep: strength/ROM   5'  L6 5'   L6 5'  5' L6 L6 5'  L6 5' L6 5'    TM      5' FW  5'BW 5' FW  5'BW  NT     Lateral step ups 30x  8\" 20x 8\" 20x  8\" 20x 8\" 20x 20x 20x 20x 20x    Fwd step up 30x W/ L hip flex  8\" 30x  8\" 30x  8\" 20x  8\" 20x  20x 20x  20x 20x W/ L hip flex                Wall 1/4 sit  10\"x10  BTB 5\"x10  BTB 5\"x10   BTB 5\"x10  Blk 10\"x10 Blk 10\"x10 Blk 10\"x10 Blk 10\"x10    Bike ROM 5'             Calf press 150# 30x  120# 20x 160# 30x 160# 30x 160# 30x  160# 160# 30x  150# 30x  150# 30x    Prone ham cybex 30# 15x, 40# 15x     Cybex 20# x10, 25# 2x10 40# 40# 30x  45# 30x  45# 30x    Leg press  180# 2x15  130# 20x 135# 10x 150# 30x 150# 15x  140# x15 160# 3x10  170#/ 30x 170# 30x  180# 30x 190# 30x   Single limb leg press  80# 2x15  60# 30x 60# 30x  60# 15x   65#   15x  65# 3x10  70# 30x   75#  30x  80# 30x  90# 30x    Cybex hip abd 90# 30x   75# 20x 75# 30x 80#   15x 80# 30x  80# 70#  85# 2x10 ea  80# 30x  90# 30x    Cybex hip add 100# 30x  60# 20x 65# 30x  75# 15x  80#   15x 90# 3x10 90# 100# 3x10  100# 30x  100# 30x    Staggered RDL  12# 2x10             Reverse lunge  NV                                                    NM Re-ed             Toe taps              Monster walk FW/BW  3 laps       BTB 3 laps  3x blk 3 laps blk  3 laps blk  3 laps   Side stepping  3 laps      BTB 3 " "laps  3x blk 3 laps blk  3 laps blk  3 laps    SL dead lift cone tap  10x (3 cones)      10x (3 cones) 10x (3 cones) 10x (3 cones) 10x (3 cones)   SL fwd/side/back lunge 10x ea slider       5x Slider 10x ea L heel taps with mirror 10x ea 10x ea    SLS on pod with MB toss  RMB 30x dynodisc        30x RMB  RMB 30x dynodisc  RMB 30x dynodisc    Squats on reverse BOSU 30x        20x  Blk tband 30x Blk tband 30x                 Ther Activity             Quick stepping FW/BW, lat 1' ea         1' ea    Toe taps to step  20x          4\" 30x    Jogging  40 ft 3 laps             FW/BW double limb hop  Agility ladder 2 laps         10x    Agility ladder:   FW  Lateral                          Gait Training                                       Modalities             Ice                                                              "

## 2024-04-09 ENCOUNTER — OFFICE VISIT (OUTPATIENT)
Dept: PHYSICAL THERAPY | Age: 34
End: 2024-04-09
Payer: COMMERCIAL

## 2024-04-09 DIAGNOSIS — M23.51 OLD COMPLETE ACL TEAR, RIGHT: Primary | ICD-10-CM

## 2024-04-09 PROCEDURE — 97530 THERAPEUTIC ACTIVITIES: CPT

## 2024-04-09 PROCEDURE — 97110 THERAPEUTIC EXERCISES: CPT

## 2024-04-09 PROCEDURE — 97112 NEUROMUSCULAR REEDUCATION: CPT

## 2024-04-09 NOTE — PROGRESS NOTES
Daily Note     Today's date: 2024  Patient name: Khalif Hood  : 1990  MRN: [id number]  Referring provider: Sanjay Cheek,*  Dx:   Encounter Diagnosis     ICD-10-CM    1. 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 States he was able to catch himself but has not felt that in a while. States he was playing softball, but taking it easy just playing in the outfield doing some light jogging and nothing more than he has done in therapy. Reports he was on his feet for about 4 hours and it is possible that it was fatigued. No significant pain noted, mostly tightness. States he was wearing his brace. Recommended against returning to sport at this time as per protocol.       Objective: See treatment diary below  Patient was seen 1:1 for 60 min.     Assessment: Improved form when light jogging on firm surface, cues for heelstrike and lighter landing. Worked on double limb hopping between 2 tiles on floor instead of agility ladder today to work on softer landings. Progressed step ups FW and lateral to a higher surface, 12 inch block. Added reverse lunges with cues for proper stance and weight shifting. Fatigue noted post session, but no increase in pain. Reviewed self stretching of HS and calf for HEP. Patient would benefit from continued PT      Plan: Continue per plan of care.      POC expires Unit limit Auth Expiration date PT/OT + Visit Limit?   24 BOMN 24 20                           Visit/Unit Tracking  AUTH Status:  Date 1/19 1/22 1/26 1/29 2 2/ 2/   Auth needed Used 1 2 3 4 5 6 7 8 9 10 11 12 13    Remaining  19 18 17 16 15 14 13 12 11 10 9 8 7    FOTO 22 (56)         RE NV           AUTH Status:  Date 3/5 3/7 3/14 3/19 3/22  3/28 4/2   Auth needed Used 14 15 16 17 18 19 20    Remaining  6 5 4  "3 2 1 0    FOTO              AUTH Status:  Date 4/5 4/9         Auth after 8 Used 1 2          Remaining  7 6 5 4 3 2 1 0    FOTO                       Precautions: PROTOCOL DOS 1/17    Access Code: [email or web address]  URL: [email or web address]  Date: 01/19/2024  Prepared by: Annemarie Soria    Exercises  - Supine Quad Set  - 1 x daily - 7 x weekly - 3 sets - 10 reps  - Active Straight Leg Raise with Quad Set  - 1 x daily - 7 x weekly - 3 sets - 10 reps  - Seated Heel Slide  - 1 x daily - 7 x weekly - 3 sets - 10 reps  - Supine Ankle Pumps  - 1 x daily - 7 x weekly - 3 sets - 10 reps  - Heel Raises with Counter Support  - 1 x daily - 7 x weekly - 3 sets - 10 reps  - Standing Hip Abduction with Counter Support  - 1 x daily - 7 x weekly - 3 sets - 10 reps  - Standing Hip Extension with Counter Support  - 1 x daily - 7 x weekly - 3 sets - 10 reps  - Mini Squat with Counter Support  - 1 x daily - 7 x weekly - 3 sets - 10 reps  Manuals 4/5  4/9  3/5  3/7 3/14 3/19 3/22  3/28 4/2    PROM             Patellar mobs             Roller R HS    JR         TherEx             Nustep: strength/ROM    L6 5'   L6 5'  5' L6 L6 5'  L6 5' L6 5'    TM      5' FW  5'BW 5' FW  5'BW  NT     Lateral step ups 30x 12\" 20x   8\" 20x  8\" 20x 8\" 20x 20x 20x 20x 20x    Fwd step up 30x W/ L hip flex 12\" w/ L hip flex 20x   8\" 30x  8\" 20x  8\" 20x  20x 20x  20x 20x W/ L hip flex                Wall 1/4 sit  10\"x10 Blk 10\"x10  BTB 5\"x10   BTB 5\"x10  Blk 10\"x10 Blk 10\"x10 Blk 10\"x10 Blk 10\"x10    Bike ROM 5'  5'           Calf press 150# 30x 150# 3x12  160# 30x 160# 30x 160# 30x  160# 160# 30x  150# 30x  150# 30x    Prone ham cybex 30# 15x, 40# 15x 50# 3x12    Cybex 20# x10, 25# 2x10 40# 40# 30x  45# 30x  45# 30x    Leg press  180# 2x15 190# 3x12  150# 30x 150# 15x  140# x15 160# 3x10  170#/ 30x 170# 30x  180# 30x 190# 30x   Single limb leg press  80# 2x15 85# 3x12  60# 30x  60# 15x   65#   15x  65# 3x10  70# 30x   75#  30x  80# 30x  90# 30x  " "  Cybex hip abd 90# 30x  95# 3x12  75# 30x 80#   15x 80# 30x  80# 70#  85# 2x10 ea  80# 30x  90# 30x    Cybex hip add 100# 30x 100# 3x12  65# 30x  75# 15x  80#   15x 90# 3x10 90# 100# 3x10  100# 30x  100# 30x    Staggered RDL  12# 2x10  12# 2x10            Reverse lunge  NV  10x            TKE Kesier   12# 3x10                                     NM Re-ed             Toe taps              Monster walk FW/BW  3 laps   3 laps blk     BTB 3 laps  3x blk 3 laps blk  3 laps blk  3 laps   Side stepping  3 laps  X walks blk 2 laps 30 ft    BTB 3 laps  3x blk 3 laps blk  3 laps blk  3 laps    SL dead lift cone tap  10x (3 cones) 10x      10x (3 cones) 10x (3 cones) 10x (3 cones) 10x (3 cones)   SL fwd/side/back lunge 10x ea slider  10x ea slider (R limb static)     5x Slider 10x ea L heel taps with mirror 10x ea 10x ea    SLS on pod with MB toss  RMB 30x dynodisc  RMB BOSU 30x       30x RMB  RMB 30x dynodisc  RMB 30x dynodisc    Squats on reverse BOSU 30x  Blk tband 30x       20x  Blk tband 30x Blk tband 30x                 Ther Activity             Quick stepping FW/BW, lat 1' ea 1' ea         1' ea    Toe taps to step  20x  20x         4\" 30x    Jogging  40 ft 3 laps  40 ft 3 laps            FW/BW double limb hop  Agility ladder 2 laps 10x tiles on floor         10x    Agility ladder:   FW  Lateral  NV                         Gait Training                                       Modalities             Ice                                                                "

## 2024-04-12 ENCOUNTER — APPOINTMENT (OUTPATIENT)
Dept: PHYSICAL THERAPY | Age: 34
End: 2024-04-12
Payer: COMMERCIAL

## 2024-04-16 ENCOUNTER — EVALUATION (OUTPATIENT)
Dept: PHYSICAL THERAPY | Age: 34
End: 2024-04-16
Payer: COMMERCIAL

## 2024-04-16 DIAGNOSIS — M23.51 OLD COMPLETE ACL TEAR, RIGHT: Primary | ICD-10-CM

## 2024-04-16 PROCEDURE — 97110 THERAPEUTIC EXERCISES: CPT | Performed by: PHYSICAL THERAPIST

## 2024-04-16 NOTE — PROGRESS NOTES
PT Re-Evaluation     Today's date: 2024  Patient name: Khalif Hood  : 1990  MRN: [id number]  Referring provider: Sanjay Cheek,*  Dx:   Encounter Diagnosis     ICD-10-CM    1. 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 His ROM is overall WFL but he does lack terminal flexion. He is having new onset of a feeling of instability since last week when he admitted to playing softball. Discussed protocol and return to sport with patient as protocol calls for 6 month return to sport. Lachman's and anterior drawer are both negative and he does not appear to have laxity at this time. Marylou's strength is improving and we are actively working on proprioception. He does have affected gait and appears to lack TKE in stance but also is a prior toe walker which may contribute to gait deviations. At thsi time, Khalif is making good progress towards set goals with progressive improvement in strength and function and would benefit from continued PT to achieve maximum functional potential.   Impairments: abnormal gait, abnormal or restricted ROM, activity intolerance, impaired physical strength, lacks appropriate home exercise program, pain with function and weight-bearing intolerance  Functional limitations: walking, standing, working, running, playing recreational softabll, exercising at gym, adls/iadlsUnderstanding of Dx/Px/POC: good   Prognosis: good    Goals  ST-3 WEEKS  1.  Decrease pain by 2 points on VAS at its worst. MET  2.  Increase ROM by > 5 deg in all deficients planes. All WFL  3.  Increase LE strength by 1 MMT grade. MET    LT-12 WEEKS  1. Patient to be independent with a/iadls. MET adls, light iadls  2. Increase functional activities for leisure and home activities  to previous LOF. By d/c  3. Independent with HEP and/or fitness program.  4. Normalize gait. MET  LTG > 12 weeks.   5. RTW. MET  6. Return recreational sports and activities.     Plan  Patient would benefit from: skilled physical therapy  Planned modality interventions: cryotherapy  Planned therapy interventions: functional ROM exercises, home exercise program, manual therapy, neuromuscular re-education, patient education, strengthening, stretching, therapeutic activities and therapeutic exercise  Frequency: 2-3x week.  Duration in weeks: 12  Plan of Care beginning date: 4/16/2024  Plan of Care expiration date: 6/28/2024  Treatment plan discussed with: patient        Subjective Evaluation    History of Present Illness  Date of surgery: 1/17/2024  Mechanism of injury: surgery  Mechanism of injury: Pt sustained a partial ACL injury in the past and it progressively worsened with knee pain an then he felt a pop while running playing softball. He began to have more pain and at times the knee would give out.   He underwent ACL reconstruction with allograft and lateral menisectomy. He is immobilized in a long leg brace, walking with crutches and referred for PT.  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     2/23/24  Pt reports he has little to no pain except produced at end range flexion. He feels stable and stronger.     4/16/24  Pt reports he feels weaker but notes he has more movement. He does describe a sensation of giving out at times for the past 2 weeks so. He did admit to playing softball last week. He reports that his physician told him it was ok (protocol has return to sport 6 months).  Pt has retunred to work.   Pt is scheduled to see Dr Ga next week to have knee drained.   Patient Goals  Patient goals for therapy: return to sport/leisure activities, return to work, decreased edema, decreased pain, increased strength, increased motion  and independence with ADLs/IADLs  Patient goal: be able to play softball  Pain  Current pain ratin  At worst pain ratin (at end range flexion)  Location: right knee  Progression: improved          Objective     Active Range of Motion     Right Knee   Flexion: 118 degrees with pain  Extension: -2 degrees     Passive Range of Motion     Right Knee   Flexion: 130 degrees with pain  Extension: 0 degrees     Mobility   Patellar Mobility:     Right Knee   WFL: medial, lateral, superior and inferior    Strength/Myotome Testing     Left Hip   Normal muscle strength    Right Hip   Planes of Motion   Abduction: 4  Adduction: 4+    Left Knee   Normal strength    Right Knee   Flexion: 4  Extension: 4  Quadriceps contraction: good    Left Ankle/Foot   Normal strength    Right Ankle/Foot   Normal strength    Tests     Right Knee   Negative active quad and anterior Lachman.     Swelling     Left Knee Girth Measurement (cm)   Joint line: 37.8 cm    Right Knee Girth Measurement (cm)   Joint line: 37.8 cm    Ambulation   Weight-Bearing Status   Weight-Bearing Status (Right): full weight-bearing      Ambulation: Level Surfaces   Ambulation without assistive device: independent             POC expires Unit limit Auth Expiration date PT/OT + Visit Limit?   24 BOMN 24 20                           Visit/Unit Tracking  AUTH Status:  Date 1/19 1/22 1/26 1/29 2/1 2/5 2/9 2/12 2/16  2/19 2/23 2/26 2/29   Auth needed Used 1 2 3 4 5 6 7 8 9 10 11 12 13    Remaining  19 18 17 16 15 14 13 12 11 10 9 8 7    FOTO 22 (56)         RE NV           AUTH Status:  Date 3/5 3/7 3/14 3/19 3/22  3/28 4/   Auth needed Used 14 15 16 17 18 19 20    Remaining  6 5 4 3 2 1 0    FOTO              AUTH Status:  Date         Auth after 8 Used 1 2 3         Remaining  7 6 5 4 3 2 1 0    FOTO   72 (56)                    Precautions: PROTOCOL DOS     Access Code: [email or web address]  URL: [email or web address]  Date:  "01/19/2024  Prepared by: Annemarie Soria    Exercises  - Supine Quad Set  - 1 x daily - 7 x weekly - 3 sets - 10 reps  - Active Straight Leg Raise with Quad Set  - 1 x daily - 7 x weekly - 3 sets - 10 reps  - Seated Heel Slide  - 1 x daily - 7 x weekly - 3 sets - 10 reps  - Supine Ankle Pumps  - 1 x daily - 7 x weekly - 3 sets - 10 reps  - Heel Raises with Counter Support  - 1 x daily - 7 x weekly - 3 sets - 10 reps  - Standing Hip Abduction with Counter Support  - 1 x daily - 7 x weekly - 3 sets - 10 reps  - Standing Hip Extension with Counter Support  - 1 x daily - 7 x weekly - 3 sets - 10 reps  - Mini Squat with Counter Support  - 1 x daily - 7 x weekly - 3 sets - 10 reps  Manuals 4/5  4/9 4/16 3/5  3/7 3/14 3/19 3/22  3/28 4/2    PROM             Patellar mobs             Roller R HS    JR         TherEx             Nustep: strength/ROM    L6 5'   L6 5'  5' L6 L6 5'  L6 5' L6 5'    TM      5' FW  5'BW 5' FW  5'BW  NT     Lateral step ups 30x 12\" 20x  20x 8\" 20x  8\" 20x 8\" 20x 20x 20x 20x 20x    Fwd step up 30x W/ L hip flex 12\" w/ L hip flex 20x  20x 8\" 30x  8\" 20x  8\" 20x  20x 20x  20x 20x W/ L hip flex                Wall 1/4 sit  10\"x10 Blk 10\"x10 10x blk BTB 5\"x10   BTB 5\"x10  Blk 10\"x10 Blk 10\"x10 Blk 10\"x10 Blk 10\"x10    Bike ROM 5'  5' 5'          Calf press 150# 30x 150# 3x12 150#/ 30 160# 30x 160# 30x 160# 30x  160# 160# 30x  150# 30x  150# 30x    Prone ham cybex 30# 15x, 40# 15x 50# 3x12 50#/ 30   Cybex 20# x10, 25# 2x10 40# 40# 30x  45# 30x  45# 30x    Leg press  180# 2x15 190# 3x12 200# 3x10 150# 30x 150# 15x  140# x15 160# 3x10  170#/ 30x 170# 30x  180# 30x 190# 30x   Single limb leg press  80# 2x15 85# 3x12 90# 3x10 60# 30x  60# 15x   65#   15x  65# 3x10  70# 30x   75#  30x  80# 30x  90# 30x    Cybex hip abd 90# 30x  95# 3x12 100#/ 30 75# 30x 80#   15x 80# 30x  80# 70#  85# 2x10 ea  80# 30x  90# 30x    Cybex hip add 100# 30x 100# 3x12 110/30 65# 30x  75# 15x  80#   15x 90# 3x10 90# 100# 3x10  100# " "30x  100# 30x    Staggered RDL  12# 2x10  12# 2x10  12#/ 20          Reverse lunge  NV  10x            TKE Kesier   12# 3x10 12#/ 30                                    NM Re-ed             Toe taps              Monster walk FW/BW  3 laps   3 laps blk  3x   BTB 3 laps  3x blk 3 laps blk  3 laps blk  3 laps   Side stepping  3 laps  X walks blk 2 laps 30 ft 2x    BTB 3 laps  3x blk 3 laps blk  3 laps blk  3 laps    SL dead lift cone tap  10x (3 cones) 10x      10x (3 cones) 10x (3 cones) 10x (3 cones) 10x (3 cones)   SL fwd/side/back lunge 10x ea slider  10x ea slider (R limb static) 10x    5x Slider 10x ea L heel taps with mirror 10x ea 10x ea    SLS on pod with MB toss  RMB 30x dynodisc  RMB BOSU 30x  30x RMB on BOSU     30x RMB  RMB 30x dynodisc  RMB 30x dynodisc    Squats on reverse BOSU 30x  Blk tband 30x  30x blk Tband BMB     20x  Blk tband 30x Blk tband 30x                 Ther Activity             Quick stepping FW/BW, lat 1' ea 1' ea         1' ea    Toe taps to step  20x  20x         4\" 30x    Jogging  40 ft 3 laps  40 ft 3 laps            FW/BW double limb hop  Agility ladder 2 laps 10x tiles on floor         10x    Agility ladder:   FW  Lateral  NV                         Gait Training                                       Modalities             Ice                             "

## 2024-04-19 ENCOUNTER — OFFICE VISIT (OUTPATIENT)
Dept: PHYSICAL THERAPY | Age: 34
End: 2024-04-19
Payer: COMMERCIAL

## 2024-04-19 DIAGNOSIS — M23.51 OLD COMPLETE ACL TEAR, RIGHT: Primary | ICD-10-CM

## 2024-04-19 PROCEDURE — 97112 NEUROMUSCULAR REEDUCATION: CPT

## 2024-04-19 PROCEDURE — 97110 THERAPEUTIC EXERCISES: CPT

## 2024-04-19 NOTE — PROGRESS NOTES
"Daily Note     Today's date: 2024  Patient name: Khalif Hood  : 1990  MRN: [id number]  Referring provider: Sanjay Cheek,*  Dx:   Encounter Diagnosis     ICD-10-CM    1. 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      Objective: See treatment diary below      Assessment: Tolerated treatment well.   Patient participated in skilled PT session focused on strengthening, stretching, and ROM.  Patient able to complete exercise program with no issues.  Patient continues to focus on strengthening RLE.  Attempted to increase step to 14\", but experienced some mild pain in R knee.  Patient would continue to benefit from skilled PT interventions to address strengthening, stretching, and ROM. Patient demonstrated fatigue post treatment and exhibited good technique with therapeutic exercises      Plan: Continue per plan of care.      POC expires Unit limit Auth Expiration date PT/OT + Visit Limit?   24 BOMN 24 20                           Visit/Unit Tracking  AUTH Status:  Date 1/19 1/22 1/26 1/29 2/1 2/5 2/9 2/12 2/16  2/19 2/23 2/26 2/29   Auth needed Used 1 2 3 4 5 6 7 8 9 10 11 12 13    Remaining  19 18 17 16 15 14 13 12 11 10 9 8 7    FOTO 22 (56)         RE NV           AUTH Status:  Date 3/5 3/7 3/14 3/19 3/22  3/28 4/   Auth needed Used 14 15 16 17 18 19 20    Remaining  6 5 4 3 2 1 0    FOTO              AUTH Status:  Date 4/5 4/9 4/16 4/19       Auth after 8 Used 1 2 3         Remaining  7 6 5 4 3 2 1 0    FOTO   72 (56)                    Precautions: PROTOCOL DOS     Access Code: [email or web address]  URL: [email or web address]  Date: 2024  Prepared by: Annemarie Soria    Exercises  - Supine Quad Set  - 1 x daily - 7 x weekly - 3 sets - 10 reps  - Active Straight Leg Raise with Quad Set  - 1 x daily - 7 x weekly - 3 sets - 10 " "reps  - Seated Heel Slide  - 1 x daily - 7 x weekly - 3 sets - 10 reps  - Supine Ankle Pumps  - 1 x daily - 7 x weekly - 3 sets - 10 reps  - Heel Raises with Counter Support  - 1 x daily - 7 x weekly - 3 sets - 10 reps  - Standing Hip Abduction with Counter Support  - 1 x daily - 7 x weekly - 3 sets - 10 reps  - Standing Hip Extension with Counter Support  - 1 x daily - 7 x weekly - 3 sets - 10 reps  - Mini Squat with Counter Support  - 1 x daily - 7 x weekly - 3 sets - 10 reps  Manuals 4/5  4/9 4/16 4/19 3/7 3/14 3/19 3/22  3/28 4/2    PROM             Patellar mobs             Roller R HS             TherEx             Nustep: strength/ROM      L6 5'  5' L6 L6 5'  L6 5' L6 5'    TM      5' FW  5'BW 5' FW  5'BW  NT     Lateral step ups 30x 12\" 20x  20x 20x 8\" 20x 8\" 20x 20x 20x 20x 20x    Fwd step up 30x W/ L hip flex 12\" w/ L hip flex 20x  20x 12\" 20x 8\" 20x  8\" 20x  20x 20x  20x 20x W/ L hip flex                Wall 1/4 sit  10\"x10 Blk 10\"x10 10x blk Blk TB 10x  BTB 5\"x10  Blk 10\"x10 Blk 10\"x10 Blk 10\"x10 Blk 10\"x10    Bike ROM 5'  5' 5' 5'         Calf press 150# 30x 150# 3x12 150#/ 30 150# 30x 160# 30x 160# 30x  160# 160# 30x  150# 30x  150# 30x    Prone ham cybex 30# 15x, 40# 15x 50# 3x12 50#/ 30 50# 30x  Cybex 20# x10, 25# 2x10 40# 40# 30x  45# 30x  45# 30x    Leg press  180# 2x15 190# 3x12 200# 3x10 200# 3x10 150# 15x  140# x15 160# 3x10  170#/ 30x 170# 30x  180# 30x 190# 30x   Single limb leg press  80# 2x15 85# 3x12 90# 3x10 90# 3x10 60# 15x   65#   15x  65# 3x10  70# 30x   75#  30x  80# 30x  90# 30x    Cybex hip abd 90# 30x  95# 3x12 100#/ 30 100# 30x 80#   15x 80# 30x  80# 70#  85# 2x10 ea  80# 30x  90# 30x    Cybex hip add 100# 30x 100# 3x12 110/30 110# 30x 75# 15x  80#   15x 90# 3x10 90# 100# 3x10  100# 30x  100# 30x    Staggered RDL  12# 2x10  12# 2x10  12#/ 20 12# 20x         Reverse lunge  NV  10x            TKE Kesier   12# 3x10 12#/ 30 13.5# 30x  14# 30x                                   NM Re-ed  " "           Toe taps              Monster walk FW/BW  3 laps   3 laps blk  3x Blk TB x 3 laps  BTB 3 laps  3x blk 3 laps blk  3 laps blk  3 laps   Side stepping  3 laps  X walks blk 2 laps 30 ft 2x  Blk TB x 3 laps  BTB 3 laps  3x blk 3 laps blk  3 laps blk  3 laps    SL dead lift cone tap  10x (3 cones) 10x      10x (3 cones) 10x (3 cones) 10x (3 cones) 10x (3 cones)   SL fwd/side/back lunge 10x ea slider  10x ea slider (R limb static) 10x 10x ea   5x Slider 10x ea L heel taps with mirror 10x ea 10x ea    SLS on pod with MB toss  RMB 30x dynodisc  RMB BOSU 30x  30x RMB on BOSU On Bosu RMB SLS 30x    30x RMB  RMB 30x dynodisc  RMB 30x dynodisc    Squats on reverse BOSU 30x  Blk tband 30x  30x blk Tband BMB Blk TB  30x     20x  Blk tband 30x Blk tband 30x                 Ther Activity             Quick stepping FW/BW, lat 1' ea 1' ea   1' ea      1' ea    Toe taps to step  20x  20x         4\" 30x    Jogging  40 ft 3 laps  40 ft 3 laps            FW/BW double limb hop  Agility ladder 2 laps 10x tiles on floor         10x    Agility ladder:   FW  Lateral  NV                         Gait Training                                       Modalities             Ice                                  "

## 2024-04-22 ENCOUNTER — TELEPHONE (OUTPATIENT)
Dept: OTHER | Facility: OTHER | Age: 34
End: 2024-04-22

## 2024-04-22 NOTE — TELEPHONE ENCOUNTER
Patient is calling regarding cancelling an appointment.    Date/Time: 4/22/2024 / 8:15 am    Patient was rescheduled: YES [] NO [x]    Patient requesting call back to reschedule: YES [x] NO []

## 2024-04-23 ENCOUNTER — OFFICE VISIT (OUTPATIENT)
Dept: PHYSICAL THERAPY | Age: 34
End: 2024-04-23
Payer: COMMERCIAL

## 2024-04-23 DIAGNOSIS — M23.51 OLD COMPLETE ACL TEAR, RIGHT: Primary | ICD-10-CM

## 2024-04-23 PROCEDURE — 97110 THERAPEUTIC EXERCISES: CPT

## 2024-04-23 PROCEDURE — 97112 NEUROMUSCULAR REEDUCATION: CPT

## 2024-04-23 NOTE — PROGRESS NOTES
Daily Note     Today's date: 2024  Patient name: Khalif Hood  : 1990  MRN: [id number]  Referring provider: Sanjay Cheek,*  Dx:   Encounter Diagnosis     ICD-10-CM    1. 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 No longer feeling lateral knee pain.       Objective: See treatment diary below  Patient was seen 1:1 for 45 min.     Assessment: Progressed wall sits to single limb with increased challenge. Also progressed ball toss in SLS on BOSU with a heavier MB and appropriate level of challenge. Improved form when jogging, patient able to increase his stride length and achieve heel strike. Decreased hip IR and adduction, but still present. Better control of lateral hip during single limb activities. Mild discomfort noted with lunges when RLE was posterior limb, cues to minimize depth of lunge to a tolerable range. Patient would benefit from continued PT.      Plan: Continue per plan of care.      POC expires Unit limit Auth Expiration date PT/OT + Visit Limit?   24 BOMN 24 20                           Visit/Unit Tracking  AUTH Status:  Date 1/19 1/22 1/26 1/29 2/1 2/5 2/9 2/12 2/16  2/19 2/23 2/26 2/29   Auth needed Used 1 2 3 4 5 6 7 8 9 10 11 12 13    Remaining  19 18 17 16 15 14 13 12 11 10 9 8 7    FOTO 22 (56)         RE NV           AUTH Status:  Date 3/5 3/7 3/14 3/19 3/22  3/28 4/   Auth needed Used 14 15 16 17 18 19 20    Remaining  6 5 4 3 2 1 0    FOTO              AUTH Status:  Date 4/5 4/9 4/16 4/19 4/23      Auth after 8 Used 1 2 3 4 5       Remaining  7 6 5 4 3 2 1 0    FOTO   72 (56)                    Precautions: PROTOCOL DOS     Access Code: [email or web address]  URL: [email or web address]  Date: 2024  Prepared by: Annemarie Soria    Exercises  - Supine Quad Set  - 1 x daily - 7 x  "weekly - 3 sets - 10 reps  - Active Straight Leg Raise with Quad Set  - 1 x daily - 7 x weekly - 3 sets - 10 reps  - Seated Heel Slide  - 1 x daily - 7 x weekly - 3 sets - 10 reps  - Supine Ankle Pumps  - 1 x daily - 7 x weekly - 3 sets - 10 reps  - Heel Raises with Counter Support  - 1 x daily - 7 x weekly - 3 sets - 10 reps  - Standing Hip Abduction with Counter Support  - 1 x daily - 7 x weekly - 3 sets - 10 reps  - Standing Hip Extension with Counter Support  - 1 x daily - 7 x weekly - 3 sets - 10 reps  - Mini Squat with Counter Support  - 1 x daily - 7 x weekly - 3 sets - 10 reps  Manuals 4/5  4/9 4/16 4/19 4/23  3/19 3/22  3/28 4/2    PROM             Patellar mobs             Roller R HS             TherEx             Nustep: strength/ROM       5' L6 L6 5'  L6 5' L6 5'    TM         NT     Lateral step ups 30x 12\" 20x  20x 20x 12\" 20x  20x 20x 20x 20x    Fwd step up 30x W/ L hip flex 12\" w/ L hip flex 20x  20x 12\" 20x 12\" 20x   20x 20x  20x 20x W/ L hip flex                Wall 1/4 sit  10\"x10 Blk 10\"x10 10x blk Blk TB 10x Single limb 5-10\"x5   Blk 10\"x10 Blk 10\"x10 Blk 10\"x10 Blk 10\"x10    Bike ROM 5'  5' 5' 5' 5'        Calf press 150# 30x 150# 3x12 150#/ 30 150# 30x 150# 30x   160# 160# 30x  150# 30x  150# 30x    Prone ham cybex 30# 15x, 40# 15x 50# 3x12 50#/ 30 50# 30x 50# 30x   40# 40# 30x  45# 30x  45# 30x    Leg press  180# 2x15 190# 3x12 200# 3x10 200# 3x10 210# 3x10  170#/ 30x 170# 30x  180# 30x 190# 30x   Single limb leg press  80# 2x15 85# 3x12 90# 3x10 90# 3x10 100# 3x10   70# 30x   75#  30x  80# 30x  90# 30x    Cybex hip abd 90# 30x  95# 3x12 100#/ 30 100# 30x 100# 30x  80# 70#  85# 2x10 ea  80# 30x  90# 30x    Cybex hip add 100# 30x 100# 3x12 110/30 110# 30x 95# 30x   90# 100# 3x10  100# 30x  100# 30x    Staggered RDL  12# 2x10  12# 2x10  12#/ 20 12# 20x 12# 20x         Reverse lunge  NV  10x            TKE Ciera   12# 3x10 12#/ 30 13.5# 30x  14# 30x 15# 5\"x20                                 " "  NM Re-ed             Toe taps              Monster walk FW/BW  3 laps   3 laps blk  3x Blk TB x 3 laps Blk TB x 3 laps  3x blk 3 laps blk  3 laps blk  3 laps   Side stepping  3 laps  X walks blk 2 laps 30 ft 2x  Blk TB x 3 laps Blk TB x 3 laps  3x blk 3 laps blk  3 laps blk  3 laps    SL dead lift cone tap  10x (3 cones) 10x    D/C   10x (3 cones) 10x (3 cones) 10x (3 cones) 10x (3 cones)   SL fwd/side/back lunge 10x ea slider  10x ea slider (R limb static) 10x 10x ea D/C   5x Slider 10x ea L heel taps with mirror 10x ea 10x ea    SLS on pod with MB toss  RMB 30x dynodisc  RMB BOSU 30x  30x RMB on BOSU On Bosu RMB SLS 30x On Bosu YMB SLS 30x   30x RMB  RMB 30x dynodisc  RMB 30x dynodisc    Squats on reverse BOSU 30x  Blk tband 30x  30x blk Tband BMB Blk TB  30x  Blk TB  30x Orange MB    20x  Blk tband 30x Blk tband 30x                 Ther Activity             Quick stepping FW/BW, lat 1' ea 1' ea   1' ea 1' ea     1' ea    Toe taps to step  20x  20x     30x      4\" 30x    Jogging  40 ft 3 laps  40 ft 3 laps    40 ft 3 laps         FW/BW double limb hop  Agility ladder 2 laps 10x tiles on floor   10x 10x      10x    Agility ladder:   FW  Lateral  NV                         Gait Training                                       Modalities             Ice                                    "

## 2024-04-25 ENCOUNTER — APPOINTMENT (OUTPATIENT)
Dept: PHYSICAL THERAPY | Age: 34
End: 2024-04-25
Payer: COMMERCIAL

## 2024-04-26 ENCOUNTER — OFFICE VISIT (OUTPATIENT)
Dept: PHYSICAL THERAPY | Age: 34
End: 2024-04-26
Payer: COMMERCIAL

## 2024-04-26 ENCOUNTER — APPOINTMENT (OUTPATIENT)
Dept: PHYSICAL THERAPY | Age: 34
End: 2024-04-26
Payer: COMMERCIAL

## 2024-04-26 DIAGNOSIS — M23.51 OLD COMPLETE ACL TEAR, RIGHT: Primary | ICD-10-CM

## 2024-04-26 PROCEDURE — 97110 THERAPEUTIC EXERCISES: CPT

## 2024-04-26 PROCEDURE — 97112 NEUROMUSCULAR REEDUCATION: CPT

## 2024-04-26 NOTE — PROGRESS NOTES
Daily Note     Today's date: 2024  Patient name: Khalif Hood  : 1990  MRN: [id number]  Referring provider: Sanjay Cheek,*  Dx:   Encounter Diagnosis     ICD-10-CM    1. Old complete ACL tear, right  M23.51           Start Time: 0845  Stop Time: 0945  Total time in clinic (min): 60 minutes    Subjective: Reports feeling really good, no issues.       Objective: See treatment diary below  Patient was seen 1:1 for 45 min.     Assessment: Patient progressing well within limitations of protocol. Improved overall form observed during jogging on firm surface. Improved movement at ankle, knee, and hip, but some lateral hip weakness still present with slight add and IR bilaterally. Increased weights on cybex machines as documented below with appropriate challenge. Decreased weight on HS today secondary to slight lateral knee discomfort post attempting lateral hops, will resume previous weight next session if appropriate. Cues to perform FW/BW and lateral hopping with control, focusing on a soft landing and control rather than speed and height. Challenged by single limb wall squat, but is able to complete. Patient would benefit from continued PT.      Plan: Continue per plan of care.      POC expires Unit limit Auth Expiration date PT/OT + Visit Limit?   24 BOMN 24 20   24 BOMN 24 8                     Visit/Unit Tracking  AUTH Status:  Date 1/19 1/22 1/26 1/29 2/ 2/   Auth needed Used 1 2 3 4 5 6 7 8 9 10 11 12 13    Remaining  19 18 17 16 15 14 13 12 11 10 9 8 7    FOTO 22 (56)         RE NV           AUTH Status:  Date 3/5 3/7 3/14 3/19 3/22  3/28 4/   Auth needed Used 14 15 16 17 18 19 20    Remaining  6 5 4 3 2 1 0    FOTO              AUTH Status:  Date      Auth after 8   (end date ) Used 1 2 3 4 5 6      Remaining  7 6 5 4 3 2 1 0    FOTO   72 (56)            Precautions: PROTOCOL DOS     Access  "Code: [email or web address]  URL: [email or web address]  Date: 01/19/2024  Prepared by: Annemarie Soria    Exercises  - Supine Quad Set  - 1 x daily - 7 x weekly - 3 sets - 10 reps  - Active Straight Leg Raise with Quad Set  - 1 x daily - 7 x weekly - 3 sets - 10 reps  - Seated Heel Slide  - 1 x daily - 7 x weekly - 3 sets - 10 reps  - Supine Ankle Pumps  - 1 x daily - 7 x weekly - 3 sets - 10 reps  - Heel Raises with Counter Support  - 1 x daily - 7 x weekly - 3 sets - 10 reps  - Standing Hip Abduction with Counter Support  - 1 x daily - 7 x weekly - 3 sets - 10 reps  - Standing Hip Extension with Counter Support  - 1 x daily - 7 x weekly - 3 sets - 10 reps  - Mini Squat with Counter Support  - 1 x daily - 7 x weekly - 3 sets - 10 reps  Manuals 4/5  4/9 4/16 4/19 4/23 4/26   3/22  3/28 4/2    PROM             Patellar mobs             Roller R HS             TherEx             Nustep: strength/ROM        L6 5'  L6 5' L6 5'    TM         NT     Lateral step ups 30x 12\" 20x  20x 20x 12\" 20x 12\" 20x  20x 20x 20x    Fwd step up 30x W/ L hip flex 12\" w/ L hip flex 20x  20x 12\" 20x 12\" 20x  12\" 20x   20x  20x 20x W/ L hip flex                Wall 1/4 sit  10\"x10 Blk 10\"x10 10x blk Blk TB 10x Single limb 5-10\"x5  Single limb 10\"x7  Blk 10\"x10 Blk 10\"x10 Blk 10\"x10    Bike ROM 5'  5' 5' 5' 5' 5'       Calf press 150# 30x 150# 3x12 150#/ 30 150# 30x 150# 30x  160# 30x  160# 30x  150# 30x  150# 30x    Prone ham cybex 30# 15x, 40# 15x 50# 3x12 50#/ 30 50# 30x 50# 30x  40# 30x   40# 30x  45# 30x  45# 30x    Leg press  180# 2x15 190# 3x12 200# 3x10 200# 3x10 210# 3x10 220# 30x  170# 30x  180# 30x 190# 30x   Single limb leg press  80# 2x15 85# 3x12 90# 3x10 90# 3x10 100# 3x10  110# 30x   75#  30x  80# 30x  90# 30x    Cybex hip abd 90# 30x  95# 3x12 100#/ 30 100# 30x 100# 30x 110# 30x  70#  85# 2x10 ea  80# 30x  90# 30x    Cybex hip add 100# 30x 100# 3x12 110/30 110# 30x 95# 30x  100# 30x  100# 3x10  100# 30x  100# 30x    Staggered " "RDL  12# 2x10  12# 2x10  12#/ 20 12# 20x 12# 20x  15# KB 2x15        Reverse lunge  NV  10x            TKE Kesier   12# 3x10 12#/ 30 13.5# 30x  14# 30x 15# 5\"x20  15# 5\"x20                                  NM Re-ed             Toe taps              Monster walk FW/BW  3 laps   3 laps blk  3x Blk TB x 3 laps Blk TB x 3 laps Blk TB x 3 laps  3 laps blk  3 laps blk  3 laps   Side stepping  3 laps  X walks blk 2 laps 30 ft 2x  Blk TB x 3 laps Blk TB x 3 laps Blk TB x 3 laps  3 laps blk  3 laps blk  3 laps    SLS on pod with MB toss  RMB 30x dynodisc  RMB BOSU 30x  30x RMB on BOSU On Bosu RMB SLS 30x On Bosu YMB SLS 30x On Bosu YMB SLS 30x  30x RMB  RMB 30x dynodisc  RMB 30x dynodisc    Squats on reverse BOSU 30x  Blk tband 30x  30x blk Tband BMB Blk TB  30x  Blk TB  30x Orange MB  Blk TB  30x Orange MB   20x  Blk tband 30x Blk tband 30x                 Ther Activity             Quick stepping FW/BW, lat 1' ea 1' ea   1' ea 1' ea 1' ea     1' ea    Toe taps to step  20x  20x     30x  30x     4\" 30x    Jogging  40 ft 3 laps  40 ft 3 laps    40 ft 3 laps  40 ft 3 laps        FW/BW double limb hop  Agility ladder 2 laps 10x tiles on floor   10x 10x  FW/BW 10x, lat 4x     10x    Agility ladder:   FW  Lateral  NV                         Gait Training                                       Modalities             Ice                                      "

## 2024-04-29 ENCOUNTER — OFFICE VISIT (OUTPATIENT)
Dept: OBGYN CLINIC | Facility: CLINIC | Age: 34
End: 2024-04-29
Payer: COMMERCIAL

## 2024-04-29 VITALS
BODY MASS INDEX: 24.57 KG/M2 | HEIGHT: 75 IN | WEIGHT: 197.6 LBS | DIASTOLIC BLOOD PRESSURE: 56 MMHG | HEART RATE: 65 BPM | SYSTOLIC BLOOD PRESSURE: 107 MMHG

## 2024-04-29 DIAGNOSIS — Z98.890 S/P ACL RECONSTRUCTION: Primary | ICD-10-CM

## 2024-04-29 PROCEDURE — 99213 OFFICE O/P EST LOW 20 MIN: CPT | Performed by: ORTHOPAEDIC SURGERY

## 2024-04-29 NOTE — PROGRESS NOTES
ASSESSMENT/PLAN:    Diagnoses and all orders for this visit:    S/P ACL reconstruction    Other orders  -     Large joint arthrocentesis        The patient was seen and examined.  His right knee was aspirated for 45 cc of clear, synovial fluid.  The patient tolerated procedure quite well.  Overall, the patient is doing extremely well since surgery.  He is now cleared from orthopedic perspective.  He may follow-up with our office as needed.  The patient is acceptable to this plan.    Return if symptoms worsen or fail to improve.        The patient is doing quite well in regards to his right knee ACL reconstruction.  He does have residual synovitis which was preoperative in nature due to extreme arthritis as well as a lack of a meniscus.  Continue home exercise program.  Follow-up on an as-needed basis.  The knee was aspirated of 45 cc of clear synovial fluid.  If his condition changes, he would not hesitate to let us know    _____________________________________________________  CHIEF COMPLAINT:  Chief Complaint   Patient presents with    Right Knee - Follow-up         SUBJECTIVE:  Khalif Hood is a 33 y.o. male who presents to our office for postop visit.  The patient is status post right knee arthroscopy with ACL reconstruction with allograft from 1/17/2024.  He is extremely pleased with the results of surgery.  He is ambulating without antalgic gait or an assist device.  He denies any significant right knee pain.  He denies any numbness or tingling.  He denies any fever or chills.    The following portions of the patient's history were reviewed and updated as appropriate: allergies, current medications, past family history, past medical history, past social history, past surgical history and problem list.    PAST MEDICAL HISTORY:  Past Medical History:   Diagnosis Date    Asthma     as a child       PAST SURGICAL HISTORY:  Past Surgical History:   Procedure Laterality Date    INCISION AND DRAINAGE OF WOUND Left  06/28/2021    Procedure: INCISION AND DRAINAGE (I&D) LEFT SUBMANDIBULAR SPACE INFECTION, LEFT  SPACE INFECTION WITH EXTRACTION OF TOOTH #19;  Surgeon: Fabi Hood DMD;  Location:  MAIN OR;  Service: Maxillofacial    ORIF TIBIA & FIBULA FRACTURES Right     MO ARTHRS AIDED ANT CRUCIATE LIGM RPR/AGMNTJ/RCNSTJ Right 1/17/2024    Procedure: Right - knee arthroscopy Lateral meniscectomy Synovectomy ACL reconstruction with allograft using the Arthrex graft link system Post arthroscopic injection of intra-articular joint space and peripheral portals;  Surgeon: Harvinder Ga DO;  Location: [facility];  Service: Orthopedics    MO ARTHRS KNE SURG W/MENISCECTOMY MED/LAT W/SHVG Right 1/10/2023    Procedure: ARTHROSCOPY KNEE WITH LATERAL MENISCECTOMY;  Surgeon: Harvinder Ga DO;  Location: [facility];  Service: Orthopedics       FAMILY HISTORY:  History reviewed. No pertinent family history.    SOCIAL HISTORY:  Social History     Tobacco Use    Smoking status: Every Day     Current packs/day: 0.50     Types: Cigarettes    Smokeless tobacco: Never   Vaping Use    Vaping status: Former    Start date: 1/1/2023   Substance Use Topics    Alcohol use: Never    Drug use: Yes     Frequency: 7.0 times per week     Types: Marijuana     Comment: daily       MEDICATIONS:    Current Outpatient Medications:     cholecalciferol (VITAMIN D3) 400 units tablet, Take 400 Units by mouth daily, Disp: , Rfl:     methadone (DOLOPHINE) 10 mg/mL oral concentrated solution, Take 25 mg by mouth every morning, Disp: , Rfl:     Omega-3 Fatty Acids (fish oil) 1,000 mg, Take 1,000 mg by mouth daily, Disp: , Rfl:     vitamin E 100 UNIT capsule, Take 100 Units by mouth daily, Disp: , Rfl:     ascorbic acid (VITAMIN C) 250 mg tablet, Take 250 mg by mouth daily (Patient not taking: Reported on 4/29/2024), Disp: , Rfl:     aspirin 325 mg tablet, Take 1 tablet (325 mg total) by mouth daily (Patient not taking: Reported on 4/29/2024),  "Disp: 30 tablet, Rfl: 0    meloxicam (MOBIC) 15 mg tablet, Take 1 tablet (15 mg total) by mouth daily (Patient not taking: Reported on 4/29/2024), Disp: 30 tablet, Rfl: 0    ALLERGIES:  No Known Allergies    ROS:  Review of Systems     Constitutional: Negative for fatigue, fever or loss of appetite.   HENT: Negative.    Respiratory: Negative for shortness of breath, dyspnea.    Cardiovascular: Negative for chest pain/tightness.   Gastrointestinal: Negative for abdominal pain, N/V.   Endocrine: Negative for cold/heat intolerance, unexplained weight loss/gain.   Genitourinary: Negative for flank pain, dysuria, hematuria.   Musculoskeletal: Negative for arthralgia   Skin: Negative for rash.    Neurological: Negative for numbness or tingling  Psychiatric/Behavioral: Negative for agitation.  _____________________________________________________  PHYSICAL EXAMINATION:    Blood pressure 107/56, pulse 65, height 6' 3\" (1.905 m), weight 89.6 kg (197 lb 9.6 oz).    Constitutional: Oriented to person, place, and time. Appears well-developed and well-nourished. No distress.   HENT:   Head: Normocephalic.   Eyes: Conjunctivae are normal. Right eye exhibits no discharge. Left eye exhibits no discharge. No scleral icterus.   Cardiovascular: Normal rate.    Pulmonary/Chest: Effort normal.   Neurological: Alert and oriented to person, place, and time.   Skin: Skin is warm and dry. No rash noted. Not diaphoretic. No erythema. No pallor.   Psychiatric: Normal mood and affect. Behavior is normal. Judgment and thought content normal.      MUSCULOSKELETAL EXAMINATION:   Physical Exam  Ortho Exam    Right lower extremity is neurovascularly intact  Toes are pink and mobile  Compartments are soft  Range of motion the knee is from 0 to 120 degrees  Negative Lachman, drawer or pivot shift  No ligament laxity  Moderate effusion present  Brisk cap refill  Sensation intact  Objective:  BP Readings from Last 1 Encounters:   04/29/24 107/56    " "  Wt Readings from Last 1 Encounters:   04/29/24 89.6 kg (197 lb 9.6 oz)        BMI:   Estimated body mass index is 24.7 kg/m² as calculated from the following:    Height as of this encounter: 6' 3\" (1.905 m).    Weight as of this encounter: 89.6 kg (197 lb 9.6 oz).      PROCEDURES PERFORMED:  Large joint arthrocentesis  Universal Protocol:  Risks and benefits: risks, benefits and alternatives were discussed  Consent given by: patient  Site marked: the operative site was marked  Radiology Images displayed and confirmed. If images not available, report reviewed: imaging studies available  Supporting Documentation  Indications: joint swelling   Procedure Details  Preparation: Patient was prepped and draped in the usual sterile fashion  Needle size: 18 G  Ultrasound guidance: no  Approach: anterolateral    Aspirate amount: 45 mL  Aspirate: yellow and clear  Patient tolerance: patient tolerated the procedure well with no immediate complications  Dressing:  Sterile dressing applied            Scribe Attestation      I,:  Sanjay Cheek PA-C am acting as a scribe while in the presence of the attending physician.:       I,:  Harvinder Ga DO personally performed the services described in this documentation    as scribed in my presence.:            "

## 2024-04-30 ENCOUNTER — OFFICE VISIT (OUTPATIENT)
Dept: PHYSICAL THERAPY | Age: 34
End: 2024-04-30
Payer: COMMERCIAL

## 2024-04-30 DIAGNOSIS — M23.51 OLD COMPLETE ACL TEAR, RIGHT: Primary | ICD-10-CM

## 2024-04-30 PROCEDURE — 97110 THERAPEUTIC EXERCISES: CPT

## 2024-04-30 PROCEDURE — 97112 NEUROMUSCULAR REEDUCATION: CPT

## 2024-04-30 NOTE — PROGRESS NOTES
Daily Note     Today's date: 2024  Patient name: Khalif Hood  : 1990  MRN: [id number]  Referring provider: Sanjay Cheek,*  Dx:   Encounter Diagnosis     ICD-10-CM    1. 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 States he has been released from MD care unless he has issues in the future. Is to finish out his PT and then transition to an independent HEP. States his MD thinks the lateral knee pain is due to arthritic changes and post clean out during surgery.       Objective: See treatment diary below  Patient was seen 1:1 for 30 min.     Assessment: Modified session today due to some lateral knee discomfort mid session, unable to pinpoint which activity aggravated knee. Did not complete all agility exercises today and reduced weights on some machines as documented below. Increased step height of step without compromised form. Will resume program next session if able. Patient would benefit from continued PT      Plan: Continue per plan of care.      POC expires Unit limit Auth Expiration date PT/OT + Visit Limit?   24 BOMN 24 20   24 BOMN 24 8   24 BOMN  12               Visit/Unit Tracking  AUTH Status:  Date 1/19 1/22 1/26 1/29 2/1 2/5 2/9 2/12 2/16  2/19 2/23 2/26 2/29   Auth needed Used 1 2 3 4 5 6 7 8 9 10 11 12 13    Remaining  19 18 17 16 15 14 13 12 11 10 9 8 7    FOTO 22 (56)         RE NV           AUTH Status:  Date 3/5 3/7 3/14 3/19 3/22  3/28 4   Auth needed Used 14 15 16 17 18 19 20    Remaining  6 5 4 3 2 1 0    FOTO              AUTH Status:  Date    Auth after 8   (end date ) Used 1 2 3 4 5 6 7    Remaining  7 6 5 4 3 2 1    FOTO   72 (56)         AUTH Status:  Date               Auth after 12  Used                Remaining                 FOTO              "        Precautions: PROTOCOL DOS 1/17    Access Code: [email or web address]  URL: [email or web address]  Date: 01/19/2024  Prepared by: Annemarie Soria    Exercises  - Supine Quad Set  - 1 x daily - 7 x weekly - 3 sets - 10 reps  - Active Straight Leg Raise with Quad Set  - 1 x daily - 7 x weekly - 3 sets - 10 reps  - Seated Heel Slide  - 1 x daily - 7 x weekly - 3 sets - 10 reps  - Supine Ankle Pumps  - 1 x daily - 7 x weekly - 3 sets - 10 reps  - Heel Raises with Counter Support  - 1 x daily - 7 x weekly - 3 sets - 10 reps  - Standing Hip Abduction with Counter Support  - 1 x daily - 7 x weekly - 3 sets - 10 reps  - Standing Hip Extension with Counter Support  - 1 x daily - 7 x weekly - 3 sets - 10 reps  - Mini Squat with Counter Support  - 1 x daily - 7 x weekly - 3 sets - 10 reps  Manuals 4/5  4/9 4/16 4/19 4/23 4/26  4/30   3/28 4/2    PROM             Patellar mobs             Roller R HS             TherEx             Nustep: strength/ROM         L6 5' L6 5'    TM              Lateral step ups 30x 12\" 20x  20x 20x 12\" 20x 12\" 20x 14\" 20x  20x 20x    Fwd step up 30x W/ L hip flex 12\" w/ L hip flex 20x  20x 12\" 20x 12\" 20x  12\" 20x  14\" 20x   20x 20x W/ L hip flex                Wall 1/4 sit  10\"x10 Blk 10\"x10 10x blk Blk TB 10x Single limb 5-10\"x5  Single limb 10\"x7 Single limb 5-8\"x10   Blk 10\"x10 Blk 10\"x10    Bike ROM 5'  5' 5' 5' 5' 5' 5'       Calf press 150# 30x 150# 3x12 150#/ 30 150# 30x 150# 30x  160# 30x 150# 2x25   150# 30x  150# 30x    Prone ham cybex 30# 15x, 40# 15x 50# 3x12 50#/ 30 50# 30x 50# 30x  40# 30x  40# 30x   45# 30x  45# 30x    Leg press  180# 2x15 190# 3x12 200# 3x10 200# 3x10 210# 3x10 220# 30x 220# 30x  180# 30x 190# 30x   Single limb leg press  80# 2x15 85# 3x12 90# 3x10 90# 3x10 100# 3x10  110# 30x  100# 30x   80# 30x  90# 30x    Cybex hip abd 90# 30x  95# 3x12 100#/ 30 100# 30x 100# 30x 110# 30x 110# 30x  80# 30x  90# 30x    Cybex hip add 100# 30x 100# 3x12 110/30 110# 30x 95# 30x  " "100# 30x 90# 30x   100# 30x  100# 30x    Staggered RDL  12# 2x10  12# 2x10  12#/ 20 12# 20x 12# 20x  15# KB 2x15  NT       Reverse lunge  NV  10x            TKE Kesier   12# 3x10 12#/ 30 13.5# 30x  14# 30x 15# 5\"x20  15# 5\"x20  15# 5\"x20                                 NM Re-ed             Toe taps              Monster walk FW/BW  3 laps   3 laps blk  3x Blk TB x 3 laps Blk TB x 3 laps Blk TB x 3 laps Blk TB x 3 laps  3 laps blk  3 laps   Side stepping  3 laps  X walks blk 2 laps 30 ft 2x  Blk TB x 3 laps Blk TB x 3 laps Blk TB x 3 laps Blk TB x 3 laps  3 laps blk  3 laps    SLS on pod with MB toss  RMB 30x dynodisc  RMB BOSU 30x  30x RMB on BOSU On Bosu RMB SLS 30x On Bosu YMB SLS 30x On Bosu YMB SLS 30x On Bosu YMB SLS 30x  RMB 30x dynodisc  RMB 30x dynodisc    Squats on reverse BOSU 30x  Blk tband 30x  30x blk Tband BMB Blk TB  30x  Blk TB  30x [place] MB  Blk TB  30x [place] MB  Blk TB  30x [place] MB   Blk tband 30x Blk tband 30x                 Ther Activity             Quick stepping FW/BW, lat 1' ea 1' ea   1' ea 1' ea 1' ea  NP   1' ea    Toe taps to step  20x  20x     30x  30x  30x    4\" 30x    Jogging  40 ft 3 laps  40 ft 3 laps    40 ft 3 laps  40 ft 3 laps  40ft 1 lap      FW/BW double limb hop  Agility ladder 2 laps 10x tiles on floor   10x 10x  FW/BW 10x, lat 4x  NP    10x    Agility ladder:   FW  Lateral  NV                         Gait Training                                       Modalities             Ice                                        "

## 2024-05-03 ENCOUNTER — OFFICE VISIT (OUTPATIENT)
Dept: PHYSICAL THERAPY | Age: 34
End: 2024-05-03
Payer: COMMERCIAL

## 2024-05-03 DIAGNOSIS — M23.51 OLD COMPLETE ACL TEAR, RIGHT: Primary | ICD-10-CM

## 2024-05-03 PROCEDURE — 97112 NEUROMUSCULAR REEDUCATION: CPT

## 2024-05-03 PROCEDURE — 97110 THERAPEUTIC EXERCISES: CPT

## 2024-05-03 NOTE — PROGRESS NOTES
Daily Note     Today's date: 5/3/2024  Patient name: Khalif Hood  : 1990  MRN: [id number]  Referring provider: Sanjay Cheek,*  Dx:   Encounter Diagnosis     ICD-10-CM    1. 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       Objective: See treatment diary below      Assessment: Progressed with light agility drills utilizing the agility ladder. No c/o lateral knee pain with various activities. Increased weights for machines as documented below with appropriate level of challenge and no pain. Patient would benefit from continued PT.      Plan: Continue per plan of care.      POC expires Unit limit Auth Expiration date PT/OT + Visit Limit?   24 BOMN 24 20   24 BOMN 24 8   24 BOMN 24 12               Visit/Unit Tracking  AUTH Status:  Date 1/19 1/22 1/26 1/29 2/1 2/5 2/9 2/12 2/16  2/19 2/23 2/26 2/29   Auth needed Used 1 2 3 4 5 6 7 8 9 10 11 12 13    Remaining  19 18 17 16 15 14 13 12 11 10 9 8 7    FOTO 22 (56)         RE NV           AUTH Status:  Date 3/5 3/7 3/14 3/19 3/22  3/28 4/   Auth needed Used 14 15 16 17 18 19 20    Remaining  6 5 4 3 2 1 0    FOTO              AUTH Status:  Date 4/5 4/9 4/16 4/19 4/23 4/26 4/30   Auth after 8   (end date ) Used 1 2 3 4 5 6 7    Remaining  7 6 5 4 3 2 1    FOTO   72 (56)         AUTH Status:  Date 5/3              Auth after 12  Used 1               Remaining  11               FOTO                     Precautions: PROTOCOL DOS     Access Code: [email or web address]  URL: [email or web address]  Date: 2024  Prepared by: Annemarie Soria    Exercises  - Supine Quad Set  - 1 x daily - 7 x weekly - 3 sets - 10 reps  - Active Straight Leg Raise with Quad Set  - 1 x daily - 7 x weekly - 3 sets - 10 reps  - Seated Heel Slide  - 1 x daily  "- 7 x weekly - 3 sets - 10 reps  - Supine Ankle Pumps  - 1 x daily - 7 x weekly - 3 sets - 10 reps  - Heel Raises with Counter Support  - 1 x daily - 7 x weekly - 3 sets - 10 reps  - Standing Hip Abduction with Counter Support  - 1 x daily - 7 x weekly - 3 sets - 10 reps  - Standing Hip Extension with Counter Support  - 1 x daily - 7 x weekly - 3 sets - 10 reps  - Mini Squat with Counter Support  - 1 x daily - 7 x weekly - 3 sets - 10 reps  Manuals 4/5  4/9 4/16 4/19 4/23 4/26  4/30  5/3  4/2    PROM             Patellar mobs             Roller R HS             TherEx             Nustep: strength/ROM          L6 5'    TM              Lateral step ups 30x 12\" 20x  20x 20x 12\" 20x 12\" 20x 14\" 20x 14\" 20x  20x    Fwd step up 30x W/ L hip flex 12\" w/ L hip flex 20x  20x 12\" 20x 12\" 20x  12\" 20x  14\" 20x  14\" 20x  20x W/ L hip flex                Wall 1/4 sit  10\"x10 Blk 10\"x10 10x blk Blk TB 10x Single limb 5-10\"x5  Single limb 10\"x7 Single limb 5-8\"x10  Single limb 10\"x8  Blk 10\"x10    Bike ROM 5'  5' 5' 5' 5' 5' 5'  5'     Calf press 150# 30x 150# 3x12 150#/ 30 150# 30x 150# 30x  160# 30x 150# 2x25  160# 30x  150# 30x    Prone ham cybex 30# 15x, 40# 15x 50# 3x12 50#/ 30 50# 30x 50# 30x  40# 30x  40# 30x  55# 30x   45# 30x    Leg press  180# 2x15 190# 3x12 200# 3x10 200# 3x10 210# 3x10 220# 30x 220# 30x 230# 30x  190# 30x   Single limb leg press  80# 2x15 85# 3x12 90# 3x10 90# 3x10 100# 3x10  110# 30x  100# 30x  110# 30x   90# 30x    Cybex hip abd 90# 30x  95# 3x12 100#/ 30 100# 30x 100# 30x 110# 30x 110# 30x 110# 30x  90# 30x    Cybex hip add 100# 30x 100# 3x12 110/30 110# 30x 95# 30x  100# 30x 90# 30x  100# 30x  100# 30x    Staggered RDL  12# 2x10  12# 2x10  12#/ 20 12# 20x 12# 20x  15# KB 2x15  NT  15# KB 2x15     Reverse lunge  NV  10x            TKE Kesier   12# 3x10 12#/ 30 13.5# 30x  14# 30x 15# 5\"x20  15# 5\"x20  15# 5\"x20  16# 5\"x20                                NM Re-ed             Toe taps            " "  Monster walk FW/BW  3 laps   3 laps blk  3x Blk TB x 3 laps Blk TB x 3 laps Blk TB x 3 laps Blk TB x 3 laps Blk TB x 3 laps D/C to home 3 laps   Side stepping  3 laps  X walks blk 2 laps 30 ft 2x  Blk TB x 3 laps Blk TB x 3 laps Blk TB x 3 laps Blk TB x 3 laps Blk TB x 3 laps D/C to home  3 laps    SLS on pod with MB toss  RMB 30x dynodisc  RMB BOSU 30x  30x RMB on BOSU On Bosu RMB SLS 30x On Bosu YMB SLS 30x On Bosu YMB SLS 30x On Bosu YMB SLS 30x On Bosu YMB SLS 30x  RMB 30x dynodisc    Squats on reverse BOSU 30x  Blk tband 30x  30x blk Tband BMB Blk TB  30x  Blk TB  30x [place] MB  Blk TB  30x [place] MB  Blk TB  30x [place] MB  Blk TB  30x [place] MB   Blk tband 30x                 Ther Activity             Quick stepping FW/BW, lat 1' ea 1' ea  1'  1' ea 1' ea 1' ea  NP 1' ea   1' ea    Toe taps to step  20x  20x     30x  30x  30x  To BOSU 30x   4\" 30x    Jogging  40 ft 3 laps  40 ft 3 laps    40 ft 3 laps  40 ft 3 laps  40ft 1 lap 40 ft 3 laps      FW/BW double limb hop  Agility ladder 2 laps 10x tiles on floor   10x 10x  FW/BW 10x, lat 4x  NP    10x    Agility ladder:   FW, hops, icky shuffle, type writer  NV       2 laps each                  Gait Training                                       Modalities             Ice                                          "

## 2024-05-07 ENCOUNTER — OFFICE VISIT (OUTPATIENT)
Dept: PHYSICAL THERAPY | Age: 34
End: 2024-05-07
Payer: COMMERCIAL

## 2024-05-07 DIAGNOSIS — M23.51 OLD COMPLETE ACL TEAR, RIGHT: Primary | ICD-10-CM

## 2024-05-07 PROCEDURE — 97110 THERAPEUTIC EXERCISES: CPT | Performed by: PHYSICAL THERAPIST

## 2024-05-07 PROCEDURE — 97112 NEUROMUSCULAR REEDUCATION: CPT | Performed by: PHYSICAL THERAPIST

## 2024-05-07 NOTE — PROGRESS NOTES
Daily Note     Today's date: 2024  Patient name: Khalif Hood  : 1990  MRN: [id number]  Referring provider: Sanjay Cheek,*  Dx:   Encounter Diagnosis     ICD-10-CM    1. 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 He reports he is feeling stronger and more stable.       Objective: See treatment diary below      Assessment: Pt tolerated session well. Added more dynamic challenges with proprioception and bounding. Pt did have pain with right lateral movement as noted above. Pt does have some difficulty controlling valgus of knee in step downs. Modified to lower step. Banded for for dead lift to help control valgus.       Plan: Continue per plan of care.      POC expires Unit limit Auth Expiration date PT/OT + Visit Limit?   24 BOMN 24 20Pt    24 BOMN 24 8   24 BOMN 24 12               Visit/Unit Tracking  AUTH Status:  Date    Auth needed Used 1 2 3 4 5 6 7 8 9 10 11 12 13    Remaining  19 18 17 16 15 14 13 12 11 10 9 8 7    FOTO 22 (56)         RE NV           AUTH Status:  Date 3/5 3/7 3/14 3/19 3/22  3/28 4/   Auth needed Used 14 15 16 17 18 19 20    Remaining  6 5 4 3 2 1 0    FOTO              AUTH Status:  Date    Auth after 8   (end date ) Used 1 2 3 4 5 6 7    Remaining  7 6 5 4 3 2 1    FOTO   72 (56)         AUTH Status:  Date 5/3 5/7             Auth after 12  Used 1 2              Remaining  11 10              FOTO                     Precautions: PROTOCOL DOS     Access Code: [email or web address]  URL: [email or web address]  Date: 2024  Prepared by: Annemarie Soria    Exercises  - Supine Quad Set  - 1 x daily - 7 x weekly - 3 sets - 10 reps  - Active Straight Leg Raise with  "Quad Set  - 1 x daily - 7 x weekly - 3 sets - 10 reps  - Seated Heel Slide  - 1 x daily - 7 x weekly - 3 sets - 10 reps  - Supine Ankle Pumps  - 1 x daily - 7 x weekly - 3 sets - 10 reps  - Heel Raises with Counter Support  - 1 x daily - 7 x weekly - 3 sets - 10 reps  - Standing Hip Abduction with Counter Support  - 1 x daily - 7 x weekly - 3 sets - 10 reps  - Standing Hip Extension with Counter Support  - 1 x daily - 7 x weekly - 3 sets - 10 reps  - Mini Squat with Counter Support  - 1 x daily - 7 x weekly - 3 sets - 10 reps  Manuals 4/5  4/9 4/16 4/19 4/23 4/26  4/30  5/3 5/7 4/2    PROM             Patellar mobs             Roller R HS             TherEx             Nustep: strength/ROM          L6 5'    TM              Lateral step ups 30x 12\" 20x  20x 20x 12\" 20x 12\" 20x 14\" 20x 14\" 20x 20x 20x    Fwd step up 30x W/ L hip flex 12\" w/ L hip flex 20x  20x 12\" 20x 12\" 20x  12\" 20x  14\" 20x  14\" 20x 20x 20x W/ L hip flex   ECC step down          10x 6\"    Wall 1/4 sit  10\"x10 Blk 10\"x10 10x blk Blk TB 10x Single limb 5-10\"x5  Single limb 10\"x7 Single limb 5-8\"x10  Single limb 10\"x8 10x Blk 10\"x10    Bike ROM 5'  5' 5' 5' 5' 5' 5'  5' 5'    Calf press 150# 30x 150# 3x12 150#/ 30 150# 30x 150# 30x  160# 30x 150# 2x25  160# 30x 160#/ 30 150# 30x    Prone ham cybex 30# 15x, 40# 15x 50# 3x12 50#/ 30 50# 30x 50# 30x  40# 30x  40# 30x  55# 30x  55#/ 30 45# 30x    Leg press  180# 2x15 190# 3x12 200# 3x10 200# 3x10 210# 3x10 220# 30x 220# 30x 230# 30x 230#/ 30 190# 30x   Single limb leg press  80# 2x15 85# 3x12 90# 3x10 90# 3x10 100# 3x10  110# 30x  100# 30x  110# 30x  110# 90# 30x    Cybex hip abd 90# 30x  95# 3x12 100#/ 30 100# 30x 100# 30x 110# 30x 110# 30x 110# 30x 110# 90# 30x    Cybex hip add 100# 30x 100# 3x12 110/30 110# 30x 95# 30x  100# 30x 90# 30x  100# 30x 100#/ 30 100# 30x    Staggered RDL  12# 2x10  12# 2x10  12#/ 20 12# 20x 12# 20x  15# KB 2x15  NT  15# KB 2x15 15# 15x2    Reverse lunge  NV  10x          " "  ANABEL Vang   12# 3x10 12#/ 30 13.5# 30x  14# 30x 15# 5\"x20  15# 5\"x20  15# 5\"x20  16# 5\"x20  16#/ 30                              NM Re-ed             Toe taps              Monster walk FW/BW  3 laps   3 laps blk  3x Blk TB x 3 laps Blk TB x 3 laps Blk TB x 3 laps Blk TB x 3 laps Blk TB x 3 laps D/C to home 3 laps   Side stepping  3 laps  X walks blk 2 laps 30 ft 2x  Blk TB x 3 laps Blk TB x 3 laps Blk TB x 3 laps Blk TB x 3 laps Blk TB x 3 laps D/C to home  3 laps    SLS on pod with MB toss  RMB 30x dynodisc  RMB BOSU 30x  30x RMB on BOSU On Bosu RMB SLS 30x On Bosu YMB SLS 30x On Bosu YMB SLS 30x On Bosu YMB SLS 30x On Bosu YMB SLS 30x 30x BMB RMB 30x dynodisc    Squats on reverse BOSU 30x  Blk tband 30x  30x blk Tband BMB Blk TB  30x  Blk TB  30x [place] MB  Blk TB  30x [place] MB  Blk TB  30x [place] MB  Blk TB  30x [place] MB  30x Blk tband 30x                 Ther Activity             Quick stepping FW/BW, lat 1' ea 1' ea  1'  1' ea 1' ea 1' ea  NP 1' ea   1' ea    Side shuffle ball toss         10x    Toe taps to step  20x  20x     30x  30x  30x  To BOSU 30x  30 4\" 30x    Jogging  40 ft 3 laps  40 ft 3 laps    40 ft 3 laps  40 ft 3 laps  40ft 1 lap 40 ft 3 laps  40'x3    Side shuffle         4x    FW/BW double limb hop  Agility ladder 2 laps 10x tiles on floor   10x 10x  FW/BW 10x, lat 4x  NP   Fwd soft land 10x 10x    Agility ladder:   FW, hops, icky shuffle, type writer  NV       2 laps each                  Gait Training                                       Modalities             Ice                                            "

## 2024-05-09 ENCOUNTER — OFFICE VISIT (OUTPATIENT)
Dept: PHYSICAL THERAPY | Age: 34
End: 2024-05-09
Payer: COMMERCIAL

## 2024-05-09 DIAGNOSIS — M23.51 OLD COMPLETE ACL TEAR, RIGHT: Primary | ICD-10-CM

## 2024-05-09 PROCEDURE — 97112 NEUROMUSCULAR REEDUCATION: CPT

## 2024-05-09 PROCEDURE — 97110 THERAPEUTIC EXERCISES: CPT

## 2024-05-09 PROCEDURE — 97530 THERAPEUTIC ACTIVITIES: CPT

## 2024-05-09 NOTE — PROGRESS NOTES
Daily Note     Today's date: 2024  Patient name: Khalif Hood  : 1990  MRN: [id number]  Referring provider: Sanjay Cheek,*  Dx:   Encounter Diagnosis     ICD-10-CM    1. 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       Objective: See treatment diary below      Assessment: Progressed agility drills as documented below. Performed lateral step downs on a slightly lower block to ensure he maintains proper hip alignment and ecc control. Mild discomfort at lateral knee with lateral movements but was able to complete remainder of the activities without modifications needed. Will continue to progress as patient is entering next phase of protocol. Patient would benefit from continued PT.      Plan: Continue per plan of care.      POC expires Unit limit Auth Expiration date PT/OT + Visit Limit?   24 BOMN 24 20Pt    24 BOMN 24 8   24 BOMN 24 12               Visit/Unit Tracking  AUTH Status:  Date 1/19 1/22 1/26 1/29 2/1 2/5 2/9 2/12 2/16  2/19 2/23 2/26 2/29   Auth needed Used 1 2 3 4 5 6 7 8 9 10 11 12 13    Remaining  19 18 17 16 15 14 13 12 11 10 9 8 7    FOTO 22 (56)         RE NV           AUTH Status:  Date 3/5 3/7 3/14 3/19 3/22  3/28 4/2   Auth needed Used 14 15 16 17 18 19 20    Remaining  6 5 4 3 2 1 0    FOTO              AUTH Status:  Date    Auth after 8   (end date ) Used 1 2 3 4 5 6 7    Remaining  7 6 5 4 3 2 1    FOTO   72 (56)         AUTH Status:  Date 5/3 5/7 5/9            Auth after 12  Used 1 2 3             Remaining  11 10 9 8 7 6 5 4 3 2 1 0    FOTO                     Precautions: PROTOCOL DOS     Access Code: [email or web address]  URL: [email or web address]  Date: 2024  Prepared by:  "Annemarie Estella    Exercises  - Supine Quad Set  - 1 x daily - 7 x weekly - 3 sets - 10 reps  - Active Straight Leg Raise with Quad Set  - 1 x daily - 7 x weekly - 3 sets - 10 reps  - Seated Heel Slide  - 1 x daily - 7 x weekly - 3 sets - 10 reps  - Supine Ankle Pumps  - 1 x daily - 7 x weekly - 3 sets - 10 reps  - Heel Raises with Counter Support  - 1 x daily - 7 x weekly - 3 sets - 10 reps  - Standing Hip Abduction with Counter Support  - 1 x daily - 7 x weekly - 3 sets - 10 reps  - Standing Hip Extension with Counter Support  - 1 x daily - 7 x weekly - 3 sets - 10 reps  - Mini Squat with Counter Support  - 1 x daily - 7 x weekly - 3 sets - 10 reps  Manuals 4/5  4/9 4/16 4/19 4/23 4/26  4/30  5/3 5/7 5/9   PROM             Patellar mobs             Roller R HS             TherEx             Nustep: strength/ROM             TM              Lateral step ups 30x 12\" 20x  20x 20x 12\" 20x 12\" 20x 14\" 20x 14\" 20x 20x 12\" 20x ecc lower    Fwd step up 30x W/ L hip flex 12\" w/ L hip flex 20x  20x 12\" 20x 12\" 20x  12\" 20x  14\" 20x  14\" 20x 20x 14\" W/ L hip flex 20x    ECC step down          10x 6\" FW step down 8\" 10x    Wall 1/4 sit  10\"x10 Blk 10\"x10 10x blk Blk TB 10x Single limb 5-10\"x5  Single limb 10\"x7 Single limb 5-8\"x10  Single limb 10\"x8 10x D/C to home    Bike ROM 5'  5' 5' 5' 5' 5' 5'  5' 5' 5'   Calf press 150# 30x 150# 3x12 150#/ 30 150# 30x 150# 30x  160# 30x 150# 2x25  160# 30x 160#/ 30 160# 30x   Prone ham cybex 30# 15x, 40# 15x 50# 3x12 50#/ 30 50# 30x 50# 30x  40# 30x  40# 30x  55# 30x  55#/ 30 55# 30x   Leg press  180# 2x15 190# 3x12 200# 3x10 200# 3x10 210# 3x10 220# 30x 220# 30x 230# 30x 230#/ 30 230# 30x   Single limb leg press  80# 2x15 85# 3x12 90# 3x10 90# 3x10 100# 3x10  110# 30x  100# 30x  110# 30x  110# 110# 30x   Cybex hip abd 90# 30x  95# 3x12 100#/ 30 100# 30x 100# 30x 110# 30x 110# 30x 110# 30x 110# 110# 30x    Cybex hip add 100# 30x 100# 3x12 110/30 110# 30x 95# 30x  100# 30x 90# 30x  100# 30x " "100#/ 30 100# 30x    Staggered RDL  12# 2x10  12# 2x10  12#/ 20 12# 20x 12# 20x  15# KB 2x15  NT  15# KB 2x15 15# 15x2 Single limb 15# 15x2    TKE [place]   12# 3x10 12#/ 30 13.5# 30x  14# 30x 15# 5\"x20  15# 5\"x20  15# 5\"x20  16# 5\"x20  16#/ 30 16# 30x                              NM Re-ed             SLS on pod with MB toss  RMB 30x dynodisc  RMB BOSU 30x  30x RMB on BOSU On Bosu RMB SLS 30x On Bosu YMB SLS 30x On Bosu YMB SLS 30x On Bosu YMB SLS 30x On Bosu YMB SLS 30x 30x BMB 30x BMB   Squats on reverse BOSU 30x  Blk tband 30x  30x blk Tband BMB Blk TB  30x  Blk TB  30x [place] MB  Blk TB  30x [place] MB  Blk TB  30x [place] MB  Blk TB  30x [place] MB  30x Blk TB  30x [place] MB                 Ther Activity             Quick stepping FW/BW, lat 1' ea 1' ea  1'  1' ea 1' ea 1' ea  NP 1' ea   D/C    Side shuffle ball toss         10x    Toe taps to step  20x  20x     30x  30x  30x  To BOSU 30x  30 BOSU 30x    Jogging  40 ft 3 laps  40 ft 3 laps    40 ft 3 laps  40 ft 3 laps  40ft 1 lap 40 ft 3 laps  40'x3 40' 3 laps    Side shuffle         4x 2 laps 30 ft    FW/BW double limb hop  Agility ladder 2 laps 10x tiles on floor   10x 10x  FW/BW 10x, lat 4x  NP   Fwd soft land 10x Single limb on BOSU 10x    Agility ladder:   FW, hops, icky shuffle, type writer, lat   NV       2 laps each  2 laps ea    Lateral step up and over BOSU          10x    Bounding           NV                 Gait Training                                       Modalities             Ice                                              "

## 2024-05-14 ENCOUNTER — APPOINTMENT (OUTPATIENT)
Dept: PHYSICAL THERAPY | Age: 34
End: 2024-05-14
Payer: COMMERCIAL

## 2024-05-16 ENCOUNTER — OFFICE VISIT (OUTPATIENT)
Dept: PHYSICAL THERAPY | Age: 34
End: 2024-05-16
Payer: COMMERCIAL

## 2024-05-16 DIAGNOSIS — M23.51 OLD COMPLETE ACL TEAR, RIGHT: Primary | ICD-10-CM

## 2024-05-16 PROCEDURE — 97110 THERAPEUTIC EXERCISES: CPT

## 2024-05-16 PROCEDURE — 97112 NEUROMUSCULAR REEDUCATION: CPT

## 2024-05-16 NOTE — PROGRESS NOTES
Daily Note     Today's date: 2024  Patient name: Khalif Hood  : 1990  MRN: [id number]  Referring provider: Sanjay Cheek,*  Dx:   Encounter Diagnosis     ICD-10-CM    1. 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 He is aware that his ACL graft is weak still and to not do anything dangerous such as aggressive cutting.       Objective: See treatment diary below      Assessment: Patient started on the bike. Followed by the agility ladder doing the icky shuffle, typerwriter, and adding single leg forward hops. We trailed single leg lateral hops but he experienced some lateral knee pain so we terminated this. Added skipping trying to achieve a high vertical with a soft landing, bounding focusing on long strides horizontally, and grapevines/karaoke. Added jump downs from 8' step with bilateral landing, with single leg landing, with bilateral landing and explosive broad jump. Added lunge pulses and progressed to prolonged lunges with reactive upper extremity movements such as dribble, toss, around the body. Lastly, added coppehagen planks, these challenged the patient.  Tolerated treatment well. Patient demonstrated fatigue post treatment      Plan: Continue per plan of care.      POC expires Unit limit Auth Expiration date PT/OT + Visit Limit?   24 BOMN 24 20Pt    24 BOMN 24 8   24 BOMN 24 12               Visit/Unit Tracking  AUTH Status:  Date 1/19 1/22 1/26 1/29 2/1 2/5 2/9 2/12 2/16  2/19 2/23 2/26 2/29   Auth needed Used 1 2 3 4 5 6 7 8 9 10 11 12 13    Remaining  19 18 17 16 15 14 13 12 11 10 9 8 7    FOTO 22 (56)         RE NV           AUTH Status:  Date 3/5 3/7 3/14 3/19 3/22  3/28 4/   Auth needed Used 14 15 16 17 18 19 20    Remaining  6 5 4 3 2 1 0    FOTO              AUTH Status:  Date 4/5 4/9 4/16 4/19 4/23 4/26 4/30  "  Auth after 8   (end date 4/30) Used 1 2 3 4 5 6 7    Remaining  7 6 5 4 3 2 1    FOTO   72 (56)         AUTH Status:  Date 5/3 5/7 5/9 5/16           Auth after 12  Used 1 2 3 4            Remaining  11 10 9 8 7 6 5 4 3 2 1 0    FOTO                     Precautions: PROTOCOL DOS 1/17    Access Code: [email or web address]  URL: [email or web address]  Date: 01/19/2024  Prepared by: Annemarie Soria    Exercises  - Supine Quad Set  - 1 x daily - 7 x weekly - 3 sets - 10 reps  - Active Straight Leg Raise with Quad Set  - 1 x daily - 7 x weekly - 3 sets - 10 reps  - Seated Heel Slide  - 1 x daily - 7 x weekly - 3 sets - 10 reps  - Supine Ankle Pumps  - 1 x daily - 7 x weekly - 3 sets - 10 reps  - Heel Raises with Counter Support  - 1 x daily - 7 x weekly - 3 sets - 10 reps  - Standing Hip Abduction with Counter Support  - 1 x daily - 7 x weekly - 3 sets - 10 reps  - Standing Hip Extension with Counter Support  - 1 x daily - 7 x weekly - 3 sets - 10 reps  - Mini Squat with Counter Support  - 1 x daily - 7 x weekly - 3 sets - 10 reps  Manuals 5/16 4/9 4/16 4/19 4/23 4/26  4/30  5/3 5/7 5/9   PROM             Patellar mobs             Roller R HS             TherEx             Nustep: strength/ROM             TM              Lateral step ups  12\" 20x  20x 20x 12\" 20x 12\" 20x 14\" 20x 14\" 20x 20x 12\" 20x ecc lower    Fwd step up  12\" w/ L hip flex 20x  20x 12\" 20x 12\" 20x  12\" 20x  14\" 20x  14\" 20x 20x 14\" W/ L hip flex 20x    ECC step down          10x 6\" FW step down 8\" 10x    Wall 1/4 sit   Blk 10\"x10 10x blk Blk TB 10x Single limb 5-10\"x5  Single limb 10\"x7 Single limb 5-8\"x10  Single limb 10\"x8 10x D/C to home    Bike ROM 5' 5' 5' 5' 5' 5' 5'  5' 5' 5'   Calf press 160# 30x  150# 3x12 150#/ 30 150# 30x 150# 30x  160# 30x 150# 2x25  160# 30x 160#/ 30 160# 30x   Prone ham cybex 55# 30x  50# 3x12 50#/ 30 50# 30x 50# 30x  40# 30x  40# 30x  55# 30x  55#/ 30 55# 30x   Leg press  240# 30x  190# 3x12 200# 3x10 200# 3x10 210# 3x10 " "220# 30x 220# 30x 230# 30x 230#/ 30 230# 30x   Single limb leg press  120# 30x  85# 3x12 90# 3x10 90# 3x10 100# 3x10  110# 30x  100# 30x  110# 30x  110# 110# 30x   Cybex hip abd 110# 30X  95# 3x12 100#/ 30 100# 30x 100# 30x 110# 30x 110# 30x 110# 30x 110# 110# 30x    Cybex hip add 100# 30x  100# 3x12 110/30 110# 30x 95# 30x  100# 30x 90# 30x  100# 30x 100#/ 30 100# 30x    Staggered RDL   12# 2x10  12#/ 20 12# 20x 12# 20x  15# KB 2x15  NT  15# KB 2x15 15# 15x2 Single limb 15# 15x2    TKE [place]   12# 3x10 12#/ 30 13.5# 30x  14# 30x 15# 5\"x20  15# 5\"x20  15# 5\"x20  16# 5\"x20  16#/ 30 16# 30x    [place] planks  3x30\" ea 12\"                          NM Re-ed             SLS on pod with MB toss  BMB BOSU x30  RMB BOSU 30x  30x RMB on BOSU On Bosu RMB SLS 30x On Bosu YMB SLS 30x On Bosu YMB SLS 30x On Bosu YMB SLS 30x On Bosu YMB SLS 30x 30x BMB 30x BMB   Squats on reverse BOSU  Blk tband 30x  30x blk Tband BMB Blk TB  30x  Blk TB  30x [place] MB  Blk TB  30x [place] MB  Blk TB  30x [place] MB  Blk TB  30x [place] MB  30x Blk TB  30x [place] MB                 Ther Activity             Quick stepping FW/BW, lat  1' ea  1'  1' ea 1' ea 1' ea  NP 1' ea   D/C    Side shuffle ball toss         10x    Toe taps to step   20x     30x  30x  30x  To BOSU 30x  30 BOSU 30x    Jogging  50ft 4 laps  40 ft 3 laps    40 ft 3 laps  40 ft 3 laps  40ft 1 lap 40 ft 3 laps  40'x3 40' 3 laps    Side shuffle 50ft 2 laps         4x 2 laps 30 ft    FW/BW double limb hop   10x tiles on floor   10x 10x  FW/BW 10x, lat 4x  NP   Fwd soft land 10x Single limb on BOSU 10x    Agility ladder:   FW, hops, icky shuffle, type writer, lat  2 laps   Forward SL   Hold lateral SL  NV       2 laps each  2 laps ea    Lateral step up and over BOSU 10x          10x    Bounding  X5          NV    Jump landings  See above   B/l x5   B/l / broad jump x5  Sl x5             Lunges Pulses x10 ea  Holds x5 ea                                                   Gait Training  "                                      Modalities             Ice

## 2024-05-21 ENCOUNTER — APPOINTMENT (OUTPATIENT)
Dept: PHYSICAL THERAPY | Age: 34
End: 2024-05-21
Payer: COMMERCIAL

## 2024-05-23 ENCOUNTER — OFFICE VISIT (OUTPATIENT)
Dept: PHYSICAL THERAPY | Age: 34
End: 2024-05-23
Payer: COMMERCIAL

## 2024-05-23 DIAGNOSIS — M23.51 OLD COMPLETE ACL TEAR, RIGHT: Primary | ICD-10-CM

## 2024-05-23 PROCEDURE — 97110 THERAPEUTIC EXERCISES: CPT

## 2024-05-23 PROCEDURE — 97112 NEUROMUSCULAR REEDUCATION: CPT

## 2024-05-23 PROCEDURE — 97530 THERAPEUTIC ACTIVITIES: CPT

## 2024-05-23 NOTE — PROGRESS NOTES
Daily Note     Today's date: 2024  Patient name: Khalif Hood  : 1990  MRN: [id number]  Referring provider: Sanjay Cheek,*  Dx:   Encounter Diagnosis     ICD-10-CM    1. 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       Objective: See treatment diary below      Assessment: Cues for soft landing when performing plyometric activities. Demonstrates good form when performing DL and SL jumps off step, no c/o pain. Lateral movements continue to provoke pain at R lateral knee on occasion. Added T drills without issues, with the exception of one instance of pain when going BW, but dissipated to baseline at rest and patient was able to complete the remainder of the drills. Relief post self quad stretch with strap. Patient would benefit from continued PT      Plan: Continue per plan of care.      POC expires Unit limit Auth Expiration date PT/OT + Visit Limit?   24 BOMN 24 20Pt    24 BOMN 24 8   24 BOMN 24 12               Visit/Unit Tracking  AUTH Status:  Date 1/19 1/22 1/26 1/29 2/1 2/5 2/9 2/12 2/16  2/19 2/23 2/26 2/29   Auth needed Used 1 2 3 4 5 6 7 8 9 10 11 12 13    Remaining  19 18 17 16 15 14 13 12 11 10 9 8 7    FOTO 22 (56)         RE NV           AUTH Status:  Date 3/5 3/7 3/14 3/19 3/22  3/28 4/   Auth needed Used 14 15 16 17 18 19 20    Remaining  6 5 4 3 2 1 0    FOTO              AUTH Status:  Date 4/5 4/9 4/16 4/19 4/23 4/26 4/30   Auth after 8   (end date ) Used 1 2 3 4 5 6 7    Remaining  7 6 5 4 3 2 1    FOTO   72 (56)         AUTH Status:  Date 5/3 5/7 5/9 5/16 5/23          Auth after 12  Used 1 2 3 4 5           Remaining  11 10 9 8 7 6 5 4 3 2 1 0    FOTO                     Precautions: PROTOCOL DOS     Access Code:  [email or web address]  URL: [email or web address]  Date: 01/19/2024  Prepared by: Annemarie Soria    Exercises  - Supine Quad Set  - 1 x daily - 7 x weekly - 3 sets - 10 reps  - Active Straight Leg Raise with Quad Set  - 1 x daily - 7 x weekly - 3 sets - 10 reps  - Seated Heel Slide  - 1 x daily - 7 x weekly - 3 sets - 10 reps  - Supine Ankle Pumps  - 1 x daily - 7 x weekly - 3 sets - 10 reps  - Heel Raises with Counter Support  - 1 x daily - 7 x weekly - 3 sets - 10 reps  - Standing Hip Abduction with Counter Support  - 1 x daily - 7 x weekly - 3 sets - 10 reps  - Standing Hip Extension with Counter Support  - 1 x daily - 7 x weekly - 3 sets - 10 reps  - Mini Squat with Counter Support  - 1 x daily - 7 x weekly - 3 sets - 10 reps  Manuals 5/16 5/23  4/19 4/23 4/26  4/30  5/3 5/7 5/9   PROM             Patellar mobs             Roller R HS             TherEx             Nustep: strength/ROM  D/C            TM              Lateral step ups  D/C   20x 12\" 20x 12\" 20x 14\" 20x 14\" 20x 20x 12\" 20x ecc lower    Fwd step up  D/C   12\" 20x 12\" 20x  12\" 20x  14\" 20x  14\" 20x 20x 14\" W/ L hip flex 20x    ECC step down   D/C        10x 6\" FW step down 8\" 10x    Self quad stretch with strap  20\"x3            Bike ROM 5' 5'  5' 5' 5' 5'  5' 5' 5'   Calf press 160# 30x  160# 30x  150# 30x 150# 30x  160# 30x 150# 2x25  160# 30x 160#/ 30 160# 30x   Prone ham cybex 55# 30x  50# x10, 60# 2x10  50# 30x 50# 30x  40# 30x  40# 30x  55# 30x  55#/ 30 55# 30x   Leg press  240# 30x  250# 3x10  200# 3x10 210# 3x10 220# 30x 220# 30x 230# 30x 230#/ 30 230# 30x   Single limb leg press  120# 30x  120# 30x   90# 3x10 100# 3x10  110# 30x  100# 30x  110# 30x  110# 110# 30x   Cybex hip abd 110# 30X  110# 30x  100# 30x 100# 30x 110# 30x 110# 30x 110# 30x 110# 110# 30x    Cybex hip add 100# 30x  120# 30x   110# 30x 95# 30x  100# 30x 90# 30x  100# 30x 100#/ 30 100# 30x    Staggered RDL   20# 30x   12# 20x 12# 20x  15# KB 2x15  NT  15# KB 2x15 15# 15x2 " "Single limb 15# 15x2    TKE Kesier     13.5# 30x  14# 30x 15# 5\"x20  15# 5\"x20  15# 5\"x20  16# 5\"x20  16#/ 30 16# 30x    [place] planks  3x30\" ea 12\"  NV                         NM Re-ed             SLS on pod with MB toss  BMB BOSU x30  BMB BOSU x30   On Bosu RMB SLS 30x On Bosu YMB SLS 30x On Bosu YMB SLS 30x On Bosu YMB SLS 30x On Bosu YMB SLS 30x 30x BMB 30x BMB   Squats on reverse BOSU    Blk TB  30x  Blk TB  30x [place] MB  Blk TB  30x [place] MB  Blk TB  30x [place] MB  Blk TB  30x [place] MB  30x Blk TB  30x [place] MB                 Ther Activity             Quick stepping FW/BW, lat    1' ea 1' ea 1' ea  NP 1' ea   D/C    Side shuffle ball toss         10x    Toe taps to BOSU   30x     30x  30x  30x  To BOSU 30x  30 BOSU 30x    Jogging  50ft 4 laps  50ft 4 laps    40 ft 3 laps  40 ft 3 laps  40ft 1 lap 40 ft 3 laps  40'x3 40' 3 laps    Side shuffle 50ft 2 laps  50ft 2 laps        4x 2 laps 30 ft    FW/BW double limb hop     10x 10x  FW/BW 10x, lat 4x  NP   Fwd soft land 10x Single limb on BOSU 10x    Agility ladder:   FW, hops, icky shuffle, type writer, lat  2 laps   Forward SL   Hold lateral SL  Icky, FW SL, lat   2 laps ea       2 laps each  2 laps ea    Lateral step up and over BOSU 10x  10x         10x    Bounding  X5  2 laps (20 ft)        NV    Jump landings  See above   B/l x5   B/l / broad jump x5  Sl x5  Off step 5x B, off step SL 5x, firm surface 5x            Lunges Pulses x10 ea  Holds x5 ea Pulses 10x     Holds 5\"x5 ea            T drills   5x                                      Gait Training                                       Modalities             Ice                                                  "

## 2024-05-28 ENCOUNTER — APPOINTMENT (OUTPATIENT)
Dept: PHYSICAL THERAPY | Age: 34
End: 2024-05-28
Payer: COMMERCIAL

## 2024-05-30 ENCOUNTER — OFFICE VISIT (OUTPATIENT)
Dept: PHYSICAL THERAPY | Age: 34
End: 2024-05-30
Payer: COMMERCIAL

## 2024-05-30 DIAGNOSIS — M23.51 OLD COMPLETE ACL TEAR, RIGHT: Primary | ICD-10-CM

## 2024-05-30 PROCEDURE — 97112 NEUROMUSCULAR REEDUCATION: CPT

## 2024-05-30 PROCEDURE — 97530 THERAPEUTIC ACTIVITIES: CPT

## 2024-05-30 PROCEDURE — 97110 THERAPEUTIC EXERCISES: CPT

## 2024-05-30 NOTE — PROGRESS NOTES
Daily Note     Today's date: 2024  Patient name: Khalif Hood  : 1990  MRN: [id number]  Referring provider: Sanjay Cheek,*  Dx:   Encounter Diagnosis     ICD-10-CM    1. 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 Today he notes some  tightness this morning. He did c/o pain w/ ex's today as well.       Objective: See treatment diary below      Assessment: Tolerated treatment well. Cueing throughout session for technique w/ ex's, soft landing technique w/ jumps and positioning w/ ex's. Pt had some difficulty today w/ his SL jumps off the step, his SL leaps and back peddling. Modified his bounding ex w/ decreased push off due to pain. Challenged pt w/ his T drills to squat and roll ball away and then  ball w/ softball throw w/ R pivot. Weakness noted in R glut w/ SL jump off step. Pain to lateral R knee and a feeling of his knee giving out today. Pt able to complete all ex's as per flowsheet w/ good relief from quad stretch.  Patient exhibited good technique with therapeutic exercises      Plan: Continue per plan of care.      POC expires Unit limit Auth Expiration date PT/OT + Visit Limit?   24 BOMN 24 20Pt    24 BOMN 24 8   24 BOMN 24 12               Visit/Unit Tracking  AUTH Status:  Date 1/19 1/22 1/26 1/29 2/1 2/5 2/9 2/12 2/16  2/19 2/23 2/26 2/29   Auth needed Used 1 2 3 4 5 6 7 8 9 10 11 12 13    Remaining  19 18 17 16 15 14 13 12 11 10 9 8 7    FOTO 22 (56)         RE NV           AUTH Status:  Date 3/5 3/7 3/14 3/19 3/22  3/28 4/2   Auth needed Used 14 15 16 17 18 19 20    Remaining  6 5 4 3 2 1 0    FOTO              AUTH Status:  Date 4/5    Auth after 8   (end date ) Used 1 2 3 4 5 6 7    Remaining  7 6 5 4 3 2 1    FOTO   72 (56)         AUTH  "Status:  Date 5/3 5/7 5/9 5/16 5/23 5/30         Auth after 12  Used 1 2 3 4 5 6          Remaining  11 10 9 8 7 6 5 4 3 2 1 0    FOTO                     Precautions: PROTOCOL DOS 1/17    Access Code: [email or web address]  URL: [email or web address]  Date: 01/19/2024  Prepared by: Annemarie Soria    Exercises  - Supine Quad Set  - 1 x daily - 7 x weekly - 3 sets - 10 reps  - Active Straight Leg Raise with Quad Set  - 1 x daily - 7 x weekly - 3 sets - 10 reps  - Seated Heel Slide  - 1 x daily - 7 x weekly - 3 sets - 10 reps  - Supine Ankle Pumps  - 1 x daily - 7 x weekly - 3 sets - 10 reps  - Heel Raises with Counter Support  - 1 x daily - 7 x weekly - 3 sets - 10 reps  - Standing Hip Abduction with Counter Support  - 1 x daily - 7 x weekly - 3 sets - 10 reps  - Standing Hip Extension with Counter Support  - 1 x daily - 7 x weekly - 3 sets - 10 reps  - Mini Squat with Counter Support  - 1 x daily - 7 x weekly - 3 sets - 10 reps  Manuals 5/16 5/23 5/30  4/23 4/26  4/30  5/3 5/7 5/9   PROM             Patellar mobs             Roller R HS             TherEx             Nustep: strength/ROM  D/C            TM              Lateral step ups  D/C   20x 12\" 20x 12\" 20x 14\" 20x 14\" 20x 20x 12\" 20x ecc lower    Fwd step up  D/C   12\" 20x 12\" 20x  12\" 20x  14\" 20x  14\" 20x 20x 14\" W/ L hip flex 20x    ECC step down   D/C        10x 6\" FW step down 8\" 10x    Self quad stretch with strap  20\"x3  20''x4          Bike ROM 5' 5' 5 5' 5' 5' 5'  5' 5' 5'   Calf press 160# 30x  160# 30x 170#x30 150# 30x 150# 30x  160# 30x 150# 2x25  160# 30x 160#/ 30 160# 30x   Prone ham cybex 55# 30x  50# x10, 60# 2x10 55#x1060#2x10 50# 30x 50# 30x  40# 30x  40# 30x  55# 30x  55#/ 30 55# 30x   Leg press  240# 30x  250# 3x10 250#3x10 200# 3x10 210# 3x10 220# 30x 220# 30x 230# 30x 230#/ 30 230# 30x   Single limb leg press  120# 30x  120# 30x  130#x30 90# 3x10 100# 3x10  110# 30x  100# 30x  110# 30x  110# 110# 30x   Cybex hip abd 110# 30X  110# 30x " "110#x30 100# 30x 100# 30x 110# 30x 110# 30x 110# 30x 110# 110# 30x    Cybex hip add 100# 30x  120# 30x  110#x30 110# 30x 95# 30x  100# 30x 90# 30x  100# 30x 100#/ 30 100# 30x    Staggered RDL   20# 30x   12# 20x 12# 20x  15# KB 2x15  NT  15# KB 2x15 15# 15x2 Single limb 15# 15x2    TKE Kesier    16#x30 13.5# 30x  14# 30x 15# 5\"x20  15# 5\"x20  15# 5\"x20  16# 5\"x20  16#/ 30 16# 30x    [place] planks  3x30\" ea 12\"  NV                         NM Re-ed             SLS on pod with MB toss  BMB BOSU x30  BMB BOSU x30  BMB BOSO x30 On Bosu RMB SLS 30x On Bosu YMB SLS 30x On Bosu YMB SLS 30x On Bosu YMB SLS 30x On Bosu YMB SLS 30x 30x BMB 30x BMB   Squats on reverse BOSU    Blk TB  30x  Blk TB  30x [place] MB  Blk TB  30x [place] MB  Blk TB  30x [place] MB  Blk TB  30x [place] MB  30x Blk TB  30x [place] MB                 Ther Activity             Quick stepping FW/BW, lat    1' ea 1' ea 1' ea  NP 1' ea   D/C    Side shuffle ball toss         10x    Toe taps to BOSU   30x     30x  30x  30x  To BOSU 30x  30 BOSU 30x    Jogging  50ft 4 laps  50ft 4 laps  50ft x4  40 ft 3 laps  40 ft 3 laps  40ft 1 lap 40 ft 3 laps  40'x3 40' 3 laps    Side shuffle 50ft 2 laps  50ft 2 laps  50ftx2      4x 2 laps 30 ft    FW/BW double limb hop     10x 10x  FW/BW 10x, lat 4x  NP   Fwd soft land 10x Single limb on BOSU 10x    Agility ladder:   FW, hops, icky shuffle, type writer, lat  2 laps   Forward SL   Hold lateral SL  Icky, FW SL, lat   2 laps ea  Icky,FW, B jumps x2 laps     2 laps each  2 laps ea    Lateral step up and over BOSU 10x  10x  x20       10x    Bounding  X5  2 laps (20 ft) 2 laps P!       NV    Jump landings  See above   B/l x5   B/l / broad jump x5  Sl x5  Off step 5x B, off step SL 5x, firm surface 5x  x5B w/ hop, x5 SL no hop          Lunges Pulses x10 ea  Holds x5 ea Pulses 10x     Holds 5\"x5 ea            T drills   5x  X5 w/ ball roll/ball throw                                    Gait Training                                 "       Modalities             Ice

## 2024-06-04 ENCOUNTER — OFFICE VISIT (OUTPATIENT)
Dept: PHYSICAL THERAPY | Age: 34
End: 2024-06-04
Payer: COMMERCIAL

## 2024-06-04 DIAGNOSIS — M23.51 OLD COMPLETE ACL TEAR, RIGHT: Primary | ICD-10-CM

## 2024-06-04 PROCEDURE — 97112 NEUROMUSCULAR REEDUCATION: CPT

## 2024-06-04 PROCEDURE — 97530 THERAPEUTIC ACTIVITIES: CPT

## 2024-06-04 PROCEDURE — 97110 THERAPEUTIC EXERCISES: CPT

## 2024-06-04 NOTE — PROGRESS NOTES
Daily Note     Today's date: 2024  Patient name: Khalif Hood  : 1990  MRN: [id number]  Referring provider: Sanjay Cheek,*  Dx:   Encounter Diagnosis     ICD-10-CM    1. 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 States he wears his brace as instructed, but finds it bothers him more when he wears it. Patient has a f/u with his MD this week, recommended he mention it to his MD to see if there is an alternative recommendation.       Objective: See treatment diary below  Patient was seen 1:1 for 45 min     Assessment: Patient was able to complete lateral movements today with no c/o lateral knee pain. The only activity patient was hesitant with due to lateral knee discomfort were BW single limb hops. Focused on shorter range in order to maintain better control and softer landing with good tolerance. Significant improvement in gait pattern when jogging with minimal to no hip add/IR. Added jogging on TM with focus on heel strike and soft landing, no issues noted. Patient would benefit from continued PT      Plan: Continue per plan of care.      POC expires Unit limit Auth Expiration date PT/OT + Visit Limit?   24 BOMN 24 20Pt    24 BOMN 24 8   24 BOMN 24 12               Visit/Unit Tracking  AUTH Status:  Date 1/19 1/22 1/26 1/29 2/1 2/5 2/9 2/12 2/16  2/19 2/23 2/26 2/29   Auth needed Used 1 2 3 4 5 6 7 8 9 10 11 12 13    Remaining  19 18 17 16 15 14 13 12 11 10 9 8 7    FOTO 22 (56)         RE NV           AUTH Status:  Date 3/5 3/7 3/14 3/19 3/22  3/28 4   Auth needed Used 14 15 16 17 18 19 20    Remaining  6 5 4 3 2 1 0    FOTO              AUTH Status:  Date    Auth after 8   (end date ) Used 1 2 3 4 5 6 7    Remaining  7 6 5  "4 3 2 1    FOTO   72 (56)         AUTH Status:  Date 5/3 5/7 5/9 5/16 5/23 5/30 6/4        Auth after 12  Used 1 2 3 4 5 6 7         Remaining  11 10 9 8 7 6 5 4 3 2 1 0    FOTO                     Precautions: PROTOCOL DOS 1/17    Access Code: [email or web address]  URL: [email or web address]  Date: 01/19/2024  Prepared by: Annemarie Soria    Exercises  - Supine Quad Set  - 1 x daily - 7 x weekly - 3 sets - 10 reps  - Active Straight Leg Raise with Quad Set  - 1 x daily - 7 x weekly - 3 sets - 10 reps  - Seated Heel Slide  - 1 x daily - 7 x weekly - 3 sets - 10 reps  - Supine Ankle Pumps  - 1 x daily - 7 x weekly - 3 sets - 10 reps  - Heel Raises with Counter Support  - 1 x daily - 7 x weekly - 3 sets - 10 reps  - Standing Hip Abduction with Counter Support  - 1 x daily - 7 x weekly - 3 sets - 10 reps  - Standing Hip Extension with Counter Support  - 1 x daily - 7 x weekly - 3 sets - 10 reps  - Mini Squat with Counter Support  - 1 x daily - 7 x weekly - 3 sets - 10 reps  Manuals 5/16 5/23 5/30 6/4   4/26  4/30  5/3 5/7 5/9   PROM             Patellar mobs             Roller R HS             TherEx             TM              Self quad stretch with strap  20\"x3  20''x4          Bike ROM 5' 5' 5 5'  5' 5'  5' 5' 5'   Calf press 160# 30x  160# 30x 170#x30 170# x30  160# 30x 150# 2x25  160# 30x 160#/ 30 160# 30x   Prone ham cybex 55# 30x  50# x10, 60# 2x10 55#x1060#2x10 55# 3x10   40# 30x  40# 30x  55# 30x  55#/ 30 55# 30x   Leg press  240# 30x  250# 3x10 250#3x10 250# 30x  220# 30x 220# 30x 230# 30x 230#/ 30 230# 30x   Single limb leg press  120# 30x  120# 30x  130#x30 130# 30x  110# 30x  100# 30x  110# 30x  110# 110# 30x   Cybex hip abd 110# 30X  110# 30x 110#x30 110# 30x  110# 30x 110# 30x 110# 30x 110# 110# 30x    Cybex hip add 100# 30x  120# 30x  110#x30 110# 30x   100# 30x 90# 30x  100# 30x 100#/ 30 100# 30x    Staggered RDL   20# 30x     15# KB 2x15  NT  15# KB 2x15 15# 15x2 Single limb 15# 15x2    TKE [place]    " "16#x30 16# x30  15# 5\"x20  15# 5\"x20  16# 5\"x20  16#/ 30 16# 30x    [place] planks  3x30\" ea 12\"  NV                         NM Re-ed             SLS on pod with MB toss  BMB BOSU x30  BMB BOSU x30  BMB BOSU x30 BMB BOSU x30  On Bosu YMB SLS 30x On Bosu YMB SLS 30x On Bosu YMB SLS 30x 30x BMB 30x BMB   Squats on reverse BOSU      Blk TB  30x [place] MB  Blk TB  30x [place] MB  Blk TB  30x [place] MB  30x Blk TB  30x [place] MB                 Ther Activity             Quick stepping FW/BW, lat      1' ea  NP 1' ea   D/C    Side shuffle ball toss         10x    Toe taps to BOSU   30x     30x  30x  To BOSU 30x  30 BOSU 30x    Jogging  50ft 4 laps  50ft 4 laps  50ft x4 50ft x4  40 ft 3 laps  40ft 1 lap 40 ft 3 laps  40'x3 40' 3 laps    Side shuffle 50ft 2 laps  50ft 2 laps  50ftx2 50ftx2     4x 2 laps 30 ft    FW/BW double limb hop       FW/BW 10x, lat 4x  NP   Fwd soft land 10x Single limb on BOSU 10x    Agility ladder:   FW, hops, icky shuffle, type writer, lat  2 laps   Forward SL   Hold lateral SL  Icky, FW SL, lat   2 laps ea  Icky,FW, B jumps x2 laps Icky,FW, B jumps x2 laps    2 laps each  2 laps ea    Lateral step up and over BOSU 10x  10x  x20 20x       10x    Bounding  X5  2 laps (20 ft) 2 laps P! 2 laps       NV    Jump landings  See above   B/l x5   B/l / broad jump x5  Sl x5  Off step 5x B, off step SL 5x, firm surface 5x  x5B w/ hop, x5 SL no hop x5B w/ hop, x5 SL no hop         Lunges Pulses x10 ea  Holds x5 ea Pulses 10x     Holds 5\"x5 ea            T drills   5x  X5 w/ ball roll/ball throw 5x          Single limb hops (FW/BW, lateral)    10x ea          Jogging on TM    5'                      Gait Training                                       Modalities             Ice                                                      "

## 2024-06-06 ENCOUNTER — OFFICE VISIT (OUTPATIENT)
Dept: PHYSICAL THERAPY | Age: 34
End: 2024-06-06
Payer: COMMERCIAL

## 2024-06-06 DIAGNOSIS — M23.51 OLD COMPLETE ACL TEAR, RIGHT: Primary | ICD-10-CM

## 2024-06-06 PROCEDURE — 97110 THERAPEUTIC EXERCISES: CPT

## 2024-06-06 PROCEDURE — 97112 NEUROMUSCULAR REEDUCATION: CPT

## 2024-06-06 PROCEDURE — 97530 THERAPEUTIC ACTIVITIES: CPT

## 2024-06-06 NOTE — PROGRESS NOTES
Daily Note     Today's date: 2024  Patient name: Khalif Hood  : 1990  MRN: [id number]  Referring provider: Sanjay Cheek,*  Dx:   Encounter Diagnosis     ICD-10-CM    1. 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 States he was sore after his session on Tuesday.       Objective: See treatment diary below  Patient was seen 1:1 for 45 min.     Assessment: Did not progress today secondary to elevated discomfort upon awakening. Performed HR in standing instead of cybex machine due to discomfort in this position, performed at various angles and with added weights. Challenged appropriately for this exercise. Some discomfort with single leg hopping today, focused on soft landing and control rather than height and distance. Patient would benefit from continued PT      Plan: Continue per plan of care.      POC expires Unit limit Auth Expiration date PT/OT + Visit Limit?   24 BOMN 24 20Pt    24 BOMN 24 8   24 BOMN 24 12               Visit/Unit Tracking  AUTH Status:  Date 1/19 1/22 1/26 1/29 2/1 2/5 2/9 2/12 2/16  2/19 2/23 2/26 2/29   Auth needed Used 1 2 3 4 5 6 7 8 9 10 11 12 13    Remaining  19 18 17 16 15 14 13 12 11 10 9 8 7    FOTO 22 (56)         RE NV           AUTH Status:  Date 3/5 3/7 3/14 3/19 3/22  3/28 4/   Auth needed Used 14 15 16 17 18 19 20    Remaining  6 5 4 3 2 1 0    FOTO              AUTH Status:  Date    Auth after 8   (end date ) Used 1 2 3 4 5 6 7    Remaining  7 6 5 4 3 2 1    FOTO   72 (56)         AUTH Status:  Date 5/3 5/7 5/ 6/ 6/6       Auth after 12  Used 1 2 3 4 5 6 7 8        Remaining  11 10 9 8 7 6 5 4 3 2 1 0    FOTO                     Precautions: PROTOCOL DOS     Access Code:  [email or web address]  URL: [email or web address]  Date: 01/19/2024  Prepared by: Annemarie Soria    Exercises  - Supine Quad Set  - 1 x daily - 7 x weekly - 3 sets - 10 reps  - Active Straight Leg Raise with Quad Set  - 1 x daily - 7 x weekly - 3 sets - 10 reps  - Seated Heel Slide  - 1 x daily - 7 x weekly - 3 sets - 10 reps  - Supine Ankle Pumps  - 1 x daily - 7 x weekly - 3 sets - 10 reps  - Heel Raises with Counter Support  - 1 x daily - 7 x weekly - 3 sets - 10 reps  - Standing Hip Abduction with Counter Support  - 1 x daily - 7 x weekly - 3 sets - 10 reps  - Standing Hip Extension with Counter Support  - 1 x daily - 7 x weekly - 3 sets - 10 reps  - Mini Squat with Counter Support  - 1 x daily - 7 x weekly - 3 sets - 10 reps  Manuals 5/16 5/23 5/30 6/4  6/6   4/30  5/3 5/7 5/9   PROM             Patellar mobs             Roller R HS             TherEx             TM              Self quad stretch with strap  20\"x3  20''x4  20\"x4        Bike ROM 5' 5' 5 5' 5'  5'  5' 5' 5'   Calf press 160# 30x  160# 30x 170#x30 170# x30 D/C   150# 2x25  160# 30x 160#/ 30 160# 30x   HR with toes elevated      2-20# KB 30x         HR with toe inversion/eversion     2-20# KB 30x ea        SL HR with toes elevated      2-20# KB 30x        Prone ham cybex 55# 30x  50# x10, 60# 2x10 55#x1060#2x10 55# 3x10  60# 30x   40# 30x  55# 30x  55#/ 30 55# 30x   Leg press  240# 30x  250# 3x10 250#3x10 250# 30x 270# 30x  220# 30x 230# 30x 230#/ 30 230# 30x   Single limb leg press  120# 30x  120# 30x  130#x30 130# 30x 130# 30x  100# 30x  110# 30x  110# 110# 30x   Cybex hip abd 110# 30X  110# 30x 110#x30 110# 30x 110# 30x  110# 30x 110# 30x 110# 110# 30x    Cybex hip add 100# 30x  120# 30x  110#x30 110# 30x  120# 30x  90# 30x  100# 30x 100#/ 30 100# 30x    Staggered RDL   20# 30x    20# KB 30x   NT  15# KB 2x15 15# 15x2 Single limb 15# 15x2    TKE [place]    16#x30 16# x30 16# 30x  15# 5\"x20  16# 5\"x20  16#/ 30 16# 30x    Mutual planks  3x30\" ea " "12\"  NV                         NM Re-ed             SLS on pod with MB toss  BMB BOSU x30  BMB BOSU x30  BMB BOSU x30 BMB BOSU x30 BMB BOSU x30  On Bosu YMB SLS 30x On Bosu YMB SLS 30x 30x BMB 30x BMB   Squats on reverse BOSU       Blk TB  30x Orange MB  Blk TB  30x [place] MB  30x Blk TB  30x Orange MB                 Ther Activity             Quick stepping FW/BW, lat       NP 1' ea   D/C    Side shuffle ball toss         10x    Toe taps to BOSU   30x      30x  To BOSU 30x  30 BOSU 30x    Jogging  50ft 4 laps  50ft 4 laps  50ft x4 50ft x4 50ft x2 *pain  40ft 1 lap 40 ft 3 laps  40'x3 40' 3 laps    Side shuffle 50ft 2 laps  50ft 2 laps  50ftx2 50ftx2 50ftx2    4x 2 laps 30 ft    FW/BW double limb hop        NP   Fwd soft land 10x Single limb on BOSU 10x    Agility ladder:   FW, hops, icky shuffle, type writer, lat  2 laps   Forward SL   Hold lateral SL  Icky, FW SL, lat   2 laps ea  Icky,FW, B jumps x2 laps Icky,FW, B jumps x2 laps Icky,FW, B jumps x2 laps   2 laps each  2 laps ea    Lateral step up and over BOSU 10x  10x  x20 20x  20x      10x    Bounding  X5  2 laps (20 ft) 2 laps P! 2 laps  2 laps      NV    Jump landings  See above   B/l x5   B/l / broad jump x5  Sl x5  Off step 5x B, off step SL 5x, firm surface 5x  x5B w/ hop, x5 SL no hop x5B w/ hop, x5 SL no hop X8 B w/ hop, x8 SL no hop        Lunges Pulses x10 ea  Holds x5 ea Pulses 10x     Holds 5\"x5 ea            T drills   5x  X5 w/ ball roll/ball throw 5x  5x         Single limb hops (FW/BW, lateral)    10x ea  10x ea         Jogging on TM    5' NV                      Gait Training                                       Modalities             Ice                                                        "

## 2024-06-11 ENCOUNTER — OFFICE VISIT (OUTPATIENT)
Dept: PHYSICAL THERAPY | Age: 34
End: 2024-06-11
Payer: COMMERCIAL

## 2024-06-11 DIAGNOSIS — M23.51 OLD COMPLETE ACL TEAR, RIGHT: Primary | ICD-10-CM

## 2024-06-11 PROCEDURE — 97112 NEUROMUSCULAR REEDUCATION: CPT

## 2024-06-11 PROCEDURE — 97530 THERAPEUTIC ACTIVITIES: CPT

## 2024-06-11 PROCEDURE — 97110 THERAPEUTIC EXERCISES: CPT

## 2024-06-11 NOTE — PROGRESS NOTES
Daily Note     Today's date: 2024  Patient name: Khalif Hood  : 1990  MRN: [id number]  Referring provider: Sanjay Cheek,*  Dx:   Encounter Diagnosis     ICD-10-CM    1. 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 States he is slowly increasing his activity but is not going 100% yet. Frustrated with lateral knee pain and fluid in joint. States he moved his MD f/u to July when he will be 6 months out post op and can get an injection and fluid removed.       Objective: See treatment diary below  Patient was seen 1:1 for 45 min.     Assessment: Occasional discomfort noted at lateral knee with lateral movements, no pain noted at medial knee. Performed agility activities outside today without issues. Good recall of his program. Some cues to keep weight through heels for jumping activities to avoid anterior translation of tibia. Also added walking lunges with some discomfort noted when advancing with his LLE and RLE is positioned posteriorly. Patient would benefit from continued PT      Plan: Continue per plan of care.      POC expires Unit limit Auth Expiration date PT/OT + Visit Limit?   24 BOMN 24 20Pt    24 BOMN 24 8   24 BOMN 24 12               Visit/Unit Tracking  AUTH Status:  Date 1/19 1/22 1/26 1/29 2/1 2/5 2/9 2/12 2/16  2/19 2/23 2/26 2/29   Auth needed Used 1 2 3 4 5 6 7 8 9 10 11 12 13    Remaining  19 18 17 16 15 14 13 12 11 10 9 8 7    FOTO 22 (56)         RE NV           AUTH Status:  Date 3/5 3/7 3/14 3/19 3/22  3/28 4/2   Auth needed Used 14 15 16 17 18 19 20    Remaining  6 5 4 3 2 1 0    FOTO              AUTH Status:  Date    Auth after 8   (end date ) Used 1 2 3 4 5 6 7    Remaining  7 6 5 4 3 2 1    FOTO   72  "(56)         AUTH Status:  Date 5/3 5/7 5/9 5/16 5/23 5/30 6/4 6/6 6/11      Auth after 12  Used 1 2 3 4 5 6 7 8 9       Remaining  11 10 9 8 7 6 5 4 3 2 1 0    FOTO                     Precautions: PROTOCOL DOS 1/17    Access Code: [email or web address]  URL: [email or web address]  Date: 01/19/2024  Prepared by: Annemarie Soria    Exercises  - Supine Quad Set  - 1 x daily - 7 x weekly - 3 sets - 10 reps  - Active Straight Leg Raise with Quad Set  - 1 x daily - 7 x weekly - 3 sets - 10 reps  - Seated Heel Slide  - 1 x daily - 7 x weekly - 3 sets - 10 reps  - Supine Ankle Pumps  - 1 x daily - 7 x weekly - 3 sets - 10 reps  - Heel Raises with Counter Support  - 1 x daily - 7 x weekly - 3 sets - 10 reps  - Standing Hip Abduction with Counter Support  - 1 x daily - 7 x weekly - 3 sets - 10 reps  - Standing Hip Extension with Counter Support  - 1 x daily - 7 x weekly - 3 sets - 10 reps  - Mini Squat with Counter Support  - 1 x daily - 7 x weekly - 3 sets - 10 reps  Manuals 5/16 5/23 5/30 6/4  6/6  6/11  5/3 5/7 5/9   PROM             Patellar mobs             Roller R HS             TherEx             TM              Self quad stretch with strap  20\"x3  20''x4  20\"x4 20\"x4       Bike ROM 5' 5' 5 5' 5' 5'   5' 5' 5'   Calf press 160# 30x  160# 30x 170#x30 170# x30 D/C    160# 30x 160#/ 30 160# 30x   HR with toes elevated      2-20# KB 30x  2-20# KB 30x        HR with toe inversion/eversion     2-20# KB 30x ea 2-20# KB 30x        SL HR with toes elevated      2-20# KB 30x 2-20# KB 30x        Prone ham cybex 55# 30x  50# x10, 60# 2x10 55#x1060#2x10 55# 3x10  60# 30x  60# 30x  55# 30x  55#/ 30 55# 30x   Leg press  240# 30x  250# 3x10 250#3x10 250# 30x 270# 30x 270# 30x  230# 30x 230#/ 30 230# 30x   Single limb leg press  120# 30x  120# 30x  130#x30 130# 30x 130# 30x 130# 30x   110# 30x  110# 110# 30x   Cybex hip abd 110# 30X  110# 30x 110#x30 110# 30x 110# 30x 110# 30x   110# 30x 110# 110# 30x    Cybex hip add 100# 30x  120# " "30x  110#x30 110# 30x  120# 30x 120# 30x   100# 30x 100#/ 30 100# 30x    Staggered RDL   20# 30x    20# KB 30x    15# KB 2x15 15# 15x2 Single limb 15# 15x2    TKE [place]    16#x30 16# x30 16# 30x   16# 5\"x20  16#/ 30 16# 30x    [place] planks  3x30\" ea 12\"  NV                         NM Re-ed             SLS on pod with MB toss  BMB BOSU x30  BMB BOSU x30  BMB BOSU x30 BMB BOSU x30 BMB BOSU x30   On Bosu YMB SLS 30x 30x BMB 30x BMB   Squats on reverse BOSU        Blk TB  30x Orange MB  30x Blk TB  30x Orange MB                 Ther Activity             Side shuffle ball toss         10x    Toe taps to BOSU   30x       To BOSU 30x  30 BOSU 30x    Jogging  50ft 4 laps  50ft 4 laps  50ft x4 50ft x4 50ft x2 *pain Outside circles cw/ccw   40 ft 3 laps  40'x3 40' 3 laps    Side shuffle 50ft 2 laps  50ft 2 laps  50ftx2 50ftx2 50ftx2 Outside 4 laps 40 ft    4x 2 laps 30 ft    FW/BW double limb hop          Fwd soft land 10x Single limb on BOSU 10x    Agility ladder:   FW, hops, icky shuffle, type writer, lat  2 laps   Forward SL   Hold lateral SL  Icky, FW SL, lat   2 laps ea  Icky,FW, B jumps x2 laps Icky,FW, B jumps x2 laps Icky,FW, B jumps x2 laps   2 laps each  2 laps ea    Lateral step up and over BOSU 10x  10x  x20 20x  20x      10x    Bounding  X5  2 laps (20 ft) 2 laps P! 2 laps  2 laps  2 laps     NV    Jump landings  See above   B/l x5   B/l / broad jump x5  Sl x5  Off step 5x B, off step SL 5x, firm surface 5x  x5B w/ hop, x5 SL no hop x5B w/ hop, x5 SL no hop X8 B w/ hop, x8 SL no hop X8 B w/ hop, x8 SL no hop       Lunges Pulses x10 ea  Holds x5 ea Pulses 10x     Holds 5\"x5 ea     Walking lunges 2 laps       T drills   5x  X5 w/ ball roll/ball throw 5x  5x  Outside 5x       Single limb hops (FW/BW, lateral)    10x ea  10x ea         Jogging on TM    5' NV         [place]       Outside 3 laps                     Gait Training                                       Modalities             Ice                   "

## 2024-06-13 ENCOUNTER — APPOINTMENT (OUTPATIENT)
Dept: PHYSICAL THERAPY | Age: 34
End: 2024-06-13
Payer: COMMERCIAL

## 2024-06-18 ENCOUNTER — APPOINTMENT (OUTPATIENT)
Dept: PHYSICAL THERAPY | Age: 34
End: 2024-06-18
Payer: COMMERCIAL

## 2024-10-07 ENCOUNTER — HOSPITAL ENCOUNTER (EMERGENCY)
Facility: HOSPITAL | Age: 34
Discharge: HOME | End: 2024-10-07
Attending: FAMILY MEDICINE

## 2024-10-07 ENCOUNTER — APPOINTMENT (EMERGENCY)
Dept: RADIOLOGY | Facility: HOSPITAL | Age: 34
End: 2024-10-07

## 2024-10-07 VITALS
HEART RATE: 59 BPM | TEMPERATURE: 97.9 F | RESPIRATION RATE: 10 BRPM | OXYGEN SATURATION: 100 % | WEIGHT: 197 LBS | DIASTOLIC BLOOD PRESSURE: 73 MMHG | SYSTOLIC BLOOD PRESSURE: 135 MMHG | BODY MASS INDEX: 24.49 KG/M2 | HEIGHT: 75 IN

## 2024-10-07 DIAGNOSIS — R07.89 CHEST TIGHTNESS: Primary | ICD-10-CM

## 2024-10-07 LAB
ALBUMIN SERPL BCG-MCNC: 4.4 G/DL (ref 3.5–5)
ALP SERPL-CCNC: 64 U/L (ref 34–104)
ALT SERPL W P-5'-P-CCNC: 11 U/L (ref 7–52)
ANION GAP SERPL CALCULATED.3IONS-SCNC: 8 MMOL/L (ref 4–13)
AST SERPL W P-5'-P-CCNC: 19 U/L (ref 13–39)
BASOPHILS # BLD AUTO: 0.05 THOUSANDS/ΜL (ref 0–0.1)
BASOPHILS NFR BLD AUTO: 1 % (ref 0–1)
BILIRUB SERPL-MCNC: 0.34 MG/DL (ref 0.2–1)
BUN SERPL-MCNC: 15 MG/DL (ref 5–25)
CALCIUM SERPL-MCNC: 9.1 MG/DL (ref 8.4–10.2)
CARDIAC TROPONIN I PNL SERPL HS: <2 NG/L
CHLORIDE SERPL-SCNC: 106 MMOL/L (ref 96–108)
CO2 SERPL-SCNC: 25 MMOL/L (ref 21–32)
CREAT SERPL-MCNC: 1.03 MG/DL (ref 0.6–1.3)
EOSINOPHIL # BLD AUTO: 0.17 THOUSAND/ΜL (ref 0–0.61)
EOSINOPHIL NFR BLD AUTO: 2 % (ref 0–6)
ERYTHROCYTE [DISTWIDTH] IN BLOOD BY AUTOMATED COUNT: 13.5 % (ref 11.6–15.1)
FLUAV AG UPPER RESP QL IA.RAPID: NEGATIVE
FLUBV AG UPPER RESP QL IA.RAPID: NEGATIVE
GFR SERPL CREATININE-BSD FRML MDRD: 93 ML/MIN/1.73SQ M
GLUCOSE SERPL-MCNC: 91 MG/DL (ref 65–140)
HCT VFR BLD AUTO: 43.1 % (ref 36.5–49.3)
HGB BLD-MCNC: 14.2 G/DL (ref 12–17)
IMM GRANULOCYTES # BLD AUTO: 0.02 THOUSAND/UL (ref 0–0.2)
IMM GRANULOCYTES NFR BLD AUTO: 0 % (ref 0–2)
LYMPHOCYTES # BLD AUTO: 2.54 THOUSANDS/ΜL (ref 0.6–4.47)
LYMPHOCYTES NFR BLD AUTO: 29 % (ref 14–44)
MCH RBC QN AUTO: 29.8 PG (ref 26.8–34.3)
MCHC RBC AUTO-ENTMCNC: 32.9 G/DL (ref 31.4–37.4)
MCV RBC AUTO: 90 FL (ref 82–98)
MONOCYTES # BLD AUTO: 0.61 THOUSAND/ΜL (ref 0.17–1.22)
MONOCYTES NFR BLD AUTO: 7 % (ref 4–12)
NEUTROPHILS # BLD AUTO: 5.27 THOUSANDS/ΜL (ref 1.85–7.62)
NEUTS SEG NFR BLD AUTO: 61 % (ref 43–75)
NRBC BLD AUTO-RTO: 0 /100 WBCS
PLATELET # BLD AUTO: 171 THOUSANDS/UL (ref 149–390)
PMV BLD AUTO: 10.7 FL (ref 8.9–12.7)
POTASSIUM SERPL-SCNC: 4.3 MMOL/L (ref 3.5–5.3)
PROT SERPL-MCNC: 7.1 G/DL (ref 6.4–8.4)
RBC # BLD AUTO: 4.77 MILLION/UL (ref 3.88–5.62)
SARS-COV+SARS-COV-2 AG RESP QL IA.RAPID: NEGATIVE
SODIUM SERPL-SCNC: 139 MMOL/L (ref 135–147)
WBC # BLD AUTO: 8.66 THOUSAND/UL (ref 4.31–10.16)

## 2024-10-07 PROCEDURE — 80053 COMPREHEN METABOLIC PANEL: CPT

## 2024-10-07 PROCEDURE — 36415 COLL VENOUS BLD VENIPUNCTURE: CPT

## 2024-10-07 PROCEDURE — 87804 INFLUENZA ASSAY W/OPTIC: CPT

## 2024-10-07 PROCEDURE — 93005 ELECTROCARDIOGRAM TRACING: CPT

## 2024-10-07 PROCEDURE — 94640 AIRWAY INHALATION TREATMENT: CPT

## 2024-10-07 PROCEDURE — 99285 EMERGENCY DEPT VISIT HI MDM: CPT

## 2024-10-07 PROCEDURE — 87811 SARS-COV-2 COVID19 W/OPTIC: CPT

## 2024-10-07 PROCEDURE — 84484 ASSAY OF TROPONIN QUANT: CPT

## 2024-10-07 PROCEDURE — 85025 COMPLETE CBC W/AUTO DIFF WBC: CPT

## 2024-10-07 PROCEDURE — 71045 X-RAY EXAM CHEST 1 VIEW: CPT

## 2024-10-07 RX ORDER — IPRATROPIUM BROMIDE AND ALBUTEROL SULFATE 2.5; .5 MG/3ML; MG/3ML
3 SOLUTION RESPIRATORY (INHALATION) ONCE
Status: COMPLETED | OUTPATIENT
Start: 2024-10-07 | End: 2024-10-07

## 2024-10-07 RX ORDER — ALBUTEROL SULFATE 90 UG/1
1-2 INHALANT RESPIRATORY (INHALATION) EVERY 6 HOURS PRN
Qty: 18 G | Refills: 2 | Status: SHIPPED | OUTPATIENT
Start: 2024-10-07

## 2024-10-07 RX ORDER — MAGNESIUM HYDROXIDE/ALUMINUM HYDROXICE/SIMETHICONE 120; 1200; 1200 MG/30ML; MG/30ML; MG/30ML
30 SUSPENSION ORAL ONCE
Status: COMPLETED | OUTPATIENT
Start: 2024-10-07 | End: 2024-10-07

## 2024-10-07 RX ORDER — FAMOTIDINE 20 MG/1
20 TABLET, FILM COATED ORAL ONCE
Status: COMPLETED | OUTPATIENT
Start: 2024-10-07 | End: 2024-10-07

## 2024-10-07 RX ADMIN — FAMOTIDINE 20 MG: 20 TABLET, FILM COATED ORAL at 15:32

## 2024-10-07 RX ADMIN — ALUMINUM HYDROXIDE, MAGNESIUM HYDROXIDE, DIMETHICONE 30 ML: 400; 400; 40 SUSPENSION ORAL at 15:32

## 2024-10-07 RX ADMIN — IPRATROPIUM BROMIDE AND ALBUTEROL SULFATE 3 ML: 2.5; .5 SOLUTION RESPIRATORY (INHALATION) at 17:16

## 2024-10-07 NOTE — DISCHARGE INSTRUCTIONS
Your workup today was reassuring. Use inhaler as prescribed for chest tightness. Follow up with primary care doctor for further evaluation.    Return to emergency department with any worsening symptoms.

## 2024-10-07 NOTE — ED NOTES
Patient reports feeling better now after duoneb which produced a productive cough.     Reji Luo RN  10/07/24 [id number]

## 2024-10-07 NOTE — ED PROVIDER NOTES
Final diagnoses:   Chest tightness     ED Disposition       ED Disposition   Discharge    Condition   Stable    Date/Time   Mon Oct 7, 2024  5:49 PM    Comment   Khalif Hood discharge to home/self care.                   Assessment & Plan       Medical Decision Making  Patient is a 33 year old male presenting with chest pain/tightness and shortness of breath since last night. He is mildly bradycardic on arrival but remaining vitals are within normal limits. He is in no acute distress.    DDx: ACS, pneumonia, viral syndrome, costochondritis, pneumothorax, asthma exacerbation, GERD.  PERC negative-doubt PE.  Plan: CBC, CMP, troponin, EKG, chest x-ray, viral testing.  Supportive care: Maalox/Pepcid, DuoNeb.    Labs without leukocytosis, anemia, electrolyte disturbance, JOSÉ MIGUEL.  Troponin is less than 2 with normal EKG.  Given the onset of symptoms was yesterday, low suspicion for ACS and found no further trending indicated.  Chest x-ray without pneumonia or acute abnormality as interpreted by myself. Viral testing negative.  He did not have improvement in symptoms with Pepcid/Maalox so trialed DuoNeb which did significantly improve symptoms.  Will discharge with albuterol inhaler for chest tightness.  Had long discussion regarding smoking cessation with the patient.  Advised to follow-up with PCP for further evaluation.    I have discussed findings and plan for discharge with the patient/caregiver. Follow up with the appropriate providers including primary care physician was discussed. Return precautions discussed with patient/caregiver as outlined in AVS. Patient/caregiver verbally expressed understanding. Patient stable at time of discharge and ambulated out of the emergency department.       Amount and/or Complexity of Data Reviewed  Labs: ordered.  Radiology: independent interpretation performed.    Risk  OTC drugs.  Prescription drug management.        ED Course as of 10/07/24 1816   Mon Oct 07, 2024   0049  Updated patient on workup. He is still complaining of chest tightness. Will try duoneb and reevaluate.   1731 Patient reports significant improvement in chest tightness following duoneb.       Medications   famotidine (PEPCID) tablet 20 mg (20 mg Oral Given 10/7/24 1532)   aluminum-magnesium hydroxide-simethicone (MAALOX) oral suspension 30 mL (30 mL Oral Given 10/7/24 1532)   ipratropium-albuterol (DUO-NEB) 0.5-2.5 mg/3 mL inhalation solution 3 mL (3 mL Nebulization Given 10/7/24 1716)       ED Risk Strat Scores   HEART Risk Score      Flowsheet Row Most Recent Value   Heart Score Risk Calculator    History 0 Filed at: 10/07/2024 1620   ECG 0 Filed at: 10/07/2024 1620   Age 0 Filed at: 10/07/2024 1620   Risk Factors 1 Filed at: 10/07/2024 1620   Troponin 0 Filed at: 10/07/2024 1620   HEART Score 1 Filed at: 10/07/2024 1620                           PERC Rule for PE      Flowsheet Row Most Recent Value   PERC Rule for PE    Age >=50 0 Filed at: 10/07/2024 1524   HR >=100 0 Filed at: 10/07/2024 1524   O2 Sat on room air < 95% 0 Filed at: 10/07/2024 1524   History of PE or DVT 0 Filed at: 10/07/2024 1524   Recent trauma or surgery 0 Filed at: 10/07/2024 1524   Hemoptysis 0 Filed at: 10/07/2024 1524   Exogenous estrogen 0 Filed at: 10/07/2024 1524   Unilateral leg swelling 0 Filed at: 10/07/2024 1524   PERC Rule for PE Results 0 Filed at: 10/07/2024 1524            SBIRT 22yo+      Flowsheet Row Most Recent Value   Initial Alcohol Screen: US AUDIT-C     1. How often do you have a drink containing alcohol? 0 Filed at: 10/07/2024 0369   2. How many drinks containing alcohol do you have on a typical day you are drinking?  0 Filed at: 10/07/2024 6609   3a. Male UNDER 65: How often do you have five or more drinks on one occasion? 0 Filed at: 10/07/2024 1459   Audit-C Score 0 Filed at: 10/07/2024 1459   ABIODUN: How many times in the past year have you...    Used an illegal drug or used a prescription medication for non-medical  reasons? Never Filed at: 10/07/2024 7621                            History of Present Illness       Chief Complaint   Patient presents with    Chest Pain     Started with burning chest pain yesterday. Feeling fatigued and dizzy and generally not feeling right today.       Past Medical History:   Diagnosis Date    Asthma     as a child      Past Surgical History:   Procedure Laterality Date    INCISION AND DRAINAGE OF WOUND Left 06/28/2021    Procedure: INCISION AND DRAINAGE (I&D) LEFT SUBMANDIBULAR SPACE INFECTION, LEFT  SPACE INFECTION WITH EXTRACTION OF TOOTH #19;  Surgeon: Fabi Hood DMD;  Location:  MAIN OR;  Service: Maxillofacial    ORIF TIBIA & FIBULA FRACTURES Right     NM ARTHRS AIDED ANT CRUCIATE LIGM RPR/AGMNTJ/RCNSTJ Right 1/17/2024    Procedure: Right - knee arthroscopy Lateral meniscectomy Synovectomy ACL reconstruction with allograft using the Arthrex graft link system Post arthroscopic injection of intra-articular joint space and peripheral portals;  Surgeon: Harvinder Ga DO;  Location: [facility];  Service: Orthopedics    NM ARTHRS KNE SURG W/MENISCECTOMY MED/LAT W/SHVG Right 1/10/2023    Procedure: ARTHROSCOPY KNEE WITH LATERAL MENISCECTOMY;  Surgeon: Harvinder Ga DO;  Location: [facility];  Service: Orthopedics      History reviewed. No pertinent family history.   Social History     Tobacco Use    Smoking status: Every Day     Current packs/day: 0.50     Types: Cigarettes    Smokeless tobacco: Never   Vaping Use    Vaping status: Former    Start date: 1/1/2023   Substance Use Topics    Alcohol use: Never    Drug use: Yes     Frequency: 7.0 times per week     Types: Marijuana     Comment: daily      E-Cigarette/Vaping    E-Cigarette Use Former User     Start Date 1/1/23     Cartridges/Day 0.5       E-Cigarette/Vaping Substances      I have reviewed and agree with the history as documented.     Patient is a 33-year-old male with a history of smoking presenting to  the emergency department for evaluation of chest pain that started last night.  Pain is in the middle of his chest and he describes it as a burning sensation.  States he feels congested like he needs to cough something up.  He is reporting intermittent mild shortness of breath as well.  He had a brief episode of lightheadedness today when getting out of his car and states he has generally not felt well today. Had a mild cough the past 2 weeks but otherwise no fevers, URI symptoms, nausea, vomiting, abdominal pain, diarrhea, body aches.  No known sick contacts. No triggering or alleviating factors including PO intake. Uses marijuana, denies other recreational drug use for the past few years. No leg swelling, history of blood clots, recent surgeries/prolonged immobilization. He took mucinex at home without relief.      Chest Pain  Associated symptoms: cough and shortness of breath    Associated symptoms: no abdominal pain, no back pain, no dizziness, no fever, no headache, no nausea, no palpitations, not vomiting and no weakness        Review of Systems   Constitutional:  Negative for chills and fever.   HENT:  Negative for congestion, ear pain and sore throat.    Eyes:  Negative for pain and visual disturbance.   Respiratory:  Positive for cough, chest tightness and shortness of breath. Negative for wheezing.    Cardiovascular:  Positive for chest pain. Negative for palpitations and leg swelling.   Gastrointestinal:  Negative for abdominal pain, diarrhea, nausea and vomiting.   Genitourinary:  Negative for dysuria and hematuria.   Musculoskeletal:  Negative for arthralgias and back pain.   Skin:  Negative for color change and rash.   Neurological:  Positive for light-headedness. Negative for dizziness, seizures, syncope, facial asymmetry, weakness and headaches.   All other systems reviewed and are negative.          Objective       ED Triage Vitals   Temperature Pulse Blood Pressure Respirations SpO2 Patient  Position - Orthostatic VS   10/07/24 1500 10/07/24 1458 10/07/24 1458 10/07/24 1458 10/07/24 1458 10/07/24 1458   97.9 °F (36.6 °C) (!) 52 135/73 16 98 % Sitting      Temp Source Heart Rate Source BP Location FiO2 (%) Pain Score    10/07/24 1500 10/07/24 1458 10/07/24 1458 -- 10/07/24 1458    Tympanic Monitor Right arm  3      Vitals      Date and Time Temp Pulse SpO2 Resp BP Pain Score FACES Pain Rating User   10/07/24 1730 -- 59 100 % 10 -- -- -- TG   10/07/24 1715 -- 48 97 % 10 -- -- -- TG   10/07/24 1700 -- 47 96 % 17 -- -- -- TG   10/07/24 1645 -- 61 98 % 16 -- -- -- TG   10/07/24 1630 -- 56 96 % 11 -- -- -- TG   10/07/24 1500 97.9 °F (36.6 °C) -- -- -- -- -- -- TG   10/07/24 1458 -- 52 98 % 16 135/73 3 -- TG            Physical Exam  Vitals and nursing note reviewed.   Constitutional:       General: He is not in acute distress.     Appearance: Normal appearance. He is not toxic-appearing.   HENT:      Head: Normocephalic and atraumatic.      Right Ear: External ear normal.      Left Ear: External ear normal.      Nose: Nose normal.   Eyes:      General: No scleral icterus.        Right eye: No discharge.         Left eye: No discharge.   Cardiovascular:      Rate and Rhythm: Normal rate and regular rhythm.      Pulses: Normal pulses.           Radial pulses are 2+ on the right side and 2+ on the left side.        Dorsalis pedis pulses are 2+ on the right side and 2+ on the left side.      Heart sounds: Normal heart sounds.   Pulmonary:      Effort: Pulmonary effort is normal. No accessory muscle usage or respiratory distress.      Breath sounds: Normal breath sounds. No decreased breath sounds or wheezing.   Chest:      Chest wall: No tenderness.   Abdominal:      Palpations: Abdomen is soft.      Tenderness: There is no abdominal tenderness.   Musculoskeletal:         General: No tenderness, deformity or signs of injury.      Cervical back: Normal range of motion and neck supple.   Skin:     General: Skin is  dry.      Coloration: Skin is not jaundiced.      Findings: No erythema or rash.   Neurological:      General: No focal deficit present.      Mental Status: He is alert and oriented to person, place, and time. Mental status is at baseline.      Motor: No weakness.      Gait: Gait normal.   Psychiatric:         Mood and Affect: Mood normal.         Behavior: Behavior normal.         Thought Content: Thought content normal.         Results Reviewed       Procedure Component Value Units Date/Time    FLU/COVID Rapid Antigen (30 min. TAT) - Preferred screening test in ED [[id number]]  (Normal) Collected: 10/07/24 1531    Lab Status: Final result Specimen: Nares from Nose Updated: 10/07/24 1600     SARS COV Rapid Antigen Negative     Influenza A Rapid Antigen Negative     Influenza B Rapid Antigen Negative    Narrative:      This test has been performed using the Power Liensidel Alessandra 2 FLU+SARS Antigen test under the Emergency Use Authorization (EUA). This test has been validated by the  and verified by the performing laboratory. The Alessandra uses lateral flow immunofluorescent sandwich assay to detect SARS-COV, Influenza A and Influenza B Antigen.     The Quidel Alessandra 2 SARS Antigen test does not differentiate between SARS-CoV and SARS-CoV-2.     Negative results are presumptive and may be confirmed with a molecular assay, if necessary, for patient management. Negative results do not rule out SARS-CoV-2 or influenza infection and should not be used as the sole basis for treatment or patient management decisions. A negative test result may occur if the level of antigen in a sample is below the limit of detection of this test.     Positive results are indicative of the presence of viral antigens, but do not rule out bacterial infection or co-infection with other viruses.     All test results should be used as an adjunct to clinical observations and other information available to the provider.    FOR PEDIATRIC PATIENTS -  copy/paste COVID Guidelines URL to browser: [email or web address]    HS Troponin 0hr (reflex protocol) [[id number]]  (Normal) Collected: 10/07/24 1505    Lab Status: Final result Specimen: Blood from Arm, Left Updated: 10/07/24 1533     hs TnI 0hr <2 ng/L     Comprehensive metabolic panel [[id number]] Collected: 10/07/24 1505    Lab Status: Final result Specimen: Blood from Arm, Left Updated: 10/07/24 1530     Sodium 139 mmol/L      Potassium 4.3 mmol/L      Chloride 106 mmol/L      CO2 25 mmol/L      ANION GAP 8 mmol/L      BUN 15 mg/dL      Creatinine 1.03 mg/dL      Glucose 91 mg/dL      Calcium 9.1 mg/dL      AST 19 U/L      ALT 11 U/L      Alkaline Phosphatase 64 U/L      Total Protein 7.1 g/dL      Albumin 4.4 g/dL      Total Bilirubin 0.34 mg/dL      eGFR 93 ml/min/1.73sq m     Narrative:      National Kidney Disease Foundation guidelines for Chronic Kidney Disease (CKD):     Stage 1 with normal or high GFR (GFR > 90 mL/min/1.73 square meters)    Stage 2 Mild CKD (GFR = 60-89 mL/min/1.73 square meters)    Stage 3A Moderate CKD (GFR = 45-59 mL/min/1.73 square meters)    Stage 3B Moderate CKD (GFR = 30-44 mL/min/1.73 square meters)    Stage 4 Severe CKD (GFR = 15-29 mL/min/1.73 square meters)    Stage 5 End Stage CKD (GFR <15 mL/min/1.73 square meters)  Note: GFR calculation is accurate only with a steady state creatinine    CBC and differential [[id number]] Collected: 10/07/24 1505    Lab Status: Final result Specimen: Blood from Arm, Left Updated: 10/07/24 1510     WBC 8.66 Thousand/uL      RBC 4.77 Million/uL      Hemoglobin 14.2 g/dL      Hematocrit 43.1 %      MCV 90 fL      MCH 29.8 pg      MCHC 32.9 g/dL      RDW 13.5 %      MPV 10.7 fL      Platelets 171 Thousands/uL      nRBC 0 /100 WBCs      Segmented % 61 %      Immature Grans % 0 %      Lymphocytes % 29 %      Monocytes % 7 %      Eosinophils Relative 2 %      Basophils Relative 1 %      Absolute  Neutrophils 5.27 Thousands/µL      Absolute Immature Grans 0.02 Thousand/uL      Absolute Lymphocytes 2.54 Thousands/µL      Absolute Monocytes 0.61 Thousand/µL      Eosinophils Absolute 0.17 Thousand/µL      Basophils Absolute 0.05 Thousands/µL             XR chest 1 view portable   ED Interpretation by Barbi Rachel PA-C (10/07 1622)   No acute pathology as interpreted by myself.        Final Interpretation by Baron Kimball MD (10/07 1624)      No acute cardiopulmonary disease.            Workstation performed: [id number]             ECG 12 Lead Documentation Only    Date/Time: 10/7/2024 3:53 PM    Performed by: Barbi Rachel PA-C  Authorized by: Barbi Rachel PA-C    Indications / Diagnosis:  Chest pain  ECG reviewed by me, the ED Provider: yes    Patient location:  ED  Interpretation:     Interpretation: normal    Rate:     ECG rate:  57    ECG rate assessment: bradycardic    Rhythm:     Rhythm: sinus bradycardia    Ectopy:     Ectopy: none    QRS:     QRS axis:  Normal    QRS intervals:  Normal  Conduction:     Conduction: normal    ST segments:     ST segments:  Normal  T waves:     T waves: normal        ED Medication and Procedure Management   Prior to Admission Medications   Prescriptions Last Dose Informant Patient Reported? Taking?   Omega-3 Fatty Acids (fish oil) 1,000 mg   Yes No   Sig: Take 1,000 mg by mouth daily   ascorbic acid (VITAMIN C) 250 mg tablet   Yes No   Sig: Take 250 mg by mouth daily   Patient not taking: Reported on 4/29/2024   aspirin 325 mg tablet   No No   Sig: Take 1 tablet (325 mg total) by mouth daily   Patient not taking: Reported on 4/29/2024   cholecalciferol (VITAMIN D3) 400 units tablet   Yes No   Sig: Take 400 Units by mouth daily   meloxicam (MOBIC) 15 mg tablet   No No   Sig: Take 1 tablet (15 mg total) by mouth daily   Patient not taking: Reported on 4/29/2024   methadone (DOLOPHINE) 10 mg/mL oral concentrated solution   Yes No    Sig: Take 25 mg by mouth every morning   vitamin E 100 UNIT capsule   Yes No   Sig: Take 100 Units by mouth daily      Facility-Administered Medications: None     Discharge Medication List as of 10/7/2024  5:51 PM        START taking these medications    Details   albuterol (Proventil HFA) 90 mcg/act inhaler Inhale 1-2 puffs every 6 (six) hours as needed for wheezing, Starting Mon 10/7/2024, Normal           CONTINUE these medications which have NOT CHANGED    Details   ascorbic acid (VITAMIN C) 250 mg tablet Take 250 mg by mouth daily, Historical Med      aspirin 325 mg tablet Take 1 tablet (325 mg total) by mouth daily, Starting Wed 1/17/2024, Until Mon 4/29/2024, Normal      cholecalciferol (VITAMIN D3) 400 units tablet Take 400 Units by mouth daily, Historical Med      meloxicam (MOBIC) 15 mg tablet Take 1 tablet (15 mg total) by mouth daily, Starting Wed 1/17/2024, Until Mon 4/29/2024, Normal      methadone (DOLOPHINE) 10 mg/mL oral concentrated solution Take 25 mg by mouth every morning, Historical Med      Omega-3 Fatty Acids (fish oil) 1,000 mg Take 1,000 mg by mouth daily, Historical Med      vitamin E 100 UNIT capsule Take 100 Units by mouth daily, Historical Med           No discharge procedures on file.  ED SEPSIS DOCUMENTATION   Time reflects when diagnosis was documented in both MDM as applicable and the Disposition within this note       Time User Action Codes Description Comment    10/7/2024  5:49 PM Barbi Rachel Add [R07.89] Chest tightness                  Barbi Rachel PA-C  10/07/24 3810

## 2024-10-10 LAB
ATRIAL RATE: 57 BPM
P AXIS: 67 DEGREES
PR INTERVAL: 152 MS
QRS AXIS: 66 DEGREES
QRSD INTERVAL: 84 MS
QT INTERVAL: 406 MS
QTC INTERVAL: 395 MS
T WAVE AXIS: 63 DEGREES
VENTRICULAR RATE: 57 BPM

## 2024-10-10 PROCEDURE — 93010 ELECTROCARDIOGRAM REPORT: CPT | Performed by: INTERNAL MEDICINE

## 2024-10-21 ENCOUNTER — HOSPITAL ENCOUNTER (EMERGENCY)
Facility: HOSPITAL | Age: 34
Discharge: HOME | End: 2024-10-21
Attending: EMERGENCY MEDICINE

## 2024-10-21 ENCOUNTER — APPOINTMENT (EMERGENCY)
Dept: RADIOLOGY | Facility: HOSPITAL | Age: 34
End: 2024-10-21

## 2024-10-21 VITALS
TEMPERATURE: 97.9 F | WEIGHT: 201 LBS | HEART RATE: 58 BPM | OXYGEN SATURATION: 99 % | RESPIRATION RATE: 14 BRPM | HEIGHT: 75 IN | SYSTOLIC BLOOD PRESSURE: 132 MMHG | BODY MASS INDEX: 24.99 KG/M2 | DIASTOLIC BLOOD PRESSURE: 69 MMHG

## 2024-10-21 DIAGNOSIS — R07.9 CHEST PAIN, UNSPECIFIED: ICD-10-CM

## 2024-10-21 DIAGNOSIS — R06.00 DYSPNEA: Primary | ICD-10-CM

## 2024-10-21 DIAGNOSIS — J20.9 ACUTE BRONCHITIS: ICD-10-CM

## 2024-10-21 LAB
ANION GAP SERPL CALCULATED.3IONS-SCNC: 6 MMOL/L (ref 4–13)
ATRIAL RATE: 58 BPM
BASOPHILS # BLD AUTO: 0.04 THOUSANDS/ΜL (ref 0–0.1)
BASOPHILS NFR BLD AUTO: 1 % (ref 0–1)
BUN SERPL-MCNC: 15 MG/DL (ref 5–25)
CALCIUM SERPL-MCNC: 9.4 MG/DL (ref 8.4–10.2)
CARDIAC TROPONIN I PNL SERPL HS: <2 NG/L
CHLORIDE SERPL-SCNC: 106 MMOL/L (ref 96–108)
CO2 SERPL-SCNC: 25 MMOL/L (ref 21–32)
CREAT SERPL-MCNC: 0.96 MG/DL (ref 0.6–1.3)
D DIMER PPP FEU-MCNC: 0.28 UG/ML FEU
EOSINOPHIL # BLD AUTO: 0.07 THOUSAND/ΜL (ref 0–0.61)
EOSINOPHIL NFR BLD AUTO: 1 % (ref 0–6)
ERYTHROCYTE [DISTWIDTH] IN BLOOD BY AUTOMATED COUNT: 13.1 % (ref 11.6–15.1)
FLUAV AG UPPER RESP QL IA.RAPID: NEGATIVE
FLUBV AG UPPER RESP QL IA.RAPID: NEGATIVE
GFR SERPL CREATININE-BSD FRML MDRD: 101 ML/MIN/1.73SQ M
GLUCOSE SERPL-MCNC: 106 MG/DL (ref 65–140)
HCT VFR BLD AUTO: 44.8 % (ref 36.5–49.3)
HGB BLD-MCNC: 14.7 G/DL (ref 12–17)
IMM GRANULOCYTES # BLD AUTO: 0.01 THOUSAND/UL (ref 0–0.2)
IMM GRANULOCYTES NFR BLD AUTO: 0 % (ref 0–2)
LYMPHOCYTES # BLD AUTO: 1.87 THOUSANDS/ΜL (ref 0.6–4.47)
LYMPHOCYTES NFR BLD AUTO: 34 % (ref 14–44)
MCH RBC QN AUTO: 29.5 PG (ref 26.8–34.3)
MCHC RBC AUTO-ENTMCNC: 32.8 G/DL (ref 31.4–37.4)
MCV RBC AUTO: 90 FL (ref 82–98)
MONOCYTES # BLD AUTO: 0.44 THOUSAND/ΜL (ref 0.17–1.22)
MONOCYTES NFR BLD AUTO: 8 % (ref 4–12)
NEUTROPHILS # BLD AUTO: 3 THOUSANDS/ΜL (ref 1.85–7.62)
NEUTS SEG NFR BLD AUTO: 56 % (ref 43–75)
NRBC BLD AUTO-RTO: 0 /100 WBCS
P AXIS: 70 DEGREES
PLATELET # BLD AUTO: 160 THOUSANDS/UL (ref 149–390)
PMV BLD AUTO: 10.3 FL (ref 8.9–12.7)
POTASSIUM SERPL-SCNC: 4.1 MMOL/L (ref 3.5–5.3)
PR INTERVAL: 156 MS
QRS AXIS: 68 DEGREES
QRSD INTERVAL: 92 MS
QT INTERVAL: 400 MS
QTC INTERVAL: 392 MS
RBC # BLD AUTO: 4.98 MILLION/UL (ref 3.88–5.62)
SARS-COV+SARS-COV-2 AG RESP QL IA.RAPID: NEGATIVE
SODIUM SERPL-SCNC: 137 MMOL/L (ref 135–147)
T WAVE AXIS: 65 DEGREES
VENTRICULAR RATE: 58 BPM
WBC # BLD AUTO: 5.43 THOUSAND/UL (ref 4.31–10.16)

## 2024-10-21 PROCEDURE — 85379 FIBRIN DEGRADATION QUANT: CPT

## 2024-10-21 PROCEDURE — 93005 ELECTROCARDIOGRAM TRACING: CPT

## 2024-10-21 PROCEDURE — 80048 BASIC METABOLIC PNL TOTAL CA: CPT

## 2024-10-21 PROCEDURE — 84484 ASSAY OF TROPONIN QUANT: CPT

## 2024-10-21 PROCEDURE — 93010 ELECTROCARDIOGRAM REPORT: CPT | Performed by: INTERNAL MEDICINE

## 2024-10-21 PROCEDURE — 99285 EMERGENCY DEPT VISIT HI MDM: CPT

## 2024-10-21 PROCEDURE — 87811 SARS-COV-2 COVID19 W/OPTIC: CPT

## 2024-10-21 PROCEDURE — 71046 X-RAY EXAM CHEST 2 VIEWS: CPT

## 2024-10-21 PROCEDURE — 87804 INFLUENZA ASSAY W/OPTIC: CPT

## 2024-10-21 PROCEDURE — 36415 COLL VENOUS BLD VENIPUNCTURE: CPT

## 2024-10-21 PROCEDURE — 96365 THER/PROPH/DIAG IV INF INIT: CPT

## 2024-10-21 PROCEDURE — 94640 AIRWAY INHALATION TREATMENT: CPT

## 2024-10-21 PROCEDURE — 85025 COMPLETE CBC W/AUTO DIFF WBC: CPT

## 2024-10-21 PROCEDURE — 96375 TX/PRO/DX INJ NEW DRUG ADDON: CPT

## 2024-10-21 RX ORDER — METHYLPREDNISOLONE SODIUM SUCCINATE 125 MG/2ML
125 INJECTION, POWDER, LYOPHILIZED, FOR SOLUTION INTRAMUSCULAR; INTRAVENOUS ONCE
Status: COMPLETED | OUTPATIENT
Start: 2024-10-21 | End: 2024-10-21

## 2024-10-21 RX ORDER — MAGNESIUM SULFATE HEPTAHYDRATE 40 MG/ML
2 INJECTION, SOLUTION INTRAVENOUS ONCE
Status: COMPLETED | OUTPATIENT
Start: 2024-10-21 | End: 2024-10-21

## 2024-10-21 RX ORDER — KETOROLAC TROMETHAMINE 30 MG/ML
15 INJECTION, SOLUTION INTRAMUSCULAR; INTRAVENOUS ONCE
Status: COMPLETED | OUTPATIENT
Start: 2024-10-21 | End: 2024-10-21

## 2024-10-21 RX ORDER — ALBUTEROL SULFATE 0.83 MG/ML
2.5 SOLUTION RESPIRATORY (INHALATION) EVERY 6 HOURS PRN
Qty: 75 ML | Refills: 0 | Status: SHIPPED | OUTPATIENT
Start: 2024-10-21

## 2024-10-21 RX ORDER — PREDNISONE 20 MG/1
40 TABLET ORAL DAILY
Qty: 10 TABLET | Refills: 0 | Status: SHIPPED | OUTPATIENT
Start: 2024-10-21 | End: 2024-10-26

## 2024-10-21 RX ORDER — IPRATROPIUM BROMIDE AND ALBUTEROL SULFATE 2.5; .5 MG/3ML; MG/3ML
3 SOLUTION RESPIRATORY (INHALATION) ONCE
Status: COMPLETED | OUTPATIENT
Start: 2024-10-21 | End: 2024-10-21

## 2024-10-21 RX ADMIN — IPRATROPIUM BROMIDE AND ALBUTEROL SULFATE 3 ML: 2.5; .5 SOLUTION RESPIRATORY (INHALATION) at 10:26

## 2024-10-21 RX ADMIN — METHYLPREDNISOLONE SODIUM SUCCINATE 125 MG: 125 INJECTION, POWDER, FOR SOLUTION INTRAMUSCULAR; INTRAVENOUS at 10:30

## 2024-10-21 RX ADMIN — MAGNESIUM SULFATE HEPTAHYDRATE 2 G: 40 INJECTION, SOLUTION INTRAVENOUS at 10:29

## 2024-10-21 RX ADMIN — KETOROLAC TROMETHAMINE 15 MG: 30 INJECTION, SOLUTION INTRAMUSCULAR at 10:28

## 2024-10-21 NOTE — ED PROVIDER NOTES
Time reflects when diagnosis was documented in both MDM as applicable and the Disposition within this note       Time User Action Codes Description Comment    10/21/2024 11:12 AM Jonathan Andrew Add [R06.00] Dyspnea     10/21/2024 11:41 AM Jonathan Andrew Add [J20.9] Acute bronchitis     10/21/2024 11:45 AM Jonathan Andrew Add [R07.9] Chest pain, unspecified           ED Disposition       ED Disposition   Discharge    Condition   Stable    Date/Time   Mon Oct 21, 2024 11:00 AM    Comment   Khalif Hood discharge to home/self care.                   Assessment & Plan       Medical Decision Making  Patient is a well-appearing 34-year-old male presenting with shortness of breath and chest congestion and tightness for the last 2 weeks. He was seen in our ER on 10/7 for chest pain and shortness of breath and had an unremarkable cardiac workup and he PERC'd out. He reports the nebulizer treatment really helped.  Brief focused differential diagnosis: Asthma, early COPD, URI, bronchitis, pneumonia, ACS but less likely, PE but less likely, electrolyte abnormality  Plan is to obtain cardiac workup including ECG, troponin, and chest x-ray 2 views to evaluate for acute cardiopulmonary disease. Will also add on D-dimer since he PERC'd out last time but presents with ongoing symptoms of dyspnea, chest tightness, and pleuritic chest pain. CBC and BMP evaluate for leukocytosis, anemia, electrolyte abnormality, JOSÉ MIGUEL, or glucose abnormality. Will swab for COVID/flu.  See ED course for interpretation of labs, imaging, and further medical decision making.   Patient is requesting another DuoNeb treatment as that really helped last time. Will treat with DuoNeb, Solu-Medrol, and mag. Toradol for his chest pain.  He was feeling significantly better after this and we provided him a nebulizer machine for home which he is very happy about and I showed him how to use it. Sent nebulizer packets into his pharmacy and a prednisone burst for  acute bronchitis. Provided pulmonology referral for formal lung testing/PFTs.  Dispo: Patient is safe/stable for discharge home and was discharged home with strict return precautions. Provided verbal and written supportive care instructions for managing his illness. Advised patient to return to the nearest emergency room if he has new or worsening symptoms or if any questions arise. Advised patient to follow-up with his family doctor and pulmonology. Patient is satisfied with care and agrees with management and plan.     Amount and/or Complexity of Data Reviewed  External Data Reviewed: labs, radiology and notes.  Labs: ordered. Decision-making details documented in ED Course.  Radiology: ordered and independent interpretation performed.  ECG/medicine tests: ordered and independent interpretation performed.    Risk  Prescription drug management.        ED Course as of 10/21/24 1213   Mon Oct 21, 2024   0953 Blood Pressure: 141/67  Mildly elevated BP. Other vital signs reviewed and within normal limits.    1023 CBC and differential  No leukocytosis, anemia, or platelet abnormality.   1039 Basic metabolic panel  Electrolytes WNL. No JOSÉ MIGUEL or glucose abnormality.   1039 FLU/COVID Rapid Antigen (30 min. TAT) - Preferred screening test in ED  Negative for COVID/flu.   1050 Patient feeling significantly better after the neb treatment. He reports this is a godsend. He is hoping for a neb machine for home. We will give him this today.   1053 D-Dimer, Quant: 0.28  PE effectively ruled out.   1053 hs TnI 0hr: <2  Will discontinue further troponins.   1130 Once again discussed results with patient and reassessed him and he is feeling significantly better. I went over using his nebulizer machine and he is very thankful for this. Will provide pulmonology referral and send in nebulizer packets and prednisone to his pharmacy.       Medications   ipratropium-albuterol (DUO-NEB) 0.5-2.5 mg/3 mL inhalation solution 3 mL (3 mL  Nebulization Given 10/21/24 1026)   methylPREDNISolone sodium succinate (Solu-MEDROL) injection 125 mg (125 mg Intravenous Given 10/21/24 1030)   magnesium sulfate 2 g/50 mL IVPB (premix) 2 g (0 g Intravenous Stopped 10/21/24 1059)   ketorolac (TORADOL) injection 15 mg (15 mg Intravenous Given 10/21/24 1028)       ED Risk Strat Scores   HEART Risk Score      Flowsheet Row Most Recent Value   Heart Score Risk Calculator    History 0 Filed at: 10/21/2024 1022   ECG 1 Filed at: 10/21/2024 1022   Age 0 Filed at: 10/21/2024 1022   Risk Factors 1 Filed at: 10/21/2024 1022   Troponin 0 Filed at: 10/21/2024 1022   HEART Score 2 Filed at: 10/21/2024 1022                               SBIRT 20yo+      Flowsheet Row Most Recent Value   Initial Alcohol Screen: US AUDIT-C     1. How often do you have a drink containing alcohol? 0 Filed at: 10/21/2024 0944   2. How many drinks containing alcohol do you have on a typical day you are drinking?  0 Filed at: 10/21/2024 0944   3a. Male UNDER 65: How often do you have five or more drinks on one occasion? 0 Filed at: 10/21/2024 0944   Audit-C Score 0 Filed at: 10/21/2024 0944   ABIODUN: How many times in the past year have you...    Used an illegal drug or used a prescription medication for non-medical reasons? Never Filed at: 10/21/2024 0944                            History of Present Illness       Chief Complaint   Patient presents with    Shortness of Breath     Pt c/o two weeks of shortness of breath with chest congestion and difficult time taking a deep breath. Using an inhaler and breathing with no relief.        Past Medical History:   Diagnosis Date    Asthma     as a child      Past Surgical History:   Procedure Laterality Date    INCISION AND DRAINAGE OF WOUND Left 06/28/2021    Procedure: INCISION AND DRAINAGE (I&D) LEFT SUBMANDIBULAR SPACE INFECTION, LEFT  SPACE INFECTION WITH EXTRACTION OF TOOTH #19;  Surgeon: Fabi Hood DMD;  Location: [facility];   Service: Maxillofacial    ORIF TIBIA & FIBULA FRACTURES Right     TN ARTHRS AIDED ANT CRUCIATE LIGM RPR/AGMNTJ/RCNSTJ Right 1/17/2024    Procedure: Right - knee arthroscopy Lateral meniscectomy Synovectomy ACL reconstruction with allograft using the Arthrex graft link system Post arthroscopic injection of intra-articular joint space and peripheral portals;  Surgeon: Harvinder Ga DO;  Location: [facility];  Service: Orthopedics    TN ARTHRS KNE SURG W/MENISCECTOMY MED/LAT W/SHVG Right 1/10/2023    Procedure: ARTHROSCOPY KNEE WITH LATERAL MENISCECTOMY;  Surgeon: Harvinder Ga DO;  Location: [facility];  Service: Orthopedics      History reviewed. No pertinent family history.   Social History     Tobacco Use    Smoking status: Former     Current packs/day: 0.50     Types: Cigarettes    Smokeless tobacco: Never   Vaping Use    Vaping status: Former    Start date: 1/1/2023   Substance Use Topics    Alcohol use: Never    Drug use: Yes     Frequency: 7.0 times per week     Types: Marijuana     Comment: daily      E-Cigarette/Vaping    E-Cigarette Use Former User     Start Date 1/1/23     Cartridges/Day 0.5       E-Cigarette/Vaping Substances      I have reviewed and agree with the history as documented.     Patient is a 34-year-old male with relevant past medical history of asthma as a child, pneumonia, ACL tear, and chest tightness presenting shortness of breath and chest congestion x 2 weeks. He was seen in our emergency room on 10/7 for chest pain and shortness of breath and had an unremarkable cardiac workup and he PERC'd out. He reports the neb treatment really helped. He quit smoking 5 days ago (20+ year smoker) and started with worsening shortness of breath on Saturday while at work. His lungs got really tight and he used his inhaler with some relief. He started with pain in the center of his chest yesterday morning and he feels like he needs to bring up phlegm but cannot really bring up much help. His  lungs feel tight and he wheezes at times. He presents the ER with 7/10 tightness in the center of his chest and he also describes pleuritic chest pain. Associated symptoms of fatigue, congestion, and dry cough. He denies F/C, weakness, abdominal pain, nausea, vomiting, or urinary symptoms.        History provided by:  Patient   used: No    Shortness of Breath  Associated symptoms: cough    Associated symptoms: no abdominal pain, no chest pain, no ear pain, no fever, no headaches, no rash, no sore throat and no vomiting        Review of Systems   Constitutional:  Positive for fatigue. Negative for chills and fever.   HENT:  Positive for congestion. Negative for ear pain, rhinorrhea and sore throat.    Eyes:  Negative for pain and visual disturbance.   Respiratory:  Positive for cough, chest tightness and shortness of breath.    Cardiovascular:  Negative for chest pain and palpitations.   Gastrointestinal:  Negative for abdominal pain, constipation, diarrhea, nausea and vomiting.   Genitourinary:  Negative for dysuria, frequency, hematuria and urgency.   Musculoskeletal:  Negative for arthralgias and back pain.   Skin:  Negative for color change and rash.   Neurological:  Negative for dizziness, seizures, syncope, weakness, light-headedness and headaches.   Psychiatric/Behavioral:  Negative for agitation and confusion.            Objective       ED Triage Vitals   Temperature Pulse Blood Pressure Respirations SpO2 Patient Position - Orthostatic VS   10/21/24 0943 10/21/24 0943 10/21/24 0943 10/21/24 0943 10/21/24 0943 10/21/24 0943   97.9 °F (36.6 °C) 77 141/67 21 98 % Sitting      Temp Source Heart Rate Source BP Location FiO2 (%) Pain Score    10/21/24 0943 10/21/24 0943 10/21/24 0943 -- 10/21/24 1028    Temporal Monitor Left arm  7      Vitals      Date and Time Temp Pulse SpO2 Resp BP Pain Score FACES Pain Rating User   10/21/24 1130 -- 58 99 % 14 132/69 -- -- AM   10/21/24 1100 -- 64 98 % 15  117/59 -- -- AM   10/21/24 1028 -- -- -- -- -- 7 -- RC   10/21/24 0943 97.9 °F (36.6 °C) 77 98 % 21 141/67 -- -- RD            Physical Exam  Vitals and nursing note reviewed.   Constitutional:       General: He is not in acute distress.     Appearance: He is well-developed. He is not ill-appearing.   HENT:      Head: Normocephalic and atraumatic.   Eyes:      Conjunctiva/sclera: Conjunctivae normal.   Cardiovascular:      Rate and Rhythm: Normal rate and regular rhythm.      Heart sounds: No murmur heard.  Pulmonary:      Effort: Pulmonary effort is normal. No respiratory distress.      Breath sounds: Normal breath sounds. No wheezing, rhonchi or rales.   Abdominal:      Palpations: Abdomen is soft.      Tenderness: There is no abdominal tenderness. There is no guarding or rebound.   Musculoskeletal:         General: No swelling.      Cervical back: Neck supple.   Skin:     General: Skin is warm and dry.   Neurological:      General: No focal deficit present.      Mental Status: He is alert and oriented to person, place, and time.      GCS: GCS eye subscore is 4. GCS verbal subscore is 5. GCS motor subscore is 6.      Cranial Nerves: Cranial nerves 2-12 are intact.      Sensory: Sensation is intact.      Motor: Motor function is intact.      Coordination: Coordination is intact.      Gait: Gait is intact.   Psychiatric:         Mood and Affect: Mood normal.         Results Reviewed       Procedure Component Value Units Date/Time    HS Troponin 0hr (reflex protocol) [[id number]]  (Normal) Collected: 10/21/24 1013    Lab Status: Final result Specimen: Blood from Arm, Right Updated: 10/21/24 1042     hs TnI 0hr <2 ng/L     D-dimer, quantitative [[id number]]  (Normal) Collected: 10/21/24 1013    Lab Status: Final result Specimen: Blood from Arm, Right Updated: 10/21/24 1041     D-Dimer, Quant 0.28 ug/ml FEU     FLU/COVID Rapid Antigen (30 min. TAT) - Preferred screening test in ED [[id number]]  (Normal) Collected:  10/21/24 1015    Lab Status: Final result Specimen: Nares from Nose Updated: 10/21/24 1037     SARS COV Rapid Antigen Negative     Influenza A Rapid Antigen Negative     Influenza B Rapid Antigen Negative    Narrative:      This test has been performed using the [employer] Alessandra 2 FLU+SARS Antigen test under the Emergency Use Authorization (EUA). This test has been validated by the  and verified by the performing laboratory. The Alessandra uses lateral flow immunofluorescent sandwich assay to detect SARS-COV, Influenza A and Influenza B Antigen.     The [employer] Alessandra 2 SARS Antigen test does not differentiate between SARS-CoV and SARS-CoV-2.     Negative results are presumptive and may be confirmed with a molecular assay, if necessary, for patient management. Negative results do not rule out SARS-CoV-2 or influenza infection and should not be used as the sole basis for treatment or patient management decisions. A negative test result may occur if the level of antigen in a sample is below the limit of detection of this test.     Positive results are indicative of the presence of viral antigens, but do not rule out bacterial infection or co-infection with other viruses.     All test results should be used as an adjunct to clinical observations and other information available to the provider.    FOR PEDIATRIC PATIENTS - copy/paste COVID Guidelines URL to browser: [email or web address]    Basic metabolic panel [[id number]] Collected: 10/21/24 1013    Lab Status: Final result Specimen: Blood from Arm, Right Updated: 10/21/24 1036     Sodium 137 mmol/L      Potassium 4.1 mmol/L      Chloride 106 mmol/L      CO2 25 mmol/L      ANION GAP 6 mmol/L      BUN 15 mg/dL      Creatinine 0.96 mg/dL      Glucose 106 mg/dL      Calcium 9.4 mg/dL      eGFR 101 ml/min/1.73sq m     Narrative:      National Kid[employer] Disease Foundation guidelines for Chronic Kidney Disease (CKD):      Stage 1 with normal or high GFR (GFR > 90 mL/min/1.73 square meters)    Stage 2 Mild CKD (GFR = 60-89 mL/min/1.73 square meters)    Stage 3A Moderate CKD (GFR = 45-59 mL/min/1.73 square meters)    Stage 3B Moderate CKD (GFR = 30-44 mL/min/1.73 square meters)    Stage 4 Severe CKD (GFR = 15-29 mL/min/1.73 square meters)    Stage 5 End Stage CKD (GFR <15 mL/min/1.73 square meters)  Note: GFR calculation is accurate only with a steady state creatinine    CBC and differential [[id number]] Collected: 10/21/24 1013    Lab Status: Final result Specimen: Blood from Arm, Right Updated: 10/21/24 1020     WBC 5.43 Thousand/uL      RBC 4.98 Million/uL      Hemoglobin 14.7 g/dL      Hematocrit 44.8 %      MCV 90 fL      MCH 29.5 pg      MCHC 32.8 g/dL      RDW 13.1 %      MPV 10.3 fL      Platelets 160 Thousands/uL      nRBC 0 /100 WBCs      Segmented % 56 %      Immature Grans % 0 %      Lymphocytes % 34 %      Monocytes % 8 %      Eosinophils Relative 1 %      Basophils Relative 1 %      Absolute Neutrophils 3.00 Thousands/µL      Absolute Immature Grans 0.01 Thousand/uL      Absolute Lymphocytes 1.87 Thousands/µL      Absolute Monocytes 0.44 Thousand/µL      Eosinophils Absolute 0.07 Thousand/µL      Basophils Absolute 0.04 Thousands/µL             X-ray chest 2 views   ED Interpretation by Jonathan Andrew PA-C (10/21 4336)   No acute cardiopulmonary disease.          ECG 12 Lead Documentation Only    Date/Time: 10/21/2024 10:12 AM    Performed by: Jonathan Andrew PA-C  Authorized by: Jonathan Andrew PA-C    Indications / Diagnosis:  Chest tightness, dyspnea  ECG reviewed by me, the ED Provider: yes    Patient location:  ED  Previous ECG:     Comparison to cardiac monitor: Yes    Interpretation:     Interpretation: abnormal    Rate:     ECG rate:  58    ECG rate assessment: bradycardic    Rhythm:     Rhythm: sinus bradycardia    Ectopy:     Ectopy: none    QRS:     QRS axis:  Normal    QRS intervals:   Normal  Conduction:     Conduction: normal    ST segments:     ST segments:  Normal  T waves:     T waves: inverted      Inverted:  V1      ED Medication and Procedure Management   Prior to Admission Medications   Prescriptions Last Dose Informant Patient Reported? Taking?   Omega-3 Fatty Acids (fish oil) 1,000 mg   Yes No   Sig: Take 1,000 mg by mouth daily   albuterol (Proventil HFA) 90 mcg/act inhaler   No No   Sig: Inhale 1-2 puffs every 6 (six) hours as needed for wheezing   ascorbic acid (VITAMIN C) 250 mg tablet   Yes No   Sig: Take 250 mg by mouth daily   Patient not taking: Reported on 4/29/2024   aspirin 325 mg tablet   No No   Sig: Take 1 tablet (325 mg total) by mouth daily   Patient not taking: Reported on 4/29/2024   cholecalciferol (VITAMIN D3) 400 units tablet   Yes No   Sig: Take 400 Units by mouth daily   meloxicam (MOBIC) 15 mg tablet   No No   Sig: Take 1 tablet (15 mg total) by mouth daily   Patient not taking: Reported on 4/29/2024   methadone (DOLOPHINE) 10 mg/mL oral concentrated solution   Yes No   Sig: Take 25 mg by mouth every morning   vitamin E 100 UNIT capsule   Yes No   Sig: Take 100 Units by mouth daily      Facility-Administered Medications: None     Discharge Medication List as of 10/21/2024 11:51 AM        START taking these medications    Details   albuterol (2.5 mg/3 mL) 0.083 % nebulizer solution Take 3 mL (2.5 mg total) by nebulization every 6 (six) hours as needed for wheezing or shortness of breath, Starting Mon 10/21/2024, Normal      predniSONE 20 mg tablet Take 2 tablets (40 mg total) by mouth daily for 5 days, Starting Mon 10/21/2024, Until Sat 10/26/2024, Normal           CONTINUE these medications which have NOT CHANGED    Details   albuterol (Proventil HFA) 90 mcg/act inhaler Inhale 1-2 puffs every 6 (six) hours as needed for wheezing, Starting Mon 10/7/2024, Normal      ascorbic acid (VITAMIN C) 250 mg tablet Take 250 mg by mouth daily, Historical Med      aspirin 325  mg tablet Take 1 tablet (325 mg total) by mouth daily, Starting Wed 1/17/2024, Until Mon 4/29/2024, Normal      cholecalciferol (VITAMIN D3) 400 units tablet Take 400 Units by mouth daily, Historical Med      meloxicam (MOBIC) 15 mg tablet Take 1 tablet (15 mg total) by mouth daily, Starting Wed 1/17/2024, Until Mon 4/29/2024, Normal      methadone (DOLOPHINE) 10 mg/mL oral concentrated solution Take 25 mg by mouth every morning, Historical Med      Omega-3 Fatty Acids (fish oil) 1,000 mg Take 1,000 mg by mouth daily, Historical Med      vitamin E 100 UNIT capsule Take 100 Units by mouth daily, Historical Med             ED SEPSIS DOCUMENTATION   Time reflects when diagnosis was documented in both MDM as applicable and the Disposition within this note       Time User Action Codes Description Comment    10/21/2024 11:12 AM Jonathan Andrew [R06.00] Dyspnea     10/21/2024 11:41 AM Jonathan Andrew [J20.9] Acute bronchitis     10/21/2024 11:45 AM Jonathan Andrew [R07.9] Chest pain, unspecified                  Jonathan Andrew PA-C  10/21/24 1212

## 2024-10-21 NOTE — DISCHARGE INSTRUCTIONS
Immediately return to the emergency room if you experience any new or worsening symptoms or if the symptoms are lasting longer than expected.     Please follow up with your family doctor to discuss your ER visit today. Please get established with pulmonology for formal lung testing. Please use the nebulizer machine as needed for shortness of breath and wheezing. Take the prednisone for 5 days for bronchitis. Continue with adequate fluids and Mucinex.

## 2024-10-25 ENCOUNTER — APPOINTMENT (EMERGENCY)
Dept: CT IMAGING | Facility: HOSPITAL | Age: 34
End: 2024-10-25

## 2024-10-25 ENCOUNTER — HOSPITAL ENCOUNTER (EMERGENCY)
Facility: HOSPITAL | Age: 34
Discharge: HOME | End: 2024-10-25

## 2024-10-25 VITALS
TEMPERATURE: 98.1 F | OXYGEN SATURATION: 98 % | HEART RATE: 64 BPM | DIASTOLIC BLOOD PRESSURE: 68 MMHG | RESPIRATION RATE: 18 BRPM | BODY MASS INDEX: 24.87 KG/M2 | SYSTOLIC BLOOD PRESSURE: 116 MMHG | HEIGHT: 75 IN | WEIGHT: 200 LBS

## 2024-10-25 DIAGNOSIS — R05.9 COUGH: ICD-10-CM

## 2024-10-25 DIAGNOSIS — R07.9 CHEST PAIN, UNSPECIFIED: Primary | ICD-10-CM

## 2024-10-25 LAB
ALBUMIN SERPL BCG-MCNC: 4.2 G/DL (ref 3.5–5)
ALP SERPL-CCNC: 54 U/L (ref 34–104)
ALT SERPL W P-5'-P-CCNC: 13 U/L (ref 7–52)
ANION GAP SERPL CALCULATED.3IONS-SCNC: 7 MMOL/L (ref 4–13)
AST SERPL W P-5'-P-CCNC: 13 U/L (ref 13–39)
ATRIAL RATE: 55 BPM
BASOPHILS # BLD AUTO: 0.03 THOUSANDS/ΜL (ref 0–0.1)
BASOPHILS NFR BLD AUTO: 0 % (ref 0–1)
BILIRUB SERPL-MCNC: 0.34 MG/DL (ref 0.2–1)
BUN SERPL-MCNC: 20 MG/DL (ref 5–25)
CALCIUM SERPL-MCNC: 8.8 MG/DL (ref 8.4–10.2)
CARDIAC TROPONIN I PNL SERPL HS: <2 NG/L
CHLORIDE SERPL-SCNC: 105 MMOL/L (ref 96–108)
CO2 SERPL-SCNC: 27 MMOL/L (ref 21–32)
CREAT SERPL-MCNC: 1.04 MG/DL (ref 0.6–1.3)
EOSINOPHIL # BLD AUTO: 0.13 THOUSAND/ΜL (ref 0–0.61)
EOSINOPHIL NFR BLD AUTO: 1 % (ref 0–6)
ERYTHROCYTE [DISTWIDTH] IN BLOOD BY AUTOMATED COUNT: 13.4 % (ref 11.6–15.1)
GFR SERPL CREATININE-BSD FRML MDRD: 91 ML/MIN/1.73SQ M
GLUCOSE SERPL-MCNC: 101 MG/DL (ref 65–140)
HCT VFR BLD AUTO: 39.6 % (ref 36.5–49.3)
HGB BLD-MCNC: 13.2 G/DL (ref 12–17)
IMM GRANULOCYTES # BLD AUTO: 0.04 THOUSAND/UL (ref 0–0.2)
IMM GRANULOCYTES NFR BLD AUTO: 0 % (ref 0–2)
LYMPHOCYTES # BLD AUTO: 3.41 THOUSANDS/ΜL (ref 0.6–4.47)
LYMPHOCYTES NFR BLD AUTO: 32 % (ref 14–44)
MCH RBC QN AUTO: 29.9 PG (ref 26.8–34.3)
MCHC RBC AUTO-ENTMCNC: 33.3 G/DL (ref 31.4–37.4)
MCV RBC AUTO: 90 FL (ref 82–98)
MONOCYTES # BLD AUTO: 0.89 THOUSAND/ΜL (ref 0.17–1.22)
MONOCYTES NFR BLD AUTO: 8 % (ref 4–12)
NEUTROPHILS # BLD AUTO: 6.25 THOUSANDS/ΜL (ref 1.85–7.62)
NEUTS SEG NFR BLD AUTO: 59 % (ref 43–75)
NRBC BLD AUTO-RTO: 0 /100 WBCS
P AXIS: 63 DEGREES
PLATELET # BLD AUTO: 174 THOUSANDS/UL (ref 149–390)
PMV BLD AUTO: 10.3 FL (ref 8.9–12.7)
POTASSIUM SERPL-SCNC: 3.6 MMOL/L (ref 3.5–5.3)
PR INTERVAL: 138 MS
PROT SERPL-MCNC: 6.7 G/DL (ref 6.4–8.4)
QRS AXIS: 53 DEGREES
QRSD INTERVAL: 100 MS
QT INTERVAL: 410 MS
QTC INTERVAL: 392 MS
RBC # BLD AUTO: 4.42 MILLION/UL (ref 3.88–5.62)
SODIUM SERPL-SCNC: 139 MMOL/L (ref 135–147)
T WAVE AXIS: 40 DEGREES
VENTRICULAR RATE: 55 BPM
WBC # BLD AUTO: 10.75 THOUSAND/UL (ref 4.31–10.16)

## 2024-10-25 PROCEDURE — 84484 ASSAY OF TROPONIN QUANT: CPT

## 2024-10-25 PROCEDURE — 93005 ELECTROCARDIOGRAM TRACING: CPT

## 2024-10-25 PROCEDURE — 36415 COLL VENOUS BLD VENIPUNCTURE: CPT

## 2024-10-25 PROCEDURE — 71260 CT THORAX DX C+: CPT

## 2024-10-25 PROCEDURE — 85025 COMPLETE CBC W/AUTO DIFF WBC: CPT

## 2024-10-25 PROCEDURE — 80053 COMPREHEN METABOLIC PANEL: CPT

## 2024-10-25 PROCEDURE — 93010 ELECTROCARDIOGRAM REPORT: CPT | Performed by: INTERNAL MEDICINE

## 2024-10-25 PROCEDURE — 94640 AIRWAY INHALATION TREATMENT: CPT

## 2024-10-25 PROCEDURE — 99285 EMERGENCY DEPT VISIT HI MDM: CPT

## 2024-10-25 PROCEDURE — 74177 CT ABD & PELVIS W/CONTRAST: CPT

## 2024-10-25 PROCEDURE — 96365 THER/PROPH/DIAG IV INF INIT: CPT

## 2024-10-25 PROCEDURE — 96375 TX/PRO/DX INJ NEW DRUG ADDON: CPT

## 2024-10-25 RX ORDER — AZITHROMYCIN 250 MG/1
TABLET, FILM COATED ORAL
Qty: 6 TABLET | Refills: 0 | Status: SHIPPED | OUTPATIENT
Start: 2024-10-25 | End: 2024-10-29

## 2024-10-25 RX ORDER — LEVALBUTEROL INHALATION SOLUTION 1.25 MG/3ML
1.25 SOLUTION RESPIRATORY (INHALATION) 3 TIMES DAILY
Qty: 270 ML | Refills: 0 | Status: SHIPPED | OUTPATIENT
Start: 2024-10-25 | End: 2024-11-24

## 2024-10-25 RX ORDER — LEVALBUTEROL INHALATION SOLUTION 1.25 MG/3ML
1.25 SOLUTION RESPIRATORY (INHALATION) ONCE
Status: COMPLETED | OUTPATIENT
Start: 2024-10-25 | End: 2024-10-25

## 2024-10-25 RX ORDER — MAGNESIUM SULFATE HEPTAHYDRATE 40 MG/ML
2 INJECTION, SOLUTION INTRAVENOUS ONCE
Status: COMPLETED | OUTPATIENT
Start: 2024-10-25 | End: 2024-10-25

## 2024-10-25 RX ORDER — METHYLPREDNISOLONE SODIUM SUCCINATE 125 MG/2ML
125 INJECTION, POWDER, LYOPHILIZED, FOR SOLUTION INTRAMUSCULAR; INTRAVENOUS ONCE
Status: COMPLETED | OUTPATIENT
Start: 2024-10-25 | End: 2024-10-25

## 2024-10-25 RX ADMIN — IPRATROPIUM BROMIDE 0.5 MG: 0.5 SOLUTION RESPIRATORY (INHALATION) at 11:54

## 2024-10-25 RX ADMIN — LEVALBUTEROL HYDROCHLORIDE 1.25 MG: 1.25 SOLUTION RESPIRATORY (INHALATION) at 11:30

## 2024-10-25 RX ADMIN — MAGNESIUM SULFATE HEPTAHYDRATE 2 G: 40 INJECTION, SOLUTION INTRAVENOUS at 11:28

## 2024-10-25 RX ADMIN — METHYLPREDNISOLONE SODIUM SUCCINATE 125 MG: 125 INJECTION, POWDER, FOR SOLUTION INTRAMUSCULAR; INTRAVENOUS at 11:59

## 2024-10-25 RX ADMIN — IOHEXOL 100 ML: 350 INJECTION, SOLUTION INTRAVENOUS at 10:57

## 2024-10-25 NOTE — ED PROVIDER NOTES
Time reflects when diagnosis was documented in both MDM as applicable and the Disposition within this note       Time User Action Codes Description Comment    10/25/2024 11:28 AM Jenn Encinas Add [R07.9] Chest pain, unspecified     10/25/2024 11:28 AM Jenn Encinas Add [R05.9] Cough           ED Disposition       ED Disposition   Discharge    Condition   Stable    Date/Time   Fri Oct 25, 2024 11:28 AM    Comment   Khalif Hood discharge to home/self care.                   Assessment & Plan       Medical Decision Making  This patient presents with dyspnea.  Presentation not consistent with acute cardiac etiologies to include ACS-nonischemic EKG, unremarkable troponin.  Heart score 1. Presentation not consistent presentation not consistent with congestive heart failure, pericardial effusion/tamponade, acute PE,  or infectious etiology such as pneumonia.  Presentation also not consistent with benign cardiopulmonary causes to include toxidromes, metabolic etiologies such as acidemia or electrolyte derangements.  CT was negative for pneumonia.  patient CBC resulted no leukocytosis, anemia.  CMP negative for metabolic derangements.  Patient was given Xopenex instead of albuterol secondary to unable to tolerate albuterol.  Patient was given a dose of Xopenex here and felt that he did not get a headache from it like he does albuterol.  Patient given Z-Doe for anti-inflammatory process.  Advised to follow-up with pulmonology and or primary care provider.  Return to the ER symptoms worsens or questions or concerns arise at home.      Amount and/or Complexity of Data Reviewed  Radiology: ordered. Decision-making details documented in ED Course.    Risk  Prescription drug management.        ED Course as of 10/25/24 1228   Fri Oct 25, 2024   1125 CT chest abdomen pelvis w contrast  IMPRESSION:     No acute cardiopulmonary abnormality.     No acute intra-abdominal abnormality.         Medications   levalbuterol (XOPENEX)  inhalation solution 1.25 mg (1.25 mg Nebulization Given 10/25/24 1130)   ipratropium (ATROVENT) 0.02 % inhalation solution 0.5 mg (0.5 mg Nebulization Given 10/25/24 1154)   methylPREDNISolone sodium succinate (Solu-MEDROL) injection 125 mg (125 mg Intravenous Given 10/25/24 1159)   magnesium sulfate 2 g/50 mL IVPB (premix) 2 g (0 g Intravenous Stopped 10/25/24 1158)   iohexol (OMNIPAQUE) 350 MG/ML injection (MULTI-DOSE) 100 mL (100 mL Intravenous Given 10/25/24 1057)       ED Risk Strat Scores   HEART Risk Score      Flowsheet Row Most Recent Value   Heart Score Risk Calculator    History 0 Filed at: 10/25/2024 1222   ECG 0 Filed at: 10/25/2024 1222   Age 0 Filed at: 10/25/2024 1222   Risk Factors 1 Filed at: 10/25/2024 1222   Troponin 0 Filed at: 10/25/2024 1222   HEART Score 1 Filed at: 10/25/2024 1222                               SBIRT 22yo+      Flowsheet Row Most Recent Value   Initial Alcohol Screen: US AUDIT-C     1. How often do you have a drink containing alcohol? 0 Filed at: 10/25/2024 0952   2. How many drinks containing alcohol do you have on a typical day you are drinking?  0 Filed at: 10/25/2024 0952   3a. Male UNDER 65: How often do you have five or more drinks on one occasion? 0 Filed at: 10/25/2024 0952   Audit-C Score 0 Filed at: 10/25/2024 0952   ABIODUN: How many times in the past year have you...    Used an illegal drug or used a prescription medication for non-medical reasons? Never Filed at: 10/25/2024 0952                            History of Present Illness       Chief Complaint   Patient presents with    Chest Pain     Patient arrived to ER c/o chest pain that started 2 weeks ago, worsening the last 72 hours. +SOB +Dizziness +HA        Past Medical History:   Diagnosis Date    Asthma     as a child      Past Surgical History:   Procedure Laterality Date    INCISION AND DRAINAGE OF WOUND Left 06/28/2021    Procedure: INCISION AND DRAINAGE (I&D) LEFT SUBMANDIBULAR SPACE INFECTION, LEFT   SPACE INFECTION WITH EXTRACTION OF TOOTH #19;  Surgeon: Fabi Hood DMD;  Location:  MAIN OR;  Service: Maxillofacial    ORIF TIBIA & FIBULA FRACTURES Right     MI ARTHRS AIDED ANT CRUCIATE LIGM RPR/AGMNTJ/RCNSTJ Right 1/17/2024    Procedure: Right - knee arthroscopy Lateral meniscectomy Synovectomy ACL reconstruction with allograft using the Arthrex graft link system Post arthroscopic injection of intra-articular joint space and peripheral portals;  Surgeon: Harvnider Ga DO;  Location: [facility];  Service: Orthopedics    MI ARTHRS KNE SURG W/MENISCECTOMY MED/LAT W/SHVG Right 1/10/2023    Procedure: ARTHROSCOPY KNEE WITH LATERAL MENISCECTOMY;  Surgeon: Harvinder Ga DO;  Location: [facility];  Service: Orthopedics      History reviewed. No pertinent family history.   Social History     Tobacco Use    Smoking status: Former     Current packs/day: 0.50     Types: Cigarettes    Smokeless tobacco: Never   Vaping Use    Vaping status: Former    Start date: 1/1/2023   Substance Use Topics    Alcohol use: Never    Drug use: Yes     Frequency: 7.0 times per week     Types: Marijuana     Comment: daily      E-Cigarette/Vaping    E-Cigarette Use Former User     Start Date 1/1/23     Cartridges/Day 0.5       E-Cigarette/Vaping Substances      I have reviewed and agree with the history as documented.     34-year-old male patient presents to the ER for evaluation of shortness of breath. PMH: Asthma, pneumonia.  Patient has been having shortness of breath and chest congestion for approximately 2 weeks.  Patient has been seen and evaluated twice in the emergency department for chest pain and shortness of breath and had unremarkable cardiac workup both visits.  Patient states that he has been using albuterol treatment at which does seem to help although has gotten a severe headache after using the albuterol and feels that he needs something else.  Patient also complains of insomnia.  He is taking  prednisone 40 mg daily.  He reports that he has not had issues with prednisone in the past.  He reports that he quit smoking approximately 9 days ago.  He is a 20+ year smoker.  He reports that his lungs are tight and feels that he has intermittent wheezing.  Patient able to talk in full complete sentences without respiratory distress, tripoding, drooling or trismus.         Review of Systems   Constitutional:  Negative for chills and fever.   HENT:  Negative for ear pain and sore throat.    Eyes:  Negative for pain and visual disturbance.   Respiratory:  Positive for cough and chest tightness. Negative for shortness of breath.    Cardiovascular:  Negative for chest pain and palpitations.   Gastrointestinal:  Positive for nausea. Negative for abdominal pain and vomiting.   Genitourinary:  Negative for dysuria and hematuria.   Musculoskeletal:  Negative for arthralgias and back pain.   Skin:  Negative for color change and rash.   Neurological:  Positive for headaches. Negative for seizures and syncope.   All other systems reviewed and are negative.          Objective       ED Triage Vitals   Temperature Pulse Blood Pressure Respirations SpO2 Patient Position - Orthostatic VS   10/25/24 0952 10/25/24 0952 10/25/24 0952 10/25/24 0952 10/25/24 0952 10/25/24 0952   98.1 °F (36.7 °C) 59 127/68 18 98 % Sitting      Temp Source Heart Rate Source BP Location FiO2 (%) Pain Score    10/25/24 0952 10/25/24 0952 10/25/24 0952 -- 10/25/24 1126    Oral Monitor Left arm  5      Vitals      Date and Time Temp Pulse SpO2 Resp BP Pain Score FACES Pain Rating User   10/25/24 1200 -- 64 98 % 18 116/68 -- -- CC   10/25/24 1130 -- 55 99 % 18 118/64 -- -- CC   10/25/24 1126 -- 60 98 % 19 117/59 5 -- GP   10/25/24 0952 98.1 °F (36.7 °C) 59 98 % 18 127/68 -- -- CC            Physical Exam  Vitals and nursing note reviewed.   Constitutional:       General: He is not in acute distress.     Appearance: He is well-developed.   HENT:      Head:  Normocephalic and atraumatic.   Eyes:      Conjunctiva/sclera: Conjunctivae normal.   Cardiovascular:      Rate and Rhythm: Normal rate and regular rhythm.      Pulses:           Radial pulses are 2+ on the right side and 2+ on the left side.      Heart sounds: Normal heart sounds. No murmur heard.  Pulmonary:      Effort: Pulmonary effort is normal. No respiratory distress.      Breath sounds: Normal breath sounds.   Abdominal:      Palpations: Abdomen is soft.      Tenderness: There is no abdominal tenderness.   Musculoskeletal:         General: No swelling.      Cervical back: Neck supple.   Skin:     General: Skin is warm and dry.      Capillary Refill: Capillary refill takes less than 2 seconds.   Neurological:      Mental Status: He is alert and oriented to person, place, and time.   Psychiatric:         Mood and Affect: Mood normal.         Behavior: Behavior normal.         Results Reviewed       Procedure Component Value Units Date/Time    HS Troponin I 4hr [[id number]]     Lab Status: No result Specimen: Blood     HS Troponin I 2hr [[id number]]     Lab Status: No result Specimen: Blood     HS Troponin 0hr (reflex protocol) [[id number]]  (Normal) Collected: 10/25/24 0955    Lab Status: Final result Specimen: Blood from Arm, Right Updated: 10/25/24 1026     hs TnI 0hr <2 ng/L     Comprehensive metabolic panel [[id number]] Collected: 10/25/24 0955    Lab Status: Final result Specimen: Blood from Arm, Right Updated: 10/25/24 1019     Sodium 139 mmol/L      Potassium 3.6 mmol/L      Chloride 105 mmol/L      CO2 27 mmol/L      ANION GAP 7 mmol/L      BUN 20 mg/dL      Creatinine 1.04 mg/dL      Glucose 101 mg/dL      Calcium 8.8 mg/dL      AST 13 U/L      ALT 13 U/L      Alkaline Phosphatase 54 U/L      Total Protein 6.7 g/dL      Albumin 4.2 g/dL      Total Bilirubin 0.34 mg/dL      eGFR 91 ml/min/1.73sq m     Narrative:      National Kid[employer] guidelines for Chronic Kidney Disease (CKD):     Stage  1 with normal or high GFR (GFR > 90 mL/min/1.73 square meters)    Stage 2 Mild CKD (GFR = 60-89 mL/min/1.73 square meters)    Stage 3A Moderate CKD (GFR = 45-59 mL/min/1.73 square meters)    Stage 3B Moderate CKD (GFR = 30-44 mL/min/1.73 square meters)    Stage 4 Severe CKD (GFR = 15-29 mL/min/1.73 square meters)    Stage 5 End Stage CKD (GFR <15 mL/min/1.73 square meters)  Note: GFR calculation is accurate only with a steady state creatinine    CBC and differential [[id number]]  (Abnormal) Collected: 10/25/24 0955    Lab Status: Final result Specimen: Blood from Arm, Right Updated: 10/25/24 1005     WBC 10.75 Thousand/uL      RBC 4.42 Million/uL      Hemoglobin 13.2 g/dL      Hematocrit 39.6 %      MCV 90 fL      MCH 29.9 pg      MCHC 33.3 g/dL      RDW 13.4 %      MPV 10.3 fL      Platelets 174 Thousands/uL      nRBC 0 /100 WBCs      Segmented % 59 %      Immature Grans % 0 %      Lymphocytes % 32 %      Monocytes % 8 %      Eosinophils Relative 1 %      Basophils Relative 0 %      Absolute Neutrophils 6.25 Thousands/µL      Absolute Immature Grans 0.04 Thousand/uL      Absolute Lymphocytes 3.41 Thousands/µL      Absolute Monocytes 0.89 Thousand/µL      Eosinophils Absolute 0.13 Thousand/µL      Basophils Absolute 0.03 Thousands/µL             CT chest abdomen pelvis w contrast   Final Interpretation by Van Rubalcava MD (10/25 1123)      No acute cardiopulmonary abnormality.      No acute intra-abdominal abnormality.         Workstation performed: [id number]             ECG 12 Lead Documentation Only    Date/Time: 10/25/2024 9:50 AM    Performed by: SHELBY Pathak  Authorized by: SHELBY Pathak    Indications / Diagnosis:  Shortness of breath  ECG reviewed by me, the ED Provider: yes    Patient location:  ED  Previous ECG:     Previous ECG:  Compared to current    Comparison ECG info:  10/21/24    Similarity:  No change    Comparison to cardiac monitor: Yes    Interpretation:     Interpretation:  non-specific    Rate:     ECG rate:  55    ECG rate assessment: bradycardic    Rhythm:     Rhythm: sinus bradycardia    Ectopy:     Ectopy: none    QRS:     QRS axis:  Normal    QRS intervals:  Normal  Conduction:     Conduction: normal    ST segments:     ST segments:  Normal  T waves:     T waves: normal        ED Medication and Procedure Management   Prior to Admission Medications   Prescriptions Last Dose Informant Patient Reported? Taking?   Omega-3 Fatty Acids (fish oil) 1,000 mg   Yes No   Sig: Take 1,000 mg by mouth daily   albuterol (2.5 mg/3 mL) 0.083 % nebulizer solution   No No   Sig: Take 3 mL (2.5 mg total) by nebulization every 6 (six) hours as needed for wheezing or shortness of breath   albuterol (Proventil HFA) 90 mcg/act inhaler   No No   Sig: Inhale 1-2 puffs every 6 (six) hours as needed for wheezing   ascorbic acid (VITAMIN C) 250 mg tablet   Yes No   Sig: Take 250 mg by mouth daily   Patient not taking: Reported on 4/29/2024   aspirin 325 mg tablet   No No   Sig: Take 1 tablet (325 mg total) by mouth daily   Patient not taking: Reported on 4/29/2024   cholecalciferol (VITAMIN D3) 400 units tablet   Yes No   Sig: Take 400 Units by mouth daily   meloxicam (MOBIC) 15 mg tablet   No No   Sig: Take 1 tablet (15 mg total) by mouth daily   Patient not taking: Reported on 4/29/2024   methadone (DOLOPHINE) 10 mg/mL oral concentrated solution   Yes No   Sig: Take 25 mg by mouth every morning   predniSONE 20 mg tablet   No No   Sig: Take 2 tablets (40 mg total) by mouth daily for 5 days   vitamin E 100 UNIT capsule   Yes No   Sig: Take 100 Units by mouth daily      Facility-Administered Medications: None     Discharge Medication List as of 10/25/2024 11:43 AM        START taking these medications    Details   azithromycin (Zithromax Z-Deo) 250 mg tablet Take 2 tablets today then 1 tablet daily x 4 days, Normal      levalbuterol (Xopenex) 1.25 mg/3 mL nebulizer solution Take 3 mL (1.25 mg total) by  nebulization 3 (three) times a day, Starting Fri 10/25/2024, Until Sun 11/24/2024, Normal           CONTINUE these medications which have NOT CHANGED    Details   albuterol (2.5 mg/3 mL) 0.083 % nebulizer solution Take 3 mL (2.5 mg total) by nebulization every 6 (six) hours as needed for wheezing or shortness of breath, Starting Mon 10/21/2024, Normal      albuterol (Proventil HFA) 90 mcg/act inhaler Inhale 1-2 puffs every 6 (six) hours as needed for wheezing, Starting Mon 10/7/2024, Normal      ascorbic acid (VITAMIN C) 250 mg tablet Take 250 mg by mouth daily, Historical Med      aspirin 325 mg tablet Take 1 tablet (325 mg total) by mouth daily, Starting Wed 1/17/2024, Until Mon 4/29/2024, Normal      cholecalciferol (VITAMIN D3) 400 units tablet Take 400 Units by mouth daily, Historical Med      meloxicam (MOBIC) 15 mg tablet Take 1 tablet (15 mg total) by mouth daily, Starting Wed 1/17/2024, Until Mon 4/29/2024, Normal      methadone (DOLOPHINE) 10 mg/mL oral concentrated solution Take 25 mg by mouth every morning, Historical Med      Omega-3 Fatty Acids (fish oil) 1,000 mg Take 1,000 mg by mouth daily, Historical Med      predniSONE 20 mg tablet Take 2 tablets (40 mg total) by mouth daily for 5 days, Starting Mon 10/21/2024, Until Sat 10/26/2024, Normal      vitamin E 100 UNIT capsule Take 100 Units by mouth daily, Historical Med           No discharge procedures on file.  ED SEPSIS DOCUMENTATION   Time reflects when diagnosis was documented in both MDM as applicable and the Disposition within this note       Time User Action Codes Description Comment    10/25/2024 11:28 AM Jenn Encinas [R07.9] Chest pain, unspecified     10/25/2024 11:28 AM Jenn Encinas [R05.9] Cough                  SHELBY Pathak  10/25/24 5549

## 2024-10-25 NOTE — DISCHARGE INSTRUCTIONS
Xopenex- three times a day as needed for cough/chest tightness  Z-soren take as prescribed  Follow up with pulmonologist

## 2024-10-29 ENCOUNTER — CONSULT (OUTPATIENT)
Dept: PULMONOLOGY | Facility: CLINIC | Age: 34
End: 2024-10-29

## 2024-10-29 ENCOUNTER — TELEPHONE (OUTPATIENT)
Age: 34
End: 2024-10-29

## 2024-10-29 VITALS
BODY MASS INDEX: 24.74 KG/M2 | HEIGHT: 75 IN | HEART RATE: 66 BPM | DIASTOLIC BLOOD PRESSURE: 66 MMHG | OXYGEN SATURATION: 97 % | WEIGHT: 199 LBS | SYSTOLIC BLOOD PRESSURE: 112 MMHG

## 2024-10-29 DIAGNOSIS — J45.909 UNCONTROLLED ASTHMA: Primary | ICD-10-CM

## 2024-10-29 DIAGNOSIS — F17.210 TOBACCO DEPENDENCE DUE TO CIGARETTES: ICD-10-CM

## 2024-10-29 DIAGNOSIS — R06.00 DYSPNEA: ICD-10-CM

## 2024-10-29 PROCEDURE — 99244 OFF/OP CNSLTJ NEW/EST MOD 40: CPT | Performed by: INTERNAL MEDICINE

## 2024-10-29 RX ORDER — FLUTICASONE PROPIONATE AND SALMETEROL 250; 50 UG/1; UG/1
1 POWDER RESPIRATORY (INHALATION) 2 TIMES DAILY
Qty: 60 BLISTER | Refills: 5 | Status: SHIPPED | OUTPATIENT
Start: 2024-10-29 | End: 2025-04-27

## 2024-10-29 NOTE — ASSESSMENT & PLAN NOTE
History c/w uncontrolled asthma.  He recently quit smoking and is using nicotine patches.  He currently only has albuterol    Prescribe fluticasone/salmeterol 250-50 1 puff BID, rinse mouth after use (gave printed prescription and sent him [facility] coupon- can fill for $50 a month)  Continue Prn albuterol    Defer PFTs for now as he has no medical insurance  Remain off cigarettes  RTC 2-3 months

## 2024-10-29 NOTE — ASSESSMENT & PLAN NOTE
21 years of 1-1.5 PPD quit 10/2024  He has been using nicotine patches I encouraged him to continue using.

## 2024-10-29 NOTE — PROGRESS NOTES
Pulmonary Outpatient Note   Khalif Hood 34 y.o. male MRN: [id number]  10/29/2024      Referring Physician: Jonathan Andrew PA-C    Reason for Consultation:    Chief Complaint   Patient presents with    Breathing Problem     Patient stopped smoking in the last few weeks, 2nd day had chest tightening very painful, sob, has been to ER a few times for this.  Patient states that he was given 2 inhalers- doesn't feel that it helps as much as the neb treatments.    Medication Management     Patient is unable to fill xopenex at this time due to funds         Assessment/Plan:    1. Uncontrolled asthma  Assessment & Plan:  History c/w uncontrolled asthma.  He recently quit smoking and is using nicotine patches.  He currently only has albuterol    Prescribe fluticasone/salmeterol 250-50 1 puff BID, rinse mouth after use (gave printed prescription and sent him GoodRx coupon- can fill for $50 a month)  Continue Prn albuterol    Defer PFTs for now as he has no medical insurance  Remain off cigarettes  RTC 2-3 months  Orders:  -     Fluticasone-Salmeterol (Advair) 250-50 mcg/dose inhaler; Inhale 1 puff 2 (two) times a day Rinse mouth after use.  2. Dyspnea  -     Ambulatory Referral to Pulmonology  3. Tobacco dependence due to cigarettes  Assessment & Plan:  21 years of 1-1.5 PPD quit 10/2024  He has been using nicotine patches I encouraged him to continue using.        Health Maintenance    There is no immunization history on file for this patient.     Return in about 2 months (around 12/29/2024).    History of Present Illness   HPI:  Khalif Hood is a 34 y.o. male who is here for shortness of breath.    He had pneumonia when he was around 8 years old requiring hospital stay  Diagnosed with asthma as a child that was in remission.  No inhaler for many years.    More recently has had shortness of breath, chest tightness, cough.  He has been to the ED 3 times in the last month.  Prescribed an albuterol inhaler 10/7.  10/21- given albuterol and prednisone course- only took a couple doses- he is unsure if it helped his breathing.  Went back on 10/25- given levalbuterol nebs and azithromycin.    He has been a cigarette smoker and marijuana smoker  Started smoking at age 13- for last 21 years around 1-1.5 PPD.  Stopped smoking on 10/17/24.  He is intermittently using nicotine patches.  Stopped today because he ran out.    He stopped smoking marijuana 2 days ago.  Normally smokes marijuana    Before October 2024 he had a couple of similar episodes of chest tightness and shortness of breath when smoking.  Did not see a doctor for that.  Around 1 month ago had a bout of shortness of breath as well.    No allergy symptoms/post-nasal drip    Pulmonary history:    Childhood lung disease/premature birth: Pneumonia, asthma    Family hx of lung disease: No    Rash: No    Dysphagia/Reflux: No  Exposure history:    Exposure to pets/birds/farm animals: NO    Mold/Roaches:No    Social history:    Smoking: Started smoking at age 13- for last 21 years around 1-1.5 PPD.  Stopped smoking on 10/17/24.      Alcohol, ilicit drugs (inhalational/ IV): Marijuana    Worked as: Works for a [employer]        Review of Systems   Constitutional:  Negative for chills and fever.   HENT:  Negative for ear pain and sore throat.    Eyes:  Negative for pain and visual disturbance.   Respiratory:  Positive for cough, chest tightness and shortness of breath.    Cardiovascular:  Negative for chest pain and palpitations.   Gastrointestinal:  Negative for abdominal pain and vomiting.   Genitourinary:  Negative for dysuria and hematuria.   Musculoskeletal:  Negative for arthralgias and back pain.   Skin:  Negative for color change and rash.   Neurological:  Negative for seizures and syncope.   All other systems reviewed and are negative.          Historical Information   Past Medical History:   Diagnosis Date    Asthma     as a child     Past Surgical  History:   Procedure Laterality Date    INCISION AND DRAINAGE OF WOUND Left 06/28/2021    Procedure: INCISION AND DRAINAGE (I&D) LEFT SUBMANDIBULAR SPACE INFECTION, LEFT  SPACE INFECTION WITH EXTRACTION OF TOOTH #19;  Surgeon: Fabi Hood DMD;  Location:  MAIN OR;  Service: Maxillofacial    ORIF TIBIA & FIBULA FRACTURES Right     CO ARTHRS AIDED ANT CRUCIATE LIGM RPR/AGMNTJ/RCNSTJ Right 1/17/2024    Procedure: Right - knee arthroscopy Lateral meniscectomy Synovectomy ACL reconstruction with allograft using the Arthrex graft link system Post arthroscopic injection of intra-articular joint space and peripheral portals;  Surgeon: Harvinder Ga DO;  Location: [facility];  Service: Orthopedics    CO ARTHRS KNE SURG W/MENISCECTOMY MED/LAT W/SHVG Right 1/10/2023    Procedure: ARTHROSCOPY KNEE WITH LATERAL MENISCECTOMY;  Surgeon: Harvinder Ga DO;  Location: [facility];  Service: Orthopedics     History reviewed. No pertinent family history.          Meds/Allergies     Current Outpatient Medications:     albuterol (2.5 mg/3 mL) 0.083 % nebulizer solution, Take 3 mL (2.5 mg total) by nebulization every 6 (six) hours as needed for wheezing or shortness of breath, Disp: 75 mL, Rfl: 0    albuterol (Proventil HFA) 90 mcg/act inhaler, Inhale 1-2 puffs every 6 (six) hours as needed for wheezing, Disp: 18 g, Rfl: 2    cholecalciferol (VITAMIN D3) 400 units tablet, Take 400 Units by mouth daily, Disp: , Rfl:     Fluticasone-Salmeterol (Advair) 250-50 mcg/dose inhaler, Inhale 1 puff 2 (two) times a day Rinse mouth after use., Disp: 60 blister, Rfl: 5    methadone (DOLOPHINE) 10 mg/mL oral concentrated solution, Take 25 mg by mouth every morning, Disp: , Rfl:     Omega-3 Fatty Acids (fish oil) 1,000 mg, Take 1,000 mg by mouth daily, Disp: , Rfl:     vitamin E 100 UNIT capsule, Take 100 Units by mouth daily, Disp: , Rfl:     ascorbic acid (VITAMIN C) 250 mg tablet, Take 250 mg by mouth daily (Patient not  "taking: Reported on 4/29/2024), Disp: , Rfl:     aspirin 325 mg tablet, Take 1 tablet (325 mg total) by mouth daily (Patient not taking: Reported on 4/29/2024), Disp: 30 tablet, Rfl: 0    azithromycin (Zithromax Z-Doe) 250 mg tablet, Take 2 tablets today then 1 tablet daily x 4 days (Patient not taking: Reported on 10/29/2024), Disp: 6 tablet, Rfl: 0    levalbuterol (Xopenex) 1.25 mg/3 mL nebulizer solution, Take 3 mL (1.25 mg total) by nebulization 3 (three) times a day (Patient not taking: Reported on 10/29/2024), Disp: 270 mL, Rfl: 0    meloxicam (MOBIC) 15 mg tablet, Take 1 tablet (15 mg total) by mouth daily (Patient not taking: Reported on 4/29/2024), Disp: 30 tablet, Rfl: 0  No Known Allergies    Vitals: Blood pressure 112/66, pulse 66, height 6' 3\" (1.905 m), weight 90.3 kg (199 lb), SpO2 97%. Body mass index is 24.87 kg/m². Oxygen Therapy  SpO2: 97 %      Physical Exam  Physical Exam  Vitals and nursing note reviewed.   Constitutional:       General: He is not in acute distress.     Appearance: He is well-developed.   HENT:      Head: Normocephalic and atraumatic.   Eyes:      Conjunctiva/sclera: Conjunctivae normal.   Cardiovascular:      Rate and Rhythm: Normal rate and regular rhythm.      Heart sounds: No murmur heard.  Pulmonary:      Effort: Pulmonary effort is normal. No respiratory distress.      Breath sounds: Normal breath sounds.   Abdominal:      Palpations: Abdomen is soft.      Tenderness: There is no abdominal tenderness.   Musculoskeletal:         General: No swelling.      Cervical back: Neck supple.      Right lower leg: No edema.      Left lower leg: No edema.   Skin:     General: Skin is warm and dry.      Capillary Refill: Capillary refill takes less than 2 seconds.   Neurological:      Mental Status: He is alert.   Psychiatric:         Mood and Affect: Mood normal.         Labs:   I have personally reviewed pertinent lab results.    ABG: No results found for: \"PHART\", \"CZC9YGU\", " "\"PO2ART\", \"KWC2NYS\", \"E7CEBUIU\", \"BEART\", \"SOURCE\",   BNP: No results found for: \"BNP\",   CBC:  Lab Results   Component Value Date    WBC 10.75 (H) 10/25/2024    HGB 13.2 10/25/2024    HCT 39.6 10/25/2024    MCV 90 10/25/2024     10/25/2024    EOSPCT 1 10/25/2024    EOSABS 0.13 10/25/2024    NEUTOPHILPCT 59 10/25/2024    LYMPHOPCT 32 10/25/2024   ,   CMP:   Lab Results   Component Value Date    SODIUM 139 10/25/2024    K 3.6 10/25/2024     10/25/2024    CO2 27 10/25/2024    BUN 20 10/25/2024    CREATININE 1.04 10/25/2024    CALCIUM 8.8 10/25/2024    AST 13 10/25/2024    ALT 13 10/25/2024    ALKPHOS 54 10/25/2024    EGFR 91 10/25/2024   ,   PT/INR: No results found for: \"PT\", \"INR\",   Troponin: No results found for: \"TROPONINI\"      Imaging and other studies: I have personally reviewed pertinent reports.   and I have personally reviewed pertinent films in PACS    CT Chest 10/25/24  Lungs clear    CXR 10/7, 10/21- lungs clear    Pulmonary function testing:   Pulmonary Functions Testing Results:    No results found for: \"FEV1\", \"FVC\", \"JWA3DZX\", \"TLC\", \"DLCO\"        EKG, Pathology, and Other Studies: I have personally reviewed pertinent reports.       Harvinder Velasco M.D.  [facility]  "

## 2024-10-29 NOTE — TELEPHONE ENCOUNTER
Pt calls in and states he has a visit this morning but does not have insurance and would like to know what amount will be due at the time of visit. Informed him at the time of the visit will be $30. Pt verbalizes understanding and gives thanks

## 2024-10-31 ENCOUNTER — HOSPITAL ENCOUNTER (EMERGENCY)
Facility: HOSPITAL | Age: 34
Discharge: HOME | End: 2024-10-31
Attending: EMERGENCY MEDICINE

## 2024-10-31 ENCOUNTER — APPOINTMENT (EMERGENCY)
Dept: RADIOLOGY | Facility: HOSPITAL | Age: 34
End: 2024-10-31

## 2024-10-31 VITALS
DIASTOLIC BLOOD PRESSURE: 75 MMHG | RESPIRATION RATE: 18 BRPM | HEART RATE: 96 BPM | HEIGHT: 75 IN | OXYGEN SATURATION: 100 % | TEMPERATURE: 97.7 F | SYSTOLIC BLOOD PRESSURE: 147 MMHG | BODY MASS INDEX: 24.87 KG/M2 | WEIGHT: 200 LBS

## 2024-10-31 DIAGNOSIS — G47.00 INSOMNIA: ICD-10-CM

## 2024-10-31 DIAGNOSIS — R07.89 CHEST TIGHTNESS: Primary | ICD-10-CM

## 2024-10-31 DIAGNOSIS — F41.9 ANXIETY: ICD-10-CM

## 2024-10-31 LAB
ALBUMIN SERPL BCG-MCNC: 4.5 G/DL (ref 3.5–5)
ALP SERPL-CCNC: 57 U/L (ref 34–104)
ALT SERPL W P-5'-P-CCNC: 15 U/L (ref 7–52)
ANION GAP SERPL CALCULATED.3IONS-SCNC: 7 MMOL/L (ref 4–13)
AST SERPL W P-5'-P-CCNC: 13 U/L (ref 13–39)
BASOPHILS # BLD AUTO: 0.03 THOUSANDS/ΜL (ref 0–0.1)
BASOPHILS NFR BLD AUTO: 0 % (ref 0–1)
BILIRUB DIRECT SERPL-MCNC: 0.04 MG/DL (ref 0–0.2)
BILIRUB SERPL-MCNC: 0.44 MG/DL (ref 0.2–1)
BNP SERPL-MCNC: 6 PG/ML (ref 0–100)
BUN SERPL-MCNC: 25 MG/DL (ref 5–25)
CALCIUM SERPL-MCNC: 9.2 MG/DL (ref 8.4–10.2)
CARDIAC TROPONIN I PNL SERPL HS: <2 NG/L
CHLORIDE SERPL-SCNC: 105 MMOL/L (ref 96–108)
CO2 SERPL-SCNC: 26 MMOL/L (ref 21–32)
CREAT SERPL-MCNC: 1.02 MG/DL (ref 0.6–1.3)
EOSINOPHIL # BLD AUTO: 0.11 THOUSAND/ΜL (ref 0–0.61)
EOSINOPHIL NFR BLD AUTO: 1 % (ref 0–6)
ERYTHROCYTE [DISTWIDTH] IN BLOOD BY AUTOMATED COUNT: 13.2 % (ref 11.6–15.1)
GFR SERPL CREATININE-BSD FRML MDRD: 94 ML/MIN/1.73SQ M
GLUCOSE SERPL-MCNC: 131 MG/DL (ref 65–140)
HCT VFR BLD AUTO: 44.4 % (ref 36.5–49.3)
HGB BLD-MCNC: 14.7 G/DL (ref 12–17)
IMM GRANULOCYTES # BLD AUTO: 0.03 THOUSAND/UL (ref 0–0.2)
IMM GRANULOCYTES NFR BLD AUTO: 0 % (ref 0–2)
LYMPHOCYTES # BLD AUTO: 2.04 THOUSANDS/ΜL (ref 0.6–4.47)
LYMPHOCYTES NFR BLD AUTO: 26 % (ref 14–44)
MAGNESIUM SERPL-MCNC: 2 MG/DL (ref 1.9–2.7)
MCH RBC QN AUTO: 29.2 PG (ref 26.8–34.3)
MCHC RBC AUTO-ENTMCNC: 33.1 G/DL (ref 31.4–37.4)
MCV RBC AUTO: 88 FL (ref 82–98)
MONOCYTES # BLD AUTO: 0.68 THOUSAND/ΜL (ref 0.17–1.22)
MONOCYTES NFR BLD AUTO: 9 % (ref 4–12)
NEUTROPHILS # BLD AUTO: 4.96 THOUSANDS/ΜL (ref 1.85–7.62)
NEUTS SEG NFR BLD AUTO: 64 % (ref 43–75)
NRBC BLD AUTO-RTO: 0 /100 WBCS
PLATELET # BLD AUTO: 192 THOUSANDS/UL (ref 149–390)
PMV BLD AUTO: 10.5 FL (ref 8.9–12.7)
POTASSIUM SERPL-SCNC: 3.6 MMOL/L (ref 3.5–5.3)
PROT SERPL-MCNC: 7.1 G/DL (ref 6.4–8.4)
RBC # BLD AUTO: 5.03 MILLION/UL (ref 3.88–5.62)
SODIUM SERPL-SCNC: 138 MMOL/L (ref 135–147)
WBC # BLD AUTO: 7.85 THOUSAND/UL (ref 4.31–10.16)

## 2024-10-31 PROCEDURE — 71046 X-RAY EXAM CHEST 2 VIEWS: CPT

## 2024-10-31 PROCEDURE — 96360 HYDRATION IV INFUSION INIT: CPT

## 2024-10-31 PROCEDURE — 84484 ASSAY OF TROPONIN QUANT: CPT | Performed by: EMERGENCY MEDICINE

## 2024-10-31 PROCEDURE — 99284 EMERGENCY DEPT VISIT MOD MDM: CPT

## 2024-10-31 PROCEDURE — 83735 ASSAY OF MAGNESIUM: CPT | Performed by: EMERGENCY MEDICINE

## 2024-10-31 PROCEDURE — 93005 ELECTROCARDIOGRAM TRACING: CPT

## 2024-10-31 PROCEDURE — 85025 COMPLETE CBC W/AUTO DIFF WBC: CPT | Performed by: EMERGENCY MEDICINE

## 2024-10-31 PROCEDURE — 80076 HEPATIC FUNCTION PANEL: CPT | Performed by: EMERGENCY MEDICINE

## 2024-10-31 PROCEDURE — 80048 BASIC METABOLIC PNL TOTAL CA: CPT | Performed by: EMERGENCY MEDICINE

## 2024-10-31 PROCEDURE — 36415 COLL VENOUS BLD VENIPUNCTURE: CPT | Performed by: EMERGENCY MEDICINE

## 2024-10-31 PROCEDURE — 83880 ASSAY OF NATRIURETIC PEPTIDE: CPT | Performed by: EMERGENCY MEDICINE

## 2024-10-31 PROCEDURE — 94644 CONT INHLJ TX 1ST HOUR: CPT

## 2024-10-31 PROCEDURE — 99285 EMERGENCY DEPT VISIT HI MDM: CPT | Performed by: EMERGENCY MEDICINE

## 2024-10-31 RX ORDER — DEXAMETHASONE 4 MG/1
10 TABLET ORAL ONCE
Status: COMPLETED | OUTPATIENT
Start: 2024-10-31 | End: 2024-10-31

## 2024-10-31 RX ORDER — PHENOL 1.4 %
10 AEROSOL, SPRAY (ML) MUCOUS MEMBRANE
Qty: 20 TABLET | Refills: 0 | Status: SHIPPED | OUTPATIENT
Start: 2024-10-31

## 2024-10-31 RX ORDER — IBUPROFEN 400 MG/1
400 TABLET, FILM COATED ORAL ONCE
Status: COMPLETED | OUTPATIENT
Start: 2024-10-31 | End: 2024-10-31

## 2024-10-31 RX ORDER — HYDROXYZINE HYDROCHLORIDE 25 MG/1
TABLET, FILM COATED ORAL
Qty: 30 TABLET | Refills: 0 | Status: SHIPPED | OUTPATIENT
Start: 2024-10-31

## 2024-10-31 RX ORDER — HYDROXYZINE HYDROCHLORIDE 25 MG/1
25 TABLET, FILM COATED ORAL ONCE
Status: COMPLETED | OUTPATIENT
Start: 2024-10-31 | End: 2024-10-31

## 2024-10-31 RX ORDER — ACETAMINOPHEN 325 MG/1
650 TABLET ORAL ONCE
Status: COMPLETED | OUTPATIENT
Start: 2024-10-31 | End: 2024-10-31

## 2024-10-31 RX ORDER — ALBUTEROL SULFATE 5 MG/ML
10 SOLUTION RESPIRATORY (INHALATION) ONCE
Status: COMPLETED | OUTPATIENT
Start: 2024-10-31 | End: 2024-10-31

## 2024-10-31 RX ORDER — SODIUM CHLORIDE FOR INHALATION 0.9 %
12 VIAL, NEBULIZER (ML) INHALATION ONCE
Status: COMPLETED | OUTPATIENT
Start: 2024-10-31 | End: 2024-10-31

## 2024-10-31 RX ORDER — HYDROXYZINE HYDROCHLORIDE 25 MG/1
50 TABLET, FILM COATED ORAL ONCE
Status: COMPLETED | OUTPATIENT
Start: 2024-10-31 | End: 2024-10-31

## 2024-10-31 RX ADMIN — ACETAMINOPHEN 650 MG: 325 TABLET ORAL at 17:43

## 2024-10-31 RX ADMIN — SODIUM CHLORIDE 500 ML: 0.9 INJECTION, SOLUTION INTRAVENOUS at 16:40

## 2024-10-31 RX ADMIN — ALBUTEROL SULFATE 10 MG: 2.5 SOLUTION RESPIRATORY (INHALATION) at 16:40

## 2024-10-31 RX ADMIN — IBUPROFEN 400 MG: 400 TABLET, FILM COATED ORAL at 17:43

## 2024-10-31 RX ADMIN — ISODIUM CHLORIDE 12 ML: 0.03 SOLUTION RESPIRATORY (INHALATION) at 16:40

## 2024-10-31 RX ADMIN — DEXAMETHASONE 10 MG: 4 TABLET ORAL at 16:39

## 2024-10-31 RX ADMIN — IPRATROPIUM BROMIDE 1 MG: 0.5 SOLUTION RESPIRATORY (INHALATION) at 16:40

## 2024-10-31 RX ADMIN — HYDROXYZINE HYDROCHLORIDE 50 MG: 25 TABLET ORAL at 18:57

## 2024-10-31 RX ADMIN — HYDROXYZINE HYDROCHLORIDE 25 MG: 25 TABLET ORAL at 16:39

## 2024-10-31 NOTE — ED PROVIDER NOTES
Time reflects when diagnosis was documented in both MDM as applicable and the Disposition within this note       Time User Action Codes Description Comment    10/31/2024  6:57 PM Sun Smith Add [R07.89] Chest tightness     10/31/2024  6:57 PM Sun Smith Add [F41.9] Anxiety     10/31/2024  6:57 PM Sun Smith Add [G47.00] Insomnia           ED Disposition       ED Disposition   Discharge    Condition   Stable    Date/Time   Thu Oct 31, 2024  6:52 PM    Comment   Khalif Hood discharge to home/self care.                   Assessment & Plan       Medical Decision Making  34-year-old male presents to the ED for the fourth time within the past 3 weeks for chest tightness and difficulty breathing.  Patient has history of asthma and recently saw a pulmonologist who confirmed asthma and started him on fluticasone/salmeterol 250-50 1 puff BID.  Given his smoking history, COPD also considered but PFTs were deferred.  He has had negative workups in the ED thus far however will repeat EKG and cardiac labs.  Will obtain chest x-ray to rule out pneumonia.  Will provide hour-long albuterol treatment.  Patient had severe insomnia from prior prednisone.  He is agreeable to trying a one-time dose of Decadron.  Will give hydroxyzine for anxiety and likely give prescription for hydroxyzine.  Also recommended he start taking melatonin to aid with sleep.    Amount and/or Complexity of Data Reviewed  Labs: ordered. Decision-making details documented in ED Course.  Radiology: ordered and independent interpretation performed. Decision-making details documented in ED Course.  ECG/medicine tests: ordered and independent interpretation performed. Decision-making details documented in ED Course.    Risk  OTC drugs.  Prescription drug management.        ED Course as of 10/31/24 1900   Thu Oct 31, 2024   1856 Updated patient about workup being normal and reexamined patient after neb treatment.  Patient does have improved air  movement.  No audible wheezing or rhonchi.  He states he does feel less tight but still feeling anxious and jittery which I suspect is related to the albuterol and underlying anxiety in addition to weaning off his methadone and nicotine.  He did request a dose of the anxiety medicine to take home with so he can take it before bed.  Will prescribe hydroxyzine and also recommended he take melatonin to aid with sleep.  Recommended continued follow up with his pulmonologist as well as his PCP.  Discussed ED return parameters.       Medications   sodium chloride 0.9 % bolus 500 mL (0 mL Intravenous Stopped 10/31/24 1740)   hydrOXYzine HCL (ATARAX) tablet 25 mg (25 mg Oral Given 10/31/24 1639)   dexamethasone (DECADRON) tablet 10 mg (10 mg Oral Given 10/31/24 1639)   albuterol inhalation solution 10 mg (10 mg Nebulization Given 10/31/24 1640)   ipratropium (ATROVENT) 0.02 % inhalation solution 1 mg (1 mg Nebulization Given 10/31/24 1640)   sodium chloride 0.9 % inhalation solution 12 mL (12 mL Nebulization Given 10/31/24 1640)   acetaminophen (TYLENOL) tablet 650 mg (650 mg Oral Given 10/31/24 1743)   ibuprofen (MOTRIN) tablet 400 mg (400 mg Oral Given 10/31/24 1743)   hydrOXYzine HCL (ATARAX) tablet 50 mg (50 mg Oral Given 10/31/24 1857)       ED Risk Strat Scores   HEART Risk Score      Flowsheet Row Most Recent Value   Heart Score Risk Calculator    History 0 Filed at: 10/31/2024 1859   ECG 0 Filed at: 10/31/2024 1859   Age 0 Filed at: 10/31/2024 1859   Risk Factors 1 Filed at: 10/31/2024 1859   Troponin 0 Filed at: 10/31/2024 1859   HEART Score 1 Filed at: 10/31/2024 1859                               SBIRT 22yo+      Flowsheet Row Most Recent Value   Initial Alcohol Screen: US AUDIT-C     1. How often do you have a drink containing alcohol? 0 Filed at: 10/31/2024 1458   2. How many drinks containing alcohol do you have on a typical day you are drinking?  0 Filed at: 10/31/2024 1458   3a. Male UNDER 65: How often do  you have five or more drinks on one occasion? 0 Filed at: 10/31/2024 1458   Audit-C Score 0 Filed at: 10/31/2024 6889   ABIODUN: How many times in the past year have you...    Used an illegal drug or used a prescription medication for non-medical reasons? Never Filed at: 10/31/2024 1459                            History of Present Illness       Chief Complaint   Patient presents with    Asthma     Patient states recently diagnosed with asthma, c/o asthma attack earlier today. Tried neb treatments with no relief.        Past Medical History:   Diagnosis Date    Asthma     as a child      Past Surgical History:   Procedure Laterality Date    INCISION AND DRAINAGE OF WOUND Left 06/28/2021    Procedure: INCISION AND DRAINAGE (I&D) LEFT SUBMANDIBULAR SPACE INFECTION, LEFT  SPACE INFECTION WITH EXTRACTION OF TOOTH #19;  Surgeon: Fabi Hood DMD;  Location:  MAIN OR;  Service: Maxillofacial    ORIF TIBIA & FIBULA FRACTURES Right     AR ARTHRS AIDED ANT CRUCIATE LIGM RPR/AGMNTJ/RCNSTJ Right 1/17/2024    Procedure: Right - knee arthroscopy Lateral meniscectomy Synovectomy ACL reconstruction with allograft using the Arthrex graft link system Post arthroscopic injection of intra-articular joint space and peripheral portals;  Surgeon: Harvinder Ga DO;  Location: [facility];  Service: Orthopedics    AR ARTHRS KNE SURG W/MENISCECTOMY MED/LAT W/SHVG Right 1/10/2023    Procedure: ARTHROSCOPY KNEE WITH LATERAL MENISCECTOMY;  Surgeon: Harvinder Ga DO;  Location: [facility];  Service: Orthopedics      History reviewed. No pertinent family history.   Social History     Tobacco Use    Smoking status: Former     Current packs/day: 0.50     Types: Cigarettes    Smokeless tobacco: Never   Vaping Use    Vaping status: Former    Start date: 1/1/2023   Substance Use Topics    Alcohol use: Never    Drug use: Yes     Frequency: 7.0 times per week     Types: Marijuana     Comment: daily      E-Cigarette/Vaping     E-Cigarette Use Former User     Start Date 1/1/23     Cartridges/Day 0.5       E-Cigarette/Vaping Substances      I have reviewed and agree with the history as documented.     Patient is a 34-year-old male with past medical history of childhood asthma and recent diagnosis of adult asthma within the past 1 month, presents to the emergency department for chest tightness and difficulty breathing.  Patient states that this all started about 3 weeks ago.  He states he noticed his chest was getting tight and it was more difficult for him to breathe.  He went to [facility] on 10/7 and was diagnosed with bronchospasm.  He had normal cardiac workup at that time.  He had returned on 10/21 for worsening symptoms and at that time was placed on prednisone 40 mg for 5 days.  He admits that the prednisone was causing insomnia.  He states he has followed up with a pulmonologist who diagnosed him with asthma and started him on fluticasone/salmeterol 250-50 1 puff BID.  PFTs were deferred given lack of insurance so unclear if patient does have COPD.  He states he has since quit smoking cigarettes since this started and he is trying to cut back on smoking marijuana.  He is also cutting down on methadone which he was placed on for history of drug abuse.  He states he has been weaning the methadone down and now is on 20 mg daily.  He came back to the ED, this time at [facility] on 10/25 for worsening symptoms.  He was given Solu-Medrol in the ED but was not sent home on any further steroids.  He was sent home with a Z-Doe which she completed but states it did not help.  Over the past couple of days he has noticed increased chest tightness despite using his albuterol nebulizer treatments.  He also admits to poor sleep, increased anxiety and shakiness.  He has had a productive cough over the past couple of weeks.  He denies any new fevers, runny nose or congestion, sore throat, headache, dizziness or near syncope,  palpitations, abdominal pain, nausea, vomiting, change in bowel habits, urinary symptoms, skin rash or color change, leg pain or swelling, focal neurologic deficits.  Patient was worked up in the ED each time with cardiac labs as well as D-dimer which was normal.      History provided by:  Patient and medical records   used: No    Asthma  Associated symptoms: cough and shortness of breath    Associated symptoms: no abdominal pain, no chest pain, no congestion, no diarrhea, no ear pain, no fever, no headaches, no nausea, no rash, no rhinorrhea, no sore throat, no vomiting and no wheezing        Review of Systems   Constitutional:  Negative for chills and fever.   HENT:  Negative for congestion, ear pain, rhinorrhea and sore throat.    Respiratory:  Positive for cough, chest tightness and shortness of breath. Negative for wheezing.    Cardiovascular:  Negative for chest pain, palpitations and leg swelling.   Gastrointestinal:  Negative for abdominal pain, constipation, diarrhea, nausea and vomiting.   Genitourinary:  Negative for dysuria, flank pain, frequency and hematuria.   Musculoskeletal:  Negative for back pain, neck pain and neck stiffness.   Skin:  Negative for color change, pallor, rash and wound.   Allergic/Immunologic: Negative for immunocompromised state.   Neurological:  Negative for dizziness, syncope, weakness, light-headedness, numbness and headaches.   Hematological:  Negative for adenopathy. Does not bruise/bleed easily.   Psychiatric/Behavioral:  Positive for sleep disturbance. Negative for confusion and decreased concentration. The patient is nervous/anxious.    All other systems reviewed and are negative.          Objective       ED Triage Vitals   Temperature Pulse Blood Pressure Respirations SpO2 Patient Position - Orthostatic VS   10/31/24 1456 10/31/24 1456 10/31/24 1456 10/31/24 1456 10/31/24 1456 10/31/24 1456   97.7 °F (36.5 °C) 87 151/73 20 99 % Sitting      Temp Source  "Heart Rate Source BP Location FiO2 (%) Pain Score    10/31/24 1456 10/31/24 1456 10/31/24 1456 -- 10/31/24 1743    Temporal Monitor Left arm  6      Vitals      Date and Time Temp Pulse SpO2 Resp BP Pain Score FACES Pain Rating User   10/31/24 1858 -- 96 100 % 18 147/75 -- -- BH   10/31/24 1743 -- -- -- -- -- 6 -- BH   10/31/24 1545 -- 65 100 % 18 143/80 -- -- BH   10/31/24 1456 97.7 °F (36.5 °C) 87 99 % 20 151/73 -- -- RO          Vitals:    10/31/24 1456 10/31/24 1545 10/31/24 1858   BP: 151/73 143/80 147/75   BP Location: Left arm Right arm Right arm   Pulse: 87 65 96   Resp: 20 18 18   Temp: 97.7 °F (36.5 °C)     TempSrc: Temporal     SpO2: 99% 100% 100%   Weight: 90.7 kg (200 lb)     Height: 6' 3\" (1.905 m)          Physical Exam  Vitals and nursing note reviewed.   Constitutional:       General: He is not in acute distress.     Appearance: Normal appearance. He is well-developed. He is not ill-appearing, toxic-appearing or diaphoretic.   HENT:      Head: Normocephalic and atraumatic.      Right Ear: External ear normal.      Left Ear: External ear normal.      Nose: Nose normal.      Mouth/Throat:      Mouth: Mucous membranes are moist.      Pharynx: Oropharynx is clear.   Eyes:      Extraocular Movements: Extraocular movements intact.      Conjunctiva/sclera: Conjunctivae normal.   Neck:      Vascular: No JVD.   Cardiovascular:      Rate and Rhythm: Normal rate and regular rhythm.      Pulses: Normal pulses.      Heart sounds: Normal heart sounds. No murmur heard.     No friction rub. No gallop.   Pulmonary:      Effort: Pulmonary effort is normal. No respiratory distress.      Breath sounds: No wheezing, rhonchi or rales.      Comments: Decreased air movement throughout.  No audible wheezing or rhonchi.  Abdominal:      General: There is no distension.      Palpations: Abdomen is soft.      Tenderness: There is no abdominal tenderness. There is no guarding or rebound.   Musculoskeletal:         General: No " swelling or tenderness. Normal range of motion.      Cervical back: Normal range of motion and neck supple. No rigidity.   Skin:     General: Skin is warm and dry.      Coloration: Skin is not pale.      Findings: No erythema or rash.   Neurological:      General: No focal deficit present.      Mental Status: He is alert and oriented to person, place, and time.      Sensory: No sensory deficit.      Motor: No weakness.   Psychiatric:         Behavior: Behavior normal.      Comments: Patient mildly anxious.         Results Reviewed       Procedure Component Value Units Date/Time    HS Troponin 0hr (reflex protocol) [[id number]]  (Normal) Collected: 10/31/24 1627    Lab Status: Final result Specimen: Blood from Arm, Left Updated: 10/31/24 1655     hs TnI 0hr <2 ng/L     B-Type Natriuretic Peptide(BNP) [[id number]]  (Normal) Collected: 10/31/24 1627    Lab Status: Final result Specimen: Blood from Arm, Left Updated: 10/31/24 1654     BNP 6 pg/mL     Basic metabolic panel [[id number]] Collected: 10/31/24 1627    Lab Status: Final result Specimen: Blood from Arm, Left Updated: 10/31/24 1648     Sodium 138 mmol/L      Potassium 3.6 mmol/L      Chloride 105 mmol/L      CO2 26 mmol/L      ANION GAP 7 mmol/L      BUN 25 mg/dL      Creatinine 1.02 mg/dL      Glucose 131 mg/dL      Calcium 9.2 mg/dL      eGFR 94 ml/min/1.73sq m     Narrative:      National Kid[employer] Disease Foundation guidelines for Chronic Kidney Disease (CKD):     Stage 1 with normal or high GFR (GFR > 90 mL/min/1.73 square meters)    Stage 2 Mild CKD (GFR = 60-89 mL/min/1.73 square meters)    Stage 3A Moderate CKD (GFR = 45-59 mL/min/1.73 square meters)    Stage 3B Moderate CKD (GFR = 30-44 mL/min/1.73 square meters)    Stage 4 Severe CKD (GFR = 15-29 mL/min/1.73 square meters)    Stage 5 End Stage CKD (GFR <15 mL/min/1.73 square meters)  Note: GFR calculation is accurate only with a steady state creatinine    Hepatic function panel [[id number]]  (Normal)  Collected: 10/31/24 1627    Lab Status: Final result Specimen: Blood from Arm, Left Updated: 10/31/24 1648     Total Bilirubin 0.44 mg/dL      Bilirubin, Direct 0.04 mg/dL      Alkaline Phosphatase 57 U/L      AST 13 U/L      ALT 15 U/L      Total Protein 7.1 g/dL      Albumin 4.5 g/dL     Magnesium [[id number]]  (Normal) Collected: 10/31/24 1627    Lab Status: Final result Specimen: Blood from Arm, Left Updated: 10/31/24 1648     Magnesium 2.0 mg/dL     CBC and differential [[id number]] Collected: 10/31/24 1627    Lab Status: Final result Specimen: Blood from Arm, Left Updated: 10/31/24 1632     WBC 7.85 Thousand/uL      RBC 5.03 Million/uL      Hemoglobin 14.7 g/dL      Hematocrit 44.4 %      MCV 88 fL      MCH 29.2 pg      MCHC 33.1 g/dL      RDW 13.2 %      MPV 10.5 fL      Platelets 192 Thousands/uL      nRBC 0 /100 WBCs      Segmented % 64 %      Immature Grans % 0 %      Lymphocytes % 26 %      Monocytes % 9 %      Eosinophils Relative 1 %      Basophils Relative 0 %      Absolute Neutrophils 4.96 Thousands/µL      Absolute Immature Grans 0.03 Thousand/uL      Absolute Lymphocytes 2.04 Thousands/µL      Absolute Monocytes 0.68 Thousand/µL      Eosinophils Absolute 0.11 Thousand/µL      Basophils Absolute 0.03 Thousands/µL             XR chest 2 views   ED Interpretation by Sun Smith DO (10/31 1652)   No acute abnormality in the chest.          ECG 12 Lead Documentation Only    Date/Time: 10/31/2024 4:49 PM    Performed by: Sun Smith DO  Authorized by: Sun Smith DO    ECG reviewed by me, the ED Provider: yes    Patient location:  ED  Previous ECG:     Previous ECG:  Compared to current    Comparison ECG info:  10-  Rate:     ECG rate:  66    ECG rate assessment: normal    Rhythm:     Rhythm: sinus rhythm    Ectopy:     Ectopy: none    QRS:     QRS axis:  Normal    QRS intervals:  Normal  Conduction:     Conduction: normal    ST segments:     ST segments:   Normal  T waves:     T waves: normal        ED Medication and Procedure Management   Prior to Admission Medications   Prescriptions Last Dose Informant Patient Reported? Taking?   Fluticasone-Salmeterol (Advair) 250-50 mcg/dose inhaler   No No   Sig: Inhale 1 puff 2 (two) times a day Rinse mouth after use.   Omega-3 Fatty Acids (fish oil) 1,000 mg   Yes No   Sig: Take 1,000 mg by mouth daily   albuterol (2.5 mg/3 mL) 0.083 % nebulizer solution   No No   Sig: Take 3 mL (2.5 mg total) by nebulization every 6 (six) hours as needed for wheezing or shortness of breath   albuterol (Proventil HFA) 90 mcg/act inhaler   No No   Sig: Inhale 1-2 puffs every 6 (six) hours as needed for wheezing   ascorbic acid (VITAMIN C) 250 mg tablet   Yes No   Sig: Take 250 mg by mouth daily   Patient not taking: Reported on 4/29/2024   aspirin 325 mg tablet   No No   Sig: Take 1 tablet (325 mg total) by mouth daily   Patient not taking: Reported on 4/29/2024   cholecalciferol (VITAMIN D3) 400 units tablet   Yes No   Sig: Take 400 Units by mouth daily   levalbuterol (Xopenex) 1.25 mg/3 mL nebulizer solution   No No   Sig: Take 3 mL (1.25 mg total) by nebulization 3 (three) times a day   Patient not taking: Reported on 10/29/2024   meloxicam (MOBIC) 15 mg tablet   No No   Sig: Take 1 tablet (15 mg total) by mouth daily   Patient not taking: Reported on 4/29/2024   methadone (DOLOPHINE) 10 mg/mL oral concentrated solution   Yes No   Sig: Take 25 mg by mouth every morning   vitamin E 100 UNIT capsule   Yes No   Sig: Take 100 Units by mouth daily      Facility-Administered Medications: None     Patient's Medications   Discharge Prescriptions    HYDROXYZINE HCL (ATARAX) 25 MG TABLET    Take 1 to 2 tablets by mouth every 6-8 hours as needed for anxiety.       Start Date: 10/31/2024End Date: --       Order Dose: --       Quantity: 30 tablet    Refills: 0    MELATONIN 10 MG TABS    Take 1 tablet (10 mg total) by mouth daily at bedtime as needed  (insomnia)       Start Date: 10/31/2024End Date: --       Order Dose: 10 mg       Quantity: 20 tablet    Refills: 0     No discharge procedures on file.  ED SEPSIS DOCUMENTATION   Time reflects when diagnosis was documented in both MDM as applicable and the Disposition within this note       Time User Action Codes Description Comment    10/31/2024  6:57 PM Sun Smith [R07.89] Chest tightness     10/31/2024  6:57 PM Sun Smith [F41.9] Anxiety     10/31/2024  6:57 PM Sun Smith [G47.00] Insomnia                  Sun Smith DO  10/31/24 1900

## 2024-11-01 LAB
ATRIAL RATE: 66 BPM
P AXIS: 75 DEGREES
PR INTERVAL: 136 MS
QRS AXIS: 69 DEGREES
QRSD INTERVAL: 94 MS
QT INTERVAL: 374 MS
QTC INTERVAL: 392 MS
T WAVE AXIS: 65 DEGREES
VENTRICULAR RATE: 66 BPM

## 2024-11-01 PROCEDURE — 93010 ELECTROCARDIOGRAM REPORT: CPT | Performed by: STUDENT IN AN ORGANIZED HEALTH CARE EDUCATION/TRAINING PROGRAM

## 2024-11-12 ENCOUNTER — TELEPHONE (OUTPATIENT)
Age: 34
End: 2024-11-12

## 2024-11-12 NOTE — PROGRESS NOTES
Pulmonary Follow Up Note  Khalif Hood 34 y.o. male MRN: [id number]  11/13/2024    Assessment/Plan:    Uncontrolled asthma  -Started on Advair 250-50 2 weeks ago, still experiencing frequent symptoms of chest tightness and shortness of breath.  No significant cough or wheezing.  His lungs are clear on exam today, in no acute distress and does not appear to be in acute exacerbation  -Reviewed recent chest x-ray from ED visit, clear lungs  -No prior PFTs, holding off until patient obtains insurance- he has appt tomorrow to sign up for state insurance  -Low eosinophils on recent blood work  -Patient has not smoked cigarettes or THC for at least 3 weeks now.  -Recently prescribed hydroxyzine for anxiety  -Patient confirms symptoms of acid reflux    Plan:  -Will escalate inhaler therapy to Trelegy 200.  Sample provided and instructed on use.  Instructed patient not to use more than once per day.  Also provided patient with [employer] assistance application to see if he qualifies for inhaler coverage  -Continue Xopenex nebs/albuterol HFA every 6 hours as needed  -Continue to refrain from smoking  -Encouraged patient use hydroxyzine as needed for anxiety, as this may be contributing to his symptoms  -Instructed patient to take Pepcid twice daily for GERD  -Follow-up in 6 weeks as scheduled    Cigarette nicotine dependence in remission  -Patient reports remission x 1 month.  Encourage patient to continue refrain from smoking    Gastroesophageal reflux disease  -Patient reports symptoms of acid reflux.  He has been taking OTC Pepcid as needed  -Encouraged patient to start taking Pepcid regularly twice daily to see if this helps improve symptoms        Diagnoses and all orders for this visit:    Uncontrolled asthma  -     fluticasone-umeclidinium-vilanterol (Trelegy Ellipta) 200-62.5-25 mcg/actuation AEPB inhaler; Inhale 1 puff daily Rinse mouth after use.    Cigarette nicotine dependence in remission  -      fluticasone-umeclidinium-vilanterol (Trelegy Ellipta) 200-62.5-25 mcg/actuation AEPB inhaler; Inhale 1 puff daily Rinse mouth after use.    Gastroesophageal reflux disease, unspecified whether esophagitis present        No follow-ups on file.      History of Present Illness     Chief Complaint:   Chief Complaint   Patient presents with    Follow-up       Patient ID: Khalif is a 34 y.o. y.o. male has a past medical history of Asthma.      11/13/2024  HPI: Khalif Hood is a 34 y.o. male with history of childhood asthma, tobacco, and THC use who is here today for an urgent follow-up visit.  He called the office yesterday reporting an asthma flareup 2 days ago.  States the inhaler he was given is not helping, and thinks it is even worsening his symptoms.      He was recently seen in the office 2 weeks ago with Dr. Velasco as a new patient consult for uncontrolled asthma.  He has been to the ED several times in the past month.  At his last visit, he was prescribed Advair 250-50 with good Rx coupon and maintained on albuterol as needed.  PFTs not ordered due to no medical insurance from the patient.    Since his last office visit, he was seen in the ED again for chest tightness and difficulty breathing.  He had a normal chest x-ray and workup.  Treated with hour-long albuterol treatment and dose of Decadron.  Provided prescription for hydroxyzine for anxiety and maintained on Advair 2 50-50.    Patient returns today, reports he had a bad asthma attack 2 days ago.  No known triggering event.  He had increase in shortness of breath and chest tightness with frequent use of rescue inhaler/nebs.  He even tried using the Advair up to 4 times per day without relief.  Still has chest tightness and shortness of breath.  Reports his lungs hurt mostly when he wakes up.  No cough or wheezing.  No fevers or chills.  Patient has not smoked cigarettes or THC for the past few weeks at least.  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  Does not have any pets at home.  Denies any allergy symptoms.  Patient does confirm acid reflux symptoms especially if he eats right before bedtime.  He takes Pepcid PRN.  Patient tells me he has an appointment tomorrow to possibly sign out for state insurance if he qualifies.          Review of Systems   Constitutional:  Negative for activity change, chills, fever and unexpected weight change.   HENT:  Negative for congestion, postnasal drip, rhinorrhea, sore throat and trouble swallowing.    Respiratory:  Positive for chest tightness and shortness of breath. Negative for cough and wheezing.    Cardiovascular:  Negative for chest pain, palpitations and leg swelling.   Allergic/Immunologic: Negative.        Historical Information   Past Medical History:   Diagnosis Date    Asthma     as a child     Past Surgical History:   Procedure Laterality Date    INCISION AND DRAINAGE OF WOUND Left 06/28/2021    Procedure: INCISION AND DRAINAGE (I&D) LEFT SUBMANDIBULAR SPACE INFECTION, LEFT  SPACE INFECTION WITH EXTRACTION OF TOOTH #19;  Surgeon: Fabi Hood DMD;  Location:  MAIN OR;  Service: Maxillofacial    ORIF TIBIA & FIBULA FRACTURES Right     [facility] ARTHRS AIDED ANT CRUCIATE LIGM RPR/AGMNTJ/RCNSTJ Right 1/17/2024    Procedure: Right - knee arthroscopy Lateral meniscectomy Synovectomy ACL reconstruction with allograft using the Arthrex graft link system Post arthroscopic injection of intra-articular joint space and peripheral portals;  Surgeon: Harvinder Ga DO;  Location: [facility];  Service: Orthopedics    [facility] ARTHRS KNE SURG W/MENISCECTOMY MED/LAT W/SHVG Right 1/10/2023    Procedure: ARTHROSCOPY KNEE WITH LATERAL MENISCECTOMY;  Surgeon: Harvinder Ga DO;  Location: [facility];  Service: Orthopedics     History reviewed. No pertinent family history.    Smoking history: He reports that he has quit smoking. His smoking use included cigarettes. He has never used smokeless  tobacco.    Occupational History: Works for [employer]      There is no immunization history on file for this patient.    Meds/Allergies     Current Outpatient Medications:     albuterol (Proventil HFA) 90 mcg/act inhaler, Inhale 1-2 puffs every 6 (six) hours as needed for wheezing, Disp: 18 g, Rfl: 2    cholecalciferol (VITAMIN D3) 400 units tablet, Take 400 Units by mouth daily, Disp: , Rfl:     fluticasone-umeclidinium-vilanterol (Trelegy Ellipta) 200-62.5-25 mcg/actuation AEPB inhaler, Inhale 1 puff daily Rinse mouth after use., Disp: 60 blister, Rfl: 0    hydrOXYzine HCL (ATARAX) 25 mg tablet, Take 1 to 2 tablets by mouth every 6-8 hours as needed for anxiety., Disp: 30 tablet, Rfl: 0    levalbuterol (Xopenex) 1.25 mg/3 mL nebulizer solution, Take 3 mL (1.25 mg total) by nebulization 3 (three) times a day, Disp: 270 mL, Rfl: 0    Melatonin 10 MG TABS, Take 1 tablet (10 mg total) by mouth daily at bedtime as needed (insomnia), Disp: 20 tablet, Rfl: 0    methadone (DOLOPHINE) 10 mg/mL oral concentrated solution, Take 25 mg by mouth every morning (Patient taking differently: Take 20 mg by mouth every morning), Disp: , Rfl:     Omega-3 Fatty Acids (fish oil) 1,000 mg, Take 1,000 mg by mouth daily, Disp: , Rfl:     vitamin E 100 UNIT capsule, Take 100 Units by mouth daily, Disp: , Rfl:     ascorbic acid (VITAMIN C) 250 mg tablet, Take 250 mg by mouth daily (Patient not taking: Reported on 4/29/2024), Disp: , Rfl:     aspirin 325 mg tablet, Take 1 tablet (325 mg total) by mouth daily (Patient not taking: Reported on 4/29/2024), Disp: 30 tablet, Rfl: 0    meloxicam (MOBIC) 15 mg tablet, Take 1 tablet (15 mg total) by mouth daily (Patient not taking: Reported on 4/29/2024), Disp: 30 tablet, Rfl: 0    Allergies: No Known Allergies      Vitals:  Vitals:    11/13/24 0804   BP: 110/60   BP Location: Right arm   Patient Position: Sitting   Cuff Size: Standard   Pulse: 62   Resp: 18   Temp: 98.2 °F (36.8 °C)   TempSrc:  "Temporal   SpO2: 97%   Weight: 89.4 kg (197 lb)   Height: 6' 3\" (1.905 m)   Oxygen Therapy  SpO2: 97 %  .  Wt Readings from Last 3 Encounters:   11/13/24 89.4 kg (197 lb)   10/31/24 90.7 kg (200 lb)   10/29/24 90.3 kg (199 lb)     Body mass index is 24.62 kg/m².    Physical Exam  Vitals and nursing note reviewed.   Constitutional:       General: He is not in acute distress.     Appearance: Normal appearance. He is well-developed.   Cardiovascular:      Rate and Rhythm: Normal rate and regular rhythm.      Heart sounds: Normal heart sounds. No murmur heard.  Pulmonary:      Effort: Pulmonary effort is normal. No respiratory distress.      Breath sounds: Normal breath sounds. No decreased breath sounds, wheezing, rhonchi or rales.   Musculoskeletal:         General: No swelling.      Right lower leg: No edema.      Left lower leg: No edema.   Psychiatric:         Mood and Affect: Mood and affect normal.         Behavior: Behavior normal. Behavior is cooperative.           Labs: I have personally reviewed pertinent lab results.  Lab Results   Component Value Date    WBC 7.85 10/31/2024    HGB 14.7 10/31/2024    HCT 44.4 10/31/2024    MCV 88 10/31/2024     10/31/2024     Lab Results   Component Value Date    CALCIUM 9.2 10/31/2024    K 3.6 10/31/2024    CO2 26 10/31/2024     10/31/2024    BUN 25 10/31/2024    CREATININE 1.02 10/31/2024     No results found for: \"IGE\"  Lab Results   Component Value Date    ALT 15 10/31/2024    AST 13 10/31/2024    ALKPHOS 57 10/31/2024       Studies:    Imaging and other studies: I have personally reviewed pertinent reports and I have personally reviewed pertinent films in PACS     Chest x-ray 10/31/2024  No acute cardiopulmonary disease    CT chest abdomen pelvis 10/25/2024  Chest: Lungs are clear.  No tracheal or endobronchial lesion.    EKG, Pathology, and Other Studies: I have personally reviewed pertinent reports.        Pulmonary function testing: none prior for " review

## 2024-11-12 NOTE — TELEPHONE ENCOUNTER
Pt stated Pulm Provider: Dr. Velasco    Actionable item: Scheduled for urgent appt for 11/13 with Elizabeth.     What is the reason for the call/chief complaint?    Pt was having an asthma flare up yesterday. He states that even with the medication he hasn't been getting any better. He states that he feels like his asthma has actually gotten worse with the current inhalers that he is on. He knows that there are options and different medications that might be better for him but due to his finances makes it difficult. He wasn't sure if even a sample of another medication can be given to him.       Pt at the moment doesn't have health insurance. He is going to try to get some on Thursday AM.       Priority: Moderate

## 2024-11-13 ENCOUNTER — OFFICE VISIT (OUTPATIENT)
Dept: PULMONOLOGY | Facility: CLINIC | Age: 34
End: 2024-11-13

## 2024-11-13 VITALS
HEIGHT: 75 IN | OXYGEN SATURATION: 97 % | WEIGHT: 197 LBS | RESPIRATION RATE: 18 BRPM | TEMPERATURE: 98.2 F | DIASTOLIC BLOOD PRESSURE: 60 MMHG | SYSTOLIC BLOOD PRESSURE: 110 MMHG | HEART RATE: 62 BPM | BODY MASS INDEX: 24.49 KG/M2

## 2024-11-13 DIAGNOSIS — F17.211 CIGARETTE NICOTINE DEPENDENCE IN REMISSION: ICD-10-CM

## 2024-11-13 DIAGNOSIS — K21.9 GASTROESOPHAGEAL REFLUX DISEASE, UNSPECIFIED WHETHER ESOPHAGITIS PRESENT: ICD-10-CM

## 2024-11-13 DIAGNOSIS — J45.909 UNCONTROLLED ASTHMA: Primary | ICD-10-CM

## 2024-11-13 PROCEDURE — 99214 OFFICE O/P EST MOD 30 MIN: CPT

## 2024-11-13 RX ORDER — FLUTICASONE FUROATE, UMECLIDINIUM BROMIDE AND VILANTEROL TRIFENATATE 200; 62.5; 25 UG/1; UG/1; UG/1
1 POWDER RESPIRATORY (INHALATION) DAILY
Qty: 60 BLISTER | Refills: 0 | Status: SHIPPED | COMMUNITY
Start: 2024-11-13 | End: 2024-12-13

## 2024-11-13 NOTE — ASSESSMENT & PLAN NOTE
-Started on Advair 250-50 2 weeks ago, still experiencing frequent symptoms of chest tightness and shortness of breath.  No significant cough or wheezing.  His lungs are clear on exam today, in no acute distress and does not appear to be in acute exacerbation  -Reviewed recent chest x-ray from ED visit, clear lungs  -No prior PFTs, holding off until patient obtains insurance- he has appt tomorrow to sign up for state insurance  -Low eosinophils on recent blood work  -Patient has not smoked cigarettes or THC for at least 3 weeks now.  -Recently prescribed hydroxyzine for anxiety  -Patient confirms symptoms of acid reflux    Plan:  -Will escalate inhaler therapy to Trelegy 200.  Sample provided and instructed on use.  Instructed patient not to use more than once per day.  Also provided patient with [employer] assistance application to see if he qualifies for inhaler coverage  -Continue Xopenex nebs/albuterol HFA every 6 hours as needed  -Continue to refrain from smoking  -Encouraged patient use hydroxyzine as needed for anxiety, as this may be contributing to his symptoms  -Instructed patient to take Pepcid twice daily for GERD  -Follow-up in 6 weeks as scheduled

## 2024-11-13 NOTE — ASSESSMENT & PLAN NOTE
-Patient reports symptoms of acid reflux.  He has been taking OTC Pepcid as needed  -Encouraged patient to start taking Pepcid regularly twice daily to see if this helps improve symptoms

## 2024-11-17 ENCOUNTER — APPOINTMENT (EMERGENCY)
Dept: CT IMAGING | Facility: HOSPITAL | Age: 34
End: 2024-11-17

## 2024-11-17 ENCOUNTER — HOSPITAL ENCOUNTER (EMERGENCY)
Facility: HOSPITAL | Age: 34
Discharge: HOME | End: 2024-11-17
Attending: EMERGENCY MEDICINE

## 2024-11-17 VITALS
OXYGEN SATURATION: 96 % | SYSTOLIC BLOOD PRESSURE: 127 MMHG | HEART RATE: 73 BPM | TEMPERATURE: 98.6 F | BODY MASS INDEX: 24.87 KG/M2 | WEIGHT: 200 LBS | HEIGHT: 75 IN | RESPIRATION RATE: 20 BRPM | DIASTOLIC BLOOD PRESSURE: 82 MMHG

## 2024-11-17 DIAGNOSIS — R51.9 HEADACHE: Primary | ICD-10-CM

## 2024-11-17 DIAGNOSIS — R10.32 ABDOMINAL DISCOMFORT IN LEFT LOWER QUADRANT: ICD-10-CM

## 2024-11-17 LAB
ALBUMIN SERPL BCG-MCNC: 4.5 G/DL (ref 3.5–5)
ALP SERPL-CCNC: 54 U/L (ref 34–104)
ALT SERPL W P-5'-P-CCNC: 14 U/L (ref 7–52)
ANION GAP SERPL CALCULATED.3IONS-SCNC: 6 MMOL/L (ref 4–13)
APTT PPP: 27 SECONDS (ref 23–34)
AST SERPL W P-5'-P-CCNC: 15 U/L (ref 13–39)
BASOPHILS # BLD AUTO: 0.05 THOUSANDS/ÂΜL (ref 0–0.1)
BASOPHILS NFR BLD AUTO: 1 % (ref 0–1)
BILIRUB SERPL-MCNC: 0.43 MG/DL (ref 0.2–1)
BUN SERPL-MCNC: 19 MG/DL (ref 5–25)
CALCIUM SERPL-MCNC: 9.1 MG/DL (ref 8.4–10.2)
CHLORIDE SERPL-SCNC: 106 MMOL/L (ref 96–108)
CO2 SERPL-SCNC: 25 MMOL/L (ref 21–32)
CREAT SERPL-MCNC: 0.86 MG/DL (ref 0.6–1.3)
EOSINOPHIL # BLD AUTO: 0.11 THOUSAND/ÂΜL (ref 0–0.61)
EOSINOPHIL NFR BLD AUTO: 1 % (ref 0–6)
ERYTHROCYTE [DISTWIDTH] IN BLOOD BY AUTOMATED COUNT: 12.8 % (ref 11.6–15.1)
GFR SERPL CREATININE-BSD FRML MDRD: 111 ML/MIN/1.73SQ M
GLUCOSE SERPL-MCNC: 100 MG/DL (ref 65–140)
HCT VFR BLD AUTO: 44.2 % (ref 36.5–49.3)
HGB BLD-MCNC: 14.8 G/DL (ref 12–17)
IMM GRANULOCYTES # BLD AUTO: 0.02 THOUSAND/UL (ref 0–0.2)
IMM GRANULOCYTES NFR BLD AUTO: 0 % (ref 0–2)
INR PPP: 1.02 (ref 0.85–1.19)
LYMPHOCYTES # BLD AUTO: 1.62 THOUSANDS/ÂΜL (ref 0.6–4.47)
LYMPHOCYTES NFR BLD AUTO: 18 % (ref 14–44)
MAGNESIUM SERPL-MCNC: 2.1 MG/DL (ref 1.9–2.7)
MCH RBC QN AUTO: 29.8 PG (ref 26.8–34.3)
MCHC RBC AUTO-ENTMCNC: 33.5 G/DL (ref 31.4–37.4)
MCV RBC AUTO: 89 FL (ref 82–98)
MONOCYTES # BLD AUTO: 0.73 THOUSAND/ÂΜL (ref 0.17–1.22)
MONOCYTES NFR BLD AUTO: 8 % (ref 4–12)
NEUTROPHILS # BLD AUTO: 6.39 THOUSANDS/ÂΜL (ref 1.85–7.62)
NEUTS SEG NFR BLD AUTO: 72 % (ref 43–75)
NRBC BLD AUTO-RTO: 0 /100 WBCS
PHOSPHATE SERPL-MCNC: 3 MG/DL (ref 2.7–4.5)
PLATELET # BLD AUTO: 185 THOUSANDS/UL (ref 149–390)
PMV BLD AUTO: 9.9 FL (ref 8.9–12.7)
POTASSIUM SERPL-SCNC: 4.2 MMOL/L (ref 3.5–5.3)
PROT SERPL-MCNC: 7.1 G/DL (ref 6.4–8.4)
PROTHROMBIN TIME: 13.9 SECONDS (ref 12.3–15)
RBC # BLD AUTO: 4.96 MILLION/UL (ref 3.88–5.62)
SODIUM SERPL-SCNC: 137 MMOL/L (ref 135–147)
WBC # BLD AUTO: 8.92 THOUSAND/UL (ref 4.31–10.16)

## 2024-11-17 PROCEDURE — 70450 CT HEAD/BRAIN W/O DYE: CPT

## 2024-11-17 PROCEDURE — 85610 PROTHROMBIN TIME: CPT | Performed by: EMERGENCY MEDICINE

## 2024-11-17 PROCEDURE — 96374 THER/PROPH/DIAG INJ IV PUSH: CPT

## 2024-11-17 PROCEDURE — 85730 THROMBOPLASTIN TIME PARTIAL: CPT | Performed by: EMERGENCY MEDICINE

## 2024-11-17 PROCEDURE — 74177 CT ABD & PELVIS W/CONTRAST: CPT

## 2024-11-17 PROCEDURE — 96361 HYDRATE IV INFUSION ADD-ON: CPT

## 2024-11-17 PROCEDURE — 99285 EMERGENCY DEPT VISIT HI MDM: CPT | Performed by: EMERGENCY MEDICINE

## 2024-11-17 PROCEDURE — 36415 COLL VENOUS BLD VENIPUNCTURE: CPT | Performed by: EMERGENCY MEDICINE

## 2024-11-17 PROCEDURE — 96375 TX/PRO/DX INJ NEW DRUG ADDON: CPT

## 2024-11-17 PROCEDURE — 85025 COMPLETE CBC W/AUTO DIFF WBC: CPT | Performed by: EMERGENCY MEDICINE

## 2024-11-17 PROCEDURE — 84100 ASSAY OF PHOSPHORUS: CPT | Performed by: EMERGENCY MEDICINE

## 2024-11-17 PROCEDURE — 83735 ASSAY OF MAGNESIUM: CPT | Performed by: EMERGENCY MEDICINE

## 2024-11-17 PROCEDURE — 99285 EMERGENCY DEPT VISIT HI MDM: CPT

## 2024-11-17 PROCEDURE — 80053 COMPREHEN METABOLIC PANEL: CPT | Performed by: EMERGENCY MEDICINE

## 2024-11-17 RX ORDER — PANTOPRAZOLE SODIUM 40 MG/10ML
40 INJECTION, POWDER, LYOPHILIZED, FOR SOLUTION INTRAVENOUS ONCE
Status: COMPLETED | OUTPATIENT
Start: 2024-11-17 | End: 2024-11-17

## 2024-11-17 RX ORDER — KETOROLAC TROMETHAMINE 30 MG/ML
15 INJECTION, SOLUTION INTRAMUSCULAR; INTRAVENOUS ONCE
Status: COMPLETED | OUTPATIENT
Start: 2024-11-17 | End: 2024-11-17

## 2024-11-17 RX ORDER — KETOROLAC TROMETHAMINE 10 MG/1
10 TABLET, FILM COATED ORAL EVERY 6 HOURS PRN
Qty: 10 TABLET | Refills: 0 | Status: SHIPPED | OUTPATIENT
Start: 2024-11-17

## 2024-11-17 RX ORDER — CYCLOBENZAPRINE HCL 5 MG
5 TABLET ORAL 3 TIMES DAILY PRN
Qty: 12 TABLET | Refills: 0 | Status: SHIPPED | OUTPATIENT
Start: 2024-11-17

## 2024-11-17 RX ADMIN — MORPHINE SULFATE 2 MG: 2 INJECTION, SOLUTION INTRAMUSCULAR; INTRAVENOUS at 09:48

## 2024-11-17 RX ADMIN — PANTOPRAZOLE SODIUM 40 MG: 40 INJECTION, POWDER, FOR SOLUTION INTRAVENOUS at 09:20

## 2024-11-17 RX ADMIN — KETOROLAC TROMETHAMINE 15 MG: 30 INJECTION, SOLUTION INTRAMUSCULAR at 13:04

## 2024-11-17 RX ADMIN — SODIUM CHLORIDE 1000 ML: 0.9 INJECTION, SOLUTION INTRAVENOUS at 09:16

## 2024-11-17 RX ADMIN — IOHEXOL 100 ML: 350 INJECTION, SOLUTION INTRAVENOUS at 10:13

## 2024-11-17 NOTE — ED PROVIDER NOTES
Time reflects when diagnosis was documented in both MDM as applicable and the Disposition within this note       Time User Action Codes Description Comment    11/17/2024 12:54 PM Akil Blevins [R51.9] Headache     11/17/2024 12:54 PM Akil Blevins [R10.32] Abdominal discomfort in left lower quadrant           ED Disposition       ED Disposition   Discharge    Condition   Stable    Date/Time   Sun Nov 17, 2024 12:54 PM    Comment   Khalif Hood discharge to home/self care.                   Assessment & Plan       Medical Decision Making  34-year-old male presents emergency department complaining of headaches as well as abdominal pain and dark stool.  The patient notes that he has been taking Tylenol Advil and aspirin for chronic headaches and this has been a significant problem intermittently ever since he was starting his asthma treatments.  Patient denies fever chills neck pain or trauma but then decided to come into the hospital after he talked to his ex-girlfriend who is an ER nurse who recommended him be evaluated for the potential of a GI ulcer due to overmedicating himself or headaches.  Patient neurologically is intact and has a differential diagnosis of colitis, dietary change mimicking dark stool, intracranial pathology due to an unrelated disease.  Electrolyte abnormalities, or infection.  Patient had a rectal exam done at the bedside which was negative for blood.  Patient's lab studies otherwise look reassuring at this time.  CT scan is nonacute as well as abdominal CT.  The patient's pain does appear to be somewhat related to musculoskeletal concerns of the scalp because I can reproduce much of his discomfort with palpation.  The patient was given a dose of Toradol and will be placed on this as outpatient with recommendation not to take other NSAIDs at this time.  I doubt the patient has a GI bleed as his rectal exam had a sample which was negative for any discoloration.  CT scan also  "negative.  Patient told to return to the ER for new or concerning issues but was again told that he needs to follow-up with a family doctor and have further evaluation done appropriately.    Amount and/or Complexity of Data Reviewed  Labs: ordered.  Radiology: ordered.    Risk  Prescription drug management.        ED Course as of 11/17/24 1648   Sun Nov 17, 2024   1001 Rectal exam negative for masses.  Hemoccult negative.       Medications   sodium chloride 0.9 % bolus 1,000 mL (0 mL Intravenous Stopped 11/17/24 0950)   pantoprazole (PROTONIX) injection 40 mg (40 mg Intravenous Given 11/17/24 0920)   morphine injection 2 mg (2 mg Intravenous Given 11/17/24 0948)   iohexol (OMNIPAQUE) 350 MG/ML injection (MULTI-DOSE) 100 mL (100 mL Intravenous Given 11/17/24 1013)   ketorolac (TORADOL) injection 15 mg (15 mg Intravenous Given 11/17/24 1304)       ED Risk Strat Scores                           SBIRT 22yo+      Flowsheet Row Most Recent Value   Initial Alcohol Screen: US AUDIT-C     1. How often do you have a drink containing alcohol? 0 Filed at: 11/17/2024 0845   2. How many drinks containing alcohol do you have on a typical day you are drinking?  0 Filed at: 11/17/2024 0845   3a. Male UNDER 65: How often do you have five or more drinks on one occasion? 0 Filed at: 11/17/2024 0845   Audit-C Score 0 Filed at: 11/17/2024 0845   ABIODUN: How many times in the past year have you...    Used an illegal drug or used a prescription medication for non-medical reasons? Never Filed at: 11/17/2024 0845                            History of Present Illness       Chief Complaint   Patient presents with    Headache     Ambulatory w/complaint of headache x1 month; denies history of same; taking tylenol and aspirin w/no relief; states \"I now have stomach pain & blood in stool\" which started x3 days ago; unable to express how much each of aspirin & tylenol being taken; took stool softener d/t constipation; states black tarry stool today; " states utilizing rescue inhaler which causes headaches    Abdominal Pain    Black or Bloody Stool       Past Medical History:   Diagnosis Date    Asthma     as a child      Past Surgical History:   Procedure Laterality Date    INCISION AND DRAINAGE OF WOUND Left 06/28/2021    Procedure: INCISION AND DRAINAGE (I&D) LEFT SUBMANDIBULAR SPACE INFECTION, LEFT  SPACE INFECTION WITH EXTRACTION OF TOOTH #19;  Surgeon: Fabi Hood DMD;  Location:  MAIN OR;  Service: Maxillofacial    ORIF TIBIA & FIBULA FRACTURES Right     ME ARTHRS AIDED ANT CRUCIATE LIGM RPR/AGMNTJ/RCNSTJ Right 1/17/2024    Procedure: Right - knee arthroscopy Lateral meniscectomy Synovectomy ACL reconstruction with allograft using the Arthrex graft link system Post arthroscopic injection of intra-articular joint space and peripheral portals;  Surgeon: Harvinder Ga DO;  Location: [facility];  Service: Orthopedics    ME ARTHRS KNE SURG W/MENISCECTOMY MED/LAT W/SHVG Right 1/10/2023    Procedure: ARTHROSCOPY KNEE WITH LATERAL MENISCECTOMY;  Surgeon: Harvinder Ga DO;  Location: [facility];  Service: Orthopedics      History reviewed. No pertinent family history.   Social History     Tobacco Use    Smoking status: Former     Current packs/day: 0.50     Types: Cigarettes    Smokeless tobacco: Never   Vaping Use    Vaping status: Former    Start date: 1/1/2023   Substance Use Topics    Alcohol use: Never    Drug use: Not Currently     Frequency: 7.0 times per week     Types: Marijuana     Comment: daily      E-Cigarette/Vaping    E-Cigarette Use Former User     Start Date 1/1/23     Cartridges/Day 0.5       E-Cigarette/Vaping Substances    Nicotine No     THC No     CBD No     Flavoring No     Other No     Unknown No       I have reviewed and agree with the history as documented.     34-year-old male presents emergency room complaining of headache as well as black stools and abdominal discomfort.  Patient notes he has had a headache  ever since being treated for his asthma for about a month.  The patient's pain is in the frontal regions of his head as well as the temporal bilaterally with the right greater than left.  Patient denies any neurologic complaints associated with this and states he has been taking Tylenol and Advil frequently just try to get the headaches to subside.  The patient is not sure if it is due to medication or another concern but he did state that over the last few days while taking aspirin he started getting more pain in his abdomen and had black stools that had a little bit of a red tent in the.  He contacted his ex-girlfriend who is an ER nurse and was told that he may have an ulcer so the patient came to the hospital.  Patient appears in no acute distress on initial presentation.        Review of Systems   Constitutional:  Positive for activity change. Negative for chills and fever.   HENT:  Negative for ear pain and sore throat.    Eyes:  Negative for pain and visual disturbance.   Respiratory:  Negative for cough and shortness of breath.    Cardiovascular:  Negative for chest pain and palpitations.   Gastrointestinal:  Positive for abdominal pain and blood in stool. Negative for vomiting.   Genitourinary:  Negative for dysuria and hematuria.   Musculoskeletal:  Negative for arthralgias and back pain.   Skin:  Negative for color change and rash.   Neurological:  Positive for headaches. Negative for syncope.   All other systems reviewed and are negative.          Objective       ED Triage Vitals [11/17/24 0842]   Temperature Pulse Blood Pressure Respirations SpO2 Patient Position - Orthostatic VS   98.6 °F (37 °C) 73 127/82 20 96 % Sitting      Temp Source Heart Rate Source BP Location FiO2 (%) Pain Score    Oral Monitor Left arm -- 6      Vitals      Date and Time Temp Pulse SpO2 Resp BP Pain Score FACES Pain Rating User   11/17/24 0927 -- -- -- -- -- 6 -- JW   11/17/24 0842 98.6 °F (37 °C) 73 96 % 20 127/82 6 -- TB             Physical Exam  Constitutional:       General: He is not in acute distress.     Appearance: Normal appearance. He is well-developed and normal weight. He is not ill-appearing.   HENT:      Head: Normocephalic and atraumatic.      Comments: Some of the patient's pain of his head seems to be reproducible with palpating the temporalis region of the left and right scalp.     Right Ear: External ear normal.      Left Ear: External ear normal.      Nose: Nose normal.      Mouth/Throat:      Mouth: Mucous membranes are moist.   Eyes:      Conjunctiva/sclera: Conjunctivae normal.   Cardiovascular:      Rate and Rhythm: Normal rate and regular rhythm.      Pulses: Normal pulses.      Heart sounds: Normal heart sounds.   Pulmonary:      Effort: Pulmonary effort is normal.      Breath sounds: Normal breath sounds.   Abdominal:      General: Abdomen is flat. There is no distension.      Palpations: Abdomen is soft. There is no mass.      Tenderness: There is abdominal tenderness in the left lower quadrant. There is no guarding or rebound. Negative signs include Santos's sign and Rovsing's sign.      Hernia: No hernia is present.   Genitourinary:     Rectum: Guaiac result negative. No mass.   Musculoskeletal:         General: No swelling, tenderness or deformity. Normal range of motion.      Cervical back: Normal range of motion.   Skin:     General: Skin is warm and dry.      Capillary Refill: Capillary refill takes 2 to 3 seconds.      Coloration: Skin is not pale.   Neurological:      General: No focal deficit present.      Mental Status: He is alert and oriented to person, place, and time. Mental status is at baseline.   Psychiatric:         Mood and Affect: Mood normal.         Results Reviewed       Procedure Component Value Units Date/Time    Comprehensive metabolic panel [[id number]] Collected: 11/17/24 0916    Lab Status: Final result Specimen: Blood from Arm, Right Updated: 11/17/24 0941     Sodium 137 mmol/L       Potassium 4.2 mmol/L      Chloride 106 mmol/L      CO2 25 mmol/L      ANION GAP 6 mmol/L      BUN 19 mg/dL      Creatinine 0.86 mg/dL      Glucose 100 mg/dL      Calcium 9.1 mg/dL      AST 15 U/L      ALT 14 U/L      Alkaline Phosphatase 54 U/L      Total Protein 7.1 g/dL      Albumin 4.5 g/dL      Total Bilirubin 0.43 mg/dL      eGFR 111 ml/min/1.73sq m     Narrative:      National Kid[employer] Foundation guidelines for Chronic Kidney Disease (CKD):     Stage 1 with normal or high GFR (GFR > 90 mL/min/1.73 square meters)    Stage 2 Mild CKD (GFR = 60-89 mL/min/1.73 square meters)    Stage 3A Moderate CKD (GFR = 45-59 mL/min/1.73 square meters)    Stage 3B Moderate CKD (GFR = 30-44 mL/min/1.73 square meters)    Stage 4 Severe CKD (GFR = 15-29 mL/min/1.73 square meters)    Stage 5 End Stage CKD (GFR <15 mL/min/1.73 square meters)  Note: GFR calculation is accurate only with a steady state creatinine    Phosphorus [[id number]]  (Normal) Collected: 11/17/24 0916    Lab Status: Final result Specimen: Blood from Arm, Right Updated: 11/17/24 0941     Phosphorus 3.0 mg/dL     Magnesium [[id number]]  (Normal) Collected: 11/17/24 0916    Lab Status: Final result Specimen: Blood from Arm, Right Updated: 11/17/24 0941     Magnesium 2.1 mg/dL     Protime-INR [[id number]]  (Normal) Collected: 11/17/24 0916    Lab Status: Final result Specimen: Blood from Arm, Right Updated: 11/17/24 0934     Protime 13.9 seconds      INR 1.02    Narrative:      INR Therapeutic Range    Indication                                             INR Range      Atrial Fibrillation                                               2.0-3.0  Hypercoagulable State                                    2.0.2.3  Left Ventricular Asist Device                            2.0-3.0  Mechanical Heart Valve                                  -    Aortic(with afib, MI, embolism, HF, LA enlargement,    and/or coagulopathy)                                     2.0-3.0  (2.5-3.5)     Mitral                                                             2.5-3.5  Prosthetic/Bioprosthetic Heart Valve               2.0-3.0  Venous thromboembolism (VTE: VT, PE        2.0-3.0    APTT [[id number]]  (Normal) Collected: 11/17/24 0916    Lab Status: Final result Specimen: Blood from Arm, Right Updated: 11/17/24 0934     PTT 27 seconds     CBC and differential [[id number]] Collected: 11/17/24 0916    Lab Status: Final result Specimen: Blood from Arm, Right Updated: 11/17/24 0921     WBC 8.92 Thousand/uL      RBC 4.96 Million/uL      Hemoglobin 14.8 g/dL      Hematocrit 44.2 %      MCV 89 fL      MCH 29.8 pg      MCHC 33.5 g/dL      RDW 12.8 %      MPV 9.9 fL      Platelets 185 Thousands/uL      nRBC 0 /100 WBCs      Segmented % 72 %      Immature Grans % 0 %      Lymphocytes % 18 %      Monocytes % 8 %      Eosinophils Relative 1 %      Basophils Relative 1 %      Absolute Neutrophils 6.39 Thousands/µL      Absolute Immature Grans 0.02 Thousand/uL      Absolute Lymphocytes 1.62 Thousands/µL      Absolute Monocytes 0.73 Thousand/µL      Eosinophils Absolute 0.11 Thousand/µL      Basophils Absolute 0.05 Thousands/µL             CT abdomen pelvis with contrast   Final Interpretation by Delmis Puentes MD (11/17 1216)      1. No acute findings in the abdomen or pelvis.      2. New tree-in-bud and centrilobular nodules in the left lower lobe, likely related to infectious/inflammatory bronchiolitis.            Resident: MICHAEL CORNELIUS I, the attending radiologist, have reviewed the images and agree with the final report above.      Workstation performed: [id number]         CT head without contrast   Final Interpretation by Baron Kimball MD (11/17 2311)      No acute intracranial abnormality.      Trace left mastoid effusion.               Workstation performed: [id number]             Procedures    ED Medication and Procedure Management   Prior to Admission Medications   Prescriptions  Last Dose Informant Patient Reported? Taking?   Melatonin 10 MG TABS  Self No No   Sig: Take 1 tablet (10 mg total) by mouth daily at bedtime as needed (insomnia)   Omega-3 Fatty Acids (fish oil) 1,000 mg  Self Yes No   Sig: Take 1,000 mg by mouth daily   albuterol (Proventil HFA) 90 mcg/act inhaler  Self No No   Sig: Inhale 1-2 puffs every 6 (six) hours as needed for wheezing   ascorbic acid (VITAMIN C) 250 mg tablet  Self Yes No   Sig: Take 250 mg by mouth daily   Patient not taking: Reported on 4/29/2024   aspirin 325 mg tablet   No No   Sig: Take 1 tablet (325 mg total) by mouth daily   Patient not taking: Reported on 4/29/2024   cholecalciferol (VITAMIN D3) 400 units tablet  Self Yes No   Sig: Take 400 Units by mouth daily   fluticasone-umeclidinium-vilanterol (Trelegy Ellipta) 200-62.5-25 mcg/actuation AEPB inhaler   No No   Sig: Inhale 1 puff daily Rinse mouth after use.   hydrOXYzine HCL (ATARAX) 25 mg tablet  Self No No   Sig: Take 1 to 2 tablets by mouth every 6-8 hours as needed for anxiety.   levalbuterol (Xopenex) 1.25 mg/3 mL nebulizer solution  Self No No   Sig: Take 3 mL (1.25 mg total) by nebulization 3 (three) times a day   meloxicam (MOBIC) 15 mg tablet   No No   Sig: Take 1 tablet (15 mg total) by mouth daily   Patient not taking: Reported on 4/29/2024   methadone (DOLOPHINE) 10 mg/mL oral concentrated solution  Self Yes No   Sig: Take 25 mg by mouth every morning   Patient taking differently: Take 20 mg by mouth every morning   vitamin E 100 UNIT capsule  Self Yes No   Sig: Take 100 Units by mouth daily      Facility-Administered Medications: None     Discharge Medication List as of 11/17/2024 12:56 PM        START taking these medications    Details   cyclobenzaprine (FLEXERIL) 5 mg tablet Take 1 tablet (5 mg total) by mouth 3 (three) times a day as needed for muscle spasms, Starting Sun 11/17/2024, Normal      ketorolac (TORADOL) 10 mg tablet Take 1 tablet (10 mg total) by mouth every 6 (six)  hours as needed for moderate pain, Starting Sun 11/17/2024, Normal           CONTINUE these medications which have NOT CHANGED    Details   albuterol (Proventil HFA) 90 mcg/act inhaler Inhale 1-2 puffs every 6 (six) hours as needed for wheezing, Starting Mon 10/7/2024, Normal      ascorbic acid (VITAMIN C) 250 mg tablet Take 250 mg by mouth daily, Historical Med      aspirin 325 mg tablet Take 1 tablet (325 mg total) by mouth daily, Starting Wed 1/17/2024, Until Mon 4/29/2024, Normal      cholecalciferol (VITAMIN D3) 400 units tablet Take 400 Units by mouth daily, Historical Med      fluticasone-umeclidinium-vilanterol (Trelegy Ellipta) 200-62.5-25 mcg/actuation AEPB inhaler Inhale 1 puff daily Rinse mouth after use., Starting Wed 11/13/2024, Until Fri 12/13/2024, Sample      hydrOXYzine HCL (ATARAX) 25 mg tablet Take 1 to 2 tablets by mouth every 6-8 hours as needed for anxiety., Normal      levalbuterol (Xopenex) 1.25 mg/3 mL nebulizer solution Take 3 mL (1.25 mg total) by nebulization 3 (three) times a day, Starting Fri 10/25/2024, Until Sun 11/24/2024, Normal      Melatonin 10 MG TABS Take 1 tablet (10 mg total) by mouth daily at bedtime as needed (insomnia), Starting Thu 10/31/2024, Normal      meloxicam (MOBIC) 15 mg tablet Take 1 tablet (15 mg total) by mouth daily, Starting Wed 1/17/2024, Until Mon 4/29/2024, Normal      methadone (DOLOPHINE) 10 mg/mL oral concentrated solution Take 25 mg by mouth every morning, Historical Med      Omega-3 Fatty Acids (fish oil) 1,000 mg Take 1,000 mg by mouth daily, Historical Med      vitamin E 100 UNIT capsule Take 100 Units by mouth daily, Historical Med           No discharge procedures on file.  ED SEPSIS DOCUMENTATION   Time reflects when diagnosis was documented in both MDM as applicable and the Disposition within this note       Time User Action Codes Description Comment    11/17/2024 12:54 PM Akil Blevins Add [R51.9] Headache     11/17/2024 12:54 PM Felicity  Akil Add [R10.32] Abdominal discomfort in left lower quadrant                  Akil Blevins Jr., DO  11/17/24 9969

## 2024-11-17 NOTE — DISCHARGE INSTRUCTIONS
For increasing pain use Toradol 1 pill every 6 hours as needed. Do not take Advil, Motrin, ibuprofen, Aleve, Naprosyn, or aspirin while on Toradol.  You may take Tylenol if necessary.     In case your pain is from musculoskeletal concerns use Flexeril 1 pill every 8 hours as needed.  Do not drive or operate heavy machinery on this medicine.    Your stool for blood was negative at this time.  If you notice that this situation is worsening I would come back and have it rechecked.  You need to follow-up with your family doctor for repeat evaluation.

## 2024-11-17 NOTE — Clinical Note
Khalif Hood was seen and treated in our emergency department on 11/17/2024.                Diagnosis:     Khalif  may return to work on return date.    He may return on this date: 11/17/2024    Patient will likely be late to work today due to visit to the doctor.     If you have any questions or concerns, please don't hesitate to call.      Akil Blevins Jr., DO    ______________________________           _______________          _______________  Hospital Representative                              Date                                Time

## 2024-11-17 NOTE — ED TRIAGE NOTES
"Ambulatory w/complaint of headache x1 month; denies history of same; taking tylenol and aspirin w/no relief; states \"I now have stomach pain & blood in stool\" which started x3 days ago; unable to express how much each of aspirin & tylenol being taken; took stool softener d/t constipation; states black tarry stool today; states utilizing rescue inhaler which causes headaches  "

## 2024-11-19 ENCOUNTER — OFFICE VISIT (OUTPATIENT)
Dept: FAMILY MEDICINE CLINIC | Facility: CLINIC | Age: 34
End: 2024-11-19

## 2024-11-19 ENCOUNTER — TELEPHONE (OUTPATIENT)
Dept: ENDOCRINOLOGY | Facility: CLINIC | Age: 34
End: 2024-11-19

## 2024-11-19 VITALS
SYSTOLIC BLOOD PRESSURE: 116 MMHG | BODY MASS INDEX: 24.39 KG/M2 | WEIGHT: 196.2 LBS | TEMPERATURE: 97.7 F | DIASTOLIC BLOOD PRESSURE: 68 MMHG | HEIGHT: 75 IN | HEART RATE: 80 BPM | OXYGEN SATURATION: 99 %

## 2024-11-19 DIAGNOSIS — R51.9 CHRONIC NONINTRACTABLE HEADACHE, UNSPECIFIED HEADACHE TYPE: Primary | ICD-10-CM

## 2024-11-19 DIAGNOSIS — G89.29 CHRONIC NONINTRACTABLE HEADACHE, UNSPECIFIED HEADACHE TYPE: Primary | ICD-10-CM

## 2024-11-19 DIAGNOSIS — R19.4 CHANGE IN BOWEL HABITS: ICD-10-CM

## 2024-11-19 PROCEDURE — 99214 OFFICE O/P EST MOD 30 MIN: CPT | Performed by: FAMILY MEDICINE

## 2024-11-19 NOTE — TELEPHONE ENCOUNTER
Patient came in with papers for [employer] patient assistance program. He did want to inform Elizabeth that the [employer] is working very well for him. Scanned papers into chart and put them in Elizabeth folder.

## 2024-11-19 NOTE — PROGRESS NOTES
Name: Khalif Hood      : 1990      MRN: [id number]  Encounter Provider: SHELBY Royal  Encounter Date: 2024   Encounter department: [facility] [address]  :  Assessment & Plan  Chronic nonintractable headache, unspecified headache type  Discussed potentials, reviewed personal history, negative family history, attempted to ascertain triggers characteristics causative factor, recommended journaling headache diary, recommended further eval neurology  Orders:    Ambulatory Referral to Neurology; Future    Change in bowel habits  Discussed and reviewed CT labs ER multiple visits, previous complaint constipation resolved  Orders:    Ambulatory referral to Gastroenterology; Future           History of Present Illness     Here for follow-up  Complaining of daily headaches,  Denies any previous migraine   Feels related to start of treatment with asthma medications which have since been altered  Headaches nonspecific varying intensity location duration onset  No known triggers  Taking Motrin which then feels discomfort lower abdomen constipation  Requesting stronger medication, refused tramadol as this advised per pharmacy due to constipation  Denies any vision changes weakness to extremities numbness tingling negative chest pain palpitations negative nausea vomiting  Denies any rash lesion negative travel negative insect exposure  Constipation resolved with  But still feels there is an issue as was told in ER potential for ulcer and diverticulitis  Seen by pulmonary and started on Trelegy denies any exacerbations in regard to asthma,    Asthma  There is no cough or shortness of breath. Associated symptoms include headaches. Pertinent negatives include no appetite change, chest pain, ear pain, fever, myalgias, postnasal drip, rhinorrhea, sneezing or sore throat. His past medical history is significant for asthma.   Headache  Headache pattern:  Some headache  "always there, and the pain level varies  Duration:  6 to 12 months  Previous testing:  Blood work  Constipation  Pertinent negatives include no abdominal pain, back pain, diarrhea, difficulty urinating, fever, nausea or vomiting.     Review of Systems   Constitutional:  Negative for appetite change, chills, fever and unexpected weight change.   HENT:  Negative for congestion, dental problem, ear pain, hearing loss, postnasal drip, rhinorrhea, sinus pressure, sinus pain, sneezing, sore throat, tinnitus and voice change.    Eyes:  Negative for visual disturbance.   Respiratory:  Negative for apnea, cough, chest tightness and shortness of breath.    Cardiovascular:  Negative for chest pain, palpitations and leg swelling.   Gastrointestinal:  Positive for constipation. Negative for abdominal pain, blood in stool, diarrhea, nausea and vomiting.   Endocrine: Negative for cold intolerance, heat intolerance, polydipsia, polyphagia and polyuria.   Genitourinary:  Negative for decreased urine volume, difficulty urinating, dysuria, frequency and hematuria.   Musculoskeletal:  Negative for arthralgias, back pain, gait problem, joint swelling and myalgias.   Skin:  Negative for color change, rash and wound.   Allergic/Immunologic: Negative for environmental allergies and food allergies.   Neurological:  Positive for headaches. Negative for dizziness, syncope, weakness, light-headedness and numbness.   Hematological:  Negative for adenopathy. Does not bruise/bleed easily.   Psychiatric/Behavioral:  Negative for sleep disturbance and suicidal ideas. The patient is not nervous/anxious.           Objective   /68 (BP Location: Left arm, Patient Position: Sitting)   Pulse 80   Temp 97.7 °F (36.5 °C)   Ht 6' 3\" (1.905 m)   Wt 89 kg (196 lb 3.2 oz)   SpO2 99%   BMI 24.52 kg/m²      Physical Exam  Constitutional:       General: He is not in acute distress.     Appearance: He is well-developed. He is not ill-appearing or " toxic-appearing.   HENT:      Head: Normocephalic and atraumatic.   Eyes:      General: No scleral icterus.     Conjunctiva/sclera: Conjunctivae normal.   Cardiovascular:      Rate and Rhythm: Normal rate and regular rhythm.      Heart sounds: Normal heart sounds.   Pulmonary:      Effort: Pulmonary effort is normal.      Breath sounds: Normal breath sounds.   Abdominal:      General: Bowel sounds are normal.      Palpations: Abdomen is soft.   Musculoskeletal:         General: Normal range of motion.      Cervical back: Normal range of motion and neck supple.   Skin:     General: Skin is warm and dry.   Neurological:      Mental Status: He is alert and oriented to person, place, and time.      Deep Tendon Reflexes: Reflexes are normal and symmetric.   Psychiatric:         Behavior: Behavior normal.         Thought Content: Thought content normal.         Judgment: Judgment normal.

## 2024-11-20 ENCOUNTER — TELEPHONE (OUTPATIENT)
Age: 34
End: 2024-11-20

## 2024-11-20 NOTE — TELEPHONE ENCOUNTER
Patient called back; confirmed he will be self pay for this visit and will be able to pay the $30 minimum.     Thank you

## 2024-11-20 NOTE — TELEPHONE ENCOUNTER
Patients GI provider:  YOEL Rockwell    Number to return call: [phone]    Reason for call: Pt calling to book a self pay appt, Est run and quoted as $169. Pt has not chart ,can we please mail to him     Scheduled procedure/appointment date if applicable: 11.27.24

## 2024-11-20 NOTE — TELEPHONE ENCOUNTER
Called and left voicemail message for patient to call with any updated insurance information before appointment scheduled on 11/22/24 with Luc CLARKE. I also left patient the estimated cost for his visit if there is no insurance.

## 2024-11-22 ENCOUNTER — OFFICE VISIT (OUTPATIENT)
Age: 34
End: 2024-11-22

## 2024-11-22 VITALS
WEIGHT: 197.2 LBS | HEART RATE: 70 BPM | BODY MASS INDEX: 24.65 KG/M2 | SYSTOLIC BLOOD PRESSURE: 118 MMHG | OXYGEN SATURATION: 98 % | DIASTOLIC BLOOD PRESSURE: 76 MMHG

## 2024-11-22 DIAGNOSIS — G44.52 NEW PERSISTENT DAILY HEADACHE: Primary | ICD-10-CM

## 2024-11-22 DIAGNOSIS — R51.9 CHRONIC NONINTRACTABLE HEADACHE, UNSPECIFIED HEADACHE TYPE: ICD-10-CM

## 2024-11-22 DIAGNOSIS — G89.29 CHRONIC NONINTRACTABLE HEADACHE, UNSPECIFIED HEADACHE TYPE: ICD-10-CM

## 2024-11-22 PROCEDURE — 99204 OFFICE O/P NEW MOD 45 MIN: CPT

## 2024-11-22 RX ORDER — AMITRIPTYLINE HYDROCHLORIDE 10 MG/1
20 TABLET ORAL
Qty: 60 TABLET | Refills: 3 | Status: SHIPPED | OUTPATIENT
Start: 2024-11-22

## 2024-11-22 RX ORDER — DIVALPROEX SODIUM 500 MG/1
500 TABLET, DELAYED RELEASE ORAL
Qty: 5 TABLET | Refills: 0 | Status: SHIPPED | OUTPATIENT
Start: 2024-11-22 | End: 2024-11-27

## 2024-11-22 NOTE — ASSESSMENT & PLAN NOTE
I had the pleasure of seeing Khalif today in the office at [facility] in [place].  He is presenting today for an initial new patient consultation in regard to his new onset of headaches.  Is noted that a few weeks ago the patient started having upper respiratory issues and shortness of breath.  Patient was placed on albuterol that he felt was potentially causing some of his headaches.  He was taking level butyryl and albuterol together which did not seem to help with his headaches.  Trelegy seem to help with his shortness of breath but still having headaches.  As of 2 weeks ago the headaches got much more intense.  He notes that he never really had headaches before in the past just a few weeks ago when he started having the respiratory issues and taking the medication as when the headache started.  He notes currently having a headache about every day.  Notes within 5 minutes of waking up the headache will come on.  He notes that headaches are usually lasting all day, ibuprofen seems to be helping with the pain slightly.  It is noted that the patient had been using significant amount of over-the-counter medication besides ibuprofen.  Was taking aspirin, Tylenol, Excedrin Migraine and does take a significant amount of these medications throughout the week.  Patient does not have an aura with the headaches.  He notes a bilateral frontal and temporal pressure and throbbing type pain.  Does sometimes note nausea, photophobia, phonophobia, stiff sore neck, problems concentration with the headaches.  Sudden movements may make the headaches worse, cause more pressure after bending over as well.  Standing to laying down may make the headaches worse.  More pressure when lying down he notes.  The patient notes that he and never seen neurology in the past before.  Did have a CT head without contrast on 11/14/2024 which was normal and showed no acute intracranial abnormality.    Based on my evaluation of  patient's headaches, not quite sure what type of headache the patient may be having.  It is possible that he may be having just more of a chronic tension type headache.  Although, there are some factors that may potentially be related to migraine headaches as well.  Regardless, due to significant change and increased frequency of the headaches would recommend that the patient get an MRI brain without contrast for further evaluation.  Also noted that the patient was having headaches right when he woke up in the morning as well and more headaches when laying down so should be properly evaluated with the MRI brain.  Hoping, that much of the patient's headaches are may be related to medication overuse.  Seems as though he is taking significant amount of over-the-counter medication to relieve the headache and advised the patient to try to cut back on this as it may be making the headaches worse.  Advised patient to use over-the-counter medications no more than 2 to 3 days out of the week to get rid of a headache when he has one.  Patient stated that he tried a course of prednisone before for the asthma exacerbation and was causing him insomnia.  Prescribed the patient Depakote 500 mg for 5 days to take for bridging therapy.  Advised the patient that if the Depakote does not seem to break the cycle of the headaches then he should continue with the prescription of amitriptyline that was provided for preventative therapy.  Advised the patient to try this plan and if he does not have any significant issues after completing either one of the medications then he will likely not have to complete an MRI brain.  Advised the patient to follow-up in about 3 months time for further evaluation.    Orders:    divalproex sodium (Depakote) 500 mg DR tablet; Take 1 tablet (500 mg total) by mouth daily at bedtime for 5 days    amitriptyline (ELAVIL) 10 mg tablet; Take 2 tablets (20 mg total) by mouth daily at bedtime    MRI brain without  contrast; Future

## 2024-11-22 NOTE — PROGRESS NOTES
Name: Khalif Hood      : 1990      MRN: [id number]  Encounter Provider: Jason Neal PA-C  Encounter Date: 2024   Encounter department: [facility]    :  Assessment & Plan  New persistent daily headache  I had the pleasure of seeing Khalif today in the office at [facility] in [place].  He is presenting today for an initial new patient consultation in regard to his new onset of headaches.  Is noted that a few weeks ago the patient started having upper respiratory issues and shortness of breath.  Patient was placed on albuterol that he felt was potentially causing some of his headaches.  He was taking level butyryl and albuterol together which did not seem to help with his headaches.  Trelegy seem to help with his shortness of breath but still having headaches.  As of 2 weeks ago the headaches got much more intense.  He notes that he never really had headaches before in the past just a few weeks ago when he started having the respiratory issues and taking the medication as when the headache started.  He notes currently having a headache about every day.  Notes within 5 minutes of waking up the headache will come on.  He notes that headaches are usually lasting all day, ibuprofen seems to be helping with the pain slightly.  It is noted that the patient had been using significant amount of over-the-counter medication besides ibuprofen.  Was taking aspirin, Tylenol, Excedrin Migraine and does take a significant amount of these medications throughout the week.  Patient does not have an aura with the headaches.  He notes a bilateral frontal and temporal pressure and throbbing type pain.  Does sometimes note nausea, photophobia, phonophobia, stiff sore neck, problems concentration with the headaches.  Sudden movements may make the headaches worse, cause more pressure after bending over as well.  Standing to laying down may make the headaches worse.   More pressure when lying down he notes.  The patient notes that he and never seen neurology in the past before.  Did have a CT head without contrast on 11/14/2024 which was normal and showed no acute intracranial abnormality.    Based on my evaluation of patient's headaches, not quite sure what type of headache the patient may be having.  It is possible that he may be having just more of a chronic tension type headache.  Although, there are some factors that may potentially be related to migraine headaches as well.  Regardless, due to significant change and increased frequency of the headaches would recommend that the patient get an MRI brain without contrast for further evaluation.  Also noted that the patient was having headaches right when he woke up in the morning as well and more headaches when laying down so should be properly evaluated with the MRI brain.  Hoping, that much of the patient's headaches are may be related to medication overuse.  Seems as though he is taking significant amount of over-the-counter medication to relieve the headache and advised the patient to try to cut back on this as it may be making the headaches worse.  Advised patient to use over-the-counter medications no more than 2 to 3 days out of the week to get rid of a headache when he has one.  Patient stated that he tried a course of prednisone before for the asthma exacerbation and was causing him insomnia.  Prescribed the patient Depakote 500 mg for 5 days to take for bridging therapy.  Advised the patient that if the Depakote does not seem to break the cycle of the headaches then he should continue with the prescription of amitriptyline that was provided for preventative therapy.  Advised the patient to try this plan and if he does not have any significant issues after completing either one of the medications then he will likely not have to complete an MRI brain.  Advised the patient to follow-up in about 3 months time for further  evaluation.    Orders:    divalproex sodium (Depakote) 500 mg DR tablet; Take 1 tablet (500 mg total) by mouth daily at bedtime for 5 days    amitriptyline (ELAVIL) 10 mg tablet; Take 2 tablets (20 mg total) by mouth daily at bedtime    MRI brain without contrast; Future    Chronic nonintractable headache, unspecified headache type    Orders:    Ambulatory Referral to Neurology    divalproex sodium (Depakote) 500 mg DR tablet; Take 1 tablet (500 mg total) by mouth daily at bedtime for 5 days    amitriptyline (ELAVIL) 10 mg tablet; Take 2 tablets (20 mg total) by mouth daily at bedtime    MRI brain without contrast; Future      Patient Instructions   Additional Testing:   Neurodiagnostic workup:  MRI Brain without contrast ordered    Headache Calendar  Please maintain a headache calendar  Consider using phone applications such as Migraine Pedro or Migraine Diary    Headache/migraine treatment:     Rescue medications (for immediate treatment of a headache):   It is ok to take ibuprofen, acetaminophen or naproxen (Advil, Tylenol,  Aleve, Excedrin) if they help your headaches you should limit these to No more than 3 times a week to avoid medication overuse/rebound headaches.     Prescription preventive medications for headaches/migraines   (to take every day to help prevent headaches - not to take at the time of headache):  -Start amitriptyline 10 mg, take 2 tablets by mouth once daily at bedtime for headache prevention.  Would recommend that for the first week the patient only takes 1 tablet by mouth once daily at bedtime.  If he is having no issues or side effects with the medication then can increase to 2 tablets at bedtime afterwards.  He will remain on this moving forward until the next appointment or until the patient lets me know how he is doing in about 6 to 8 weeks.  Advised the patient that if he uses the Depakote bridging therapy and that seems to help abort the headaches and end the cycle completely then he  may not need to start amitriptyline but he can always have the option of picking up the prescription if the headaches do seem to return.    *Typically these types of medications take time until you see the benefit, although some may see improvement in days, often it may take weeks, especially if the medication is being titrated up to a beneficial level. Please contact us if there are any concerns or questions regarding the medication.     Bridging therapy:    - Start Depakote 500 mg, take 1 tablet by mouth once daily at bedtime for 5 days to break potential headache cycle.  Also advised the patient to cut down on the amount of over-the-counter medication he is using as this may be contributing to some rebound headaches.  If the patient does not have success with breaking the headaches after 5 days with the Depakote then he can start taking the amitriptyline for preventative therapy.    Over the counter preventive supplements for headaches/migraines (if you try, try for 3 months straight)  (to take every day to help prevent headaches - not to take at the time of headache):  There are combo pills online of these - none of which regulated by FDA and double check dosing - take appropriate dose only once a day- prevent a migraine, migravent, mind ease, migrelief   [] Magnesium 400mg daily (If any diarrhea or upset stomach, decrease dose  as tolerated)  [] Riboflavin (Vitamin B2) 400mg daily (may make your urine bright/neon yellow)  - All supplements can be purchased online    Lifestyle Recommendations:  [x] SLEEP - Maintain a regular sleep schedule: Adults need at least 7-8 hours of uninterrupted a night. Maintain good sleep hygiene:  Going to bed and waking up at consistent times, avoiding excessive daytime naps, avoiding caffeinated beverages in the evening, avoid excessive stimulation in the evening and generally using bed primarily for sleeping.  One hour before bedtime would recommend turning lights down lower,  decreasing your activity (may read quietly, listen to music at a low volume). When you get into bed, should eliminate all technology (no texting, emailing, playing with your phone, iPad or tablet in bed).  [x] HYDRATION - Maintain good hydration.  Drink  2L of fluid a day (4 typical small water bottles)  [x] DIET - Maintain good nutrition. In particular don't skip meals and try and eat healthy balanced meals regularly.  [x] TRIGGERS - Look for other triggers and avoid them: Limit caffeine to 1-2 cups a day or less. Avoid dietary triggers that you have noticed bring on your headaches (this could include aged cheese, peanuts, MSG, aspartame and nitrates).  [x] EXERCISE - physical exercise as we all know is good for you in many ways, and not only is good for your heart, but also is beneficial for your mental health, cognitive health and  chronic pain/headaches. I would encourage at the least 5 days of physical exercise weekly for at least 30 minutes.     Education and Follow-up  [x] Please call with any questions or concerns. Of course if any new concerning symptoms go to the emergency department.  [x] Follow up  in 3 months with Luc HUBBARD   History of Present Illness   HPI    Current medical illnesses  or past medical history include uncontrolled asthma, GERD, previous right ACL tear, Chris's angina      Interval History:    Few weeks ago started having upper respiratory issues and SOB. He was placed on albuterol which was causing headaches. He was then taking levalbuterol and albuterol which did not help with the headaches. Trelegy which he is taking now seems to help with SOB, but still now having headaches. 2 weeks ago headaches did seem to get a lot worse. Having headaches more frequent over the last few days. Has been taking more daily medication for abortive therapy. Has had to start taking OTC meds more frequently and does not help with the headaches. He does take aspirin, ibuprofen, Tylenol, and excedrin  migraine for the headaches almost daily. Does not have a significant amount of relief with headaches.     Headaches started? No headaches at a younger age, started approximately a few weeks ago when started with the inhaler for SOB and upper respiratory issues. Last two weeks really became daily at that point.   How often do the headaches occur?   - as of 11/22/2024: 2 weeks ago started having headaches 30/30 days in the month with a headache.   What time of the day do the headaches start?  Within five minutes of waking up the headache comes on   How long do the headaches last? Headache lasting all day after that point in time. Ibuprofen seems to be helping with the pain slightly.   Are you ever headache free? No    Aura? without aura     Where is your headache located and pain quality? Pressure type of pain, sometimes pounding pain. Bilateral frontal and temple pressure and throbbing.   What is the intensity of pain? Worst 10/10, Average: 6/10  Associated symptoms:   [x] Nausea   [x] Stiff or sore neck   [x] Problems with concentration  [x] Photophobia     [x]Phonophobia   [x] Prefer quiet, dark room    Things that make the headache worse? Sudden movements may make the headache worse, more pressure and pain after bending over     Any positional change headaches? Standing to laying down makes the headache worse, more pressure when laying down.     Headache triggers:  no significant triggers     Have you seen someone else for headaches or pain? No  Have you had trigger point injection performed and how often? No  Have you had Botox injection performed and how often? No   Have you had epidural injections or transforaminal injections performed? No  Have you ever had any Brain imaging? Yes, CT head wo contrast, 11/17/2024    Last eye exam: last eye exam was approximately in 2020. Not noticing any issues with his vision currently.     What medications do you take or have you taken for your headaches?   ABORTIVE:    OTC  medications: ibuprofen, Tylenol, Excedrin migraine  Prescription: None    Past/ failed/contraindicated:  OTC medications: aspirin (stomach irritation)  Prescription: ketorolac (not effective), flexeril     PREVENTIVE:   Hydroxyzine PRN (anxiety)    Past/ failed/contraindicated:  None      LIFESTYLE  Sleep   - averages: 7-8 hours, sometimes waking up at night. Taking more melatonin now.  Problems falling asleep?:   No  Problems staying asleep?:  No    - Does snore in his sleep, no issues with trouble breathing or apnea in sleep.     Physical activity:  at [employer], very active job and use to go too the gym as well.    Water: 4, 16 ounce bottles of water per day  Caffeine:  2, 20 ounce coffees per day     Mood: Does have more depression and anxiety associated just around the headaches now. More irritable now he states.     The following portions of the patient's history were reviewed and updated as appropriate: allergies, current medications, past family history, past medical history, past social history, past surgical history and problem list.    Pertinent family history:  Family history of headaches:  migraine headaches in half sister  Any family history of aneurysms - No    Pertinent social history:  Work:  for [employer] company   Education: 12th grade and GED  Lives with parents at the beginning of the year     Illicit Drugs: admits to heroin date of last use la few years ago and marijuana date of last use few months ago    Alcohol/tobacco: Denies alcohol use, Denies tobacco use, prior cigarette smoking. Had quit a few months ago and smoked for about 20 years.     Review of Systems   Constitutional:  Negative for appetite change, fatigue and fever.   HENT: Negative.  Negative for hearing loss, tinnitus, trouble swallowing and voice change.    Eyes: Negative.  Negative for photophobia, pain and visual disturbance.   Respiratory: Negative.  Negative for shortness of breath.     Cardiovascular: Negative.  Negative for palpitations.   Gastrointestinal: Negative.  Negative for nausea and vomiting.   Endocrine: Negative.  Negative for cold intolerance.   Genitourinary: Negative.  Negative for dysuria, frequency and urgency.   Musculoskeletal:  Negative for back pain, gait problem, myalgias, neck pain and neck stiffness.   Skin: Negative.  Negative for rash.   Allergic/Immunologic: Negative.    Neurological:  Positive for light-headedness and headaches. Negative for dizziness, tremors, seizures, syncope, facial asymmetry, speech difficulty, weakness and numbness.   Hematological: Negative.  Does not bruise/bleed easily.   Psychiatric/Behavioral: Negative.  Negative for confusion, hallucinations and sleep disturbance.    All other systems reviewed and are negative.    I have personally reviewed the MA's review of systems and made changes as necessary.    Medical History Reviewed by provider this encounter:  Tobacco  Allergies  Meds  Problems  Med Hx  Surg Hx  Fam Hx     .  Past Medical History   Past Medical History:   Diagnosis Date    Asthma     as a child     Past Surgical History:   Procedure Laterality Date    INCISION AND DRAINAGE OF WOUND Left 06/28/2021    Procedure: INCISION AND DRAINAGE (I&D) LEFT SUBMANDIBULAR SPACE INFECTION, LEFT  SPACE INFECTION WITH EXTRACTION OF TOOTH #19;  Surgeon: Fabi Hood DMD;  Location:  MAIN OR;  Service: Maxillofacial    ORIF TIBIA & FIBULA FRACTURES Right     CT ARTHRS AIDED ANT CRUCIATE LIGM RPR/AGMNTJ/RCNSTJ Right 1/17/2024    Procedure: Right - knee arthroscopy Lateral meniscectomy Synovectomy ACL reconstruction with allograft using the Arthrex graft link system Post arthroscopic injection of intra-articular joint space and peripheral portals;  Surgeon: Harvinder Ga DO;  Location: [facility];  Service: Orthopedics    CT ARTHRS KNE SURG W/MENISCECTOMY MED/LAT W/SHVG Right 1/10/2023    Procedure: ARTHROSCOPY KNEE WITH  LATERAL MENISCECTOMY;  Surgeon: Harvinder Ga DO;  Location: [facility];  Service: Orthopedics     History reviewed. No pertinent family history.   reports that he has quit smoking. His smoking use included cigarettes. He has never used smokeless tobacco. He reports that he does not currently use drugs after having used the following drugs: Marijuana. Frequency: 7.00 times per week. He reports that he does not drink alcohol.  Current Outpatient Medications on File Prior to Visit   Medication Sig Dispense Refill    albuterol (Proventil HFA) 90 mcg/act inhaler Inhale 1-2 puffs every 6 (six) hours as needed for wheezing 18 g 2    cholecalciferol (VITAMIN D3) 400 units tablet Take 400 Units by mouth daily      cyclobenzaprine (FLEXERIL) 5 mg tablet Take 1 tablet (5 mg total) by mouth 3 (three) times a day as needed for muscle spasms 12 tablet 0    fluticasone-umeclidinium-vilanterol (Trelegy Ellipta) 200-62.5-25 mcg/actuation AEPB inhaler Inhale 1 puff daily Rinse mouth after use. 60 blister 0    hydrOXYzine HCL (ATARAX) 25 mg tablet Take 1 to 2 tablets by mouth every 6-8 hours as needed for anxiety. 30 tablet 0    ketorolac (TORADOL) 10 mg tablet Take 1 tablet (10 mg total) by mouth every 6 (six) hours as needed for moderate pain 10 tablet 0    levalbuterol (Xopenex) 1.25 mg/3 mL nebulizer solution Take 3 mL (1.25 mg total) by nebulization 3 (three) times a day 270 mL 0    Melatonin 10 MG TABS Take 1 tablet (10 mg total) by mouth daily at bedtime as needed (insomnia) 20 tablet 0    Omega-3 Fatty Acids (fish oil) 1,000 mg Take 1,000 mg by mouth daily      vitamin E 100 UNIT capsule Take 100 Units by mouth daily      ascorbic acid (VITAMIN C) 250 mg tablet Take 250 mg by mouth daily (Patient not taking: Reported on 4/29/2024)      aspirin 325 mg tablet Take 1 tablet (325 mg total) by mouth daily (Patient not taking: Reported on 11/22/2024) 30 tablet 0    meloxicam (MOBIC) 15 mg tablet Take 1 tablet (15 mg total) by  mouth daily (Patient not taking: Reported on 11/22/2024) 30 tablet 0    methadone (DOLOPHINE) 10 mg/mL oral concentrated solution Take 25 mg by mouth every morning (Patient not taking: Reported on 11/19/2024)       No current facility-administered medications on file prior to visit.   No Known Allergies   Current Outpatient Medications on File Prior to Visit   Medication Sig Dispense Refill    albuterol (Proventil HFA) 90 mcg/act inhaler Inhale 1-2 puffs every 6 (six) hours as needed for wheezing 18 g 2    cholecalciferol (VITAMIN D3) 400 units tablet Take 400 Units by mouth daily      cyclobenzaprine (FLEXERIL) 5 mg tablet Take 1 tablet (5 mg total) by mouth 3 (three) times a day as needed for muscle spasms 12 tablet 0    fluticasone-umeclidinium-vilanterol (Trelegy Ellipta) 200-62.5-25 mcg/actuation AEPB inhaler Inhale 1 puff daily Rinse mouth after use. 60 blister 0    hydrOXYzine HCL (ATARAX) 25 mg tablet Take 1 to 2 tablets by mouth every 6-8 hours as needed for anxiety. 30 tablet 0    ketorolac (TORADOL) 10 mg tablet Take 1 tablet (10 mg total) by mouth every 6 (six) hours as needed for moderate pain 10 tablet 0    levalbuterol (Xopenex) 1.25 mg/3 mL nebulizer solution Take 3 mL (1.25 mg total) by nebulization 3 (three) times a day 270 mL 0    Melatonin 10 MG TABS Take 1 tablet (10 mg total) by mouth daily at bedtime as needed (insomnia) 20 tablet 0    Omega-3 Fatty Acids (fish oil) 1,000 mg Take 1,000 mg by mouth daily      vitamin E 100 UNIT capsule Take 100 Units by mouth daily      ascorbic acid (VITAMIN C) 250 mg tablet Take 250 mg by mouth daily (Patient not taking: Reported on 4/29/2024)      aspirin 325 mg tablet Take 1 tablet (325 mg total) by mouth daily (Patient not taking: Reported on 11/22/2024) 30 tablet 0    meloxicam (MOBIC) 15 mg tablet Take 1 tablet (15 mg total) by mouth daily (Patient not taking: Reported on 11/22/2024) 30 tablet 0    methadone (DOLOPHINE) 10 mg/mL oral concentrated  solution Take 25 mg by mouth every morning (Patient not taking: Reported on 11/19/2024)       No current facility-administered medications on file prior to visit.      Social History     Tobacco Use    Smoking status: Former     Current packs/day: 0.50     Types: Cigarettes    Smokeless tobacco: Never   Vaping Use    Vaping status: Former    Start date: 1/1/2023   Substance and Sexual Activity    Alcohol use: Never    Drug use: Not Currently     Frequency: 7.0 times per week     Types: Marijuana     Comment: daily    Sexual activity: Not on file        Objective   There were no vitals taken for this visit.    Physical Exam  Neurologic Exam    Physical Exam:                                                                 Vitals:            Constitutional:    /76 (BP Location: Left arm, Patient Position: Sitting, Cuff Size: Adult)   Pulse 70   Wt 89.4 kg (197 lb 3.2 oz)   SpO2 98%   BMI 24.65 kg/m²   BP Readings from Last 3 Encounters:   11/22/24 118/76   11/19/24 116/68   11/17/24 127/82     Pulse Readings from Last 3 Encounters:   11/22/24 70   11/19/24 80   11/17/24 73         Well developed, well nourished, well groomed. No dysmorphic features.       Psychiatric:  Normal behavior and appropriate affect        Neurological Examination:     Mental status/cognitive function:   Orientated to time, place and person. Recent and remote memory intact. Attention span and concentration as well as fund of knowledge are appropriate for age. Normal language and spontaneous speech.    Cranial Nerves:  II-visual fields full.   III, IV, VI-Pupils were equal, round, and reactive to light and accomodation. Extraocular movements were full and conjugate without nystagmus. Conjugate gaze, normal smooth pursuits, normal saccades   V-facial sensation symmetric.    VII-facial expression symmetric, intact forehead wrinkle, strong eye closure, symmetric smile    VIII-hearing grossly intact bilaterally   IX, X-palate elevation  symmetric, no dysarthria.   XI-shoulder shrug strength intact    XII-tongue protrusion midline.    Motor Exam: symmetric bulk and tone throughout, no pronator drift. Power/strength 5/5 bilateral upper and lower extremities, no atrophy, fasciculations or abnormal movements noted.   Sensory: grossly intact light touch in all extremities.   Reflexes: brachioradialis 2+, biceps 2+, knee 2+ bilaterally  Coordination: Finger nose finger intact bilaterally, no apparent dysmetria, ataxia or tremor noted  Gait: steady casual and tandem gait.      Results/Data:      CT head without contrast, 11/17/2024:    IMPRESSION:     No acute intracranial abnormality.     Trace left mastoid effusion.    [facility] Radiology Results Review: I have reviewed radiology reports from 11/17/2024 including: CT head.    Administrative Statements   I have spent a total time of 50 minutes in caring for this patient on the day of the visit/encounter including Diagnostic results, Risks and benefits of tx options, Instructions for management, Patient and family education, Importance of tx compliance, Risk factor reductions, Impressions, Counseling / Coordination of care, Documenting in the medical record, Reviewing / ordering tests, medicine, procedures  , and Obtaining or reviewing history  .

## 2024-11-22 NOTE — PATIENT INSTRUCTIONS
Additional Testing:   Neurodiagnostic workup:  MRI Brain without contrast ordered    Headache Calendar  Please maintain a headache calendar  Consider using phone applications such as Migraine Pedro or Migraine Diary    Headache/migraine treatment:     Rescue medications (for immediate treatment of a headache):   It is ok to take ibuprofen, acetaminophen or naproxen (Advil, Tylenol,  Aleve, Excedrin) if they help your headaches you should limit these to No more than 3 times a week to avoid medication overuse/rebound headaches.     Prescription preventive medications for headaches/migraines   (to take every day to help prevent headaches - not to take at the time of headache):  -Start amitriptyline 10 mg, take 2 tablets by mouth once daily at bedtime for headache prevention.  Would recommend that for the first week the patient only takes 1 tablet by mouth once daily at bedtime.  If he is having no issues or side effects with the medication then can increase to 2 tablets at bedtime afterwards.  He will remain on this moving forward until the next appointment or until the patient lets me know how he is doing in about 6 to 8 weeks.  Advised the patient that if he uses the Depakote bridging therapy and that seems to help abort the headaches and end the cycle completely then he may not need to start amitriptyline but he can always have the option of picking up the prescription if the headaches do seem to return.    *Typically these types of medications take time until you see the benefit, although some may see improvement in days, often it may take weeks, especially if the medication is being titrated up to a beneficial level. Please contact us if there are any concerns or questions regarding the medication.     Bridging therapy:    - Start Depakote 500 mg, take 1 tablet by mouth once daily at bedtime for 5 days to break potential headache cycle.  Also advised the patient to cut down on the amount of over-the-counter  medication he is using as this may be contributing to some rebound headaches.  If the patient does not have success with breaking the headaches after 5 days with the Depakote then he can start taking the amitriptyline for preventative therapy.    Over the counter preventive supplements for headaches/migraines (if you try, try for 3 months straight)  (to take every day to help prevent headaches - not to take at the time of headache):  There are combo pills online of these - none of which regulated by FDA and double check dosing - take appropriate dose only once a day- prevent a migraine, migravent, mind ease, migrelief   [] Magnesium 400mg daily (If any diarrhea or upset stomach, decrease dose  as tolerated)  [] Riboflavin (Vitamin B2) 400mg daily (may make your urine bright/neon yellow)  - All supplements can be purchased online    Lifestyle Recommendations:  [x] SLEEP - Maintain a regular sleep schedule: Adults need at least 7-8 hours of uninterrupted a night. Maintain good sleep hygiene:  Going to bed and waking up at consistent times, avoiding excessive daytime naps, avoiding caffeinated beverages in the evening, avoid excessive stimulation in the evening and generally using bed primarily for sleeping.  One hour before bedtime would recommend turning lights down lower, decreasing your activity (may read quietly, listen to music at a low volume). When you get into bed, should eliminate all technology (no texting, emailing, playing with your phone, iPad or tablet in bed).  [x] HYDRATION - Maintain good hydration.  Drink  2L of fluid a day (4 typical small water bottles)  [x] DIET - Maintain good nutrition. In particular don't skip meals and try and eat healthy balanced meals regularly.  [x] TRIGGERS - Look for other triggers and avoid them: Limit caffeine to 1-2 cups a day or less. Avoid dietary triggers that you have noticed bring on your headaches (this could include aged cheese, peanuts, MSG, aspartame and  nitrates).  [x] EXERCISE - physical exercise as we all know is good for you in many ways, and not only is good for your heart, but also is beneficial for your mental health, cognitive health and  chronic pain/headaches. I would encourage at the least 5 days of physical exercise weekly for at least 30 minutes.     Education and Follow-up  [x] Please call with any questions or concerns. Of course if any new concerning symptoms go to the emergency department.  [x] Follow up  in 3 months with Luc HUBBARD

## 2024-11-22 NOTE — ASSESSMENT & PLAN NOTE
Orders:    Ambulatory Referral to Neurology    divalproex sodium (Depakote) 500 mg DR tablet; Take 1 tablet (500 mg total) by mouth daily at bedtime for 5 days    amitriptyline (ELAVIL) 10 mg tablet; Take 2 tablets (20 mg total) by mouth daily at bedtime    MRI brain without contrast; Future

## 2024-11-26 ENCOUNTER — HOSPITAL ENCOUNTER (EMERGENCY)
Facility: HOSPITAL | Age: 34
Discharge: HOME | End: 2024-11-26

## 2024-11-26 ENCOUNTER — OFFICE VISIT (OUTPATIENT)
Dept: GASTROENTEROLOGY | Facility: CLINIC | Age: 34
End: 2024-11-26

## 2024-11-26 ENCOUNTER — TELEPHONE (OUTPATIENT)
Age: 34
End: 2024-11-26

## 2024-11-26 ENCOUNTER — NURSE TRIAGE (OUTPATIENT)
Age: 34
End: 2024-11-26

## 2024-11-26 VITALS
HEART RATE: 77 BPM | DIASTOLIC BLOOD PRESSURE: 76 MMHG | RESPIRATION RATE: 16 BRPM | OXYGEN SATURATION: 98 % | SYSTOLIC BLOOD PRESSURE: 122 MMHG | TEMPERATURE: 98.2 F

## 2024-11-26 VITALS
WEIGHT: 195 LBS | BODY MASS INDEX: 24.25 KG/M2 | OXYGEN SATURATION: 97 % | SYSTOLIC BLOOD PRESSURE: 122 MMHG | DIASTOLIC BLOOD PRESSURE: 70 MMHG | HEART RATE: 84 BPM | TEMPERATURE: 97.5 F | HEIGHT: 75 IN

## 2024-11-26 DIAGNOSIS — R10.32 LEFT LOWER QUADRANT ABDOMINAL PAIN: Primary | ICD-10-CM

## 2024-11-26 DIAGNOSIS — J45.909 UNCONTROLLED ASTHMA: Primary | ICD-10-CM

## 2024-11-26 DIAGNOSIS — R19.4 CHANGE IN BOWEL HABITS: ICD-10-CM

## 2024-11-26 DIAGNOSIS — L02.91 ABSCESS: Primary | ICD-10-CM

## 2024-11-26 DIAGNOSIS — F17.211 CIGARETTE NICOTINE DEPENDENCE IN REMISSION: ICD-10-CM

## 2024-11-26 PROCEDURE — 99283 EMERGENCY DEPT VISIT LOW MDM: CPT

## 2024-11-26 PROCEDURE — 99284 EMERGENCY DEPT VISIT MOD MDM: CPT | Performed by: NURSE PRACTITIONER

## 2024-11-26 PROCEDURE — 99203 OFFICE O/P NEW LOW 30 MIN: CPT | Performed by: PHYSICIAN ASSISTANT

## 2024-11-26 PROCEDURE — 10060 I&D ABSCESS SIMPLE/SINGLE: CPT | Performed by: NURSE PRACTITIONER

## 2024-11-26 NOTE — TELEPHONE ENCOUNTER
Pt states last ov / he filled out application to see if he was eligible for Trelegy/ pt states that was 1 1/2 weeks ago. / he has not heard anything back/ and only has 1 day left of Trelegy.    Pt asking status of application.    Do you have any Trelegy samples to give him/ stating that is the only medication that seems to work for him.    Please advise patient w call back

## 2024-11-26 NOTE — ED PROVIDER NOTES
Time reflects when diagnosis was documented in both MDM as applicable and the Disposition within this note       Time User Action Codes Description Comment    11/26/2024  6:29 AM Jairon Clemons Add [L02.91] Abscess           ED Disposition       ED Disposition   Discharge    Condition   Stable    Date/Time   Tue Nov 26, 2024  6:29 AM    Comment   Khalif Hood discharge to home/self care.                   Assessment & Plan       Medical Decision Making  Successful drainage of small skin abscess.  Patient tolerated this well.  Continue his previously prescribed outpatient antibiotic.             Medications - No data to display    ED Risk Strat Scores                                               History of Present Illness       Chief Complaint   Patient presents with    Cyst     Cyst on the finger that needs lanced, started two days ago         Past Medical History:   Diagnosis Date    Asthma     as a child      Past Surgical History:   Procedure Laterality Date    INCISION AND DRAINAGE OF WOUND Left 06/28/2021    Procedure: INCISION AND DRAINAGE (I&D) LEFT SUBMANDIBULAR SPACE INFECTION, LEFT  SPACE INFECTION WITH EXTRACTION OF TOOTH #19;  Surgeon: Fabi Hood DMD;  Location:  MAIN OR;  Service: Maxillofacial    ORIF TIBIA & FIBULA FRACTURES Right     [facility] ARTHRS AIDED ANT CRUCIATE LIGM RPR/AGMNTJ/RCNSTJ Right 1/17/2024    Procedure: Right - knee arthroscopy Lateral meniscectomy Synovectomy ACL reconstruction with allograft using the Arthrex graft link system Post arthroscopic injection of intra-articular joint space and peripheral portals;  Surgeon: Harvinder Ga DO;  Location: [facility];  Service: Orthopedics    [facility] ARTHRS KNE SURG W/MENISCECTOMY MED/LAT W/SHVG Right 1/10/2023    Procedure: ARTHROSCOPY KNEE WITH LATERAL MENISCECTOMY;  Surgeon: Harvinder Ga DO;  Location: [facility];  Service: Orthopedics      No family history on file.   Social History     Tobacco Use    Smoking  status: Former     Current packs/day: 0.50     Types: Cigarettes    Smokeless tobacco: Never   Vaping Use    Vaping status: Former    Start date: 1/1/2023   Substance Use Topics    Alcohol use: Never    Drug use: Not Currently     Frequency: 7.0 times per week     Types: Marijuana     Comment: daily      E-Cigarette/Vaping    E-Cigarette Use Former User     Start Date 1/1/23     Cartridges/Day 0.5       E-Cigarette/Vaping Substances    Nicotine No     THC No     CBD No     Flavoring No     Other No     Unknown No       I have reviewed and agree with the history as documented.     34-year-old male patient presenting here with small skin abscess over his left ring finger.  Started develop a few days ago he was placed on amoxicillin and then it seemed to have organized over the dorsal aspect of the proximal phalanx.  It is fluctuant and will need to be lanced.          Review of Systems   Constitutional:  Negative for chills and fever.   HENT:  Negative for ear pain and sore throat.    Eyes:  Negative for pain and visual disturbance.   Respiratory:  Negative for cough and shortness of breath.    Cardiovascular:  Negative for chest pain and palpitations.   Gastrointestinal:  Negative for abdominal pain and vomiting.   Genitourinary:  Negative for dysuria and hematuria.   Musculoskeletal:  Negative for arthralgias and back pain.   Skin:  Positive for rash. Negative for color change.   Neurological:  Negative for seizures and syncope.   All other systems reviewed and are negative.          Objective       ED Triage Vitals [11/26/24 0621]   Temperature Pulse Blood Pressure Respirations SpO2 Patient Position - Orthostatic VS   98.2 °F (36.8 °C) 73 129/68 17 99 % Sitting      Temp Source Heart Rate Source BP Location FiO2 (%) Pain Score    Oral Monitor Right arm -- --      Vitals      Date and Time Temp Pulse SpO2 Resp BP Pain Score FACES Pain Rating User   11/26/24 0630 -- 77 98 % 16 122/76 -- -- KL   11/26/24 0621 98.2 °F  (36.8 °C) 73 99 % 17 129/68 -- -- KL            Physical Exam  Vitals and nursing note reviewed.   Constitutional:       General: He is not in acute distress.     Appearance: He is well-developed.   HENT:      Head: Normocephalic and atraumatic.      Nose: No rhinorrhea.   Eyes:      General:         Right eye: No discharge.         Left eye: No discharge.      Conjunctiva/sclera: Conjunctivae normal.   Cardiovascular:      Rate and Rhythm: Normal rate.   Pulmonary:      Effort: Pulmonary effort is normal. No respiratory distress.   Abdominal:      General: There is no distension.      Tenderness: There is no guarding.   Musculoskeletal:         General: No deformity.      Cervical back: Normal range of motion and neck supple.   Skin:     General: Skin is warm and dry.      Coloration: Skin is not pale.      Comments: Small skin abscess over left ring finger dorsally   Neurological:      Mental Status: He is alert and oriented to person, place, and time.      Coordination: Coordination normal.   Psychiatric:         Mood and Affect: Mood normal.         Results Reviewed       None            No orders to display       Incision and drain    Date/Time: 11/26/2024 6:49 AM    Performed by: SHELBY Mayer  Authorized by: SHELBY Mayer  Universal Protocol:  Consent: The procedure was performed in an emergent situation. Verbal consent obtained.  Required items: required blood products, implants, devices, and special equipment available  Patient identity confirmed: verbally with patient and arm band    Patient location:  ED  Location:     Type:  Abscess    Location:  Upper extremity    Upper extremity location:  L ring finger  Pre-procedure details:     Skin preparation:  Antiseptic wash  Anesthesia (see MAR for exact dosages):     Anesthesia method:  None  Procedure details:     Complexity:  Simple    Incision types:  Stab incision    Scalpel blade:  11    Drainage:  Purulent    Drainage amount:  Moderate    Wound  treatment:  Wound left open  Post-procedure details:     Patient tolerance of procedure:  Tolerated well, no immediate complications      ED Medication and Procedure Management   Prior to Admission Medications   Prescriptions Last Dose Informant Patient Reported? Taking?   Melatonin 10 MG TABS  Self No No   Sig: Take 1 tablet (10 mg total) by mouth daily at bedtime as needed (insomnia)   Omega-3 Fatty Acids (fish oil) 1,000 mg  Self Yes No   Sig: Take 1,000 mg by mouth daily   albuterol (Proventil HFA) 90 mcg/act inhaler  Self No No   Sig: Inhale 1-2 puffs every 6 (six) hours as needed for wheezing   amitriptyline (ELAVIL) 10 mg tablet   No No   Sig: Take 2 tablets (20 mg total) by mouth daily at bedtime   ascorbic acid (VITAMIN C) 250 mg tablet  Self Yes No   Sig: Take 250 mg by mouth daily   cholecalciferol (VITAMIN D3) 400 units tablet  Self Yes No   Sig: Take 400 Units by mouth daily   cyclobenzaprine (FLEXERIL) 5 mg tablet  Self No No   Sig: Take 1 tablet (5 mg total) by mouth 3 (three) times a day as needed for muscle spasms   fluticasone-umeclidinium-vilanterol (Trelegy Ellipta) 200-62.5-25 mcg/actuation AEPB inhaler  Self No No   Sig: Inhale 1 puff daily Rinse mouth after use.   hydrOXYzine HCL (ATARAX) 25 mg tablet  Self No No   Sig: Take 1 to 2 tablets by mouth every 6-8 hours as needed for anxiety.   ketorolac (TORADOL) 10 mg tablet  Self No No   Sig: Take 1 tablet (10 mg total) by mouth every 6 (six) hours as needed for moderate pain   meloxicam (MOBIC) 15 mg tablet   No No   Sig: Take 1 tablet (15 mg total) by mouth daily   methadone (DOLOPHINE) 10 mg/mL oral concentrated solution  Self Yes No   Sig: Take 25 mg by mouth every morning   vitamin E 100 UNIT capsule  Self Yes No   Sig: Take 100 Units by mouth daily      Facility-Administered Medications: None     Discharge Medication List as of 11/26/2024  6:33 AM        CONTINUE these medications which have NOT CHANGED    Details   albuterol (Proventil  HFA) 90 mcg/act inhaler Inhale 1-2 puffs every 6 (six) hours as needed for wheezing, Starting Mon 10/7/2024, Normal      amitriptyline (ELAVIL) 10 mg tablet Take 2 tablets (20 mg total) by mouth daily at bedtime, Starting Fri 11/22/2024, Normal      ascorbic acid (VITAMIN C) 250 mg tablet Take 250 mg by mouth daily, Historical Med      cholecalciferol (VITAMIN D3) 400 units tablet Take 400 Units by mouth daily, Historical Med      cyclobenzaprine (FLEXERIL) 5 mg tablet Take 1 tablet (5 mg total) by mouth 3 (three) times a day as needed for muscle spasms, Starting Sun 11/17/2024, Normal      fluticasone-umeclidinium-vilanterol (Trelegy Ellipta) 200-62.5-25 mcg/actuation AEPB inhaler Inhale 1 puff daily Rinse mouth after use., Starting Wed 11/13/2024, Until Fri 12/13/2024, Sample      hydrOXYzine HCL (ATARAX) 25 mg tablet Take 1 to 2 tablets by mouth every 6-8 hours as needed for anxiety., Normal      ketorolac (TORADOL) 10 mg tablet Take 1 tablet (10 mg total) by mouth every 6 (six) hours as needed for moderate pain, Starting Sun 11/17/2024, Normal      Melatonin 10 MG TABS Take 1 tablet (10 mg total) by mouth daily at bedtime as needed (insomnia), Starting u 10/31/2024, Normal      meloxicam (MOBIC) 15 mg tablet Take 1 tablet (15 mg total) by mouth daily, Starting Wed 1/17/2024, Until Mon 4/29/2024, Normal      methadone (DOLOPHINE) 10 mg/mL oral concentrated solution Take 25 mg by mouth every morning, Historical Med      Omega-3 Fatty Acids (fish oil) 1,000 mg Take 1,000 mg by mouth daily, Historical Med      vitamin E 100 UNIT capsule Take 100 Units by mouth daily, Historical Med      aspirin 325 mg tablet Take 1 tablet (325 mg total) by mouth daily, Starting Wed 1/17/2024, Until Mon 4/29/2024, Normal      divalproex sodium (Depakote) 500 mg DR tablet Take 1 tablet (500 mg total) by mouth daily at bedtime for 5 days, Starting Fri 11/22/2024, Until Wed 11/27/2024, Normal           No discharge procedures on  file.  ED SEPSIS DOCUMENTATION   Time reflects when diagnosis was documented in both MDM as applicable and the Disposition within this note       Time User Action Codes Description Comment    11/26/2024  6:29 AM Jairon Clemons Add [L02.91] Abscess                  SHELBY Mayer  11/26/24 1501

## 2024-11-26 NOTE — TELEPHONE ENCOUNTER
"Reason for Conversation: received call from pt.  He has been experiencing fluttering sensation and heart pounding that first started 5-6 weeks ago.  Pt was started on an albuterol inhaler when sx first started, and pulmonology d/c'd it due to sx being a side effect.  Pt was then started on Trelegy.  The first week of taking it pt had no symptoms.  Pt reports 5 days ago symptoms returned.  Pt called to schedule with cardiology for evaluation of symptoms.  Pt is scheduled 12/13 to see Dr. Eid.  I recommended pt contact his pulmonologist to see if symptoms are another side effect of Trelegy.  I advised pt if symptoms worsen or if he experiences chest pain/SOB to go to ED for evaluation.  He verbalized understanding.    VS/Weight: (Note: Please include date/time vitals/weight were measured)  HR trends 85-92 bpm    Pain: No    Risk Factors: Other asthma, GERD    Recent relevant testing and date of testing: Labs 11/17, ct ab/pelvi 11/17, cxr 10/31, ct head 11/17, ecg 10/31    Medication: amitriptyline, ketorolac, cyclobenzaprine, Trelegy, hydroxyzine hcl, melatonin, meloxicam    Upcoming Office Visit: Yes    Last Office Visit: n/a, new pt        Reason for Disposition   Palpitations are a chronic symptom (recurrent or ongoing AND present > 4 weeks)    Answer Assessment - Initial Assessment Questions  1. DESCRIPTION: \"Please describe your heart rate or heartbeat that you are having\" (e.g., fast/slow, regular/irregular, skipped or extra beats, \"palpitations\")      Chest pounding, fluttering after taking albuterol, switched to Trelegy- sx did resolve until 5 days ago  2. ONSET: \"When did it start?\" (e.g., minutes, hours, days)       5-6 weeks ago  3. DURATION: \"How long does it last\" (e.g., seconds, minutes, hours)      Few hours  4. PATTERN \"Does it come and go, or has it been constant since it started?\"  \"Does it get worse with exertion?\"   \"Are you feeling it now?\"      Comes and goes  6. HEART RATE: \"Can you tell me " "your heart rate?\" \"How many beats in 15 seconds?\"  Note: Not all patients can do this.        85-92 bpm  7. RECURRENT SYMPTOM: \"Have you ever had this before?\" If Yes, ask: \"When was the last time?\" and \"What happened that time?\"       5-6 weeks ago  8. CAUSE: \"What do you think is causing the palpitations?\"      Initially albuterol inhaler, now Trelegy   9. CARDIAC HISTORY: \"Do you have any history of heart disease?\" (e.g., heart attack, angina, bypass surgery, angioplasty, arrhythmia)       High BP at 13 years old- resolved  10. OTHER SYMPTOMS: \"Do you have any other symptoms?\" (e.g., dizziness, chest pain, sweating, difficulty breathing)        Chest pain, SOB- last Friday occurred. Headaches    Protocols used: Heart Rate and Heartbeat Questions-Adult-[place]    "

## 2024-11-26 NOTE — PROGRESS NOTES
Name: Khalif Hood      : 1990      MRN: [id number]  Encounter Provider: Adele Rockwell PA-C  Encounter Date: 2024   Encounter department: [facility] [place]  :  Assessment & Plan  Left lower quadrant abdominal pain    Change in bowel habits  Recently tending towards constipation with 3-4 days going between the bowel movements  Stools are smaller and thinner than previously    In October he started with asthma  The inhalers given to treat this induced headaches  He was therefore using NSAIDs and Tylenol on a frequent basis  This induced abdominal pain and constipation    His asthma is now under control with Trelegy  His headaches are seeming to improve with amitriptyline    Suspect his change in bowel movements is related to recent medical issues and medicines including OTC NSAIDs and Tylenol  Symptoms seem to be improving as he notes he has not had pain in a few days  He had a good bowel movement on     Advised MiraLAX 1 capful daily  Continue increased fiber diet  Minimum 64 ounces of water daily  Hopefully he will be able to increase his activity levels again now that he is feeling better    Advised to call if symptoms do not continue to improve  We could plan further testing with labs, EGD and colonoscopy    Advised to try to avoid NSAIDs  If needed he should take them with food and take a Pepcid        History of Present Illness     HPI  Khalif Hood is a 34 y.o. male with a recent history of asthma and headaches who presents for evaluation of abdominal pain and change in bowel habits.  He reports that he was in his usual state of good health until mid October.  He reports he was smoking a cigarette when he developed severe chest pain and shortness of breath.  He went to the emergency room and was diagnosed with asthma.  He was given several inhalers which were not helping and so he eventually followed up with pulmonology.  Pulmonology  prescribed Trelegy.  He reports that since taking Trelegy he is feeling much better however he started to develop headaches.  Because of the headaches he started to take significant amounts of aspirin, ibuprofen and Tylenol.  Once he started doing this he developed abdominal pain and constipation.  He reports that prior to any of this medication he was having daily predictable spontaneous complete bowel movements.  He reports that he never had any concerns about his bowel movements previously.  He has had no rectal bleeding.  He said no nausea or vomiting.  He is not losing weight.  He reports that he feels as if he is eating more food now than he did before.  He reports that he has been using Dulcolax as needed for the constipation.  This does help but typically will induce diarrhea the following morning.  His last bowel movement was this past Sunday which she reports was thin and smaller than his normal bowel movements.  He has not felt the urge to have a bowel movement since that time.  Since his episode in October he has completely stopped smoking cigarettes.  He also stopped smoking marijuana.  He does maintain on methadone but is tapering off of this.  He has been on this medication for quite some time.  He has no known family history of any serious gastrointestinal disorders.  History obtained from: patient    Review of Systems   Constitutional:  Positive for activity change and appetite change. Negative for fatigue, fever and unexpected weight change.   Respiratory:  Positive for shortness of breath and wheezing. Negative for cough.    Gastrointestinal:  Positive for abdominal pain and constipation. Negative for abdominal distention, blood in stool, diarrhea, nausea and vomiting.   Endocrine: Negative for cold intolerance and heat intolerance.   Genitourinary:  Negative for dysuria, flank pain and hematuria.   Neurological:  Positive for headaches. Negative for dizziness and numbness.     Medical History  Reviewed by provider this encounter:  Meds     .  Current Outpatient Medications on File Prior to Visit   Medication Sig Dispense Refill    albuterol (Proventil HFA) 90 mcg/act inhaler Inhale 1-2 puffs every 6 (six) hours as needed for wheezing 18 g 2    amitriptyline (ELAVIL) 10 mg tablet Take 2 tablets (20 mg total) by mouth daily at bedtime 60 tablet 3    ascorbic acid (VITAMIN C) 250 mg tablet Take 250 mg by mouth daily      cholecalciferol (VITAMIN D3) 400 units tablet Take 400 Units by mouth daily      cyclobenzaprine (FLEXERIL) 5 mg tablet Take 1 tablet (5 mg total) by mouth 3 (three) times a day as needed for muscle spasms 12 tablet 0    fluticasone-umeclidinium-vilanterol (Trelegy Ellipta) 200-62.5-25 mcg/actuation AEPB inhaler Inhale 1 puff daily Rinse mouth after use. 60 blister 0    hydrOXYzine HCL (ATARAX) 25 mg tablet Take 1 to 2 tablets by mouth every 6-8 hours as needed for anxiety. 30 tablet 0    ketorolac (TORADOL) 10 mg tablet Take 1 tablet (10 mg total) by mouth every 6 (six) hours as needed for moderate pain 10 tablet 0    Melatonin 10 MG TABS Take 1 tablet (10 mg total) by mouth daily at bedtime as needed (insomnia) 20 tablet 0    meloxicam (MOBIC) 15 mg tablet Take 1 tablet (15 mg total) by mouth daily 30 tablet 0    Omega-3 Fatty Acids (fish oil) 1,000 mg Take 1,000 mg by mouth daily      vitamin E 100 UNIT capsule Take 100 Units by mouth daily      methadone (DOLOPHINE) 10 mg/mL oral concentrated solution Take 25 mg by mouth every morning      [DISCONTINUED] aspirin 325 mg tablet Take 1 tablet (325 mg total) by mouth daily (Patient not taking: Reported on 4/29/2024) 30 tablet 0    [DISCONTINUED] divalproex sodium (Depakote) 500 mg DR tablet Take 1 tablet (500 mg total) by mouth daily at bedtime for 5 days (Patient not taking: Reported on 11/26/2024) 5 tablet 0     No current facility-administered medications on file prior to visit.         Objective   /70 (BP Location: Left arm,  "Patient Position: Sitting, Cuff Size: Standard)   Pulse 84   Temp 97.5 °F (36.4 °C) (Temporal)   Ht 6' 3\" (1.905 m)   Wt 88.5 kg (195 lb)   SpO2 97%   BMI 24.37 kg/m²      Physical Exam  Vitals reviewed.   Constitutional:       Appearance: Normal appearance.   HENT:      Head: Normocephalic and atraumatic.   Eyes:      Extraocular Movements: Extraocular movements intact.      Pupils: Pupils are equal, round, and reactive to light.   Cardiovascular:      Rate and Rhythm: Normal rate and regular rhythm.   Abdominal:      General: Bowel sounds are normal. There is no distension.      Palpations: Abdomen is soft. There is no mass.      Tenderness: There is abdominal tenderness in the left lower quadrant.   Skin:     General: Skin is warm and dry.      Coloration: Skin is not jaundiced.   Neurological:      General: No focal deficit present.      Mental Status: He is alert and oriented to person, place, and time.           "

## 2024-11-27 ENCOUNTER — APPOINTMENT (EMERGENCY)
Dept: RADIOLOGY | Facility: HOSPITAL | Age: 34
End: 2024-11-27

## 2024-11-27 ENCOUNTER — HOSPITAL ENCOUNTER (EMERGENCY)
Facility: HOSPITAL | Age: 34
Discharge: HOME | End: 2024-11-27
Attending: EMERGENCY MEDICINE

## 2024-11-27 VITALS
WEIGHT: 200 LBS | OXYGEN SATURATION: 96 % | HEIGHT: 75 IN | SYSTOLIC BLOOD PRESSURE: 112 MMHG | BODY MASS INDEX: 24.87 KG/M2 | RESPIRATION RATE: 17 BRPM | TEMPERATURE: 98.4 F | HEART RATE: 73 BPM | DIASTOLIC BLOOD PRESSURE: 56 MMHG

## 2024-11-27 DIAGNOSIS — R00.2 PALPITATIONS: Primary | ICD-10-CM

## 2024-11-27 LAB
ALBUMIN SERPL BCG-MCNC: 4.5 G/DL (ref 3.5–5)
ALP SERPL-CCNC: 66 U/L (ref 34–104)
ALT SERPL W P-5'-P-CCNC: 14 U/L (ref 7–52)
ANION GAP SERPL CALCULATED.3IONS-SCNC: 7 MMOL/L (ref 4–13)
AST SERPL W P-5'-P-CCNC: 16 U/L (ref 13–39)
BASOPHILS # BLD AUTO: 0.05 THOUSANDS/ΜL (ref 0–0.1)
BASOPHILS NFR BLD AUTO: 1 % (ref 0–1)
BILIRUB SERPL-MCNC: 0.28 MG/DL (ref 0.2–1)
BUN SERPL-MCNC: 27 MG/DL (ref 5–25)
CALCIUM SERPL-MCNC: 9.1 MG/DL (ref 8.4–10.2)
CARDIAC TROPONIN I PNL SERPL HS: <2 NG/L (ref ?–50)
CHLORIDE SERPL-SCNC: 103 MMOL/L (ref 96–108)
CO2 SERPL-SCNC: 27 MMOL/L (ref 21–32)
CREAT SERPL-MCNC: 1.12 MG/DL (ref 0.6–1.3)
D DIMER PPP FEU-MCNC: <0.27 UG/ML FEU
EOSINOPHIL # BLD AUTO: 0.12 THOUSAND/ΜL (ref 0–0.61)
EOSINOPHIL NFR BLD AUTO: 1 % (ref 0–6)
ERYTHROCYTE [DISTWIDTH] IN BLOOD BY AUTOMATED COUNT: 12.9 % (ref 11.6–15.1)
GFR SERPL CREATININE-BSD FRML MDRD: 84 ML/MIN/1.73SQ M
GLUCOSE SERPL-MCNC: 107 MG/DL (ref 65–140)
HCT VFR BLD AUTO: 44.4 % (ref 36.5–49.3)
HGB BLD-MCNC: 14.5 G/DL (ref 12–17)
IMM GRANULOCYTES # BLD AUTO: 0.03 THOUSAND/UL (ref 0–0.2)
IMM GRANULOCYTES NFR BLD AUTO: 0 % (ref 0–2)
LYMPHOCYTES # BLD AUTO: 2.49 THOUSANDS/ΜL (ref 0.6–4.47)
LYMPHOCYTES NFR BLD AUTO: 24 % (ref 14–44)
MCH RBC QN AUTO: 29.5 PG (ref 26.8–34.3)
MCHC RBC AUTO-ENTMCNC: 32.7 G/DL (ref 31.4–37.4)
MCV RBC AUTO: 90 FL (ref 82–98)
MONOCYTES # BLD AUTO: 0.89 THOUSAND/ΜL (ref 0.17–1.22)
MONOCYTES NFR BLD AUTO: 9 % (ref 4–12)
NEUTROPHILS # BLD AUTO: 6.79 THOUSANDS/ΜL (ref 1.85–7.62)
NEUTS SEG NFR BLD AUTO: 65 % (ref 43–75)
NRBC BLD AUTO-RTO: 0 /100 WBCS
PLATELET # BLD AUTO: 202 THOUSANDS/UL (ref 149–390)
PMV BLD AUTO: 10.4 FL (ref 8.9–12.7)
POTASSIUM SERPL-SCNC: 4.3 MMOL/L (ref 3.5–5.3)
PROT SERPL-MCNC: 6.9 G/DL (ref 6.4–8.4)
RBC # BLD AUTO: 4.92 MILLION/UL (ref 3.88–5.62)
SODIUM SERPL-SCNC: 137 MMOL/L (ref 135–147)
TSH SERPL DL<=0.05 MIU/L-ACNC: 4.03 UIU/ML (ref 0.45–4.5)
WBC # BLD AUTO: 10.37 THOUSAND/UL (ref 4.31–10.16)

## 2024-11-27 PROCEDURE — 99285 EMERGENCY DEPT VISIT HI MDM: CPT

## 2024-11-27 PROCEDURE — 36415 COLL VENOUS BLD VENIPUNCTURE: CPT | Performed by: EMERGENCY MEDICINE

## 2024-11-27 PROCEDURE — 93005 ELECTROCARDIOGRAM TRACING: CPT

## 2024-11-27 PROCEDURE — 85379 FIBRIN DEGRADATION QUANT: CPT | Performed by: EMERGENCY MEDICINE

## 2024-11-27 PROCEDURE — 80053 COMPREHEN METABOLIC PANEL: CPT | Performed by: EMERGENCY MEDICINE

## 2024-11-27 PROCEDURE — 84443 ASSAY THYROID STIM HORMONE: CPT | Performed by: EMERGENCY MEDICINE

## 2024-11-27 PROCEDURE — 84484 ASSAY OF TROPONIN QUANT: CPT | Performed by: EMERGENCY MEDICINE

## 2024-11-27 PROCEDURE — 99285 EMERGENCY DEPT VISIT HI MDM: CPT | Performed by: EMERGENCY MEDICINE

## 2024-11-27 PROCEDURE — 85025 COMPLETE CBC W/AUTO DIFF WBC: CPT | Performed by: EMERGENCY MEDICINE

## 2024-11-27 PROCEDURE — 71045 X-RAY EXAM CHEST 1 VIEW: CPT

## 2024-11-27 RX ORDER — SODIUM CHLORIDE 9 MG/ML
3 INJECTION INTRAVENOUS
Status: DISCONTINUED | OUTPATIENT
Start: 2024-11-27 | End: 2024-11-27 | Stop reason: HOSPADM

## 2024-11-28 LAB
ATRIAL RATE: 76 BPM
P AXIS: 75 DEGREES
PR INTERVAL: 138 MS
QRS AXIS: 38 DEGREES
QRSD INTERVAL: 88 MS
QT INTERVAL: 364 MS
QTC INTERVAL: 409 MS
T WAVE AXIS: 50 DEGREES
VENTRICULAR RATE: 76 BPM

## 2024-11-28 PROCEDURE — 93010 ELECTROCARDIOGRAM REPORT: CPT | Performed by: INTERNAL MEDICINE

## 2024-11-28 NOTE — ED PROVIDER NOTES
Time reflects when diagnosis was documented in both MDM as applicable and the Disposition within this note       Time User Action Codes Description Comment    11/27/2024  5:35 PM Lexx Isac Add [R00.2] Palpitations           ED Disposition       ED Disposition   Discharge    Condition   Stable    Date/Time   Wed Nov 27, 2024  5:35 PM    Comment   Khalif Hood discharge to home/self care.                   Assessment & Plan       Medical Decision Making  No radiation to the back. No tearing pain going to the back. Normal bilat UE pulses.   No pleuritic pain. No history of PE. No new unilateral leg swelling.  Non exertional. No sweats. No right sided pain. No vomiting.   No fever or cough to suggest pneumonia.   No  vomiting or subcue crepitus.   Equal breath sounds.   No skin rash.   No syncope.    No new murmur to suggest symptomatic valvular stenosis/ regurg or ruptured chordae.     Vitals do not suggest tamponade.   No palpable crepitus on exam.   No recent viral syndromes to suggest Lynn/Myocarditis.  Patient's symptoms started after starting Trelegy.  Discussed starting a conversation with pulmonologist about if this is a reasonable side effect expectation.  Patient likely also has some underlying anxiety as he starts to think about his symptoms and then they become worse.    Discussed warning signs and symptoms with the patient as well as when to return to the emergency department versus follow up with [facility]. Patient states understanding and agreement with the plan..      This note was completed using dictation software.       Amount and/or Complexity of Data Reviewed  External Data Reviewed: notes.  Labs: ordered.  Radiology: ordered and independent interpretation performed.  ECG/medicine tests: ordered and independent interpretation performed.    Risk  OTC drugs.  Prescription drug management.             Medications - No data to display    ED Risk Strat Scores                           SBIRT 20yo+       Flowsheet Row Most Recent Value   Initial Alcohol Screen: US AUDIT-C     1. How often do you have a drink containing alcohol? 0 Filed at: 11/27/2024 1645   2. How many drinks containing alcohol do you have on a typical day you are drinking?  0 Filed at: 11/27/2024 1645   3a. Male UNDER 65: How often do you have five or more drinks on one occasion? 0 Filed at: 11/27/2024 1645   3b. FEMALE Any Age, or MALE 65+: How often do you have 4 or more drinks on one occassion? 0 Filed at: 11/27/2024 1645   Audit-C Score 0 Filed at: 11/27/2024 1645   ABIODUN: How many times in the past year have you...    Used an illegal drug or used a prescription medication for non-medical reasons? Never Filed at: 11/27/2024 1645                            History of Present Illness       Chief Complaint   Patient presents with    Palpitations     For the past few days       Past Medical History:   Diagnosis Date    Asthma     as a child      Past Surgical History:   Procedure Laterality Date    INCISION AND DRAINAGE OF WOUND Left 06/28/2021    Procedure: INCISION AND DRAINAGE (I&D) LEFT SUBMANDIBULAR SPACE INFECTION, LEFT  SPACE INFECTION WITH EXTRACTION OF TOOTH #19;  Surgeon: Fabi Hood DMD;  Location:  MAIN OR;  Service: Maxillofacial    ORIF TIBIA & FIBULA FRACTURES Right     NH ARTHRS AIDED ANT CRUCIATE LIGM RPR/AGMNTJ/RCNSTJ Right 1/17/2024    Procedure: Right - knee arthroscopy Lateral meniscectomy Synovectomy ACL reconstruction with allograft using the Arthrex graft link system Post arthroscopic injection of intra-articular joint space and peripheral portals;  Surgeon: Harvinder Ga DO;  Location: [facility];  Service: Orthopedics    NH ARTHRS KNE SURG W/MENISCECTOMY MED/LAT W/SHVG Right 1/10/2023    Procedure: ARTHROSCOPY KNEE WITH LATERAL MENISCECTOMY;  Surgeon: Harvinder Ga DO;  Location: [facility];  Service: Orthopedics      History reviewed. No pertinent family history.   Social History      Tobacco Use    Smoking status: Former     Current packs/day: 0.50     Types: Cigarettes    Smokeless tobacco: Never   Vaping Use    Vaping status: Former    Start date: 1/1/2023   Substance Use Topics    Alcohol use: Never    Drug use: Not Currently     Frequency: 7.0 times per week     Types: Marijuana     Comment: daily      E-Cigarette/Vaping    E-Cigarette Use Former User     Start Date 1/1/23     Cartridges/Day 0.5       E-Cigarette/Vaping Substances    Nicotine No     THC No     CBD No     Flavoring No     Other No     Unknown No       I have reviewed and agree with the history as documented.     34-year-old male reports episodes of palpitations, chest tightness and dyspnea.  Reports he is having them currently.  States he will have episodes after he starts thinking about his breathing or noticing an odd beat in his chest.  States that he focuses more continues to worsen.  States is especially worsened since starting Trelegy medication for has lung conditions.  No family history of early cardiac deaths.  No risk factors for DVT or PE.  No trauma to his chest recently.  Patient does note that he drinks massive amounts of caffeine per day.        Review of Systems   Constitutional:  Negative for chills and fever.   Eyes:  Negative for visual disturbance.   Respiratory:  Positive for chest tightness and shortness of breath.    Cardiovascular:  Positive for palpitations. Negative for chest pain and leg swelling.   Gastrointestinal:  Negative for abdominal distention and abdominal pain.   Endocrine: Negative for polyuria.   Genitourinary:  Negative for decreased urine volume and dysuria.   Skin:  Negative for pallor.   Neurological:  Negative for dizziness, syncope and weakness.           Objective       ED Triage Vitals   Temperature Pulse Blood Pressure Respirations SpO2 Patient Position - Orthostatic VS   11/27/24 1639 11/27/24 1639 11/27/24 1639 11/27/24 1639 11/27/24 1639 11/27/24 1730   98.4 °F (36.9 °C)  82 135/79 18 97 % Sitting      Temp Source Heart Rate Source BP Location FiO2 (%) Pain Score    11/27/24 1639 11/27/24 1639 11/27/24 1730 -- 11/27/24 1639    Oral Monitor Left arm  1      Vitals      Date and Time Temp Pulse SpO2 Resp BP Pain Score FACES Pain Rating User   11/27/24 1730 -- 73 96 % 17 112/56 -- -- AMF   11/27/24 1639 98.4 °F (36.9 °C) 82 97 % 18 135/79 1 -- JCS            Physical Exam  Constitutional:       General: He is not in acute distress.     Appearance: He is well-developed. He is not ill-appearing, toxic-appearing or diaphoretic.   HENT:      Head: Normocephalic and atraumatic.      Right Ear: External ear normal.      Left Ear: External ear normal.      Nose: Nose normal.      Mouth/Throat:      Mouth: Mucous membranes are moist.      Pharynx: Oropharynx is clear.   Eyes:      Conjunctiva/sclera: Conjunctivae normal.      Pupils: Pupils are equal, round, and reactive to light.   Cardiovascular:      Rate and Rhythm: Normal rate and regular rhythm.      Heart sounds: Normal heart sounds.   Pulmonary:      Effort: Pulmonary effort is normal. No respiratory distress.      Breath sounds: Normal breath sounds. No stridor. No wheezing, rhonchi or rales.   Chest:      Chest wall: No tenderness.   Abdominal:      General: Bowel sounds are normal.      Palpations: Abdomen is soft.   Musculoskeletal:         General: Normal range of motion.      Cervical back: Normal range of motion and neck supple.   Skin:     General: Skin is warm and dry.   Neurological:      General: No focal deficit present.      Mental Status: He is alert and oriented to person, place, and time.   Psychiatric:      Comments: Anxious mood and affect         Results Reviewed       Procedure Component Value Units Date/Time    D-dimer, quantitative [[id number]]  (Normal) Collected: 11/27/24 1652    Lab Status: Final result Specimen: Blood from Arm, Right Updated: 11/27/24 1734     D-Dimer, Quant <0.27 ug/ml FEU     TSH, 3rd  generation [[id number]]  (Normal) Collected: 11/27/24 1652    Lab Status: Final result Specimen: Blood from Arm, Right Updated: 11/27/24 1730     TSH 3RD GENERATON 4.033 uIU/mL     Comprehensive metabolic panel [[id number]]  (Abnormal) Collected: 11/27/24 1652    Lab Status: Final result Specimen: Blood from Arm, Right Updated: 11/27/24 1724     Sodium 137 mmol/L      Potassium 4.3 mmol/L      Chloride 103 mmol/L      CO2 27 mmol/L      ANION GAP 7 mmol/L      BUN 27 mg/dL      Creatinine 1.12 mg/dL      Glucose 107 mg/dL      Calcium 9.1 mg/dL      AST 16 U/L      ALT 14 U/L      Alkaline Phosphatase 66 U/L      Total Protein 6.9 g/dL      Albumin 4.5 g/dL      Total Bilirubin 0.28 mg/dL      eGFR 84 ml/min/1.73sq m     Narrative:      National Kidney Disease Foundation guidelines for Chronic Kidney Disease (CKD):     Stage 1 with normal or high GFR (GFR > 90 mL/min/1.73 square meters)    Stage 2 Mild CKD (GFR = 60-89 mL/min/1.73 square meters)    Stage 3A Moderate CKD (GFR = 45-59 mL/min/1.73 square meters)    Stage 3B Moderate CKD (GFR = 30-44 mL/min/1.73 square meters)    Stage 4 Severe CKD (GFR = 15-29 mL/min/1.73 square meters)    Stage 5 End Stage CKD (GFR <15 mL/min/1.73 square meters)  Note: GFR calculation is accurate only with a steady state creatinine    HS Troponin 0hr (reflex protocol) [[id number]]  (Normal) Collected: 11/27/24 1652    Lab Status: Final result Specimen: Blood from Arm, Right Updated: 11/27/24 1723     hs TnI 0hr <2 ng/L     CBC and differential [[id number]]  (Abnormal) Collected: 11/27/24 1652    Lab Status: Final result Specimen: Blood from Arm, Right Updated: 11/27/24 1658     WBC 10.37 Thousand/uL      RBC 4.92 Million/uL      Hemoglobin 14.5 g/dL      Hematocrit 44.4 %      MCV 90 fL      MCH 29.5 pg      MCHC 32.7 g/dL      RDW 12.9 %      MPV 10.4 fL      Platelets 202 Thousands/uL      nRBC 0 /100 WBCs      Segmented % 65 %      Immature Grans % 0 %      Lymphocytes % 24 %       Monocytes % 9 %      Eosinophils Relative 1 %      Basophils Relative 1 %      Absolute Neutrophils 6.79 Thousands/µL      Absolute Immature Grans 0.03 Thousand/uL      Absolute Lymphocytes 2.49 Thousands/µL      Absolute Monocytes 0.89 Thousand/µL      Eosinophils Absolute 0.12 Thousand/µL      Basophils Absolute 0.05 Thousands/µL             X-ray chest 1 view portable   ED Interpretation by Isac Hallman MD (11/27 1752)   Naf. RLL looks similar to previous        Final Interpretation by Franco Crook MD (11/27 1757)      No acute cardiopulmonary disease.            Workstation performed: [id number]             ECG 12 Lead Documentation Only    Date/Time: 11/28/2024 11:58 PM    Performed by: Isac Hallman MD  Authorized by: Isac Hallman MD    ECG reviewed by me, the ED Provider: yes    Patient location:  ED  Previous ECG:     Comparison to cardiac monitor: Yes    Interpretation:     Interpretation: normal    Rate:     ECG rate assessment: normal    Rhythm:     Rhythm: sinus rhythm    Ectopy:     Ectopy: none    QRS:     QRS axis:  Normal  Conduction:     Conduction: normal    ST segments:     ST segments:  Normal  T waves:     T waves: normal        ED Medication and Procedure Management   Prior to Admission Medications   Prescriptions Last Dose Informant Patient Reported? Taking?   Melatonin 10 MG TABS  Self No No   Sig: Take 1 tablet (10 mg total) by mouth daily at bedtime as needed (insomnia)   Omega-3 Fatty Acids (fish oil) 1,000 mg  Self Yes No   Sig: Take 1,000 mg by mouth daily   albuterol (Proventil HFA) 90 mcg/act inhaler  Self No No   Sig: Inhale 1-2 puffs every 6 (six) hours as needed for wheezing   amitriptyline (ELAVIL) 10 mg tablet   No No   Sig: Take 2 tablets (20 mg total) by mouth daily at bedtime   ascorbic acid (VITAMIN C) 250 mg tablet  Self Yes No   Sig: Take 250 mg by mouth daily   cholecalciferol (VITAMIN D3) 400 units tablet  Self Yes No   Sig: Take 400 Units by mouth daily    cyclobenzaprine (FLEXERIL) 5 mg tablet  Self No No   Sig: Take 1 tablet (5 mg total) by mouth 3 (three) times a day as needed for muscle spasms   fluticasone-umeclidinium-vilanterol (Trelegy Ellipta) 200-62.5-25 mcg/actuation AEPB inhaler  Self No No   Sig: Inhale 1 puff daily Rinse mouth after use.   hydrOXYzine HCL (ATARAX) 25 mg tablet  Self No No   Sig: Take 1 to 2 tablets by mouth every 6-8 hours as needed for anxiety.   ketorolac (TORADOL) 10 mg tablet  Self No No   Sig: Take 1 tablet (10 mg total) by mouth every 6 (six) hours as needed for moderate pain   meloxicam (MOBIC) 15 mg tablet   No No   Sig: Take 1 tablet (15 mg total) by mouth daily   methadone (DOLOPHINE) 10 mg/mL oral concentrated solution  Self Yes No   Sig: Take 25 mg by mouth every morning   vitamin E 100 UNIT capsule  Self Yes No   Sig: Take 100 Units by mouth daily      Facility-Administered Medications: None     Discharge Medication List as of 11/27/2024  5:36 PM        CONTINUE these medications which have NOT CHANGED    Details   albuterol (Proventil HFA) 90 mcg/act inhaler Inhale 1-2 puffs every 6 (six) hours as needed for wheezing, Starting Mon 10/7/2024, Normal      amitriptyline (ELAVIL) 10 mg tablet Take 2 tablets (20 mg total) by mouth daily at bedtime, Starting Fri 11/22/2024, Normal      ascorbic acid (VITAMIN C) 250 mg tablet Take 250 mg by mouth daily, Historical Med      cholecalciferol (VITAMIN D3) 400 units tablet Take 400 Units by mouth daily, Historical Med      cyclobenzaprine (FLEXERIL) 5 mg tablet Take 1 tablet (5 mg total) by mouth 3 (three) times a day as needed for muscle spasms, Starting Sun 11/17/2024, Normal      fluticasone-umeclidinium-vilanterol (Trelegy Ellipta) 200-62.5-25 mcg/actuation AEPB inhaler Inhale 1 puff daily Rinse mouth after use., Starting Wed 11/13/2024, Until Fri 12/13/2024, Sample      hydrOXYzine HCL (ATARAX) 25 mg tablet Take 1 to 2 tablets by mouth every 6-8 hours as needed for anxiety.,  Normal      ketorolac (TORADOL) 10 mg tablet Take 1 tablet (10 mg total) by mouth every 6 (six) hours as needed for moderate pain, Starting Sun 11/17/2024, Normal      Melatonin 10 MG TABS Take 1 tablet (10 mg total) by mouth daily at bedtime as needed (insomnia), Starting Thu 10/31/2024, Normal      meloxicam (MOBIC) 15 mg tablet Take 1 tablet (15 mg total) by mouth daily, Starting Wed 1/17/2024, Until Tue 11/26/2024, Normal      methadone (DOLOPHINE) 10 mg/mL oral concentrated solution Take 25 mg by mouth every morning, Historical Med      Omega-3 Fatty Acids (fish oil) 1,000 mg Take 1,000 mg by mouth daily, Historical Med      vitamin E 100 UNIT capsule Take 100 Units by mouth daily, Historical Med           No discharge procedures on file.  ED SEPSIS DOCUMENTATION   Time reflects when diagnosis was documented in both MDM as applicable and the Disposition within this note       Time User Action Codes Description Comment    11/27/2024  5:35 PM Isac Hallman Add [R00.2] Palpitations                  Isac Hallman MD  11/29/24 0000

## 2024-11-29 RX ORDER — FLUTICASONE FUROATE, UMECLIDINIUM BROMIDE AND VILANTEROL TRIFENATATE 200; 62.5; 25 UG/1; UG/1; UG/1
1 POWDER RESPIRATORY (INHALATION) DAILY
Qty: 180 BLISTER | Refills: 0 | Status: SHIPPED | COMMUNITY
Start: 2024-11-29 | End: 2024-12-29

## 2024-12-03 ENCOUNTER — HOSPITAL ENCOUNTER (EMERGENCY)
Facility: HOSPITAL | Age: 34
Discharge: HOME | End: 2024-12-03

## 2024-12-03 VITALS
SYSTOLIC BLOOD PRESSURE: 121 MMHG | TEMPERATURE: 98.3 F | RESPIRATION RATE: 17 BRPM | OXYGEN SATURATION: 100 % | HEART RATE: 90 BPM | DIASTOLIC BLOOD PRESSURE: 80 MMHG

## 2024-12-03 DIAGNOSIS — Z76.0 MEDICATION REFILL: Primary | ICD-10-CM

## 2024-12-03 PROCEDURE — 99282 EMERGENCY DEPT VISIT SF MDM: CPT

## 2024-12-03 PROCEDURE — 99284 EMERGENCY DEPT VISIT MOD MDM: CPT

## 2024-12-03 RX ORDER — METHADONE HYDROCHLORIDE 10 MG/ML
19 CONCENTRATE ORAL ONCE
Refills: 0 | Status: COMPLETED | OUTPATIENT
Start: 2024-12-03 | End: 2024-12-03

## 2024-12-03 RX ADMIN — METHADONE HYDROCHLORIDE 19 MG: 10 CONCENTRATE ORAL at 09:53

## 2024-12-03 NOTE — ED PROVIDER NOTES
Time reflects when diagnosis was documented in both MDM as applicable and the Disposition within this note       Time User Action Codes Description Comment    12/3/2024  9:41 AM Jenn Encinas Add [Z76.0] Medication refill           ED Disposition       ED Disposition   Discharge    Condition   Stable    Date/Time   Tue Dec 3, 2024  9:41 AM    Comment   Khalif Hood discharge to home/self care.                   Assessment & Plan       Medical Decision Making  34-year-old male patient presents to the ER for evaluation of methadone dosage.  Patient reports that [facility] was unable to prescribe his methadone dose.  Called supervisor and spoke with Nicole who verified patient's methadone dosage of 19 mg.  Patient has been tapering off of his methadone.  He has no complaints and has had no history of medication side effects.    Risk  Prescription drug management.        ED Course as of 12/03/24 0953   Tue Dec 03, 2024   0942 Dosage verified with Nicole, supervisor at [facility].       Medications   methadone (DOLOPHINE) oral concentrated solution 19 mg (has no administration in time range)       ED Risk Strat Scores                                               History of Present Illness       Chief Complaint   Patient presents with    Medication Refill     Sent here from clinic for Methadone, reports clinic does not have a nurse today.        Past Medical History:   Diagnosis Date    Asthma     as a child      Past Surgical History:   Procedure Laterality Date    INCISION AND DRAINAGE OF WOUND Left 06/28/2021    Procedure: INCISION AND DRAINAGE (I&D) LEFT SUBMANDIBULAR SPACE INFECTION, LEFT  SPACE INFECTION WITH EXTRACTION OF TOOTH #19;  Surgeon: Fabi Hood DMD;  Location: [facility];  Service: Maxillofacial    ORIF TIBIA & FIBULA FRACTURES Right     AR ARTHRS AIDED ANT CRUCIATE LIGM RPR/AGMNTJ/RCNSTJ Right 1/17/2024    Procedure: Right - knee arthroscopy Lateral meniscectomy Synovectomy ACL  reconstruction with allograft using the Arthrex graft link system Post arthroscopic injection of intra-articular joint space and peripheral portals;  Surgeon: Harvinder Ga DO;  Location: [facility];  Service: Orthopedics    [place] ARTHRS KNE SURG W/MENISCECTOMY MED/LAT W/SHVG Right 1/10/2023    Procedure: ARTHROSCOPY KNEE WITH LATERAL MENISCECTOMY;  Surgeon: Harvinder Ga DO;  Location: [facility];  Service: Orthopedics      History reviewed. No pertinent family history.   Social History     Tobacco Use    Smoking status: Former     Current packs/day: 0.50     Types: Cigarettes    Smokeless tobacco: Never   Vaping Use    Vaping status: Former    Start date: 1/1/2023   Substance Use Topics    Alcohol use: Never    Drug use: Not Currently     Frequency: 7.0 times per week     Types: Marijuana     Comment: daily      E-Cigarette/Vaping    E-Cigarette Use Former User     Start Date 1/1/23     Cartridges/Day 0.5       E-Cigarette/Vaping Substances    Nicotine No     THC No     CBD No     Flavoring No     Other No     Unknown No       I have reviewed and agree with the history as documented.     35 y/o male patient presents to the ER for evaluation of medication dosage.          Review of Systems   Constitutional:  Negative for chills and fever.   HENT:  Negative for ear pain and sore throat.    Eyes:  Negative for pain and visual disturbance.   Respiratory:  Negative for cough and shortness of breath.    Cardiovascular:  Negative for chest pain and palpitations.   Gastrointestinal:  Negative for abdominal pain and vomiting.   Genitourinary:  Negative for dysuria and hematuria.   Musculoskeletal:  Negative for arthralgias and back pain.   Skin:  Negative for color change and rash.   Neurological:  Negative for seizures and syncope.   All other systems reviewed and are negative.          Objective       ED Triage Vitals [12/03/24 0928]   Temperature Pulse Blood Pressure Respirations SpO2 Patient Position -  Orthostatic VS   98.3 °F (36.8 °C) 90 121/80 17 100 % Sitting      Temp Source Heart Rate Source BP Location FiO2 (%) Pain Score    Oral Monitor Left arm -- --      Vitals      Date and Time Temp Pulse SpO2 Resp BP Pain Score FACES Pain Rating User   12/03/24 0928 98.3 °F (36.8 °C) 90 100 % 17 121/80 -- -- LF            Physical Exam  Vitals and nursing note reviewed.   Constitutional:       General: He is not in acute distress.     Appearance: He is well-developed.   HENT:      Head: Normocephalic and atraumatic.   Eyes:      Conjunctiva/sclera: Conjunctivae normal.   Cardiovascular:      Rate and Rhythm: Normal rate and regular rhythm.      Heart sounds: No murmur heard.  Pulmonary:      Effort: Pulmonary effort is normal. No respiratory distress.      Breath sounds: Normal breath sounds.   Abdominal:      Palpations: Abdomen is soft.      Tenderness: There is no abdominal tenderness.   Musculoskeletal:         General: No swelling.      Cervical back: Neck supple.   Skin:     General: Skin is warm and dry.      Capillary Refill: Capillary refill takes less than 2 seconds.   Neurological:      Mental Status: He is alert.   Psychiatric:         Mood and Affect: Mood normal.         Results Reviewed       None            No orders to display       Procedures    ED Medication and Procedure Management   Prior to Admission Medications   Prescriptions Last Dose Informant Patient Reported? Taking?   Melatonin 10 MG TABS  Self No No   Sig: Take 1 tablet (10 mg total) by mouth daily at bedtime as needed (insomnia)   Omega-3 Fatty Acids (fish oil) 1,000 mg  Self Yes No   Sig: Take 1,000 mg by mouth daily   albuterol (Proventil HFA) 90 mcg/act inhaler  Self No No   Sig: Inhale 1-2 puffs every 6 (six) hours as needed for wheezing   amitriptyline (ELAVIL) 10 mg tablet   No No   Sig: Take 2 tablets (20 mg total) by mouth daily at bedtime   ascorbic acid (VITAMIN C) 250 mg tablet  Self Yes No   Sig: Take 250 mg by mouth daily    cholecalciferol (VITAMIN D3) 400 units tablet  Self Yes No   Sig: Take 400 Units by mouth daily   cyclobenzaprine (FLEXERIL) 5 mg tablet  Self No No   Sig: Take 1 tablet (5 mg total) by mouth 3 (three) times a day as needed for muscle spasms   fluticasone-umeclidinium-vilanterol (Trelegy Ellipta) 200-62.5-25 mcg/actuation AEPB inhaler  Self No No   Sig: Inhale 1 puff daily Rinse mouth after use.   fluticasone-umeclidinium-vilanterol (Trelegy Ellipta) 200-62.5-25 mcg/actuation AEPB inhaler   No No   Sig: Inhale 1 puff daily Rinse mouth after use.   hydrOXYzine HCL (ATARAX) 25 mg tablet  Self No No   Sig: Take 1 to 2 tablets by mouth every 6-8 hours as needed for anxiety.   ketorolac (TORADOL) 10 mg tablet  Self No No   Sig: Take 1 tablet (10 mg total) by mouth every 6 (six) hours as needed for moderate pain   meloxicam (MOBIC) 15 mg tablet   No No   Sig: Take 1 tablet (15 mg total) by mouth daily   methadone (DOLOPHINE) 10 mg/mL oral concentrated solution  Self Yes No   Sig: Take 25 mg by mouth every morning   vitamin E 100 UNIT capsule  Self Yes No   Sig: Take 100 Units by mouth daily      Facility-Administered Medications: None     Patient's Medications   Discharge Prescriptions    No medications on file     No discharge procedures on file.  ED SEPSIS DOCUMENTATION   Time reflects when diagnosis was documented in both MDM as applicable and the Disposition within this note       Time User Action Codes Description Comment    12/3/2024  9:41 AM Jenn Encinas Add [Z76.0] Medication refill                  SHELBY Pathak  12/03/24 0953

## 2024-12-03 NOTE — ED NOTES
Spoke with Harley (Clinical Supervisor) via telephone.   Patient goes to [facility]   Confirmed that patient receives 19mg methadone solution daily.        Kaila Maza RN  12/03/24 7284

## 2024-12-11 DIAGNOSIS — J45.909 UNCONTROLLED ASTHMA: ICD-10-CM

## 2024-12-11 DIAGNOSIS — F17.211 CIGARETTE NICOTINE DEPENDENCE IN REMISSION: ICD-10-CM

## 2024-12-11 RX ORDER — FLUTICASONE FUROATE, UMECLIDINIUM BROMIDE AND VILANTEROL TRIFENATATE 200; 62.5; 25 UG/1; UG/1; UG/1
1 POWDER RESPIRATORY (INHALATION) DAILY
Qty: 180 BLISTER | Refills: 3 | Status: SHIPPED | OUTPATIENT
Start: 2024-12-11 | End: 2025-12-06

## 2024-12-13 ENCOUNTER — OFFICE VISIT (OUTPATIENT)
Dept: CARDIOLOGY CLINIC | Facility: CLINIC | Age: 34
End: 2024-12-13

## 2024-12-13 VITALS
SYSTOLIC BLOOD PRESSURE: 128 MMHG | TEMPERATURE: 97.9 F | HEIGHT: 75 IN | OXYGEN SATURATION: 98 % | HEART RATE: 69 BPM | WEIGHT: 204 LBS | BODY MASS INDEX: 25.36 KG/M2 | DIASTOLIC BLOOD PRESSURE: 78 MMHG

## 2024-12-13 DIAGNOSIS — F17.211 CIGARETTE NICOTINE DEPENDENCE IN REMISSION: Primary | ICD-10-CM

## 2024-12-13 DIAGNOSIS — R00.2 PALPITATIONS: ICD-10-CM

## 2024-12-13 PROCEDURE — 99203 OFFICE O/P NEW LOW 30 MIN: CPT | Performed by: INTERNAL MEDICINE

## 2024-12-13 NOTE — PROGRESS NOTES
[facility]    CHIEF COMPLAINT:   Chief Complaint   Patient presents with    New Patient Visit    Palpitations     Pounding sensation in heart intermittent and randomly, some times at rest. Used to be only after albuterol inhaler but now more frequently without inhaler. Multiple episodes a day lasting for a couple hours. Disturbs sleep    Chest Pain       HPI:  Khalif Hood is a 34 y.o. male with a past medical history former smoker - recently quit and asthma.     He presents for palpitations. Reports that one day he was smoking a cigarette and felt pain from his throat go down his chest. Describes as his lungs felt tight. He was prescribed albuterol from the ED. He had frequent palpitations which he describes as pounding with use of albuterol. He had several emergency department visits for chest tightness, shortness of breath, palpitations. He has since followed up with Pulmonology and feels the chest tightness and shortness of breath improved after being started on Trelegy.    About 2-3 weeks ago, he was sitting and felt his heart was pounding. He checked his heart rate and it was 80-90s. He was unable to sleep because his heart was pounding all night. Woke up the next day and felt okay but these palpitations returned. They can occur for hours at a time and go away for days. Now feels constant pounding. No associated lightheadedness, dizziness, syncope. Stays active and denies exertional chest pain. Also denies exertional dyspnea and feels asthma symptoms are well-controlled at this time.    Social history: Former smoker. No alcohol. No cocaine or amphetamine use.  Family history: Not aware of any family history of heart disease, premature CAD, cardiomyopathy.    The following portions of the patient's history were reviewed and updated as appropriate: allergies, current medications, past family history, past medical history, past social history, past surgical history, and problem  list.    SINCE LAST OV I REVIEWED WITH THE PATIENT THE INTERIM LABS, TEST RESULTS, CONSULTANT(S) NOTES AND PERFORMED AN INTERIM REVIEW OF HISTORY    Past Medical History:   Diagnosis Date    Asthma     as a child       Past Surgical History:   Procedure Laterality Date    INCISION AND DRAINAGE OF WOUND Left 06/28/2021    Procedure: INCISION AND DRAINAGE (I&D) LEFT SUBMANDIBULAR SPACE INFECTION, LEFT  SPACE INFECTION WITH EXTRACTION OF TOOTH #19;  Surgeon: Fabi Hood DMD;  Location:  MAIN OR;  Service: Maxillofacial    ORIF TIBIA & FIBULA FRACTURES Right     [place] ARTHRS AIDED ANT CRUCIATE LIGM RPR/AGMNTJ/RCNSTJ Right 1/17/2024    Procedure: Right - knee arthroscopy Lateral meniscectomy Synovectomy ACL reconstruction with allograft using the Arthrex graft link system Post arthroscopic injection of intra-articular joint space and peripheral portals;  Surgeon: Harvinder Ga DO;  Location: [facility];  Service: Orthopedics    [place] ARTHRS KNE SURG W/MENISCECTOMY MED/LAT W/SHVG Right 1/10/2023    Procedure: ARTHROSCOPY KNEE WITH LATERAL MENISCECTOMY;  Surgeon: Harvinder Ga DO;  Location: [facility];  Service: Orthopedics       Social History     Socioeconomic History    Marital status: Single     Spouse name: Not on file    Number of children: Not on file    Years of education: Not on file    Highest education level: Not on file   Occupational History    Not on file   Tobacco Use    Smoking status: Former     Current packs/day: 0.50     Types: Cigarettes    Smokeless tobacco: Never   Vaping Use    Vaping status: Former    Start date: 1/1/2023   Substance and Sexual Activity    Alcohol use: Never    Drug use: Not Currently     Frequency: 7.0 times per week     Types: Marijuana     Comment: daily    Sexual activity: Not on file   Other Topics Concern    Not on file   Social History Narrative    Not on file     Social Drivers of Health     Financial Resource Strain: Not on file   Food Insecurity:  Not on file   Transportation Needs: Not on file   Physical Activity: Not on file   Stress: Not on file   Social Connections: Unknown (6/18/2024)    Received from [email or web address]    Social [employer]     How often do you feel lonely or isolated from those around you? (Adult - for ages 18 years and over): Not on file   Intimate Partner Violence: Not on file   Housing Stability: Not on file       No family history on file.    No Known Allergies    Current Outpatient Medications   Medication Sig Dispense Refill    albuterol (Proventil HFA) 90 mcg/act inhaler Inhale 1-2 puffs every 6 (six) hours as needed for wheezing 18 g 2    amitriptyline (ELAVIL) 10 mg tablet Take 2 tablets (20 mg total) by mouth daily at bedtime 60 tablet 3    ascorbic acid (VITAMIN C) 250 mg tablet Take 250 mg by mouth daily      cholecalciferol (VITAMIN D3) 400 units tablet Take 400 Units by mouth daily      cyclobenzaprine (FLEXERIL) 5 mg tablet Take 1 tablet (5 mg total) by mouth 3 (three) times a day as needed for muscle spasms 12 tablet 0    fluticasone-umeclidinium-vilanterol (Trelegy Ellipta) 200-62.5-25 mcg/actuation AEPB inhaler Inhale 1 puff daily Rinse mouth after use. 180 blister 0    hydrOXYzine HCL (ATARAX) 25 mg tablet Take 1 to 2 tablets by mouth every 6-8 hours as needed for anxiety. 30 tablet 0    ketorolac (TORADOL) 10 mg tablet Take 1 tablet (10 mg total) by mouth every 6 (six) hours as needed for moderate pain 10 tablet 0    Melatonin 10 MG TABS Take 1 tablet (10 mg total) by mouth daily at bedtime as needed (insomnia) 20 tablet 0    meloxicam (MOBIC) 15 mg tablet Take 1 tablet (15 mg total) by mouth daily (Patient taking differently: Take 15 mg by mouth daily PRN) 30 tablet 0    methadone (DOLOPHINE) 10 mg/mL oral concentrated solution Take 25 mg by mouth every morning      Omega-3 Fatty Acids (fish oil) 1,000 mg Take 1,000 mg by mouth daily      vitamin E 100 UNIT capsule Take 100 Units by mouth daily       "fluticasone-umeclidinium-vilanterol (Trelegy Ellipta) 200-62.5-25 mcg/actuation AEPB inhaler Inhale 1 puff daily Rinse mouth after use. (Patient not taking: Reported on 12/13/2024) 180 blister 3     No current facility-administered medications for this visit.       /78 (BP Location: Left arm, Patient Position: Sitting, Cuff Size: Adult)   Pulse 69   Temp 97.9 °F (36.6 °C) (Tympanic)   Ht 6' 3\" (1.905 m)   Wt 92.5 kg (204 lb)   SpO2 98%   BMI 25.50 kg/m²     Review of Systems   All other systems reviewed and are negative.      Physical Exam  Vitals reviewed.   Constitutional:       General: He is not in acute distress.     Appearance: Normal appearance. He is well-developed. He is not toxic-appearing.   HENT:      Head: Normocephalic and atraumatic.   Eyes:      General: No scleral icterus.     Extraocular Movements: Extraocular movements intact.   Cardiovascular:      Rate and Rhythm: Normal rate and regular rhythm.      Pulses: Normal pulses.      Heart sounds: No murmur heard.     No friction rub. No gallop.   Pulmonary:      Effort: Pulmonary effort is normal. No respiratory distress.      Breath sounds: Normal breath sounds. No wheezing or rales.   Abdominal:      General: Abdomen is flat. Bowel sounds are normal. There is no distension.      Palpations: Abdomen is soft.      Tenderness: There is no abdominal tenderness.   Musculoskeletal:         General: No swelling.      Right lower leg: No edema.      Left lower leg: No edema.   Skin:     General: Skin is warm and dry.   Neurological:      Mental Status: He is alert.   Psychiatric:         Mood and Affect: Mood normal.          Lab Results   Component Value Date    K 4.3 11/27/2024     11/27/2024    CO2 27 11/27/2024    BUN 27 (H) 11/27/2024    CREATININE 1.12 11/27/2024    CALCIUM 9.1 11/27/2024    ALT 14 11/27/2024    AST 16 11/27/2024    INR 1.02 11/17/2024       Lab Results   Component Value Date    WBC 10.37 (H) 11/27/2024    HGB 14.5 " 11/27/2024    HCT 44.4 11/27/2024     11/27/2024     Cardiac studies:   ECG - 11/27/24: NSR    ASSESSMENT AND PLAN:  Khalif was seen today for new patient visit, palpitations and chest pain.    Diagnoses and all orders for this visit:    Cigarette nicotine dependence in remission    Palpitations: 34 year old gentleman with asthma who presents for palpitations. All ECGs in [facility] reviewed and show sinus rhythm. He has checked his heart rate during episodes of palpitations and it has been within the normal range. Labs including thyroid function have nee normal. He has no history of cardiovascular disease. No known family history of cardiovascular disease. Cardiovascular examination today was normal. Discussed that there may be an anxiety component and reassurance was provided. He does admit that since his palpitations occur on a daily basis and it creates a lot of anxiety for him. Discussed with CARMENCITA, LABA, and Elavil can contribute to these symptoms as well. We discussed obtaining a Holter monitor for symptom/rhythm correlation and this was prescribed. He is not sure if this will be affordable but will look into this. We reviewed associated symptoms that should prompt another assessment.     Jose Eduardo Eid MD

## 2024-12-23 ENCOUNTER — TELEPHONE (OUTPATIENT)
Age: 34
End: 2024-12-23

## 2024-12-23 DIAGNOSIS — J45.909 UNCONTROLLED ASTHMA: Primary | ICD-10-CM

## 2024-12-23 RX ORDER — FLUTICASONE FUROATE, UMECLIDINIUM BROMIDE AND VILANTEROL TRIFENATATE 200; 62.5; 25 UG/1; UG/1; UG/1
1 POWDER RESPIRATORY (INHALATION) DAILY
Qty: 60 BLISTER | Refills: 0 | Status: SHIPPED | COMMUNITY
Start: 2024-12-23 | End: 2025-01-22

## 2024-12-23 NOTE — TELEPHONE ENCOUNTER
Pt is calling to follow up on the Trelegy forms and patient is also calling in regarding samples as he is still waiting on getting insurance and assistance. Please advise as pt states he's been waiting for 2 weeks. He only has 2 puffs available in his trelegy as of right now. Warm transferred to the office

## 2024-12-23 NOTE — TELEPHONE ENCOUNTER
Patient called office requesting [employer] forms to please be signed for assistance program. Please advise.

## 2024-12-30 ENCOUNTER — OFFICE VISIT (OUTPATIENT)
Dept: PULMONOLOGY | Facility: CLINIC | Age: 34
End: 2024-12-30

## 2024-12-30 VITALS
HEART RATE: 82 BPM | HEIGHT: 75 IN | WEIGHT: 209 LBS | OXYGEN SATURATION: 98 % | BODY MASS INDEX: 25.99 KG/M2 | TEMPERATURE: 98.7 F | SYSTOLIC BLOOD PRESSURE: 100 MMHG | RESPIRATION RATE: 18 BRPM | DIASTOLIC BLOOD PRESSURE: 60 MMHG

## 2024-12-30 DIAGNOSIS — J45.50 SEVERE PERSISTENT ASTHMA WITHOUT COMPLICATION: Primary | ICD-10-CM

## 2024-12-30 DIAGNOSIS — F17.201 TOBACCO USE DISORDER, SEVERE, IN EARLY REMISSION: ICD-10-CM

## 2024-12-30 PROCEDURE — 99214 OFFICE O/P EST MOD 30 MIN: CPT | Performed by: INTERNAL MEDICINE

## 2024-12-30 RX ORDER — FLUTICASONE FUROATE, UMECLIDINIUM BROMIDE AND VILANTEROL TRIFENATATE 200; 62.5; 25 UG/1; UG/1; UG/1
1 POWDER RESPIRATORY (INHALATION) DAILY
Qty: 180 BLISTER | Refills: 3 | Status: SHIPPED | OUTPATIENT
Start: 2024-12-30 | End: 2025-12-25

## 2024-12-30 NOTE — ASSESSMENT & PLAN NOTE
Presented with uncontrolled asthma in October 2024  Prescribed fluticasone/salmeterol 250-50 1 puff BID w [facility] script due to lack of insurance.  He filled this - still uncontrolled  In the interim started on Trelegy 200 and reports complete resolution of his symptoms.  He filled out some assistance paperwork which was faxed and he is waiting to hear back about that- I brought this up to office staff  He has PRN albuterol he has not needed lately  He is working on getting insurance  He remains off cigarettes since 10/2024    Defer PFTs for now as he has no medical insurance  Remain off cigarettes  Continue Trelegy 200 if able to afford- provided samples for now- if not he will need to go back on Fluticasone-salmeterol through [facility]  RTC 3 months

## 2024-12-30 NOTE — PROGRESS NOTES
Pulmonary Outpatient Note   Khalif Hood 34 y.o. male MRN: [id number]  12/30/2024          Reason for Consultation:    Chief Complaint   Patient presents with    Follow-up     Two months,     Asthma         Assessment/Plan:    1. Severe persistent asthma without complication  Assessment & Plan:  Presented with uncontrolled asthma in October 2024  Prescribed fluticasone/salmeterol 250-50 1 puff BID w [facility] script due to lack of insurance.  He filled this - still uncontrolled  In the interim started on Trelegy 200 and reports complete resolution of his symptoms.  He filled out some assistance paperwork which was faxed and he is waiting to hear back about that- I brought this up to office staff  He has PRN albuterol he has not needed lately  He is working on getting insurance  He remains off cigarettes since 10/2024    Defer PFTs for now as he has no medical insurance  Remain off cigarettes  Continue Trelegy 200 if able to afford- provided samples for now- if not he will need to go back on Fluticasone-salmeterol through [facility]  RTC 3 months  Orders:  -     fluticasone-umeclidinium-vilanterol (Trelegy Ellipta) 200-62.5-25 mcg/actuation AEPB inhaler; Inhale 1 puff daily Rinse mouth after use.  2. Tobacco use disorder, severe, in early remission  Assessment & Plan:  21 years around 1-1.5 PPD   Quit 10/2024          Health Maintenance    There is no immunization history on file for this patient.     Return in about 3 months (around 3/30/2025).    History of Present Illness   HPI:  Khalif Hood is a 34 y.o. male who is here for asthma.    Seen in consult in October 2024 for uncontrolled asthma, had recently quit smoking. I prescribed Fluticasone/salmeterol 250-50 1 puff BID through [facility] as he had no medical insurance.    He called 11/12/24 with shortness of breath despite fluticasone/salmeterol and saw Elizabeth Amaya that week as an urgent visit.  At that visit was changed to Trelegy 200  He reports the Trelegy has  "\"changed his life.\"    He is going through state insurance to try and get coverage.    On Trelegy- no flare ups  Has not needed albuterol at all.    Has not smoked since early October.        October 2024 consult  He had pneumonia when he was around 8 years old requiring hospital stay  Diagnosed with asthma as a child that was in remission.  No inhaler for many years.    More recently has had shortness of breath, chest tightness, cough.  He has been to the ED 3 times in the last month.  Prescribed an albuterol inhaler 10/7. 10/21- given albuterol and prednisone course- only took a couple doses- he is unsure if it helped his breathing.  Went back on 10/25- given levalbuterol nebs and azithromycin.    He has been a cigarette smoker and marijuana smoker  Started smoking at age 13- for last 21 years around 1-1.5 PPD.  Stopped smoking on 10/17/24.  He is intermittently using nicotine patches.  Stopped today because he ran out.      Pulmonary history:    Childhood lung disease/premature birth: Pneumonia, asthma    Family hx of lung disease: No    Rash: No    Dysphagia/Reflux: No  Exposure history:    Exposure to pets/birds/farm animals: NO    Mold/Roaches:No    Social history:    Smoking: Started smoking at age 13- for last 21 years around 1-1.5 PPD.  Stopped smoking on 10/17/24.      Alcohol, ilicit drugs (inhalational/ IV): Marijuana    Worked as: Works for a [employer]/service [employer]        Review of Systems   Constitutional:  Negative for chills and fever.   HENT:  Negative for ear pain and sore throat.    Eyes:  Negative for pain and visual disturbance.   Respiratory:  Negative for cough and shortness of breath.    Cardiovascular:  Negative for chest pain and palpitations.   Gastrointestinal:  Negative for abdominal pain and vomiting.   Genitourinary:  Negative for dysuria and hematuria.   Musculoskeletal:  Negative for arthralgias and back pain.   Skin:  Negative for color change and rash.   Neurological:  " Negative for seizures and syncope.   All other systems reviewed and are negative.          Historical Information   Past Medical History:   Diagnosis Date    Asthma     as a child     Past Surgical History:   Procedure Laterality Date    INCISION AND DRAINAGE OF WOUND Left 06/28/2021    Procedure: INCISION AND DRAINAGE (I&D) LEFT SUBMANDIBULAR SPACE INFECTION, LEFT  SPACE INFECTION WITH EXTRACTION OF TOOTH #19;  Surgeon: Fabi Hood DMD;  Location:  MAIN OR;  Service: Maxillofacial    ORIF TIBIA & FIBULA FRACTURES Right     [place] ARTHRS AIDED ANT CRUCIATE LIGM RPR/AGMNTJ/RCNSTJ Right 1/17/2024    Procedure: Right - knee arthroscopy Lateral meniscectomy Synovectomy ACL reconstruction with allograft using the Arthrex graft link system Post arthroscopic injection of intra-articular joint space and peripheral portals;  Surgeon: Harvinder Ga DO;  Location: [facility];  Service: Orthopedics    [place] ARTHRS KNE SURG W/MENISCECTOMY MED/LAT W/SHVG Right 1/10/2023    Procedure: ARTHROSCOPY KNEE WITH LATERAL MENISCECTOMY;  Surgeon: Harvinder Ga DO;  Location: [facility];  Service: Orthopedics     History reviewed. No pertinent family history.          Meds/Allergies     Current Outpatient Medications:     albuterol (Proventil HFA) 90 mcg/act inhaler, Inhale 1-2 puffs every 6 (six) hours as needed for wheezing, Disp: 18 g, Rfl: 2    amitriptyline (ELAVIL) 10 mg tablet, Take 2 tablets (20 mg total) by mouth daily at bedtime, Disp: 60 tablet, Rfl: 3    ascorbic acid (VITAMIN C) 250 mg tablet, Take 250 mg by mouth daily, Disp: , Rfl:     cholecalciferol (VITAMIN D3) 400 units tablet, Take 400 Units by mouth daily, Disp: , Rfl:     fluticasone-umeclidinium-vilanterol (Trelegy Ellipta) 200-62.5-25 mcg/actuation AEPB inhaler, Inhale 1 puff daily Rinse mouth after use., Disp: 180 blister, Rfl: 3    ketorolac (TORADOL) 10 mg tablet, Take 1 tablet (10 mg total) by mouth every 6 (six) hours as needed for  "moderate pain, Disp: 10 tablet, Rfl: 0    Melatonin 10 MG TABS, Take 1 tablet (10 mg total) by mouth daily at bedtime as needed (insomnia), Disp: 20 tablet, Rfl: 0    methadone (DOLOPHINE) 10 mg/mL oral concentrated solution, Take 25 mg by mouth every morning, Disp: , Rfl:     Omega-3 Fatty Acids (fish oil) 1,000 mg, Take 1,000 mg by mouth daily, Disp: , Rfl:     vitamin E 100 UNIT capsule, Take 100 Units by mouth daily, Disp: , Rfl:     meloxicam (MOBIC) 15 mg tablet, Take 1 tablet (15 mg total) by mouth daily (Patient taking differently: Take 15 mg by mouth daily PRN), Disp: 30 tablet, Rfl: 0  No Known Allergies    Vitals: Blood pressure 100/60, pulse 82, temperature 98.7 °F (37.1 °C), temperature source Temporal, resp. rate 18, height 6' 3\" (1.905 m), weight 94.8 kg (209 lb), SpO2 98%. Body mass index is 26.12 kg/m². Oxygen Therapy  SpO2: 98 %      Physical Exam  Physical Exam  Vitals and nursing note reviewed.   Constitutional:       General: He is not in acute distress.     Appearance: He is well-developed.   HENT:      Head: Normocephalic and atraumatic.   Eyes:      Conjunctiva/sclera: Conjunctivae normal.   Cardiovascular:      Rate and Rhythm: Normal rate and regular rhythm.      Heart sounds: No murmur heard.  Pulmonary:      Effort: Pulmonary effort is normal. No respiratory distress.      Breath sounds: Normal breath sounds.   Abdominal:      Palpations: Abdomen is soft.      Tenderness: There is no abdominal tenderness.   Musculoskeletal:         General: No swelling.      Cervical back: Neck supple.      Right lower leg: No edema.      Left lower leg: No edema.   Skin:     General: Skin is warm and dry.      Capillary Refill: Capillary refill takes less than 2 seconds.   Neurological:      Mental Status: He is alert.   Psychiatric:         Mood and Affect: Mood normal.         Labs:   I have personally reviewed pertinent lab results.    ABG: No results found for: \"PHART\", \"EJH7DXG\", \"PO2ART\", " "\"RMJ7REV\", \"D1SJRYNN\", \"BEART\", \"SOURCE\",   BNP:   Lab Results   Component Value Date    BNP 6 10/31/2024   ,   CBC:  Lab Results   Component Value Date    WBC 10.37 (H) 11/27/2024    HGB 14.5 11/27/2024    HCT 44.4 11/27/2024    MCV 90 11/27/2024     11/27/2024    EOSPCT 1 11/27/2024    EOSABS 0.12 11/27/2024    NEUTOPHILPCT 65 11/27/2024    LYMPHOPCT 24 11/27/2024   ,   CMP:   Lab Results   Component Value Date    SODIUM 137 11/27/2024    K 4.3 11/27/2024     11/27/2024    CO2 27 11/27/2024    BUN 27 (H) 11/27/2024    CREATININE 1.12 11/27/2024    CALCIUM 9.1 11/27/2024    AST 16 11/27/2024    ALT 14 11/27/2024    ALKPHOS 66 11/27/2024    EGFR 84 11/27/2024   ,   PT/INR:   Lab Results   Component Value Date    INR 1.02 11/17/2024   ,   Troponin: No results found for: \"TROPONINI\"      Imaging and other studies: I have personally reviewed pertinent reports.   and I have personally reviewed pertinent films in PACS    CXR 11/27/24  Lungs clear    CT Chest 10/25/24  Lungs clear      Pulmonary function testing:   Pulmonary Functions Testing Results:    No results found for: \"FEV1\", \"FVC\", \"JWL5GAA\", \"TLC\", \"DLCO\"        EKG, Pathology, and Other Studies: I have personally reviewed pertinent reports.       Harvinder Velasco M.D.  [facility]  "

## 2025-01-06 ENCOUNTER — HOSPITAL ENCOUNTER (EMERGENCY)
Facility: HOSPITAL | Age: 35
Discharge: HOME | End: 2025-01-06

## 2025-01-06 VITALS
HEIGHT: 74 IN | WEIGHT: 212.96 LBS | RESPIRATION RATE: 17 BRPM | OXYGEN SATURATION: 98 % | HEART RATE: 80 BPM | TEMPERATURE: 98.4 F | SYSTOLIC BLOOD PRESSURE: 124 MMHG | BODY MASS INDEX: 27.33 KG/M2 | DIASTOLIC BLOOD PRESSURE: 81 MMHG

## 2025-01-06 DIAGNOSIS — Z79.891 ENCOUNTER FOR LONG-TERM METHADONE USE: Primary | ICD-10-CM

## 2025-01-06 PROCEDURE — 99282 EMERGENCY DEPT VISIT SF MDM: CPT

## 2025-01-06 PROCEDURE — 99284 EMERGENCY DEPT VISIT MOD MDM: CPT | Performed by: NURSE PRACTITIONER

## 2025-01-06 RX ADMIN — METHADONE HYDROCHLORIDE 17.5 MG: 10 TABLET ORAL at 07:11

## 2025-01-06 NOTE — ED TRIAGE NOTES
"Ambulatory w/complaint of requiring methadone; states sent from methadone clinic \"because they don't have a nurse today so we are all going to come here today for methadone\"; states takes 18 mg methadone daily w/last dose yesterday morning; denies any other complaints        "

## 2025-01-06 NOTE — ED NOTES
Pt verbalized understanding of discharge instructions; ambulated out of the ED without assistance; uneventful ED visit     Holly Murguia RN  01/06/25 0763

## 2025-01-06 NOTE — ED PROVIDER NOTES
"Time reflects when diagnosis was documented in both MDM as applicable and the Disposition within this note       Time User Action Codes Description Comment    1/6/2025  7:00 AM Jairon Clemons Add [Z79.891] Encounter for long-term methadone use           ED Disposition       ED Disposition   Discharge    Condition   Stable    Date/Time   Mon Jan 6, 2025  7:00 AM    Comment   Khalif Hood discharge to home/self care.                   Assessment & Plan       Medical Decision Making  Patient provided his methadone dose and will continue to follow-up in the outpatient clinic tomorrow moving forward.             Medications   methadone (Dolophine) tablet 17.5 mg (17.5 mg Oral Given 1/6/25 0711)       ED Risk Strat Scores                          SBIRT 22yo+      Flowsheet Row Most Recent Value   Initial Alcohol Screen: US AUDIT-C     1. How often do you have a drink containing alcohol? 0 Filed at: 01/06/2025 0650   2. How many drinks containing alcohol do you have on a typical day you are drinking?  0 Filed at: 01/06/2025 0650   3a. Male UNDER 65: How often do you have five or more drinks on one occasion? 0 Filed at: 01/06/2025 0650   Audit-C Score 0 Filed at: 01/06/2025 0650   ABIODUN: How many times in the past year have you...    Used an illegal drug or used a prescription medication for non-medical reasons? Never Filed at: 01/06/2025 0650                            History of Present Illness       Chief Complaint   Patient presents with    Medication Refill     Ambulatory w/complaint of requiring methadone; states sent from methadone clinic \"because they don't have a nurse today so we are all going to come here today for methadone\"; states takes 18 mg methadone daily w/last dose yesterday morning; denies any other complaints         Past Medical History:   Diagnosis Date    Asthma     as a child      Past Surgical History:   Procedure Laterality Date    INCISION AND DRAINAGE OF WOUND Left 06/28/2021    Procedure: " INCISION AND DRAINAGE (I&D) LEFT SUBMANDIBULAR SPACE INFECTION, LEFT  SPACE INFECTION WITH EXTRACTION OF TOOTH #19;  Surgeon: Fabi Hood DMD;  Location:  MAIN OR;  Service: Maxillofacial    ORIF TIBIA & FIBULA FRACTURES Right     [place] ARTHRS AIDED ANT CRUCIATE LIGM RPR/AGMNTJ/RCNSTJ Right 1/17/2024    Procedure: Right - knee arthroscopy Lateral meniscectomy Synovectomy ACL reconstruction with allograft using the Arthrex graft link system Post arthroscopic injection of intra-articular joint space and peripheral portals;  Surgeon: Harvinder Ga DO;  Location: [facility];  Service: Orthopedics    [place] ARTHRS KNE SURG W/MENISCECTOMY MED/LAT W/SHVG Right 1/10/2023    Procedure: ARTHROSCOPY KNEE WITH LATERAL MENISCECTOMY;  Surgeon: Harvinder Ga DO;  Location: [facility];  Service: Orthopedics      History reviewed. No pertinent family history.   Social History     Tobacco Use    Smoking status: Former     Current packs/day: 0.50     Types: Cigarettes    Smokeless tobacco: Never   Vaping Use    Vaping status: Former    Start date: 1/1/2023   Substance Use Topics    Alcohol use: Never    Drug use: Not Currently     Frequency: 7.0 times per week     Types: Marijuana     Comment: daily      E-Cigarette/Vaping    E-Cigarette Use Former User     Start Date 1/1/23     Cartridges/Day 0.5       E-Cigarette/Vaping Substances    Nicotine No     THC No     CBD No     Flavoring No     Other No     Unknown No       I have reviewed and agree with the history as documented.     This is a 34-year-old male patient presenting here for his regular methadone dose.  He reports that his methadone clinic did not have the appropriate nursing staff and they were subsequently referred to the emergency department for their dosing he offers no other complaints.  His dose was verified by our nursing staff.          Review of Systems   Constitutional:  Negative for chills and fever.   HENT:  Negative for ear pain and sore  throat.    Eyes:  Negative for pain and visual disturbance.   Respiratory:  Negative for cough and shortness of breath.    Cardiovascular:  Negative for chest pain and palpitations.   Gastrointestinal:  Negative for abdominal pain and vomiting.   Genitourinary:  Negative for dysuria and hematuria.   Musculoskeletal:  Negative for arthralgias and back pain.   Skin:  Negative for color change and rash.   Neurological:  Negative for seizures and syncope.   All other systems reviewed and are negative.          Objective       ED Triage Vitals [01/06/25 0648]   Temperature Pulse Blood Pressure Respirations SpO2 Patient Position - Orthostatic VS   98.4 °F (36.9 °C) 80 124/81 17 98 % Sitting      Temp Source Heart Rate Source BP Location FiO2 (%) Pain Score    Oral Monitor Left arm -- No Pain      Vitals      Date and Time Temp Pulse SpO2 Resp BP Pain Score FACES Pain Rating User   01/06/25 0711 -- -- -- -- -- Med Not Given for Pain - for MAR use only -- TB   01/06/25 0648 98.4 °F (36.9 °C) 80 98 % 17 124/81 No Pain -- TB            Physical Exam  Vitals and nursing note reviewed.   Constitutional:       General: He is not in acute distress.     Appearance: He is well-developed.   HENT:      Head: Normocephalic and atraumatic.      Nose: No rhinorrhea.   Eyes:      General:         Right eye: No discharge.         Left eye: No discharge.      Conjunctiva/sclera: Conjunctivae normal.   Cardiovascular:      Rate and Rhythm: Normal rate.   Pulmonary:      Effort: Pulmonary effort is normal. No respiratory distress.   Abdominal:      General: There is no distension.      Tenderness: There is no guarding.   Musculoskeletal:         General: No deformity.      Cervical back: Normal range of motion and neck supple.   Skin:     General: Skin is warm and dry.      Coloration: Skin is not pale.   Neurological:      Mental Status: He is alert and oriented to person, place, and time.      Coordination: Coordination normal.    Psychiatric:         Mood and Affect: Mood normal.         Results Reviewed       None            No orders to display       Procedures    ED Medication and Procedure Management   Prior to Admission Medications   Prescriptions Last Dose Informant Patient Reported? Taking?   Melatonin 10 MG TABS  Self No No   Sig: Take 1 tablet (10 mg total) by mouth daily at bedtime as needed (insomnia)   Omega-3 Fatty Acids (fish oil) 1,000 mg  Self Yes No   Sig: Take 1,000 mg by mouth daily   albuterol (Proventil HFA) 90 mcg/act inhaler  Self No No   Sig: Inhale 1-2 puffs every 6 (six) hours as needed for wheezing   amitriptyline (ELAVIL) 10 mg tablet   No No   Sig: Take 2 tablets (20 mg total) by mouth daily at bedtime   ascorbic acid (VITAMIN C) 250 mg tablet  Self Yes No   Sig: Take 250 mg by mouth daily   cholecalciferol (VITAMIN D3) 400 units tablet  Self Yes No   Sig: Take 400 Units by mouth daily   fluticasone-umeclidinium-vilanterol (Trelegy Ellipta) 200-62.5-25 mcg/actuation AEPB inhaler   No No   Sig: Inhale 1 puff daily Rinse mouth after use.   ketorolac (TORADOL) 10 mg tablet  Self No No   Sig: Take 1 tablet (10 mg total) by mouth every 6 (six) hours as needed for moderate pain   meloxicam (MOBIC) 15 mg tablet   No No   Sig: Take 1 tablet (15 mg total) by mouth daily   Patient taking differently: Take 15 mg by mouth daily PRN   methadone (DOLOPHINE) 10 mg/mL oral concentrated solution  Self Yes No   Sig: Take 25 mg by mouth every morning   vitamin E 100 UNIT capsule  Self Yes No   Sig: Take 100 Units by mouth daily      Facility-Administered Medications: None     Discharge Medication List as of 1/6/2025  7:17 AM        CONTINUE these medications which have NOT CHANGED    Details   albuterol (Proventil HFA) 90 mcg/act inhaler Inhale 1-2 puffs every 6 (six) hours as needed for wheezing, Starting Mon 10/7/2024, Normal      amitriptyline (ELAVIL) 10 mg tablet Take 2 tablets (20 mg total) by mouth daily at bedtime,  Starting Fri 11/22/2024, Normal      ascorbic acid (VITAMIN C) 250 mg tablet Take 250 mg by mouth daily, Historical Med      cholecalciferol (VITAMIN D3) 400 units tablet Take 400 Units by mouth daily, Historical Med      fluticasone-umeclidinium-vilanterol (Trelegy Ellipta) 200-62.5-25 mcg/actuation AEPB inhaler Inhale 1 puff daily Rinse mouth after use., Starting Mon 12/30/2024, Until Thu 12/25/2025, Print      ketorolac (TORADOL) 10 mg tablet Take 1 tablet (10 mg total) by mouth every 6 (six) hours as needed for moderate pain, Starting Sun 11/17/2024, Normal      Melatonin 10 MG TABS Take 1 tablet (10 mg total) by mouth daily at bedtime as needed (insomnia), Starting Thu 10/31/2024, Normal      meloxicam (MOBIC) 15 mg tablet Take 1 tablet (15 mg total) by mouth daily, Starting Wed 1/17/2024, Until Tue 11/26/2024, Normal      methadone (DOLOPHINE) 10 mg/mL oral concentrated solution Take 25 mg by mouth every morning, Historical Med      Omega-3 Fatty Acids (fish oil) 1,000 mg Take 1,000 mg by mouth daily, Historical Med      vitamin E 100 UNIT capsule Take 100 Units by mouth daily, Historical Med           No discharge procedures on file.  ED SEPSIS DOCUMENTATION   Time reflects when diagnosis was documented in both MDM as applicable and the Disposition within this note       Time User Action Codes Description Comment    1/6/2025  7:00 AM Jairon Clemons Add [Z79.891] Encounter for long-term methadone use                  SHELBY Mayer  01/06/25 1062

## 2025-01-08 ENCOUNTER — TELEPHONE (OUTPATIENT)
Dept: PULMONOLOGY | Facility: CLINIC | Age: 35
End: 2025-01-08

## 2025-01-08 NOTE — TELEPHONE ENCOUNTER
Spoke with pt to r/s 4/2 appt at 3:30pm with Elizabeth Amaya to 4/8 at 3:30pm due to change in provider schedule

## 2025-01-15 ENCOUNTER — TELEPHONE (OUTPATIENT)
Age: 35
End: 2025-01-15

## 2025-01-15 NOTE — TELEPHONE ENCOUNTER
I called patient and LVM regarding [employer]. I also called them they stated they did not receive the prof of income. I did fax it last week when patient brought it into the office. I re faxed the script with another cover sheet and LVM for patient to call me back regarding the prof of income.

## 2025-01-15 NOTE — TELEPHONE ENCOUNTER
Patient called in stating that she got a call from [employer] stating that they are still in need of his pay stubs and the cover sheet for her [employer] application for Trelegy. Patient called in stating that he dropped of his pay stubs at the office a little bit ago. He is requesting a call back from the office with an update on this. Please advise.

## 2025-01-16 ENCOUNTER — APPOINTMENT (EMERGENCY)
Dept: CT IMAGING | Facility: HOSPITAL | Age: 35
End: 2025-01-16

## 2025-01-16 ENCOUNTER — HOSPITAL ENCOUNTER (EMERGENCY)
Facility: HOSPITAL | Age: 35
Discharge: HOME | End: 2025-01-16
Attending: EMERGENCY MEDICINE | Admitting: EMERGENCY MEDICINE

## 2025-01-16 VITALS
DIASTOLIC BLOOD PRESSURE: 60 MMHG | BODY MASS INDEX: 25.67 KG/M2 | WEIGHT: 200 LBS | RESPIRATION RATE: 20 BRPM | OXYGEN SATURATION: 98 % | SYSTOLIC BLOOD PRESSURE: 124 MMHG | TEMPERATURE: 97.9 F | HEIGHT: 74 IN | HEART RATE: 75 BPM

## 2025-01-16 DIAGNOSIS — R36.9 PENILE DISCHARGE: ICD-10-CM

## 2025-01-16 DIAGNOSIS — R10.9 ABDOMINAL PAIN: Primary | ICD-10-CM

## 2025-01-16 LAB
ALBUMIN SERPL BCG-MCNC: 4.2 G/DL (ref 3.5–5)
ALP SERPL-CCNC: 61 U/L (ref 34–104)
ALT SERPL W P-5'-P-CCNC: 20 U/L (ref 7–52)
ANION GAP SERPL CALCULATED.3IONS-SCNC: 5 MMOL/L (ref 4–13)
AST SERPL W P-5'-P-CCNC: 18 U/L (ref 13–39)
BASOPHILS # BLD AUTO: 0.04 THOUSANDS/ΜL (ref 0–0.1)
BASOPHILS NFR BLD AUTO: 1 % (ref 0–1)
BILIRUB SERPL-MCNC: 0.43 MG/DL (ref 0.2–1)
BILIRUB UR QL STRIP: NEGATIVE
BUN SERPL-MCNC: 16 MG/DL (ref 5–25)
CALCIUM SERPL-MCNC: 9.2 MG/DL (ref 8.4–10.2)
CHLORIDE SERPL-SCNC: 105 MMOL/L (ref 96–108)
CLARITY UR: CLEAR
CO2 SERPL-SCNC: 26 MMOL/L (ref 21–32)
COLOR UR: YELLOW
CREAT SERPL-MCNC: 0.94 MG/DL (ref 0.6–1.3)
EOSINOPHIL # BLD AUTO: 0.15 THOUSAND/ΜL (ref 0–0.61)
EOSINOPHIL NFR BLD AUTO: 2 % (ref 0–6)
ERYTHROCYTE [DISTWIDTH] IN BLOOD BY AUTOMATED COUNT: 13.4 % (ref 11.6–15.1)
GFR SERPL CREATININE-BSD FRML MDRD: 103 ML/MIN/1.73SQ M
GLUCOSE SERPL-MCNC: 110 MG/DL (ref 65–140)
GLUCOSE UR STRIP-MCNC: NEGATIVE MG/DL
HCT VFR BLD AUTO: 43.3 % (ref 36.5–49.3)
HGB BLD-MCNC: 14.2 G/DL (ref 12–17)
HGB UR QL STRIP.AUTO: NEGATIVE
IMM GRANULOCYTES # BLD AUTO: 0.03 THOUSAND/UL (ref 0–0.2)
IMM GRANULOCYTES NFR BLD AUTO: 1 % (ref 0–2)
KETONES UR STRIP-MCNC: NEGATIVE MG/DL
LEUKOCYTE ESTERASE UR QL STRIP: NEGATIVE
LIPASE SERPL-CCNC: 26 U/L (ref 11–82)
LYMPHOCYTES # BLD AUTO: 1.57 THOUSANDS/ΜL (ref 0.6–4.47)
LYMPHOCYTES NFR BLD AUTO: 25 % (ref 14–44)
MCH RBC QN AUTO: 29.6 PG (ref 26.8–34.3)
MCHC RBC AUTO-ENTMCNC: 32.8 G/DL (ref 31.4–37.4)
MCV RBC AUTO: 90 FL (ref 82–98)
MONOCYTES # BLD AUTO: 0.61 THOUSAND/ΜL (ref 0.17–1.22)
MONOCYTES NFR BLD AUTO: 10 % (ref 4–12)
NEUTROPHILS # BLD AUTO: 4.01 THOUSANDS/ΜL (ref 1.85–7.62)
NEUTS SEG NFR BLD AUTO: 61 % (ref 43–75)
NITRITE UR QL STRIP: NEGATIVE
NRBC BLD AUTO-RTO: 0 /100 WBCS
PH UR STRIP.AUTO: 6 [PH]
PLATELET # BLD AUTO: 219 THOUSANDS/UL (ref 149–390)
PMV BLD AUTO: 10.4 FL (ref 8.9–12.7)
POTASSIUM SERPL-SCNC: 4.6 MMOL/L (ref 3.5–5.3)
PROT SERPL-MCNC: 7.1 G/DL (ref 6.4–8.4)
PROT UR STRIP-MCNC: NEGATIVE MG/DL
RBC # BLD AUTO: 4.8 MILLION/UL (ref 3.88–5.62)
SODIUM SERPL-SCNC: 136 MMOL/L (ref 135–147)
SP GR UR STRIP.AUTO: 1.01
UROBILINOGEN UR QL STRIP.AUTO: 0.2 E.U./DL
WBC # BLD AUTO: 6.41 THOUSAND/UL (ref 4.31–10.16)

## 2025-01-16 PROCEDURE — 87491 CHLMYD TRACH DNA AMP PROBE: CPT | Performed by: EMERGENCY MEDICINE

## 2025-01-16 PROCEDURE — 96374 THER/PROPH/DIAG INJ IV PUSH: CPT

## 2025-01-16 PROCEDURE — 80053 COMPREHEN METABOLIC PANEL: CPT | Performed by: EMERGENCY MEDICINE

## 2025-01-16 PROCEDURE — 96361 HYDRATE IV INFUSION ADD-ON: CPT

## 2025-01-16 PROCEDURE — 87591 N.GONORRHOEAE DNA AMP PROB: CPT | Performed by: EMERGENCY MEDICINE

## 2025-01-16 PROCEDURE — 85025 COMPLETE CBC W/AUTO DIFF WBC: CPT | Performed by: EMERGENCY MEDICINE

## 2025-01-16 PROCEDURE — 81003 URINALYSIS AUTO W/O SCOPE: CPT | Performed by: EMERGENCY MEDICINE

## 2025-01-16 PROCEDURE — 96372 THER/PROPH/DIAG INJ SC/IM: CPT

## 2025-01-16 PROCEDURE — 99284 EMERGENCY DEPT VISIT MOD MDM: CPT | Performed by: EMERGENCY MEDICINE

## 2025-01-16 PROCEDURE — 99284 EMERGENCY DEPT VISIT MOD MDM: CPT

## 2025-01-16 PROCEDURE — 36415 COLL VENOUS BLD VENIPUNCTURE: CPT | Performed by: EMERGENCY MEDICINE

## 2025-01-16 PROCEDURE — 83690 ASSAY OF LIPASE: CPT | Performed by: EMERGENCY MEDICINE

## 2025-01-16 PROCEDURE — 74177 CT ABD & PELVIS W/CONTRAST: CPT

## 2025-01-16 RX ORDER — KETOROLAC TROMETHAMINE 30 MG/ML
15 INJECTION, SOLUTION INTRAMUSCULAR; INTRAVENOUS ONCE
Status: COMPLETED | OUTPATIENT
Start: 2025-01-16 | End: 2025-01-16

## 2025-01-16 RX ORDER — DOXYCYCLINE 100 MG/1
100 CAPSULE ORAL ONCE
Status: COMPLETED | OUTPATIENT
Start: 2025-01-16 | End: 2025-01-16

## 2025-01-16 RX ORDER — DOXYCYCLINE 100 MG/1
100 CAPSULE ORAL EVERY 12 HOURS SCHEDULED
Qty: 14 CAPSULE | Refills: 0 | Status: SHIPPED | OUTPATIENT
Start: 2025-01-16 | End: 2025-01-23

## 2025-01-16 RX ADMIN — DOXYCYCLINE 100 MG: 100 CAPSULE ORAL at 11:41

## 2025-01-16 RX ADMIN — IOHEXOL 100 ML: 350 INJECTION, SOLUTION INTRAVENOUS at 09:30

## 2025-01-16 RX ADMIN — CEFTRIAXONE 500 MG: 1 INJECTION, POWDER, FOR SOLUTION INTRAMUSCULAR; INTRAVENOUS at 11:41

## 2025-01-16 RX ADMIN — SODIUM CHLORIDE 1000 ML: 0.9 INJECTION, SOLUTION INTRAVENOUS at 09:00

## 2025-01-16 RX ADMIN — KETOROLAC TROMETHAMINE 15 MG: 30 INJECTION, SOLUTION INTRAMUSCULAR; INTRAVENOUS at 08:35

## 2025-01-16 NOTE — DISCHARGE INSTRUCTIONS
If you develop any new or worsening symptoms please immediately return to your nearest emergency department.     Please do not have any sex until you have completed your antibiotics and make sure your partner is tested if you are positive.

## 2025-01-16 NOTE — ED PROVIDER NOTES
Time reflects when diagnosis was documented in both MDM as applicable and the Disposition within this note       Time User Action Codes Description Comment    1/16/2025 11:07 AM Isac Reagan [R10.9] Abdominal pain     1/16/2025 11:08 AM Isac Reagan [R36.9] Penile discharge           ED Disposition       ED Disposition   Discharge    Condition   Stable    Date/Time   Thu Jan 16, 2025 11:07 AM    Comment   Khlaif Hood discharge to home/self care.                   Assessment & Plan       Medical Decision Making  34-year-old male presented to ED for reports of penile discharge, suprapubic abdominal pain and urgency and frequency.  During discussion patient admits that he has had a recent new sexual partner and has been having penile discharge.  Tested for gonorrhea and chlamydia and treated empirically.    Amount and/or Complexity of Data Reviewed  Labs: ordered.  Radiology: ordered.    Risk  Prescription drug management.        ED Course as of 01/16/25 1332   u Jan 16, 2025   1110 Offered patient further STD testing at this time however he states he would prefer to just be treated at this time and would monitor symptoms and follow-up as needed.       Medications   ketorolac (TORADOL) injection 15 mg (15 mg Intravenous Given 1/16/25 0835)   sodium chloride 0.9 % bolus 1,000 mL (0 mL Intravenous Stopped 1/16/25 0949)   iohexol (OMNIPAQUE) 350 MG/ML injection (MULTI-DOSE) 100 mL (100 mL Intravenous Given 1/16/25 0930)   cefTRIAXone (ROCEPHIN) 500 mg in lidocaine (PF) (XYLOCAINE-MPF) 1 % IM only syringe (500 mg Intramuscular Given 1/16/25 1141)   doxycycline hyclate (VIBRAMYCIN) capsule 100 mg (100 mg Oral Given 1/16/25 1141)       ED Risk Strat Scores                          SBIRT 20yo+      Flowsheet Row Most Recent Value   Initial Alcohol Screen: US AUDIT-C     1. How often do you have a drink containing alcohol? 0 Filed at: 01/16/2025 0900   2. How many drinks containing alcohol do you have  on a typical day you are drinking?  0 Filed at: 01/16/2025 0900   3a. Male UNDER 65: How often do you have five or more drinks on one occasion? 0 Filed at: 01/16/2025 0900   Audit-C Score 0 Filed at: 01/16/2025 0900   ABIODUN: How many times in the past year have you...    Used an illegal drug or used a prescription medication for non-medical reasons? Never Filed at: 01/16/2025 0900                            History of Present Illness       Chief Complaint   Patient presents with    Abdominal Pain     Around umbilicus. States its like he has to pee but can't pee.        Past Medical History:   Diagnosis Date    Asthma     as a child      Past Surgical History:   Procedure Laterality Date    INCISION AND DRAINAGE OF WOUND Left 06/28/2021    Procedure: INCISION AND DRAINAGE (I&D) LEFT SUBMANDIBULAR SPACE INFECTION, LEFT  SPACE INFECTION WITH EXTRACTION OF TOOTH #19;  Surgeon: Fabi Hood DMD;  Location:  MAIN OR;  Service: Maxillofacial    ORIF TIBIA & FIBULA FRACTURES Right     IL ARTHRS AIDED ANT CRUCIATE LIGM RPR/AGMNTJ/RCNSTJ Right 1/17/2024    Procedure: Right - knee arthroscopy Lateral meniscectomy Synovectomy ACL reconstruction with allograft using the Arthrex graft link system Post arthroscopic injection of intra-articular joint space and peripheral portals;  Surgeon: Harvinder Ga DO;  Location: [facility];  Service: Orthopedics    IL ARTHRS KNE SURG W/MENISCECTOMY MED/LAT W/SHVG Right 1/10/2023    Procedure: ARTHROSCOPY KNEE WITH LATERAL MENISCECTOMY;  Surgeon: Harvinder Ga DO;  Location: [facility];  Service: Orthopedics      History reviewed. No pertinent family history.   Social History     Tobacco Use    Smoking status: Former     Current packs/day: 0.50     Types: Cigarettes    Smokeless tobacco: Never   Vaping Use    Vaping status: Former    Start date: 1/1/2023   Substance Use Topics    Alcohol use: Never    Drug use: Not Currently     Frequency: 7.0 times per week      Types: Marijuana     Comment: daily      E-Cigarette/Vaping    E-Cigarette Use Former User     Start Date 1/1/23     Cartridges/Day 0.5       E-Cigarette/Vaping Substances    Nicotine No     THC No     CBD No     Flavoring No     Other No     Unknown No       I have reviewed and agree with the history as documented.     35 yo male presenting to the ed for abdominal pain mostly around the belly button feeling like a pinching feeling, after cleaning his belly button in the shower a few days ago. Now having discomfort with movement and if it puts his finger in his belly button.  States today tried to urinate this morning was able to urinate and then had the urge again to urinate but new he didn't have to, now the pinching feeling in his belly button is more constant. Mild nausea but without vomiting/diarrhea, CP/sob. Denies any other symptoms at this time.        Abdominal Pain      Review of Systems   Gastrointestinal:  Positive for abdominal pain.   Genitourinary:  Positive for penile discharge and urgency.   All other systems reviewed and are negative.          Objective       ED Triage Vitals [01/16/25 0801]   Temperature Pulse Blood Pressure Respirations SpO2 Patient Position - Orthostatic VS   97.9 °F (36.6 °C) 73 129/61 20 97 % Lying      Temp Source Heart Rate Source BP Location FiO2 (%) Pain Score    Temporal Monitor Left arm -- 6      Vitals      Date and Time Temp Pulse SpO2 Resp BP Pain Score FACES Pain Rating User   01/16/25 1048 -- 75 98 % -- 124/60 -- -- JESS   01/16/25 0835 -- -- -- -- -- 6 -- AM   01/16/25 0801 97.9 °F (36.6 °C) 73 97 % 20 129/61 6 -- JCS            Physical Exam  Vitals and nursing note reviewed.   Constitutional:       General: He is not in acute distress.     Appearance: He is well-developed. He is not diaphoretic.   HENT:      Head: Normocephalic and atraumatic.      Right Ear: External ear normal.      Left Ear: External ear normal.      Nose: Nose normal.   Eyes:      General: No  scleral icterus.        Right eye: No discharge.         Left eye: No discharge.      Conjunctiva/sclera: Conjunctivae normal.   Cardiovascular:      Rate and Rhythm: Normal rate and regular rhythm.      Heart sounds: Normal heart sounds. No murmur heard.     No friction rub. No gallop.   Pulmonary:      Effort: Pulmonary effort is normal. No respiratory distress.      Breath sounds: Normal breath sounds. No wheezing or rales.   Abdominal:      General: Bowel sounds are normal. There is no distension.      Palpations: Abdomen is soft. There is no mass.      Tenderness: There is abdominal tenderness in the suprapubic area. There is no right CVA tenderness, left CVA tenderness or guarding.   Musculoskeletal:         General: No tenderness or deformity. Normal range of motion.      Cervical back: Normal range of motion and neck supple.   Skin:     General: Skin is warm and dry.      Coloration: Skin is not pale.      Findings: No erythema or rash.   Neurological:      Mental Status: He is alert and oriented to person, place, and time.   Psychiatric:         Behavior: Behavior normal.         Thought Content: Thought content normal.         Judgment: Judgment normal.       Results Reviewed       Procedure Component Value Units Date/Time    UA w Reflex to Microscopic w Reflex to Culture [[id number]]  (Normal) Collected: 01/16/25 0947    Lab Status: Final result Specimen: Urine, Clean Catch Updated: 01/16/25 1007     Color, UA Yellow     Clarity, UA Clear     Specific Gravity, UA 1.010     pH, UA 6.0     Leukocytes, UA Negative     Nitrite, UA Negative     Protein, UA Negative mg/dl      Glucose, UA Negative mg/dl      Ketones, UA Negative mg/dl      Urobilinogen, UA 0.2 E.U./dl      Bilirubin, UA Negative     Occult Blood, UA Negative    Chlamydia/GC amplified DNA by PCR [[id number]] Collected: 01/16/25 0945    Lab Status: In process Specimen: Urine, Other Updated: 01/16/25 0958    Comprehensive metabolic panel [[id number]]  Collected: 01/16/25 0834    Lab Status: Final result Specimen: Blood from Arm, Left Updated: 01/16/25 0909     Sodium 136 mmol/L      Potassium 4.6 mmol/L      Chloride 105 mmol/L      CO2 26 mmol/L      ANION GAP 5 mmol/L      BUN 16 mg/dL      Creatinine 0.94 mg/dL      Glucose 110 mg/dL      Calcium 9.2 mg/dL      AST 18 U/L      ALT 20 U/L      Alkaline Phosphatase 61 U/L      Total Protein 7.1 g/dL      Albumin 4.2 g/dL      Total Bilirubin 0.43 mg/dL      eGFR 103 ml/min/1.73sq m     Narrative:      National Kid[employer] Disease Foundation guidelines for Chronic Kidney Disease (CKD):     Stage 1 with normal or high GFR (GFR > 90 mL/min/1.73 square meters)    Stage 2 Mild CKD (GFR = 60-89 mL/min/1.73 square meters)    Stage 3A Moderate CKD (GFR = 45-59 mL/min/1.73 square meters)    Stage 3B Moderate CKD (GFR = 30-44 mL/min/1.73 square meters)    Stage 4 Severe CKD (GFR = 15-29 mL/min/1.73 square meters)    Stage 5 End Stage CKD (GFR <15 mL/min/1.73 square meters)  Note: GFR calculation is accurate only with a steady state creatinine    Lipase [[id number]]  (Normal) Collected: 01/16/25 0834    Lab Status: Final result Specimen: Blood from Arm, Left Updated: 01/16/25 0909     Lipase 26 u/L     CBC and differential [[id number]] Collected: 01/16/25 0834    Lab Status: Final result Specimen: Blood from Arm, Left Updated: 01/16/25 0858     WBC 6.41 Thousand/uL      RBC 4.80 Million/uL      Hemoglobin 14.2 g/dL      Hematocrit 43.3 %      MCV 90 fL      MCH 29.6 pg      MCHC 32.8 g/dL      RDW 13.4 %      MPV 10.4 fL      Platelets 219 Thousands/uL      nRBC 0 /100 WBCs      Segmented % 61 %      Immature Grans % 1 %      Lymphocytes % 25 %      Monocytes % 10 %      Eosinophils Relative 2 %      Basophils Relative 1 %      Absolute Neutrophils 4.01 Thousands/µL      Absolute Immature Grans 0.03 Thousand/uL      Absolute Lymphocytes 1.57 Thousands/µL      Absolute Monocytes 0.61 Thousand/µL      Eosinophils Absolute 0.15  Thousand/µL      Basophils Absolute 0.04 Thousands/µL             CT abdomen pelvis with contrast   Final Interpretation by Amaya Holland MD (01/16 0957)      No acute findings. No findings identified to explain pain.            Workstation performed: [id number]             Procedures    ED Medication and Procedure Management   Prior to Admission Medications   Prescriptions Last Dose Informant Patient Reported? Taking?   Melatonin 10 MG TABS  Self No No   Sig: Take 1 tablet (10 mg total) by mouth daily at bedtime as needed (insomnia)   Omega-3 Fatty Acids (fish oil) 1,000 mg  Self Yes No   Sig: Take 1,000 mg by mouth daily   albuterol (Proventil HFA) 90 mcg/act inhaler  Self No No   Sig: Inhale 1-2 puffs every 6 (six) hours as needed for wheezing   amitriptyline (ELAVIL) 10 mg tablet   No No   Sig: Take 2 tablets (20 mg total) by mouth daily at bedtime   ascorbic acid (VITAMIN C) 250 mg tablet  Self Yes No   Sig: Take 250 mg by mouth daily   cholecalciferol (VITAMIN D3) 400 units tablet  Self Yes No   Sig: Take 400 Units by mouth daily   fluticasone-umeclidinium-vilanterol (Trelegy Ellipta) 200-62.5-25 mcg/actuation AEPB inhaler   No No   Sig: Inhale 1 puff daily Rinse mouth after use.   ketorolac (TORADOL) 10 mg tablet  Self No No   Sig: Take 1 tablet (10 mg total) by mouth every 6 (six) hours as needed for moderate pain   meloxicam (MOBIC) 15 mg tablet   No No   Sig: Take 1 tablet (15 mg total) by mouth daily   Patient taking differently: Take 15 mg by mouth daily PRN   methadone (DOLOPHINE) 10 mg/mL oral concentrated solution  Self Yes No   Sig: Take 25 mg by mouth every morning   vitamin E 100 UNIT capsule  Self Yes No   Sig: Take 100 Units by mouth daily      Facility-Administered Medications: None     Discharge Medication List as of 1/16/2025 11:23 AM        START taking these medications    Details   doxycycline hyclate (VIBRAMYCIN) 100 mg capsule Take 1 capsule (100 mg total) by mouth every 12 (twelve) hours  for 7 days, Starting Thu 1/16/2025, Until Thu 1/23/2025, Normal           CONTINUE these medications which have NOT CHANGED    Details   albuterol (Proventil HFA) 90 mcg/act inhaler Inhale 1-2 puffs every 6 (six) hours as needed for wheezing, Starting Mon 10/7/2024, Normal      amitriptyline (ELAVIL) 10 mg tablet Take 2 tablets (20 mg total) by mouth daily at bedtime, Starting Fri 11/22/2024, Normal      ascorbic acid (VITAMIN C) 250 mg tablet Take 250 mg by mouth daily, Historical Med      cholecalciferol (VITAMIN D3) 400 units tablet Take 400 Units by mouth daily, Historical Med      fluticasone-umeclidinium-vilanterol (Trelegy Ellipta) 200-62.5-25 mcg/actuation AEPB inhaler Inhale 1 puff daily Rinse mouth after use., Starting Mon 12/30/2024, Until Thu 12/25/2025, Print      ketorolac (TORADOL) 10 mg tablet Take 1 tablet (10 mg total) by mouth every 6 (six) hours as needed for moderate pain, Starting Sun 11/17/2024, Normal      Melatonin 10 MG TABS Take 1 tablet (10 mg total) by mouth daily at bedtime as needed (insomnia), Starting Thu 10/31/2024, Normal      meloxicam (MOBIC) 15 mg tablet Take 1 tablet (15 mg total) by mouth daily, Starting Wed 1/17/2024, Until Tue 11/26/2024, Normal      methadone (DOLOPHINE) 10 mg/mL oral concentrated solution Take 25 mg by mouth every morning, Historical Med      Omega-3 Fatty Acids (fish oil) 1,000 mg Take 1,000 mg by mouth daily, Historical Med      vitamin E 100 UNIT capsule Take 100 Units by mouth daily, Historical Med           No discharge procedures on file.  ED SEPSIS DOCUMENTATION   Time reflects when diagnosis was documented in both MDM as applicable and the Disposition within this note       Time User Action Codes Description Comment    1/16/2025 11:07 AM Isac Reagan [R10.9] Abdominal pain     1/16/2025 11:08 AM Isac Reagan [R36.9] Penile discharge                  Isac Reagan,   01/16/25 7170

## 2025-01-17 LAB
C TRACH DNA SPEC QL NAA+PROBE: NEGATIVE
N GONORRHOEA DNA SPEC QL NAA+PROBE: NEGATIVE

## 2025-01-24 ENCOUNTER — TELEPHONE (OUTPATIENT)
Age: 35
End: 2025-01-24

## 2025-01-24 NOTE — TELEPHONE ENCOUNTER
I called [employer] and spoke with Martell. She stated they did receive the pay stubs but they need documentation of an hourly rate on it. They have whaty is being taken out but not an hourly rate. I called patient and LVM of what was needed. Asked patient to call me back at the office if he has any other questions or would like to drop off the hourly rate pay stub then I can fax it to [employer].

## 2025-01-27 ENCOUNTER — OFFICE VISIT (OUTPATIENT)
Dept: GASTROENTEROLOGY | Facility: CLINIC | Age: 35
End: 2025-01-27

## 2025-01-27 VITALS
WEIGHT: 217.4 LBS | HEART RATE: 70 BPM | HEIGHT: 74 IN | DIASTOLIC BLOOD PRESSURE: 70 MMHG | BODY MASS INDEX: 27.9 KG/M2 | TEMPERATURE: 97.5 F | SYSTOLIC BLOOD PRESSURE: 109 MMHG | OXYGEN SATURATION: 97 %

## 2025-01-27 DIAGNOSIS — K21.9 GASTROESOPHAGEAL REFLUX DISEASE, UNSPECIFIED WHETHER ESOPHAGITIS PRESENT: Primary | ICD-10-CM

## 2025-01-27 PROCEDURE — 99213 OFFICE O/P EST LOW 20 MIN: CPT | Performed by: PHYSICIAN ASSISTANT

## 2025-01-27 NOTE — PROGRESS NOTES
Name: Khalif Hood      : 1990      MRN: [id number]  Encounter Provider: Adele Rockwell PA-C  Encounter Date: 2025   Encounter department: [facility] [place]  :  Assessment & Plan  Gastroesophageal reflux disease, unspecified whether esophagitis present  Significant heartburn symptoms over the past 1 week  He has never had consistent symptoms before  He is taking Nexium OTC one daily along with Pepcid, Tums or Rolaids as needed  He has no alarm symptoms such as dysphagia or intractable vomiting    Advised Nexium BID and Pepcid at bedtime for the next 2 weeks  If better he will drop Nexium to once daily for a week before stopping    If not better he will call and we will plan EGD  Would also increase Nexium to 40mg BID    We reviewed GERD dietary and lifestyle modifications - informational handout provided           History of Present Illness   HPI  Khalif Hood is a 34 y.o. male with a history of migraine headaches and asthma who presents for evaluation of heartburn.  He reports that he has had significant heartburn symptoms every day for the past 5 days.  He reports that it started after eating a sausage sandwich.  The sandwich contained sausage and fried onions.  He reports that in the past he would get occasional heartburn symptoms which would resolve quickly with taking Rolaids.  This is the first time he has had consistent intractable symptoms.  He denies any episodes of vomiting.  He denies dysphagia.  Prior to symptom onset he denies any new medications or illnesses.  He has never had an endoscopy.        Review of Systems  Medical History Reviewed by provider this encounter:  Problems     .  Past Medical History   Past Medical History:   Diagnosis Date    Asthma     as a child     Past Surgical History:   Procedure Laterality Date    INCISION AND DRAINAGE OF WOUND Left 2021    Procedure: INCISION AND DRAINAGE (I&D) LEFT SUBMANDIBULAR SPACE  INFECTION, LEFT  SPACE INFECTION WITH EXTRACTION OF TOOTH #19;  Surgeon: Fabi Hood DMD;  Location:  MAIN OR;  Service: Maxillofacial    ORIF TIBIA & FIBULA FRACTURES Right     [place] ARTHRS AIDED ANT CRUCIATE LIGM RPR/AGMNTJ/RCNSTJ Right 1/17/2024    Procedure: Right - knee arthroscopy Lateral meniscectomy Synovectomy ACL reconstruction with allograft using the Arthrex graft link system Post arthroscopic injection of intra-articular joint space and peripheral portals;  Surgeon: Harvinder Ga DO;  Location: [facility];  Service: Orthopedics    NC ARTHRS KNE SURG W/MENISCECTOMY MED/LAT W/SHVG Right 1/10/2023    Procedure: ARTHROSCOPY KNEE WITH LATERAL MENISCECTOMY;  Surgeon: Harvinder Ga DO;  Location: [facility];  Service: Orthopedics     History reviewed. No pertinent family history.   reports that he has quit smoking. His smoking use included cigarettes. He has never used smokeless tobacco. He reports that he does not currently use drugs after having used the following drugs: Marijuana. Frequency: 7.00 times per week. He reports that he does not drink alcohol.  Current Outpatient Medications on File Prior to Visit   Medication Sig Dispense Refill    albuterol (Proventil HFA) 90 mcg/act inhaler Inhale 1-2 puffs every 6 (six) hours as needed for wheezing 18 g 2    amitriptyline (ELAVIL) 10 mg tablet Take 2 tablets (20 mg total) by mouth daily at bedtime 60 tablet 3    ascorbic acid (VITAMIN C) 250 mg tablet Take 250 mg by mouth daily      cholecalciferol (VITAMIN D3) 400 units tablet Take 400 Units by mouth daily      fluticasone-umeclidinium-vilanterol (Trelegy Ellipta) 200-62.5-25 mcg/actuation AEPB inhaler Inhale 1 puff daily Rinse mouth after use. 180 blister 3    Melatonin 10 MG TABS Take 1 tablet (10 mg total) by mouth daily at bedtime as needed (insomnia) 20 tablet 0    meloxicam (MOBIC) 15 mg tablet Take 1 tablet (15 mg total) by mouth daily (Patient taking differently: Take 15 mg  by mouth daily PRN) 30 tablet 0    methadone (DOLOPHINE) 10 mg/mL oral concentrated solution Take 25 mg by mouth every morning      Omega-3 Fatty Acids (fish oil) 1,000 mg Take 1,000 mg by mouth daily      vitamin E 100 UNIT capsule Take 100 Units by mouth daily      ketorolac (TORADOL) 10 mg tablet Take 1 tablet (10 mg total) by mouth every 6 (six) hours as needed for moderate pain (Patient not taking: Reported on 1/27/2025) 10 tablet 0     No current facility-administered medications on file prior to visit.   No Known Allergies   Current Outpatient Medications on File Prior to Visit   Medication Sig Dispense Refill    albuterol (Proventil HFA) 90 mcg/act inhaler Inhale 1-2 puffs every 6 (six) hours as needed for wheezing 18 g 2    amitriptyline (ELAVIL) 10 mg tablet Take 2 tablets (20 mg total) by mouth daily at bedtime 60 tablet 3    ascorbic acid (VITAMIN C) 250 mg tablet Take 250 mg by mouth daily      cholecalciferol (VITAMIN D3) 400 units tablet Take 400 Units by mouth daily      fluticasone-umeclidinium-vilanterol (Trelegy Ellipta) 200-62.5-25 mcg/actuation AEPB inhaler Inhale 1 puff daily Rinse mouth after use. 180 blister 3    Melatonin 10 MG TABS Take 1 tablet (10 mg total) by mouth daily at bedtime as needed (insomnia) 20 tablet 0    meloxicam (MOBIC) 15 mg tablet Take 1 tablet (15 mg total) by mouth daily (Patient taking differently: Take 15 mg by mouth daily PRN) 30 tablet 0    methadone (DOLOPHINE) 10 mg/mL oral concentrated solution Take 25 mg by mouth every morning      Omega-3 Fatty Acids (fish oil) 1,000 mg Take 1,000 mg by mouth daily      vitamin E 100 UNIT capsule Take 100 Units by mouth daily      ketorolac (TORADOL) 10 mg tablet Take 1 tablet (10 mg total) by mouth every 6 (six) hours as needed for moderate pain (Patient not taking: Reported on 1/27/2025) 10 tablet 0     No current facility-administered medications on file prior to visit.      Social History     Tobacco Use    Smoking status:  "Former     Current packs/day: 0.50     Types: Cigarettes    Smokeless tobacco: Never   Vaping Use    Vaping status: Former    Start date: 1/1/2023   Substance and Sexual Activity    Alcohol use: Never    Drug use: Not Currently     Frequency: 7.0 times per week     Types: Marijuana     Comment: daily    Sexual activity: Not on file        Objective   /70 (BP Location: Left arm, Patient Position: Sitting, Cuff Size: Standard)   Pulse 70   Temp 97.5 °F (36.4 °C) (Temporal)   Ht 6' 2\" (1.88 m)   Wt 98.6 kg (217 lb 6.4 oz)   SpO2 97%   BMI 27.91 kg/m²      Physical Exam      "

## 2025-01-27 NOTE — ASSESSMENT & PLAN NOTE
Significant heartburn symptoms over the past 1 week  He has never had consistent symptoms before  He is taking Nexium OTC one daily along with Pepcid, Tums or Rolaids as needed  He has no alarm symptoms such as dysphagia or intractable vomiting    Advised Nexium BID and Pepcid at bedtime for the next 2 weeks  If better he will drop Nexium to once daily for a week before stopping    If not better he will call and we will plan EGD  Would also increase Nexium to 40mg BID    We reviewed GERD dietary and lifestyle modifications - informational handout provided

## 2025-03-03 ENCOUNTER — TELEPHONE (OUTPATIENT)
Dept: PULMONOLOGY | Facility: CLINIC | Age: 35
End: 2025-03-03

## 2025-03-03 NOTE — TELEPHONE ENCOUNTER
Left VM for pt that 3:30pm 4/8 appt with Elizabeth has been r/s to 11:30am on 4/8 due to change in provider schedule. Instructed pt to c/b if new time does not work

## 2025-03-05 ENCOUNTER — TELEPHONE (OUTPATIENT)
Dept: NEUROLOGY | Facility: CLINIC | Age: 35
End: 2025-03-05

## 2025-03-05 NOTE — TELEPHONE ENCOUNTER
Jason Neal is returning to [place] on 3/21/25. Called and lft msg for pt to call and rescheduled to [place] office.

## 2025-03-11 ENCOUNTER — TELEPHONE (OUTPATIENT)
Dept: NEUROLOGY | Facility: CLINIC | Age: 35
End: 2025-03-11

## 2025-03-12 NOTE — TELEPHONE ENCOUNTER
pt called and states that he missed 2 calls from office but no message was left.      He states that he does not have health insurance and he pays out of pocket    Also made him aware that his 3/21 appt was changed to [place] office per 3/5 encounter.    Gave him [place] office address    He asked if there was a provider that he could see going forward that would be closer for him, either at [place] or [place] office.  I recommended that he speak to bin at upcoming appt and can then see about scheduling at a closer location for future appts. He was agreeable to this.

## 2025-03-14 ENCOUNTER — TELEPHONE (OUTPATIENT)
Dept: NEUROLOGY | Facility: CLINIC | Age: 35
End: 2025-03-14

## 2025-03-20 ENCOUNTER — APPOINTMENT (EMERGENCY)
Dept: CT IMAGING | Facility: HOSPITAL | Age: 35
End: 2025-03-20

## 2025-03-20 ENCOUNTER — TELEPHONE (OUTPATIENT)
Dept: NEUROLOGY | Facility: CLINIC | Age: 35
End: 2025-03-20

## 2025-03-20 ENCOUNTER — HOSPITAL ENCOUNTER (EMERGENCY)
Facility: HOSPITAL | Age: 35
Discharge: HOME | End: 2025-03-20
Attending: EMERGENCY MEDICINE

## 2025-03-20 VITALS
RESPIRATION RATE: 18 BRPM | HEART RATE: 66 BPM | OXYGEN SATURATION: 99 % | TEMPERATURE: 97.7 F | DIASTOLIC BLOOD PRESSURE: 81 MMHG | SYSTOLIC BLOOD PRESSURE: 137 MMHG

## 2025-03-20 DIAGNOSIS — K59.00 CONSTIPATION: ICD-10-CM

## 2025-03-20 DIAGNOSIS — R10.9 ABDOMINAL PAIN: Primary | ICD-10-CM

## 2025-03-20 LAB
ALBUMIN SERPL BCG-MCNC: 4.1 G/DL (ref 3.5–5)
ALP SERPL-CCNC: 60 U/L (ref 34–104)
ALT SERPL W P-5'-P-CCNC: 21 U/L (ref 7–52)
ANION GAP SERPL CALCULATED.3IONS-SCNC: 5 MMOL/L (ref 4–13)
AST SERPL W P-5'-P-CCNC: 20 U/L (ref 13–39)
BASOPHILS # BLD AUTO: 0.05 THOUSANDS/ÂΜL (ref 0–0.1)
BASOPHILS NFR BLD AUTO: 1 % (ref 0–1)
BILIRUB SERPL-MCNC: 0.6 MG/DL (ref 0.2–1)
BUN SERPL-MCNC: 18 MG/DL (ref 5–25)
CALCIUM SERPL-MCNC: 9.2 MG/DL (ref 8.4–10.2)
CHLORIDE SERPL-SCNC: 106 MMOL/L (ref 96–108)
CO2 SERPL-SCNC: 27 MMOL/L (ref 21–32)
CREAT SERPL-MCNC: 1.04 MG/DL (ref 0.6–1.3)
EOSINOPHIL # BLD AUTO: 0.12 THOUSAND/ÂΜL (ref 0–0.61)
EOSINOPHIL NFR BLD AUTO: 2 % (ref 0–6)
ERYTHROCYTE [DISTWIDTH] IN BLOOD BY AUTOMATED COUNT: 13.1 % (ref 11.6–15.1)
GFR SERPL CREATININE-BSD FRML MDRD: 91 ML/MIN/1.73SQ M
GLUCOSE SERPL-MCNC: 90 MG/DL (ref 65–140)
HCT VFR BLD AUTO: 41.9 % (ref 36.5–49.3)
HGB BLD-MCNC: 13.6 G/DL (ref 12–17)
IMM GRANULOCYTES # BLD AUTO: 0.02 THOUSAND/UL (ref 0–0.2)
IMM GRANULOCYTES NFR BLD AUTO: 0 % (ref 0–2)
LIPASE SERPL-CCNC: 26 U/L (ref 11–82)
LYMPHOCYTES # BLD AUTO: 1.83 THOUSANDS/ÂΜL (ref 0.6–4.47)
LYMPHOCYTES NFR BLD AUTO: 31 % (ref 14–44)
MCH RBC QN AUTO: 29.1 PG (ref 26.8–34.3)
MCHC RBC AUTO-ENTMCNC: 32.5 G/DL (ref 31.4–37.4)
MCV RBC AUTO: 90 FL (ref 82–98)
MONOCYTES # BLD AUTO: 0.7 THOUSAND/ÂΜL (ref 0.17–1.22)
MONOCYTES NFR BLD AUTO: 12 % (ref 4–12)
NEUTROPHILS # BLD AUTO: 3.24 THOUSANDS/ÂΜL (ref 1.85–7.62)
NEUTS SEG NFR BLD AUTO: 54 % (ref 43–75)
NRBC BLD AUTO-RTO: 0 /100 WBCS
PLATELET # BLD AUTO: 183 THOUSANDS/UL (ref 149–390)
PMV BLD AUTO: 10.2 FL (ref 8.9–12.7)
POTASSIUM SERPL-SCNC: 4.2 MMOL/L (ref 3.5–5.3)
PROT SERPL-MCNC: 6.7 G/DL (ref 6.4–8.4)
RBC # BLD AUTO: 4.67 MILLION/UL (ref 3.88–5.62)
SODIUM SERPL-SCNC: 138 MMOL/L (ref 135–147)
WBC # BLD AUTO: 5.96 THOUSAND/UL (ref 4.31–10.16)

## 2025-03-20 PROCEDURE — 96375 TX/PRO/DX INJ NEW DRUG ADDON: CPT

## 2025-03-20 PROCEDURE — 36415 COLL VENOUS BLD VENIPUNCTURE: CPT | Performed by: NURSE PRACTITIONER

## 2025-03-20 PROCEDURE — 80053 COMPREHEN METABOLIC PANEL: CPT | Performed by: NURSE PRACTITIONER

## 2025-03-20 PROCEDURE — 83690 ASSAY OF LIPASE: CPT | Performed by: NURSE PRACTITIONER

## 2025-03-20 PROCEDURE — 96365 THER/PROPH/DIAG IV INF INIT: CPT

## 2025-03-20 PROCEDURE — 99284 EMERGENCY DEPT VISIT MOD MDM: CPT

## 2025-03-20 PROCEDURE — 74177 CT ABD & PELVIS W/CONTRAST: CPT

## 2025-03-20 PROCEDURE — 85025 COMPLETE CBC W/AUTO DIFF WBC: CPT | Performed by: NURSE PRACTITIONER

## 2025-03-20 PROCEDURE — 99284 EMERGENCY DEPT VISIT MOD MDM: CPT | Performed by: NURSE PRACTITIONER

## 2025-03-20 RX ORDER — SODIUM CHLORIDE, SODIUM GLUCONATE, SODIUM ACETATE, POTASSIUM CHLORIDE, MAGNESIUM CHLORIDE, SODIUM PHOSPHATE, DIBASIC, AND POTASSIUM PHOSPHATE .53; .5; .37; .037; .03; .012; .00082 G/100ML; G/100ML; G/100ML; G/100ML; G/100ML; G/100ML; G/100ML
1000 INJECTION, SOLUTION INTRAVENOUS ONCE
Status: COMPLETED | OUTPATIENT
Start: 2025-03-20 | End: 2025-03-20

## 2025-03-20 RX ORDER — KETOROLAC TROMETHAMINE 30 MG/ML
15 INJECTION, SOLUTION INTRAMUSCULAR; INTRAVENOUS ONCE
Status: COMPLETED | OUTPATIENT
Start: 2025-03-20 | End: 2025-03-20

## 2025-03-20 RX ORDER — POLYETHYLENE GLYCOL 3350 17 G/17G
17 POWDER, FOR SOLUTION ORAL DAILY
Qty: 119 G | Refills: 0 | Status: SHIPPED | OUTPATIENT
Start: 2025-03-20 | End: 2025-03-27

## 2025-03-20 RX ADMIN — IOHEXOL 100 ML: 350 INJECTION, SOLUTION INTRAVENOUS at 07:26

## 2025-03-20 RX ADMIN — KETOROLAC TROMETHAMINE 15 MG: 30 INJECTION, SOLUTION INTRAMUSCULAR; INTRAVENOUS at 07:07

## 2025-03-20 RX ADMIN — SODIUM CHLORIDE, SODIUM GLUCONATE, SODIUM ACETATE, POTASSIUM CHLORIDE, MAGNESIUM CHLORIDE, SODIUM PHOSPHATE, DIBASIC, AND POTASSIUM PHOSPHATE 1000 ML: .53; .5; .37; .037; .03; .012; .00082 INJECTION, SOLUTION INTRAVENOUS at 07:08

## 2025-03-20 NOTE — TELEPHONE ENCOUNTER
Patient called in to re-schedule his visit due to not feeling well at this time and even going to the hospital recently. Patient requested to be re-schedule in two weeks. He is now scheduled 04/09 at 7:30.    Patient states he has no insurance and is self paying.

## 2025-03-20 NOTE — ED PROVIDER NOTES
Time reflects when diagnosis was documented in both MDM as applicable and the Disposition within this note       Time User Action Codes Description Comment    3/20/2025  8:04 AM Jairon Clemons Add [R10.9] Abdominal pain     3/20/2025  8:04 AM Jairon Clemons Add [K59.00] Constipation           ED Disposition       ED Disposition   Discharge    Condition   Stable    Date/Time   u Mar 20, 2025  8:04 AM    Comment   Khalif Hood discharge to home/self care.                   Assessment & Plan       Medical Decision Making  CT unremarkable for any surgical pathology.  Patient noted to have constipation which very well could account for his symptomology.  Will begin a course of MiraLAX return precautions discussed.    Amount and/or Complexity of Data Reviewed  Labs: ordered.  Radiology: ordered.    Risk  OTC drugs.  Prescription drug management.             Medications   multi-electrolyte (Plasmalyte-A/Isolyte-S PH 7.4/Normosol-R) IV bolus 1,000 mL (0 mL Intravenous Stopped 3/20/25 0808)   ketorolac (TORADOL) injection 15 mg (15 mg Intravenous Given 3/20/25 0707)   iohexol (OMNIPAQUE) 350 MG/ML injection (MULTI-DOSE) 100 mL (100 mL Intravenous Given 3/20/25 0726)       ED Risk Strat Scores                            SBIRT 20yo+      Flowsheet Row Most Recent Value   Initial Alcohol Screen: US AUDIT-C     1. How often do you have a drink containing alcohol? 0 Filed at: 03/20/2025 0649   2. How many drinks containing alcohol do you have on a typical day you are drinking?  0 Filed at: 03/20/2025 0649   3a. Male UNDER 65: How often do you have five or more drinks on one occasion? 0 Filed at: 03/20/2025 0649   Audit-C Score 0 Filed at: 03/20/2025 0649   ABIODUN: How many times in the past year have you...    Used an illegal drug or used a prescription medication for non-medical reasons? Never Filed at: 03/20/2025 0649                            History of Present Illness       Chief Complaint   Patient presents with    Abdominal  Pain     Pt states abdominal pain starting yesterday evening. Pt states abdomen is tender to touch. Took gas x and miralax with no relief of pain. Solid bm last night. Denies n/v/d.        Past Medical History:   Diagnosis Date    Asthma     as a child      Past Surgical History:   Procedure Laterality Date    INCISION AND DRAINAGE OF WOUND Left 06/28/2021    Procedure: INCISION AND DRAINAGE (I&D) LEFT SUBMANDIBULAR SPACE INFECTION, LEFT  SPACE INFECTION WITH EXTRACTION OF TOOTH #19;  Surgeon: Fabi Hood DMD;  Location:  MAIN OR;  Service: Maxillofacial    ORIF TIBIA & FIBULA FRACTURES Right     NV ARTHRS AIDED ANT CRUCIATE LIGM RPR/AGMNTJ/RCNSTJ Right 1/17/2024    Procedure: Right - knee arthroscopy Lateral meniscectomy Synovectomy ACL reconstruction with allograft using the Arthrex graft link system Post arthroscopic injection of intra-articular joint space and peripheral portals;  Surgeon: Harvinder Ga DO;  Location: [facility];  Service: Orthopedics    NV ARTHRS KNE SURG W/MENISCECTOMY MED/LAT W/SHVG Right 1/10/2023    Procedure: ARTHROSCOPY KNEE WITH LATERAL MENISCECTOMY;  Surgeon: Harvinder Ga DO;  Location: [facility];  Service: Orthopedics      History reviewed. No pertinent family history.   Social History     Tobacco Use    Smoking status: Former     Current packs/day: 0.50     Types: Cigarettes    Smokeless tobacco: Never   Vaping Use    Vaping status: Former    Start date: 1/1/2023   Substance Use Topics    Alcohol use: Never    Drug use: Not Currently     Frequency: 7.0 times per week     Types: Marijuana     Comment: daily      E-Cigarette/Vaping    E-Cigarette Use Former User     Start Date 1/1/23     Cartridges/Day 0.5       E-Cigarette/Vaping Substances    Nicotine No     THC No     CBD No     Flavoring No     Other No     Unknown No       I have reviewed and agree with the history as documented.     34-year-old male patient with reports of generalized abdominal  discomfort primarily of the upper abdomen starting yesterday evening.  He thought initially maybe he was suffering from gas pain so he took some Gas-X without any relief.  He does have some associated nausea with this.  Denies any diarrhea at this time.  Had a normal bowel movement yesterday.      Abdominal Pain  Associated symptoms: no chest pain, no chills, no cough, no dysuria, no fever, no hematuria, no shortness of breath, no sore throat and no vomiting        Review of Systems   Constitutional:  Negative for chills and fever.   HENT:  Negative for ear pain and sore throat.    Eyes:  Negative for pain and visual disturbance.   Respiratory:  Negative for cough and shortness of breath.    Cardiovascular:  Negative for chest pain and palpitations.   Gastrointestinal:  Positive for abdominal pain. Negative for vomiting.   Genitourinary:  Negative for dysuria and hematuria.   Musculoskeletal:  Negative for arthralgias and back pain.   Skin:  Negative for color change and rash.   Neurological:  Negative for seizures and syncope.   All other systems reviewed and are negative.          Objective       ED Triage Vitals   Temperature Pulse Blood Pressure Respirations SpO2 Patient Position - Orthostatic VS   03/20/25 0647 03/20/25 0647 03/20/25 0647 03/20/25 0647 03/20/25 0647 03/20/25 0647   97.7 °F (36.5 °C) 61 137/81 18 100 % Sitting      Temp Source Heart Rate Source BP Location FiO2 (%) Pain Score    03/20/25 0647 03/20/25 0647 03/20/25 0647 -- 03/20/25 0707    Oral Monitor Left arm  6      Vitals      Date and Time Temp Pulse SpO2 Resp BP Pain Score FACES Pain Rating User   03/20/25 0811 -- 66 99 % -- -- -- -- [facility]   03/20/25 0707 -- -- -- -- -- 6 -- [facility]   03/20/25 0647 97.7 °F (36.5 °C) 61 100 % 18 137/81 -- -- [facility]            Physical Exam  Vitals and nursing note reviewed.   Constitutional:       General: He is not in acute distress.     Appearance: He is well-developed.   HENT:      Head: Normocephalic and  atraumatic.   Eyes:      Conjunctiva/sclera: Conjunctivae normal.   Cardiovascular:      Rate and Rhythm: Normal rate and regular rhythm.      Heart sounds: No murmur heard.  Pulmonary:      Effort: Pulmonary effort is normal. No respiratory distress.      Breath sounds: Normal breath sounds.   Abdominal:      Palpations: Abdomen is soft.      Tenderness: There is generalized abdominal tenderness and tenderness in the right upper quadrant and epigastric area.   Musculoskeletal:         General: No swelling.      Cervical back: Neck supple.   Skin:     General: Skin is warm and dry.      Capillary Refill: Capillary refill takes less than 2 seconds.   Neurological:      Mental Status: He is alert.   Psychiatric:         Mood and Affect: Mood normal.         Results Reviewed       Procedure Component Value Units Date/Time    Comprehensive metabolic panel [[id number]] Collected: 03/20/25 0707    Lab Status: Final result Specimen: Blood from Arm, Right Updated: 03/20/25 0729     Sodium 138 mmol/L      Potassium 4.2 mmol/L      Chloride 106 mmol/L      CO2 27 mmol/L      ANION GAP 5 mmol/L      BUN 18 mg/dL      Creatinine 1.04 mg/dL      Glucose 90 mg/dL      Calcium 9.2 mg/dL      AST 20 U/L      ALT 21 U/L      Alkaline Phosphatase 60 U/L      Total Protein 6.7 g/dL      Albumin 4.1 g/dL      Total Bilirubin 0.60 mg/dL      eGFR 91 ml/min/1.73sq m     Narrative:      [employer] guidelines for Chronic Kidney Disease (CKD):     Stage 1 with normal or high GFR (GFR > 90 mL/min/1.73 square meters)    Stage 2 Mild CKD (GFR = 60-89 mL/min/1.73 square meters)    Stage 3A Moderate CKD (GFR = 45-59 mL/min/1.73 square meters)    Stage 3B Moderate CKD (GFR = 30-44 mL/min/1.73 square meters)    Stage 4 Severe CKD (GFR = 15-29 mL/min/1.73 square meters)    Stage 5 End Stage CKD (GFR <15 mL/min/1.73 square meters)  Note: GFR calculation is accurate only with a steady state creatinine    Lipase [[id number]]   (Normal) Collected: 03/20/25 0707    Lab Status: Final result Specimen: Blood from Arm, Right Updated: 03/20/25 0729     Lipase 26 u/L     CBC and differential [[id number]] Collected: 03/20/25 0707    Lab Status: Final result Specimen: Blood from Arm, Right Updated: 03/20/25 0715     WBC 5.96 Thousand/uL      RBC 4.67 Million/uL      Hemoglobin 13.6 g/dL      Hematocrit 41.9 %      MCV 90 fL      MCH 29.1 pg      MCHC 32.5 g/dL      RDW 13.1 %      MPV 10.2 fL      Platelets 183 Thousands/uL      nRBC 0 /100 WBCs      Segmented % 54 %      Immature Grans % 0 %      Lymphocytes % 31 %      Monocytes % 12 %      Eosinophils Relative 2 %      Basophils Relative 1 %      Absolute Neutrophils 3.24 Thousands/µL      Absolute Immature Grans 0.02 Thousand/uL      Absolute Lymphocytes 1.83 Thousands/µL      Absolute Monocytes 0.70 Thousand/µL      Eosinophils Absolute 0.12 Thousand/µL      Basophils Absolute 0.05 Thousands/µL             CT abdomen pelvis with contrast   Final Interpretation by Paddy Olvera MD (03/20 0745)      No acute intra-abdominal pathology.   Findings suggestive of constipation.         Workstation performed: [id number]             Procedures    ED Medication and Procedure Management   Prior to Admission Medications   Prescriptions Last Dose Informant Patient Reported? Taking?   Melatonin 10 MG TABS  Self No No   Sig: Take 1 tablet (10 mg total) by mouth daily at bedtime as needed (insomnia)   Omega-3 Fatty Acids (fish oil) 1,000 mg  Self Yes No   Sig: Take 1,000 mg by mouth daily   albuterol (Proventil HFA) 90 mcg/act inhaler  Self No No   Sig: Inhale 1-2 puffs every 6 (six) hours as needed for wheezing   amitriptyline (ELAVIL) 10 mg tablet   No No   Sig: Take 2 tablets (20 mg total) by mouth daily at bedtime   ascorbic acid (VITAMIN C) 250 mg tablet  Self Yes No   Sig: Take 250 mg by mouth daily   cholecalciferol (VITAMIN D3) 400 units tablet  Self Yes No   Sig: Take 400 Units by mouth daily    fluticasone-umeclidinium-vilanterol (Trelegy Ellipta) 200-62.5-25 mcg/actuation AEPB inhaler   No No   Sig: Inhale 1 puff daily Rinse mouth after use.   ketorolac (TORADOL) 10 mg tablet  Self No No   Sig: Take 1 tablet (10 mg total) by mouth every 6 (six) hours as needed for moderate pain   Patient not taking: Reported on 1/27/2025   meloxicam (MOBIC) 15 mg tablet   No No   Sig: Take 1 tablet (15 mg total) by mouth daily   Patient taking differently: Take 15 mg by mouth daily PRN   methadone (DOLOPHINE) 10 mg/mL oral concentrated solution  Self Yes No   Sig: Take 25 mg by mouth every morning   vitamin E 100 UNIT capsule  Self Yes No   Sig: Take 100 Units by mouth daily      Facility-Administered Medications: None     Discharge Medication List as of 3/20/2025  8:05 AM        START taking these medications    Details   polyethylene glycol (MIRALAX) 17 g packet Take 17 g by mouth daily for 7 days, Starting Thu 3/20/2025, Until Thu 3/27/2025, Normal           CONTINUE these medications which have NOT CHANGED    Details   albuterol (Proventil HFA) 90 mcg/act inhaler Inhale 1-2 puffs every 6 (six) hours as needed for wheezing, Starting Mon 10/7/2024, Normal      amitriptyline (ELAVIL) 10 mg tablet Take 2 tablets (20 mg total) by mouth daily at bedtime, Starting Fri 11/22/2024, Normal      ascorbic acid (VITAMIN C) 250 mg tablet Take 250 mg by mouth daily, Historical Med      cholecalciferol (VITAMIN D3) 400 units tablet Take 400 Units by mouth daily, Historical Med      fluticasone-umeclidinium-vilanterol (Trelegy Ellipta) 200-62.5-25 mcg/actuation AEPB inhaler Inhale 1 puff daily Rinse mouth after use., Starting Mon 12/30/2024, Until Thu 12/25/2025, Print      ketorolac (TORADOL) 10 mg tablet Take 1 tablet (10 mg total) by mouth every 6 (six) hours as needed for moderate pain, Starting Sun 11/17/2024, Normal      Melatonin 10 MG TABS Take 1 tablet (10 mg total) by mouth daily at bedtime as needed (insomnia), Starting  Thu 10/31/2024, Normal      meloxicam (MOBIC) 15 mg tablet Take 1 tablet (15 mg total) by mouth daily, Starting Wed 1/17/2024, Until Mon 1/27/2025, Normal      methadone (DOLOPHINE) 10 mg/mL oral concentrated solution Take 25 mg by mouth every morning, Historical Med      Omega-3 Fatty Acids (fish oil) 1,000 mg Take 1,000 mg by mouth daily, Historical Med      vitamin E 100 UNIT capsule Take 100 Units by mouth daily, Historical Med           No discharge procedures on file.  ED SEPSIS DOCUMENTATION   Time reflects when diagnosis was documented in both MDM as applicable and the Disposition within this note       Time User Action Codes Description Comment    3/20/2025  8:04 AM Jairon Clemons [R10.9] Abdominal pain     3/20/2025  8:04 AM Jairon Clemons [K59.00] Constipation                  SHELBY Mayer  03/20/25 5250

## 2025-03-20 NOTE — Clinical Note
Khalif Hood was seen and treated in our emergency department on 3/20/2025.                Diagnosis:     Khalif  may return to work on return date.    He may return on this date: 03/21/2025         If you have any questions or concerns, please don't hesitate to call.      SHELBY Mayer    ______________________________           _______________          _______________  Hospital Representative                              Date                                Time

## 2025-04-21 ENCOUNTER — HOSPITAL ENCOUNTER (EMERGENCY)
Facility: HOSPITAL | Age: 35
Discharge: HOME | End: 2025-04-21

## 2025-04-21 VITALS
OXYGEN SATURATION: 100 % | BODY MASS INDEX: 27.86 KG/M2 | RESPIRATION RATE: 16 BRPM | HEART RATE: 62 BPM | DIASTOLIC BLOOD PRESSURE: 73 MMHG | WEIGHT: 217 LBS | TEMPERATURE: 97.9 F | SYSTOLIC BLOOD PRESSURE: 124 MMHG

## 2025-04-21 DIAGNOSIS — F11.20 ENCOUNTER FOR METHADONE MAINTENANCE THERAPY (HCC): Primary | ICD-10-CM

## 2025-04-21 PROCEDURE — 99282 EMERGENCY DEPT VISIT SF MDM: CPT

## 2025-04-21 PROCEDURE — 99284 EMERGENCY DEPT VISIT MOD MDM: CPT | Performed by: PHYSICIAN ASSISTANT

## 2025-04-21 RX ORDER — METHADONE HYDROCHLORIDE 10 MG/ML
14 CONCENTRATE ORAL ONCE
Refills: 0 | Status: COMPLETED | OUTPATIENT
Start: 2025-04-21 | End: 2025-04-21

## 2025-04-21 RX ADMIN — METHADONE HYDROCHLORIDE 14 MG: 10 CONCENTRATE ORAL at 07:38

## 2025-04-21 NOTE — ED PROVIDER NOTES
Time reflects when diagnosis was documented in both MDM as applicable and the Disposition within this note       Time User Action Codes Description Comment    4/21/2025  7:31 AM Greg Davidson Add [F11.20] Encounter for methadone maintenance therapy (HCC)           ED Disposition       ED Disposition   Discharge    Condition   Stable    Date/Time   Mon Apr 21, 2025  7:31 AM    Comment   Khalif Hood discharge to home/self care.                   Assessment & Plan       Medical Decision Making  Here for methadone refill, due to nurse not showing up at clinic today.    Spoke with Harley from [facility] who verified clinic is closed today and verified dose:  14 mg.       Confirmed patient information including date of birth and medication dose.      Medical Decision Making    Patient presents to ED today for methadone maintenance therapy.   Patient here requesting methadone dose and no additional concerns or symptoms at this time.  Medical screening exam completed.   Here for methadone refill, due to nurse not showing up at clinic today.        Patient presents to ED today for methadone maintenance therapy.   Patient receives treatment at [facility] but was unable to receive treatment today due to staffing issues and was directed to ED for treatment.   I spoke with Harley, clinic representative by phone at [phone] -  reviewed patient information including date of birth and medication dose.  Patient here requesting methadone dose and no additional concerns or symptoms at this time.  Medical screening exam completed.        Based on my history and physical examination, I considered the following life or limb threatening disease(s) in my differential diagnosis of substance use disorder on methadone including medication side effects, substance use disorder, medication withdrawal, or medication toxicity     Based on my clinical acumen, I will order the appropriate diagnostic testing to  narrow my differential diagnosis and arrive at a final diagnosis.      Initial vital signs normal.  Stable pulse and blood pressure.        On evaluation: well appearing in NAD, vital signs are normal. A&O x 3, airway patent, no respiratory distress, M/S no gross deformity, MEYERS x 4,GCS 15      Final Evaluation:  (see ED course for additional MDM)  Encounter for methadone maintenance therapy    -Pt given PO dose of Methadone liquid in ED.    -Stable for discharge and outpatient management.   -Reviewed diagnosis, treatment plan, and expectant coarse  -Verbal and written instructions given to follow up with pcp and clinic for further treatment.   -Discussed reasons to Return to ED.  Patient verbalized understanding of reasons return to the ED.   -Opportunity provided for questions and all questions were answered.          Risk  Prescription drug management.             Medications   methadone (DOLOPHINE) oral concentrated solution 14 mg (14 mg Oral Given 4/21/25 0738)       ED Risk Strat Scores                    No data recorded                            History of Present Illness       Chief Complaint   Patient presents with    Medication Refill     Methadone clinic closed today. Dose 14 mg        Past Medical History:   Diagnosis Date    Asthma     as a child      Past Surgical History:   Procedure Laterality Date    INCISION AND DRAINAGE OF WOUND Left 06/28/2021    Procedure: INCISION AND DRAINAGE (I&D) LEFT SUBMANDIBULAR SPACE INFECTION, LEFT  SPACE INFECTION WITH EXTRACTION OF TOOTH #19;  Surgeon: Fabi Hood DMD;  Location: [facility];  Service: Maxillofacial    ORIF TIBIA & FIBULA FRACTURES Right     CT ARTHRS AIDED ANT CRUCIATE LIGM RPR/AGMNTJ/RCNSTJ Right 1/17/2024    Procedure: Right - knee arthroscopy Lateral meniscectomy Synovectomy ACL reconstruction with allograft using the Arthrex graft link system Post arthroscopic injection of intra-articular joint space and peripheral portals;   Surgeon: Harvinder Ga DO;  Location: [facility];  Service: Orthopedics    MD ARTHRS KNE SURG W/MENISCECTOMY MED/LAT W/SHVG Right 1/10/2023    Procedure: ARTHROSCOPY KNEE WITH LATERAL MENISCECTOMY;  Surgeon: Harvinder Ga DO;  Location: [facility];  Service: Orthopedics      History reviewed. No pertinent family history.   Social History     Tobacco Use    Smoking status: Former     Current packs/day: 0.50     Types: Cigarettes    Smokeless tobacco: Never   Vaping Use    Vaping status: Former    Start date: 1/1/2023   Substance Use Topics    Alcohol use: Never    Drug use: Not Currently     Frequency: 7.0 times per week     Types: Marijuana     Comment: daily      E-Cigarette/Vaping    E-Cigarette Use Former User     Start Date 1/1/23     Cartridges/Day 0.5       E-Cigarette/Vaping Substances    Nicotine No     THC No     CBD No     Flavoring No     Other No     Unknown No       I have reviewed and agree with the history as documented.     34 y.o. male Patient presents to ED for methadone dose, due to nurse not showing up at clinic today.    Spoke with Harley from [facility] who verified clinic is closed today and verified dose:  14 mg    Case discussed with outside provider.  Spoke with Harley @ [facility] Ph: [phone]      History provided by:  Patient   used: No      Medication Refill  Medications/supplies requested:  Methadone  Reason for request:  Clinic/provider not available  Medications taken before: yes - see home medications    Patient has complete original prescription information: no    Source of information:  Clinic/provider (Case discussed with outside provider.  Spoke with Harley @ [facility] Ph: [phone])          Review of Systems   Constitutional:  Negative for chills, diaphoresis, fever and unexpected weight change.   Eyes:  Negative for visual disturbance.   Respiratory:  Negative for chest tightness, shortness  of breath and stridor.    Cardiovascular:  Negative for chest pain.   Gastrointestinal:  Negative for abdominal pain, nausea and vomiting.   Genitourinary:  Negative for decreased urine volume, difficulty urinating, hematuria and urgency.   Musculoskeletal:  Negative for gait problem.   Skin:  Negative for rash.   Neurological:  Negative for seizures, syncope, facial asymmetry, weakness and numbness.   All other systems reviewed and are negative.          Objective       ED Triage Vitals   Temperature Pulse Blood Pressure Respirations SpO2 Patient Position - Orthostatic VS   04/21/25 0705 04/21/25 0705 04/21/25 0705 04/21/25 0705 04/21/25 0705 04/21/25 0705   97.9 °F (36.6 °C) 62 124/73 16 100 % Sitting      Temp Source Heart Rate Source BP Location FiO2 (%) Pain Score    04/21/25 0705 04/21/25 0705 04/21/25 0705 -- 04/21/25 0738    Temporal Monitor Left arm  Med Not Given for Pain - for MAR use only      Vitals      Date and Time Temp Pulse SpO2 Resp BP Pain Score FACES Pain Rating User   04/21/25 0738 -- -- -- -- -- Med Not Given for Pain - for MAR use only -- FB   04/21/25 0705 97.9 °F (36.6 °C) 62 100 % 16 124/73 -- -- CT            Physical Exam  Vitals and nursing note reviewed.   Constitutional:       General: He is not in acute distress.     Appearance: Normal appearance.   HENT:      Head: Normocephalic and atraumatic.      Right Ear: External ear normal.      Left Ear: External ear normal.      Nose: Nose normal.   Cardiovascular:      Rate and Rhythm: Normal rate.      Pulses: Normal pulses.   Pulmonary:      Effort: Pulmonary effort is normal.      Breath sounds: Normal breath sounds.   Musculoskeletal:         General: No deformity or signs of injury.      Cervical back: Normal range of motion and neck supple.   Skin:     General: Skin is dry.   Neurological:      General: No focal deficit present.      Mental Status: He is alert and oriented to person, place, and time. Mental status is at baseline.    Psychiatric:         Mood and Affect: Mood normal.         Behavior: Behavior normal.         Thought Content: Thought content normal.         Results Reviewed       None            No orders to display       Procedures    ED Medication and Procedure Management   Prior to Admission Medications   Prescriptions Last Dose Informant Patient Reported? Taking?   Melatonin 10 MG TABS  Self No No   Sig: Take 1 tablet (10 mg total) by mouth daily at bedtime as needed (insomnia)   Omega-3 Fatty Acids (fish oil) 1,000 mg  Self Yes No   Sig: Take 1,000 mg by mouth daily   albuterol (Proventil HFA) 90 mcg/act inhaler  Self No No   Sig: Inhale 1-2 puffs every 6 (six) hours as needed for wheezing   amitriptyline (ELAVIL) 10 mg tablet   No No   Sig: Take 2 tablets (20 mg total) by mouth daily at bedtime   ascorbic acid (VITAMIN C) 250 mg tablet  Self Yes No   Sig: Take 250 mg by mouth daily   cholecalciferol (VITAMIN D3) 400 units tablet  Self Yes No   Sig: Take 400 Units by mouth daily   fluticasone-umeclidinium-vilanterol (Trelegy Ellipta) 200-62.5-25 mcg/actuation AEPB inhaler   No No   Sig: Inhale 1 puff daily Rinse mouth after use.   ketorolac (TORADOL) 10 mg tablet  Self No No   Sig: Take 1 tablet (10 mg total) by mouth every 6 (six) hours as needed for moderate pain   Patient not taking: Reported on 1/27/2025   meloxicam (MOBIC) 15 mg tablet   No No   Sig: Take 1 tablet (15 mg total) by mouth daily   Patient taking differently: Take 15 mg by mouth daily PRN   methadone (DOLOPHINE) 10 mg/mL oral concentrated solution  Self Yes No   Sig: Take 25 mg by mouth every morning   polyethylene glycol (MIRALAX) 17 g packet   No No   Sig: Take 17 g by mouth daily for 7 days   vitamin E 100 UNIT capsule  Self Yes No   Sig: Take 100 Units by mouth daily      Facility-Administered Medications: None     Discharge Medication List as of 4/21/2025  7:31 AM        CONTINUE these medications which have NOT CHANGED    Details   albuterol  (Proventil HFA) 90 mcg/act inhaler Inhale 1-2 puffs every 6 (six) hours as needed for wheezing, Starting Mon 10/7/2024, Normal      amitriptyline (ELAVIL) 10 mg tablet Take 2 tablets (20 mg total) by mouth daily at bedtime, Starting Fri 11/22/2024, Normal      ascorbic acid (VITAMIN C) 250 mg tablet Take 250 mg by mouth daily, Historical Med      cholecalciferol (VITAMIN D3) 400 units tablet Take 400 Units by mouth daily, Historical Med      fluticasone-umeclidinium-vilanterol (Trelegy Ellipta) 200-62.5-25 mcg/actuation AEPB inhaler Inhale 1 puff daily Rinse mouth after use., Starting Mon 12/30/2024, Until Thu 12/25/2025, Print      ketorolac (TORADOL) 10 mg tablet Take 1 tablet (10 mg total) by mouth every 6 (six) hours as needed for moderate pain, Starting Sun 11/17/2024, Normal      Melatonin 10 MG TABS Take 1 tablet (10 mg total) by mouth daily at bedtime as needed (insomnia), Starting Thu 10/31/2024, Normal      meloxicam (MOBIC) 15 mg tablet Take 1 tablet (15 mg total) by mouth daily, Starting Wed 1/17/2024, Until Mon 1/27/2025, Normal      methadone (DOLOPHINE) 10 mg/mL oral concentrated solution Take 25 mg by mouth every morning, Historical Med      Omega-3 Fatty Acids (fish oil) 1,000 mg Take 1,000 mg by mouth daily, Historical Med      polyethylene glycol (MIRALAX) 17 g packet Take 17 g by mouth daily for 7 days, Starting Thu 3/20/2025, Until Thu 3/27/2025, Normal      vitamin E 100 UNIT capsule Take 100 Units by mouth daily, Historical Med           No discharge procedures on file.  ED SEPSIS DOCUMENTATION   Time reflects when diagnosis was documented in both MDM as applicable and the Disposition within this note       Time User Action Codes Description Comment    4/21/2025  7:31 AM Greg Davidson Add [F11.20] Encounter for methadone maintenance therapy (HCC)                  Greg Davidson PA-C  04/21/25 3416

## 2025-04-23 ENCOUNTER — OFFICE VISIT (OUTPATIENT)
Dept: PULMONOLOGY | Facility: CLINIC | Age: 35
End: 2025-04-23

## 2025-04-23 ENCOUNTER — PATIENT OUTREACH (OUTPATIENT)
Dept: CASE MANAGEMENT | Facility: OTHER | Age: 35
End: 2025-04-23

## 2025-04-23 VITALS
OXYGEN SATURATION: 97 % | TEMPERATURE: 97.8 F | DIASTOLIC BLOOD PRESSURE: 68 MMHG | HEIGHT: 74 IN | SYSTOLIC BLOOD PRESSURE: 122 MMHG | WEIGHT: 233.6 LBS | BODY MASS INDEX: 29.98 KG/M2 | HEART RATE: 74 BPM | RESPIRATION RATE: 18 BRPM

## 2025-04-23 DIAGNOSIS — J45.50 SEVERE PERSISTENT ASTHMA WITHOUT COMPLICATION: Primary | ICD-10-CM

## 2025-04-23 DIAGNOSIS — F17.201 TOBACCO USE DISORDER, SEVERE, IN EARLY REMISSION: ICD-10-CM

## 2025-04-23 PROCEDURE — 99213 OFFICE O/P EST LOW 20 MIN: CPT

## 2025-04-23 NOTE — ASSESSMENT & PLAN NOTE
- Quit 6 months ago, remains in remission.  Congratulated patient and encouraged him to continue to refrain from smoking

## 2025-04-23 NOTE — PROGRESS NOTES
Follow-up  Visit - Pulmonary Medicine   Name: Khalif Hood      : 1990      MRN: [id number]  Encounter Provider: SHELBY Gutierrez  Encounter Date: 2025   Encounter department: [facility]  :  Assessment & Plan  Severe persistent asthma without complication  - Symptoms well-controlled on Trelegy 200.  Minimal use of rescue inhaler, no recent exacerbations. Approved for [employer] inhaler assistance  -Patient working on getting insurance, for this reason we have held off on completing PFTs  -Will place order for PFTs and [facility] allergy panel testing for when patient does obtain of insurance, can then schedule this  -Continue Trelegy 200 daily and albuterol HFA every 6 hours as needed  -Follow-up in 6 months or sooner if needed       Tobacco use disorder, severe, in early remission  - Quit 6 months ago, remains in remission.  Congratulated patient and encouraged him to continue to refrain from smoking         No follow-ups on file.    History of Present Illness   Khalif Hood is a 34 y.o. male who is here today for a follow-up visit for asthma. Last seen in the office in 2024. His asthma has been better controlled since switching to Trelegy, was previously on Advair 250-50. He had applied for inhaler assistance through [employer] and was approved. He's had no exacerbations since his last office visit.  States his Trelegy has been life-changing.  Rarely requires use of his rescue inhaler.  Does note his symptoms are triggered to certain fumes/perfumes which he tries to avoid.  He quit smoking in October and has remained abstinent.  He denies any allergy symptoms with the change in season.  No chest pain or extremity swelling.    Review of Systems    Aside from what is mentioned in the HPI, ROS is otherwise negative         Medical History Reviewed by provider this encounter:     .    Objective   /68 (BP Location: Right arm, Patient Position: Sitting, Cuff  "Size: Standard)   Pulse 74   Temp 97.8 °F (36.6 °C) (Temporal)   Resp 18   Ht 6' 2\" (1.88 m)   Wt 106 kg (233 lb 9.6 oz)   SpO2 97%   BMI 29.99 kg/m²     Physical Exam  Vitals and nursing note reviewed.   Constitutional:       General: He is not in acute distress.     Appearance: Normal appearance. He is well-developed.   Cardiovascular:      Rate and Rhythm: Normal rate and regular rhythm.      Heart sounds: Normal heart sounds. No murmur heard.  Pulmonary:      Effort: Pulmonary effort is normal. No respiratory distress.      Breath sounds: Normal breath sounds. No decreased breath sounds, wheezing, rhonchi or rales.   Musculoskeletal:         General: No swelling.      Right lower leg: No edema.      Left lower leg: No edema.   Psychiatric:         Mood and Affect: Mood and affect normal.         Behavior: Behavior normal. Behavior is cooperative.           Diagnostic Data:  Labs: I personally reviewed the most recent laboratory data pertinent to today's visit.      Radiology results:  Radiology Results Review: I have reviewed radiology reports from 11/27/24 including: chest xray.      PFT/spirometry results:  No results found for: \"FEV1\", \"FVC\", \"HQD2VJJ\", \"TLC\", \"DLCO\"       Oximetry testing:      Other studies:      SHELBY Gutierrez      "

## 2025-04-23 NOTE — ASSESSMENT & PLAN NOTE
- Symptoms well-controlled on Trelegy 200.  Minimal use of rescue inhaler, no recent exacerbations. Approved for [employer] inhaler assistance  -Patient working on getting insurance, for this reason we have held off on completing PFTs  -Will place order for PFTs and [facility] allergy panel testing for when patient does obtain of insurance, can then schedule this  -Continue Trelegy 200 daily and albuterol HFA every 6 hours as needed  -Follow-up in 6 months or sooner if needed

## 2025-04-23 NOTE — PROGRESS NOTES
Referral received via email Medicaid Report. Referred to High Utilizer Care Plan Committee on 4/22/25 to be reviewed for careplan.  Case placed on department's Rising Risk Watch List.  No further action to be taken pending determination of case review.

## 2025-05-01 ENCOUNTER — PATIENT OUTREACH (OUTPATIENT)
Dept: CASE MANAGEMENT | Facility: OTHER | Age: 35
End: 2025-05-01

## 2025-05-01 DIAGNOSIS — Z59.71 DOES NOT HAVE HEALTH INSURANCE: ICD-10-CM

## 2025-05-01 DIAGNOSIS — Z71.89 ENCOUNTER FOR COORDINATION OF COMPLEX CARE: Primary | ICD-10-CM

## 2025-05-01 NOTE — PROGRESS NOTES
Email received from High Utilizer Committee on 4/30/25.  Committee reviewed and determined a care plan is not appropriate at this time.  No further recommendation from committee.  Preliminary chart review indicates patient does not have health insurance and medical care has been delayed as a result. Patient is referred to OP SW CM as a Rising Risk Utilizer.    Admission or ED Risk Score:  55  IB Message sent to OP SW CM Pool with case communication for care coordination.  OPCM Watch List database updated.

## 2025-05-02 ENCOUNTER — TELEPHONE (OUTPATIENT)
Dept: OBGYN CLINIC | Facility: HOSPITAL | Age: 35
End: 2025-05-02

## 2025-05-02 NOTE — TELEPHONE ENCOUNTER
Called patient. No answer. No VM set  up. If patient having new symptoms or swelling and pain he should come back in for repeat eval. Been over a year since seen in office.

## 2025-05-02 NOTE — TELEPHONE ENCOUNTER
Attempted to call patient again. Still no answer. Left VM advising FU. Asked him to call back to schedule

## 2025-05-02 NOTE — TELEPHONE ENCOUNTER
Caller: Patient    Doctor: Dr. Ga    Reason for call: Khalif had ACL surgery on 1/17/24. He has recently been experiencing some swelling and pain and wondered if he could have a refill of the meloxicam that previously prescribed to him. Please call patient. Thank you.    [id number] - [place] - [address]     Call back#: [phone]

## 2025-05-05 DIAGNOSIS — S83.271D COMPLEX TEAR OF LATERAL MENISCUS OF RIGHT KNEE, UNSPECIFIED WHETHER OLD OR CURRENT TEAR, SUBSEQUENT ENCOUNTER: ICD-10-CM

## 2025-05-05 RX ORDER — MELOXICAM 15 MG/1
15 TABLET ORAL DAILY
Qty: 30 TABLET | Refills: 1 | Status: SHIPPED | OUTPATIENT
Start: 2025-05-05 | End: 2025-06-04

## 2025-05-05 NOTE — TELEPHONE ENCOUNTER
Spoke to patient. Since baseball season has started he has noticed some increased swelling in his knee. We discussed FU apt, however patient currently does not have health insurance. Agreed to sent meloxicam to pharmacy. If this is too expensive he can try 2 aleve, twice daily. If he fails to improve or symptoms worsen, he said he will come back in and just pay out of pocket for an apt.

## 2025-05-07 ENCOUNTER — PATIENT OUTREACH (OUTPATIENT)
Dept: CASE MANAGEMENT | Facility: OTHER | Age: 35
End: 2025-05-07

## 2025-05-07 NOTE — PROGRESS NOTES
EL SOTELO received referral from  team. Patient does not have insurance. EL SOTELO reviewed patient's chart, assigned self to referral and called patient ([phone]). Patient did not answer, EL SOTELO left a message including EL SOTELO contact information and requested a call back. EL SOTELO will attempt to outreach again at a later date.

## 2025-05-14 ENCOUNTER — PATIENT OUTREACH (OUTPATIENT)
Dept: CASE MANAGEMENT | Facility: OTHER | Age: 35
End: 2025-05-14

## 2025-05-14 NOTE — LETTER
[address] [facility] [address]  [phone]    Re: Unable to Reach    5/14/2025       Dear Khalif,    I am a [facility]  and wanted to be certain you had information to contact me should you desire assistance with or have questions about non-medical aspects of your care such as [but not limited to] medical insurance, housing, transportation, material needs, or emergency needs.  If I do not have an answer I will assist you in finding the appropriate agency or individual who can help.      Please feel free to contact me at [phone]. Thank You.    Sincerely,         Marilee Nickerson LCSW

## 2025-05-14 NOTE — PROGRESS NOTES
EL SOTELO received referral from the  team. Patient does not have insurance. EL SOTELO reviewed patient's chart, assigned self to referral and called patient ([phone]) a second time. Patient did not answer. EL SOTELO left a message including EL SOTELO contact information and requested a call back. EL SOTELO will send unable to reach letter via [employer] and close. Please re consult EL SOTELO as needed.

## 2025-08-04 ENCOUNTER — TELEPHONE (OUTPATIENT)
Dept: PULMONOLOGY | Facility: CLINIC | Age: 35
End: 2025-08-04

## 2025-08-04 DIAGNOSIS — R07.89 CHEST TIGHTNESS: ICD-10-CM

## 2025-08-05 RX ORDER — ALBUTEROL SULFATE 90 UG/1
1-2 INHALANT RESPIRATORY (INHALATION) EVERY 6 HOURS PRN
Qty: 18 G | Refills: 2 | Status: SHIPPED | OUTPATIENT
Start: 2025-08-05

## (undated) DEVICE — SMARTGOWN SURGICAL GOWN, XL, LONG: Brand: CONVERTORS

## (undated) DEVICE — ABDOMINAL PAD: Brand: DERMACEA

## (undated) DEVICE — SUT ETHILON 3-0 PS-1 18 IN 1663H

## (undated) DEVICE — SUT CHROMIC 3-0 SH 27 IN G122H

## (undated) DEVICE — SUT ETHILON 3-0 INFS-1 30 IN 669H

## (undated) DEVICE — GAUZE SPONGES,16 PLY: Brand: CURITY

## (undated) DEVICE — PIN DRILL 4MM ACL TIGHTROPE CLSOED EYELET

## (undated) DEVICE — STRAP LITHOTOMY CANDY CANE

## (undated) DEVICE — SYRINGE 10ML LL

## (undated) DEVICE — INTENDED FOR TISSUE SEPARATION, AND OTHER PROCEDURES THAT REQUIRE A SHARP SURGICAL BLADE TO PUNCTURE OR CUT.: Brand: BARD-PARKER ® CARBON RIB-BACK BLADES

## (undated) DEVICE — BLADE SHAVER TORPEDO 4MM 13CM  COOLCUT

## (undated) DEVICE — SUT PLAIN 6-0 PC-1 18 IN 1916G

## (undated) DEVICE — GLOVE INDICATOR PI UNDERGLOVE SZ 7.5 BLUE

## (undated) DEVICE — PADDING CAST 6IN COTTON STRL

## (undated) DEVICE — TIBURON SPLIT SHEET: Brand: CONVERTORS

## (undated) DEVICE — GLOVE INDICATOR PI UNDERGLOVE SZ 8 BLUE

## (undated) DEVICE — TUBING ARTHROSCOPIC WAVE  MAIN PUMP

## (undated) DEVICE — SPONGE STICK WITH PVP-I: Brand: KENDALL

## (undated) DEVICE — DRESSING XEROFORM 2 X 2

## (undated) DEVICE — SUT VICRYL 0 CP-1 27 IN J267H

## (undated) DEVICE — NEEDLE 21 G X 1 1/2 SAFETY

## (undated) DEVICE — READY WET SKIN SCRUB TRAY-LF: Brand: MEDLINE INDUSTRIES, INC.

## (undated) DEVICE — BURR  OVAL 4MM 13CM 8 FLUTE COOLCUT

## (undated) DEVICE — SUT VICRYL 1 CP 27 IN J196H

## (undated) DEVICE — AMBIENT COVAC 50 IFS: Brand: COBLATION

## (undated) DEVICE — ACE WRAP 6 IN XL STERILE

## (undated) DEVICE — MEDI-VAC NON-CONDUCTIVE SUCTION TUBING 6MM X 1.8M (6FT.) L: Brand: CARDINAL HEALTH

## (undated) DEVICE — GLOVE SRG BIOGEL 7

## (undated) DEVICE — REAMER 10MM LOPRFL

## (undated) DEVICE — GLOVE SRG BIOGEL 7.5

## (undated) DEVICE — TOWEL SURG XR DETECT GREEN STRL RFD

## (undated) DEVICE — ASTOUND FABRIC REINFORCED SURGICAL GOWN: Brand: CONVERTORS

## (undated) DEVICE — SYRINGE 5ML LL

## (undated) DEVICE — 3M™ STERI-DRAPE™ U-DRAPE 1015: Brand: STERI-DRAPE™

## (undated) DEVICE — SPECIMEN TISSUE TRAP 12MM RIGID

## (undated) DEVICE — Device

## (undated) DEVICE — GLOVE SRG BIOGEL 8

## (undated) DEVICE — KIT ACL TRANSITIBIAL

## (undated) DEVICE — CONMED SUCTION CONNECTING TUBING, 20' LONG (6.1 M), 9/32" I.D. (7.1 MM): Brand: CONMED

## (undated) DEVICE — BLADE SHAVER EXCALIBUR 4MM 13CM COOLCUT

## (undated) DEVICE — 4-PORT MANIFOLD: Brand: NEPTUNE 2

## (undated) DEVICE — NEEDLE 18 G X 1 1/2 SAFETY

## (undated) DEVICE — LIGHT GLOVE GREEN

## (undated) DEVICE — PREP SURGICAL PURPREP 26ML

## (undated) DEVICE — HEAVY DUTY TABLE COVER: Brand: CONVERTORS

## (undated) DEVICE — ACE WRAP 6 IN STERILE

## (undated) DEVICE — CUFF TOURNIQUET 30 X 4 IN QUICK CONNECT DISP 1BLA

## (undated) DEVICE — FRAZIER SUCTION INSTRUMENT 18 FR W/OBTURATOR, NO CONTROL VENT: Brand: FRAZIER

## (undated) DEVICE — PAD CAST 6 IN COTTON NON STERILE

## (undated) DEVICE — SPONGE LAP 18 X 18 IN STRL RFD

## (undated) DEVICE — GAUZE SPONGES,USP TYPE VII GAUZE, 12 PLY: Brand: CURITY

## (undated) DEVICE — STOCKINETTE,IMPERVIOUS,12X48,STERILE: Brand: MEDLINE

## (undated) DEVICE — DECANTER: Brand: UNBRANDED

## (undated) DEVICE — 1840 FOAM BLOCK NEEDLE COUNTER: Brand: DEVON

## (undated) DEVICE — DRESSING XEROFORM 5 X 9

## (undated) DEVICE — BETHLEHEM UNIVERSAL  ARTHRO PK: Brand: CARDINAL HEALTH

## (undated) DEVICE — SUT VICRYL 2-0 CP-1 27 IN J266H

## (undated) DEVICE — COBAN 6 IN STERILE

## (undated) DEVICE — OCCLUSIVE GAUZE STRIP,3% BISMUTH TRIBROMOPHENATE IN PETROLATUM BLEND: Brand: XEROFORM

## (undated) DEVICE — 3000CC GUARDIAN II: Brand: GUARDIAN

## (undated) DEVICE — 3M™ COBAN™ NL STERILE NON-LATEX SELF-ADHERENT WRAP, 2084S, 4 IN X 5 YD (10 CM X 4,5 M), 18 ROLLS/CASE: Brand: 3M™ COBAN™

## (undated) DEVICE — SUT FIBERWIRE #2 1/2 CIRCLE T-5 38IN AR-7200

## (undated) DEVICE — STERILE MANDIBLE PACK: Brand: CARDINAL HEALTH